# Patient Record
Sex: FEMALE | Race: WHITE | Employment: OTHER | URBAN - NONMETROPOLITAN AREA
[De-identification: names, ages, dates, MRNs, and addresses within clinical notes are randomized per-mention and may not be internally consistent; named-entity substitution may affect disease eponyms.]

---

## 2017-06-21 RX ORDER — NORTRIPTYLINE HYDROCHLORIDE 10 MG/1
CAPSULE ORAL
Qty: 180 CAPSULE | Refills: 1 | Status: SHIPPED | OUTPATIENT
Start: 2017-06-21 | End: 2017-12-06 | Stop reason: SDUPTHER

## 2017-06-21 RX ORDER — NORTRIPTYLINE HYDROCHLORIDE 10 MG/1
10 CAPSULE ORAL 2 TIMES DAILY
COMMUNITY
Start: 2017-03-23 | End: 2017-12-19

## 2017-08-04 RX ORDER — LISINOPRIL 10 MG/1
TABLET ORAL
Qty: 90 TABLET | Refills: 3 | Status: SHIPPED | OUTPATIENT
Start: 2017-08-04 | End: 2018-07-26 | Stop reason: SDUPTHER

## 2017-08-14 RX ORDER — ZOLMITRIPTAN 5 MG/1
TABLET, FILM COATED ORAL
Qty: 27 TABLET | Refills: 1 | Status: SHIPPED | OUTPATIENT
Start: 2017-08-14 | End: 2018-03-18 | Stop reason: SDUPTHER

## 2017-08-26 PROBLEM — G43.009 MIGRAINE WITHOUT AURA: Chronic | Status: ACTIVE | Noted: 2017-08-26

## 2017-08-26 PROBLEM — M50.90 CERVICAL DISC DISEASE: Chronic | Status: ACTIVE | Noted: 2017-08-26

## 2017-08-26 PROBLEM — F32.5 MAJOR DEPRESSIVE DISORDER IN REMISSION (HCC): Chronic | Status: ACTIVE | Noted: 2017-08-26

## 2017-08-26 PROBLEM — K21.9 GASTROESOPHAGEAL REFLUX DISEASE WITHOUT ESOPHAGITIS: Chronic | Status: ACTIVE | Noted: 2017-08-26

## 2017-08-26 PROBLEM — E78.5 HYPERLIPIDEMIA: Chronic | Status: ACTIVE | Noted: 2017-08-26

## 2017-08-26 PROBLEM — I73.00 RAYNAUD'S PHENOMENON: Chronic | Status: ACTIVE | Noted: 2017-08-26

## 2017-08-26 PROBLEM — D51.0 PERNICIOUS ANEMIA: Chronic | Status: ACTIVE | Noted: 2017-08-26

## 2017-08-28 ENCOUNTER — OFFICE VISIT (OUTPATIENT)
Dept: INTERNAL MEDICINE | Age: 57
End: 2017-08-28
Payer: COMMERCIAL

## 2017-08-28 VITALS
SYSTOLIC BLOOD PRESSURE: 122 MMHG | OXYGEN SATURATION: 99 % | DIASTOLIC BLOOD PRESSURE: 78 MMHG | WEIGHT: 151 LBS | HEART RATE: 67 BPM | BODY MASS INDEX: 24.27 KG/M2 | HEIGHT: 66 IN

## 2017-08-28 DIAGNOSIS — E78.2 MIXED HYPERLIPIDEMIA: Primary | Chronic | ICD-10-CM

## 2017-08-28 DIAGNOSIS — F32.5 MAJOR DEPRESSIVE DISORDER WITH SINGLE EPISODE, IN REMISSION (HCC): Chronic | ICD-10-CM

## 2017-08-28 DIAGNOSIS — G43.009 MIGRAINE WITHOUT AURA AND WITHOUT STATUS MIGRAINOSUS, NOT INTRACTABLE: Chronic | ICD-10-CM

## 2017-08-28 DIAGNOSIS — D51.0 PERNICIOUS ANEMIA: Chronic | ICD-10-CM

## 2017-08-28 PROCEDURE — 99396 PREV VISIT EST AGE 40-64: CPT | Performed by: INTERNAL MEDICINE

## 2017-08-28 RX ORDER — VITAMIN B COMPLEX
1 CAPSULE ORAL DAILY
COMMUNITY
End: 2021-03-19

## 2017-08-28 RX ORDER — TIZANIDINE 4 MG/1
4 TABLET ORAL 2 TIMES DAILY PRN
Qty: 90 TABLET | Refills: 1 | Status: SHIPPED | OUTPATIENT
Start: 2017-08-28 | End: 2017-11-29 | Stop reason: SDUPTHER

## 2017-08-28 RX ORDER — CHLORAL HYDRATE 500 MG
1000 CAPSULE ORAL DAILY
COMMUNITY
End: 2018-08-10

## 2017-08-28 RX ORDER — VERAPAMIL HYDROCHLORIDE 240 MG/1
240 CAPSULE, EXTENDED RELEASE ORAL DAILY
COMMUNITY
End: 2017-12-21 | Stop reason: SDUPTHER

## 2017-08-28 RX ORDER — FLUTICASONE PROPIONATE 50 MCG
2 SPRAY, SUSPENSION (ML) NASAL DAILY
COMMUNITY
End: 2018-08-10

## 2017-08-28 RX ORDER — CYANOCOBALAMIN 1000 UG/ML
1000 INJECTION INTRAMUSCULAR; SUBCUTANEOUS
COMMUNITY
End: 2017-08-28 | Stop reason: SDUPTHER

## 2017-08-28 RX ORDER — IVERMECTIN 10 MG/G
CREAM TOPICAL DAILY
COMMUNITY
End: 2019-11-19 | Stop reason: CLARIF

## 2017-08-28 RX ORDER — CYANOCOBALAMIN 1000 UG/ML
1000 INJECTION INTRAMUSCULAR; SUBCUTANEOUS
Qty: 30 ML | Refills: 1 | Status: SHIPPED | OUTPATIENT
Start: 2017-08-28 | End: 2017-09-24 | Stop reason: SDUPTHER

## 2017-08-28 RX ORDER — TIZANIDINE 4 MG/1
4 TABLET ORAL 2 TIMES DAILY PRN
COMMUNITY
End: 2017-08-28 | Stop reason: SDUPTHER

## 2017-08-28 ASSESSMENT — PATIENT HEALTH QUESTIONNAIRE - PHQ9
SUM OF ALL RESPONSES TO PHQ9 QUESTIONS 1 & 2: 0
SUM OF ALL RESPONSES TO PHQ QUESTIONS 1-9: 0
SUM OF ALL RESPONSES TO PHQ QUESTIONS 1-9: 0
2. FEELING DOWN, DEPRESSED OR HOPELESS: 0
2. FEELING DOWN, DEPRESSED OR HOPELESS: 0
1. LITTLE INTEREST OR PLEASURE IN DOING THINGS: 0
SUM OF ALL RESPONSES TO PHQ9 QUESTIONS 1 & 2: 0
1. LITTLE INTEREST OR PLEASURE IN DOING THINGS: 0

## 2017-08-28 ASSESSMENT — ENCOUNTER SYMPTOMS
SHORTNESS OF BREATH: 0
VOICE CHANGE: 0
VOMITING: 0
ANAL BLEEDING: 0
TROUBLE SWALLOWING: 0
DIARRHEA: 0
CONSTIPATION: 0
ABDOMINAL PAIN: 0
SORE THROAT: 0
COUGH: 0
APNEA: 0
NAUSEA: 0
BACK PAIN: 0
BLOOD IN STOOL: 0
RHINORRHEA: 0

## 2017-09-25 RX ORDER — CYANOCOBALAMIN 1000 UG/ML
INJECTION INTRAMUSCULAR; SUBCUTANEOUS
Qty: 3 ML | Refills: 2 | Status: SHIPPED | OUTPATIENT
Start: 2017-09-25 | End: 2018-05-30 | Stop reason: SDUPTHER

## 2017-10-19 RX ORDER — DESVENLAFAXINE 50 MG/1
TABLET, EXTENDED RELEASE ORAL
Qty: 90 TABLET | Refills: 1 | Status: SHIPPED | OUTPATIENT
Start: 2017-10-19 | End: 2018-04-20 | Stop reason: SDUPTHER

## 2017-10-19 NOTE — TELEPHONE ENCOUNTER
Requested Prescriptions     Pending Prescriptions Disp Refills    desvenlafaxine succinate (PRISTIQ) 50 MG TB24 extended release tablet [Pharmacy Med Name: DESVENLAFAXINE SUC ER 50 MG TB] 90 tablet 1     Sig: TAKE 1 TABLET BY MOUTH EVERY DAY

## 2017-11-27 RX ORDER — ESTRADIOL 1 MG/1
TABLET ORAL
Qty: 90 TABLET | Refills: 3 | Status: SHIPPED | OUTPATIENT
Start: 2017-11-27 | End: 2018-08-10

## 2017-11-29 ENCOUNTER — OFFICE VISIT (OUTPATIENT)
Dept: INTERNAL MEDICINE | Age: 57
End: 2017-11-29
Payer: COMMERCIAL

## 2017-11-29 VITALS
TEMPERATURE: 96.5 F | SYSTOLIC BLOOD PRESSURE: 132 MMHG | HEIGHT: 67 IN | WEIGHT: 154.5 LBS | HEART RATE: 76 BPM | OXYGEN SATURATION: 98 % | BODY MASS INDEX: 24.25 KG/M2 | DIASTOLIC BLOOD PRESSURE: 70 MMHG

## 2017-11-29 DIAGNOSIS — R20.0 NUMBNESS IN BOTH HANDS: Primary | ICD-10-CM

## 2017-11-29 DIAGNOSIS — M50.90 CERVICAL DISC DISEASE: Chronic | ICD-10-CM

## 2017-11-29 PROCEDURE — 99213 OFFICE O/P EST LOW 20 MIN: CPT | Performed by: PHYSICIAN ASSISTANT

## 2017-11-29 RX ORDER — TIZANIDINE 4 MG/1
4 TABLET ORAL 2 TIMES DAILY PRN
Qty: 180 TABLET | Refills: 1 | Status: SHIPPED | OUTPATIENT
Start: 2017-11-29 | End: 2018-09-24 | Stop reason: SDUPTHER

## 2017-11-29 ASSESSMENT — ENCOUNTER SYMPTOMS
CHEST TIGHTNESS: 0
DIARRHEA: 0
COLOR CHANGE: 0
EYE PAIN: 0
SHORTNESS OF BREATH: 0
SORE THROAT: 0
EYE REDNESS: 0
WHEEZING: 0
PHOTOPHOBIA: 0
ABDOMINAL PAIN: 0
COUGH: 0
VOMITING: 0
NAUSEA: 0
RHINORRHEA: 0
CONSTIPATION: 0
SINUS PRESSURE: 0

## 2017-11-29 NOTE — PROGRESS NOTES
 Raynaud's phenomenon 8/26/2017       Past Medical History:   Diagnosis Date    Cervical disc disease 8/26/2017    Gastroesophageal reflux disease without esophagitis 8/26/2017    Hyperlipidemia     Major depressive disorder in remission 8/26/2017    Migraine without aura 8/26/2017    Pernicious anemia 8/26/2017    Raynaud's phenomenon 8/26/2017       Prior to Visit Medications    Medication Sig Taking?  Authorizing Provider   estradiol (ESTRACE) 1 MG tablet TAKE 1 TABLET BY MOUTH EVERY DAY Yes Matt Hawk MD   desvenlafaxine succinate (PRISTIQ) 50 MG TB24 extended release tablet TAKE 1 TABLET BY MOUTH EVERY DAY Yes Matt Hawk MD   cyanocobalamin 1000 MCG/ML injection INJECT 1ML MONTHLY Yes Matt Hawk MD   Omega-3 Fatty Acids (FISH OIL) 1000 MG CAPS Take 1,000 mg by mouth daily Yes Historical Provider, MD   Flaxseed Linseed, (FLAX SEEDS PO) Take by mouth Yes Historical Provider, MD   fluticasone (FLONASE) 50 MCG/ACT nasal spray 2 sprays by Nasal route daily Yes Historical Provider, MD   Ivermectin (SOOLANTRA) 1 % CREA Apply topically daily Yes Historical Provider, MD   Eflornithine HCl (VANIQA) 13.9 % CREA Apply topically 2 times daily Yes Historical Provider, MD   verapamil (VERELAN) 240 MG extended release capsule Take 240 mg by mouth daily Yes Historical Provider, MD   b complex vitamins capsule Take 1 capsule by mouth daily Yes Historical Provider, MD   ZOLMitriptan (ZOMIG) 5 MG tablet TAKE 1 TABLET BY MOUTH AT ONSET OF MIGRAINE **MAY REPEAT ONCE AFTER 2 HOURS, MAX 10MG/DAY** Yes Matt Hawk MD   lisinopril (PRINIVIL;ZESTRIL) 10 MG tablet TAKE 1 TABLET BY MOUTH EVERY DAY Yes Matt Hawk MD   nortriptyline (PAMELOR) 10 MG capsule TAKE 2 CAPSULES BY MOUTH AT BEDTIME Yes Matt Hawk MD   nortriptyline (PAMELOR) 10 MG capsule Take 10 mg by mouth 2 times daily Yes Historical Provider, MD   tiZANidine (ZANAFLEX) 4 MG tablet Take 1 tablet by mouth 2 times daily as needed (muscle spasms)  Bora Trevino bruise/bleed easily. Psychiatric/Behavioral: Negative for confusion. The patient is not nervous/anxious. Current Outpatient Prescriptions   Medication Sig Dispense Refill    estradiol (ESTRACE) 1 MG tablet TAKE 1 TABLET BY MOUTH EVERY DAY 90 tablet 3    desvenlafaxine succinate (PRISTIQ) 50 MG TB24 extended release tablet TAKE 1 TABLET BY MOUTH EVERY DAY 90 tablet 1    cyanocobalamin 1000 MCG/ML injection INJECT 1ML MONTHLY 3 mL 2    Omega-3 Fatty Acids (FISH OIL) 1000 MG CAPS Take 1,000 mg by mouth daily      Flaxseed, Linseed, (FLAX SEEDS PO) Take by mouth      fluticasone (FLONASE) 50 MCG/ACT nasal spray 2 sprays by Nasal route daily      Ivermectin (SOOLANTRA) 1 % CREA Apply topically daily      Eflornithine HCl (VANIQA) 13.9 % CREA Apply topically 2 times daily      verapamil (VERELAN) 240 MG extended release capsule Take 240 mg by mouth daily      b complex vitamins capsule Take 1 capsule by mouth daily      ZOLMitriptan (ZOMIG) 5 MG tablet TAKE 1 TABLET BY MOUTH AT ONSET OF MIGRAINE **MAY REPEAT ONCE AFTER 2 HOURS, MAX 10MG/DAY** 27 tablet 1    lisinopril (PRINIVIL;ZESTRIL) 10 MG tablet TAKE 1 TABLET BY MOUTH EVERY DAY 90 tablet 3    nortriptyline (PAMELOR) 10 MG capsule TAKE 2 CAPSULES BY MOUTH AT BEDTIME 180 capsule 1    nortriptyline (PAMELOR) 10 MG capsule Take 10 mg by mouth 2 times daily      tiZANidine (ZANAFLEX) 4 MG tablet Take 1 tablet by mouth 2 times daily as needed (muscle spasms) 180 tablet 1     No current facility-administered medications for this visit. /70   Pulse 76   Temp 96.5 °F (35.8 °C)   Ht 5' 7\" (1.702 m)   Wt 154 lb 8 oz (70.1 kg)   SpO2 98%   BMI 24.20 kg/m²     PHYSICAL EXAM  Physical Exam   Constitutional: Vital signs are normal. She appears well-developed and well-nourished. HENT:   Head: Normocephalic and atraumatic.    Right Ear: External ear normal.   Left Ear: External ear normal.   Nose: Nose normal.   Eyes: Pupils are equal,

## 2017-12-06 RX ORDER — NORTRIPTYLINE HYDROCHLORIDE 10 MG/1
CAPSULE ORAL
Qty: 180 CAPSULE | Refills: 1 | Status: SHIPPED | OUTPATIENT
Start: 2017-12-06 | End: 2018-06-07 | Stop reason: SDUPTHER

## 2017-12-19 ENCOUNTER — OFFICE VISIT (OUTPATIENT)
Dept: INTERNAL MEDICINE | Age: 57
End: 2017-12-19
Payer: COMMERCIAL

## 2017-12-19 VITALS
OXYGEN SATURATION: 99 % | SYSTOLIC BLOOD PRESSURE: 122 MMHG | HEIGHT: 67 IN | WEIGHT: 157.5 LBS | BODY MASS INDEX: 24.72 KG/M2 | TEMPERATURE: 96.4 F | DIASTOLIC BLOOD PRESSURE: 70 MMHG | HEART RATE: 73 BPM

## 2017-12-19 DIAGNOSIS — R20.0 NUMBNESS AND TINGLING IN BOTH HANDS: ICD-10-CM

## 2017-12-19 DIAGNOSIS — R20.2 NUMBNESS AND TINGLING IN BOTH HANDS: ICD-10-CM

## 2017-12-19 DIAGNOSIS — M54.2 NECK PAIN: Primary | ICD-10-CM

## 2017-12-19 DIAGNOSIS — R20.2 PARESTHESIA OF SKIN: ICD-10-CM

## 2017-12-19 PROCEDURE — 99213 OFFICE O/P EST LOW 20 MIN: CPT | Performed by: PHYSICIAN ASSISTANT

## 2017-12-19 RX ORDER — CYCLOSPORINE 0.5 MG/ML
EMULSION OPHTHALMIC
Refills: 3 | COMMUNITY
Start: 2017-12-01 | End: 2019-05-14 | Stop reason: CLARIF

## 2017-12-19 ASSESSMENT — ENCOUNTER SYMPTOMS
DIARRHEA: 0
COLOR CHANGE: 0
ABDOMINAL PAIN: 0
RHINORRHEA: 0
WHEEZING: 0
CHEST TIGHTNESS: 0
SINUS PRESSURE: 0
SHORTNESS OF BREATH: 0
CONSTIPATION: 0
PHOTOPHOBIA: 0
EYE PAIN: 0
COUGH: 0
SORE THROAT: 0
VOMITING: 0
EYE REDNESS: 0
NAUSEA: 0

## 2017-12-19 NOTE — PROGRESS NOTES
tablet TAKE 1 TABLET BY MOUTH AT ONSET OF MIGRAINE **MAY REPEAT ONCE AFTER 2 HOURS, MAX 10MG/DAY** Yes Nicole Danielle MD   lisinopril (PRINIVIL;ZESTRIL) 10 MG tablet TAKE 1 TABLET BY MOUTH EVERY DAY Yes Nicole Danielle MD   Flaxseed, Linseed, (FLAX SEEDS PO) Take by mouth  Historical Provider, MD       Past Surgical History:   Procedure Laterality Date    RIGOBERTO AND BSO         Family History   Problem Relation Age of Onset    High Blood Pressure Mother     Coronary Art Dis Mother     Thyroid Disease Mother      Hypothyroidism    Colon Polyps Father     Prostate Cancer Father     Anemia Father      Pernicious    Colon Polyps Sister     Anemia Brother      Pernicious    High Blood Pressure Paternal Grandmother        Allergies   Allergen Reactions    Pcn [Penicillins] Rash       Social History     Social History    Marital status:      Spouse name: N/A    Number of children: N/A    Years of education: N/A     Occupational History    Not on file. Social History Main Topics    Smoking status: Never Smoker    Smokeless tobacco: Never Used    Alcohol use Yes    Drug use: No    Sexual activity: Not on file     Other Topics Concern    Not on file     Social History Narrative    No narrative on file       Review of Systems  Review of Systems   Constitutional: Negative for activity change, appetite change, chills, fatigue and fever. HENT: Negative for congestion, ear pain, postnasal drip, rhinorrhea, sinus pressure, sneezing and sore throat. Eyes: Negative for photophobia, pain and redness. Respiratory: Negative for cough, chest tightness, shortness of breath and wheezing. Cardiovascular: Negative for chest pain, palpitations and leg swelling. Gastrointestinal: Negative for abdominal pain, constipation, diarrhea, nausea and vomiting. Genitourinary: Negative for dysuria, frequency, hematuria and urgency. Musculoskeletal: Positive for neck pain.  Negative for arthralgias, gait problem, hopefully with the sensation on the side of her neck. Patient states that she is not interested in seeing neurosurgery at this time. So we'll not rush to get MRI. Patient follow-up as needed for increased paresthesia, weakness and numbness and tingling in hands increases or as needed. Orders Placed This Encounter   Procedures   [facility]        Return if symptoms worsen or fail to improve. All questions answered. Patient voices understanding and agrees to plans along with risks and benefits of plan. Patient is instructed to continue prior medications, diet, and exercise plans as instructed. Patient agrees to follow up as instructions, sooner if needed, or to go to ER if condition becomes emergent. Additional Instructions: As always, patient is advised to bring in medication bottles in order to correctly reconcile with our current list.      Myesha Murguia PA-C    EMR Dragon/transcription disclaimer: Much of this encounter note is electronic transcription/translation of spoken language to printed text. The electronic translation of spoken language may be erroneous, or at times, nonsensical words or phrases may be inadvertently transcribed.  Although I have reviewed the note for such errors, some may still exist.

## 2017-12-21 RX ORDER — VERAPAMIL HYDROCHLORIDE 240 MG/1
CAPSULE, EXTENDED RELEASE ORAL
Qty: 90 CAPSULE | Refills: 0 | Status: SHIPPED | OUTPATIENT
Start: 2017-12-21 | End: 2018-03-18 | Stop reason: SDUPTHER

## 2018-01-02 ENCOUNTER — HOSPITAL ENCOUNTER (OUTPATIENT)
Dept: PHYSICAL THERAPY | Age: 58
Setting detail: THERAPIES SERIES
Discharge: HOME | End: 2018-01-02
Payer: COMMERCIAL

## 2018-01-02 PROCEDURE — G8982 BODY POS GOAL STATUS: HCPCS

## 2018-01-02 PROCEDURE — G8981 BODY POS CURRENT STATUS: HCPCS

## 2018-01-02 PROCEDURE — 97162 PT EVAL MOD COMPLEX 30 MIN: CPT

## 2018-01-02 ASSESSMENT — PAIN DESCRIPTION - DESCRIPTORS: DESCRIPTORS: ACHING;HEADACHE;SHOOTING

## 2018-01-02 ASSESSMENT — PAIN DESCRIPTION - FREQUENCY: FREQUENCY: CONTINUOUS

## 2018-01-02 ASSESSMENT — PAIN DESCRIPTION - ORIENTATION: ORIENTATION: LEFT

## 2018-01-02 ASSESSMENT — PAIN DESCRIPTION - LOCATION: LOCATION: NECK

## 2018-01-02 ASSESSMENT — PAIN DESCRIPTION - PAIN TYPE: TYPE: ACUTE PAIN

## 2018-01-02 NOTE — PROGRESS NOTES
Aching;Headache;Shooting  Pain Frequency: Continuous  Vital Signs  Patient Currently in Pain: Yes    Vision/Hearing  Vision  Vision: Within Functional Limits (Hx of detached retina. 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    Observation: At rest in sitting, holds head tilted in at least 10 degrees of R SB. Rounded shoulders with slumped posture.     AROM LLE (degrees)  LLE AROM : WNL  AROM RUE (degrees)  RUE AROM : WNL  Spine  Cervical: 50 degrees flexion, 60 degrees extension (pain at end range), 35 degrees SB R, 55 degrees SB L, 100% rotation bilaterally    Strength RUE  R Shoulder Flexion: 5/5  R Shoulder ABduction: 5/5  R Shoulder Internal Rotation: 5/5  R Shoulder External Rotation: 5/5  R Elbow Flexion: 5/5  R Elbow Extension: 5/5  R Forearm Pron: 5/5  R Forearm Sup: 5/5  R Wrist Flexion: 5/5  R Wrist Extension: 5/5  R Wrist Radial Deviation: 5/5  R Wrist Ulnar Deviation: 5/5  Strength LUE  L Shoulder Flexion: 5/5  L Shoulder ABduction: 5/5  L Shoulder Internal Rotation: 5/5  L Shoulder External Rotation: 5/5  L Elbow Flexion: 5/5  L Elbow Extension: 5/5  L Forearm Pron: 5/5  L Forearm Sup: 5/5  L Wrist Flexion: 4+/5  L Wrist Extension: 5/5  L Wrist Radial Deviation: 5/5  L Wrist Ulnar Deviation: 5/5  Motor Control  Gross Motor?: WNL  Additional Measures  Special Tests: Phalen and reverse Phalen do cause mild increase in L fingertip numbness  Other: Treatment Recommendations: Strengthening, ROM, Home Exercise Program, Safety Education & Training, Patient/Caregiver Education & Training, Equipment Evaluation, Education, & procurement, Modalities    G-Code  PT G-Codes  Functional Assessment Tool Used: Neck Pain Disability Index  Score: 32% impairment  Functional Limitation: Changing and maintaining body position  Changing and Maintaining Body Position Current Status (): At least 20 percent but less than 40 percent impaired, limited or restricted  Changing and Maintaining Body Position Goal Status (): At least 1 percent but less than 20 percent impaired, limited or restricted    OutComes Score                                           Goals  Short term goals  Time Frame for Short term goals: 2-3 weeks  Short term goal 1: Independent with HEP  Short term goal 2: Improve cervical flexion to at least 60 degrees, R SB to at least 50 degrees. Short term goal 3: Report pain on average as 3/10 or less. Long term goals  Time Frame for Long term goals : 4-6 weeks  Long term goal 1: Report reduced frequency and intensity of headaches. Long term goal 2: Report LUE parasthesias less than 50% of the day. Long term goal 3: Normalize posture (neutral head position, as opposed to R or L SB)  Long term goal 4: Improve NPDI score to less than 20% impairment.   Patient Goals   Patient goals : reduce L hand numbness       Therapy Time   Individual Concurrent Group Co-treatment   Time In 1450         Time Out 1535         Minutes 39                 Celeste Figueroa PT   Electronically signed by Celeste Figueroa PT on 1/2/2018 at 4:30 PM

## 2018-01-04 ENCOUNTER — HOSPITAL ENCOUNTER (OUTPATIENT)
Dept: PHYSICAL THERAPY | Age: 58
Setting detail: THERAPIES SERIES
Discharge: HOME | End: 2018-01-04
Payer: COMMERCIAL

## 2018-01-04 PROCEDURE — 97035 APP MDLTY 1+ULTRASOUND EA 15: CPT

## 2018-01-04 PROCEDURE — 97012 MECHANICAL TRACTION THERAPY: CPT

## 2018-01-04 PROCEDURE — 97110 THERAPEUTIC EXERCISES: CPT

## 2018-01-04 NOTE — PROGRESS NOTES
US x 8 min seated at 1.5 W/cm2  Cervical traction: ICT x 15 min @ 15#           Assessment:   Conditions Requiring Skilled Therapeutic Intervention  Body structures, Functions, Activity limitations: Decreased ROM; Decreased strength;Decreased sensation;Decreased high-level IADLs  Assessment: Pt without reports of pain today at rest.  Only reports pain with certain movements and main complaint is stiffness. Pt able to perform routine with min-mod cues. Started out with low reps and intensity. Pt ban routine with only occ reports of tightnes/soreness with stretching and cautioned not to overstretch or go to point of pain. Pt also reports discomfort ant shldr jt with Tband scapular retraction but reports hx of shldr trouble which she also attended therapy for. Pt set up on ICT at 15# tension x 15 min. Pt ban well and indicates that she would like to try inc poundage next visit. Treatment Diagnosis: Neck pain  Patient Education: Plan of care, clinical findings  REQUIRES PT FOLLOW UP: Yes  Treatment Initiated : 1/4/18 ICT @ 15# for 15' and pt to report back on her tolerance next visit; immediately after denied any pain  Discharge Recommendations: Continue to assess pending progress      G-Code:     OutComes Score                                           Goals:  Short term goals  Time Frame for Short term goals: 2-3 weeks  Short term goal 1: Independent with HEP  Short term goal 2: Improve cervical flexion to at least 60 degrees, R SB to at least 50 degrees. Short term goal 3: Report pain on average as 3/10 or less. Long term goals  Time Frame for Long term goals : 4-6 weeks  Long term goal 1: Report reduced frequency and intensity of headaches. Long term goal 2: Report LUE parasthesias less than 50% of the day. Long term goal 3: Normalize posture (neutral head position, as opposed to R or L SB)  Long term goal 4: Improve NPDI score to less than 20% impairment.   Patient Goals   Patient goals : reduce L hand numbness    Plan:    Plan  Times per week: 2x  Plan weeks: 4-6 weeks  Current Treatment Recommendations: Strengthening, ROM, Home Exercise Program, Safety Education & Training, Patient/Caregiver Education & Training, Equipment Evaluation, Education, & procurement, Modalities  Timed Code Treatment Minutes: 45 Minutes (15 min ICT)     Therapy Time   Individual Concurrent Group Co-treatment   Time In 1500         Time Out 1600         Minutes 60         Timed Code Treatment Minutes: 45 Minutes (15 min ICT)       Candie Salmon PTA       Electronically signed by Candie Salmon PTA on 1/4/2018 at 4:03 PM

## 2018-01-08 ENCOUNTER — HOSPITAL ENCOUNTER (OUTPATIENT)
Dept: PHYSICAL THERAPY | Age: 58
Setting detail: THERAPIES SERIES
Discharge: HOME | End: 2018-01-08
Payer: COMMERCIAL

## 2018-01-08 PROCEDURE — 97012 MECHANICAL TRACTION THERAPY: CPT

## 2018-01-08 PROCEDURE — 97110 THERAPEUTIC EXERCISES: CPT

## 2018-01-09 ENCOUNTER — HOSPITAL ENCOUNTER (OUTPATIENT)
Dept: NEUROLOGY | Age: 58
Discharge: HOME | End: 2018-01-09
Payer: COMMERCIAL

## 2018-01-09 DIAGNOSIS — R20.0 NUMBNESS IN BOTH HANDS: ICD-10-CM

## 2018-01-09 PROCEDURE — 95911 NRV CNDJ TEST 9-10 STUDIES: CPT

## 2018-01-09 PROCEDURE — 95886 MUSC TEST DONE W/N TEST COMP: CPT | Performed by: PSYCHIATRY & NEUROLOGY

## 2018-01-09 PROCEDURE — 95886 MUSC TEST DONE W/N TEST COMP: CPT

## 2018-01-09 PROCEDURE — 95911 NRV CNDJ TEST 9-10 STUDIES: CPT | Performed by: PSYCHIATRY & NEUROLOGY

## 2018-01-11 ENCOUNTER — HOSPITAL ENCOUNTER (OUTPATIENT)
Dept: PHYSICAL THERAPY | Age: 58
Setting detail: THERAPIES SERIES
Discharge: HOME | End: 2018-01-11
Payer: COMMERCIAL

## 2018-01-11 PROCEDURE — 97012 MECHANICAL TRACTION THERAPY: CPT

## 2018-01-11 PROCEDURE — 97110 THERAPEUTIC EXERCISES: CPT

## 2018-01-11 NOTE — PROGRESS NOTES
Physical Therapy  Daily Treatment Note  Date: 2018  Patient Name: Alexandra Beth  MRN: [id number]     :   1960    Subjective:   General  Chart Reviewed: Yes  Additional Pertinent Hx: 60 y/o F who presents with neck pain and L hand numbness, as well as R sided neck parasthesias. PMH includes detached retina, hx migraines, pernicious anemia  Response To Previous Treatment: Patient with no complaints from previous session. Referring Practitioner: RAH Obando  PT Visit Information  PT Insurance Information: BCBS (No precert req'd. Massage and HP/CP not covered)  Total # of Visits Approved:  (Anticipate 8-12)  Total # of Visits to Date: 4  Plan of Care/Certification Expiration Date: 18  Progress Note Due Date: 18  Subjective  Subjective: Pt denies pain but reports she is always stiff. Pt admits she hasn't done any ex since HEP issued. Pain Screening  Patient Currently in Pain: No  Vital Signs  Patient Currently in Pain: No       Treatment Activities:              Exercises  Exercise 1: U/S to upper traps and posterior neck in sitting (more focus on L side) 2.0 watt/cm2 x 8 minutes  Exercise 2: CROM x 10 (rotation, flex/ext, sidebending)  Exercise 3: Chin tucks x 10  Exercise 4: Upper trap stretch x 3 for 10\" ea  Exercise 5: Levator stretch x 3 for 10\" ea  Exercise 6: Scalene stretch x 3 for 10\" ea  Exercise 7: Exaggerated slouch and overcorrection x 10  Exercise 8: Shoulder shrugs x 10  Exercise 9: Backwards shoulder rolls x 10  Exercise 11: Overhead reach---NOT today  Exercise 12: Supine on short thoracic pad (as tolerated) x 3 min arms outstretched (pt enjoys this position)  Exercise 13: Windmills x 5 (diff keeping head in line with arm)  Exercise 14: Rhomboid stretch on pole x 3 for 10\" ea  Exercise 15: Scapular retraction with red t-band x 10  Exercise 16: Standing Is, Ts, Ys-- NOT today  Exercise 17:  Fwd bend rows x 10 ea  Exercise 18: ICT x 15 min @ 20#  Exercise 20: **HEP ISSUED 1/8/18**     Modalities  Ultrasound: US x 8 min seated at 1.5 W/cm2  Cervical traction: ICT x 15 min @ 15#              Assessment:   Conditions Requiring Skilled Therapeutic Intervention  Body structures, Functions, Activity limitations: Decreased ROM; Decreased strength;Decreased sensation;Decreased high-level IADLs  Assessment: Pt cont to report only stiffness. Pt denies performing HEP at home to date. Pt with good effort today. Mild discomfort with stretching ex. Some diff with keeping head and arm lined up with windmills (this added today). Pt with no reports of pain only stiffness, but feels looser upon visit end. Treatment Diagnosis: Neck pain      G-Code:     OutComes Score                                           Goals:  Short term goals  Time Frame for Short term goals: 2-3 weeks  Short term goal 1: Independent with HEP (HEP issued 1/8/18)  Short term goal 2: Improve cervical flexion to at least 60 degrees, R SB to at least 50 degrees. Short term goal 3: Report pain on average as 3/10 or less. Long term goals  Time Frame for Long term goals : 4-6 weeks  Long term goal 1: Report reduced frequency and intensity of headaches. Long term goal 2: Report LUE parasthesias less than 50% of the day. Long term goal 3: Normalize posture (neutral head position, as opposed to R or L SB)  Long term goal 4: Improve NPDI score to less than 20% impairment.   Patient Goals   Patient goals : reduce L hand numbness    Plan:    Plan  Times per week: 2x  Plan weeks: 4-6 weeks  Current Treatment Recommendations: Strengthening, ROM, Home Exercise Program, Safety Education & Training, Patient/Caregiver Education & Training, Equipment Evaluation, Education, & procurement, Modalities  Timed Code Treatment Minutes: 41 Minutes (15 min ICT)     Therapy Time   Individual Concurrent Group Co-treatment   Time In 1455         Time Out 1551         Minutes 56         Timed Code Treatment Minutes: 41 Minutes (15 min ICT)       Dario Elizabeth

## 2018-01-15 ENCOUNTER — HOSPITAL ENCOUNTER (OUTPATIENT)
Dept: PHYSICAL THERAPY | Age: 58
Setting detail: THERAPIES SERIES
End: 2018-01-15
Payer: COMMERCIAL

## 2018-01-18 ENCOUNTER — HOSPITAL ENCOUNTER (OUTPATIENT)
Dept: PHYSICAL THERAPY | Age: 58
Setting detail: THERAPIES SERIES
Discharge: HOME | End: 2018-01-18
Payer: COMMERCIAL

## 2018-01-18 PROCEDURE — 97012 MECHANICAL TRACTION THERAPY: CPT

## 2018-01-18 PROCEDURE — 97110 THERAPEUTIC EXERCISES: CPT

## 2018-01-22 ENCOUNTER — HOSPITAL ENCOUNTER (OUTPATIENT)
Dept: PHYSICAL THERAPY | Age: 58
Setting detail: THERAPIES SERIES
Discharge: HOME | End: 2018-01-22
Payer: COMMERCIAL

## 2018-01-22 PROCEDURE — 97110 THERAPEUTIC EXERCISES: CPT

## 2018-01-22 PROCEDURE — 97012 MECHANICAL TRACTION THERAPY: CPT

## 2018-01-22 PROCEDURE — 97035 APP MDLTY 1+ULTRASOUND EA 15: CPT

## 2018-01-22 ASSESSMENT — PAIN DESCRIPTION - DESCRIPTORS: DESCRIPTORS: TINGLING;NUMBNESS

## 2018-01-22 ASSESSMENT — PAIN DESCRIPTION - ORIENTATION: ORIENTATION: LEFT;RIGHT

## 2018-01-22 ASSESSMENT — PAIN SCALES - GENERAL: PAINLEVEL_OUTOF10: 1

## 2018-01-22 ASSESSMENT — PAIN DESCRIPTION - PAIN TYPE: TYPE: ACUTE PAIN

## 2018-01-22 ASSESSMENT — PAIN DESCRIPTION - LOCATION: LOCATION: NECK

## 2018-01-22 ASSESSMENT — PAIN DESCRIPTION - FREQUENCY: FREQUENCY: INTERMITTENT

## 2018-01-22 NOTE — PROGRESS NOTES
head in line with arm, required tactile cues and v.c.)  Exercise 14: Rhomboid stretch on pole x 3 for 10\" ea  Exercise 15: Scapular retraction with red t-band x 10  Exercise 16: Standing Is, Ts, Ys-- 1 x 10 ea  Exercise 17: Fwd bend rows x 10 ea  Exercise 18: ICT x 15 min @ 23#  Exercise 20: **HEP ISSUED 1/8/18**     Modalities  Ultrasound: US x 8 min seated at 1.5 W/cm2--bilateral UT's  Cervical traction: ICT x 20 min @ 23#         Assessment:   Conditions Requiring Skilled Therapeutic Intervention  Body structures, Functions, Activity limitations: Decreased ROM; Decreased strength;Decreased sensation;Decreased high-level IADLs  Assessment: Patient continues to do well the program.  She was able to recall approx 75% of her program.  Requiring min to occ mod verbal cues to perform ex with good tech. She reports overall less neck pain since initiation of therapy. However, the intensity of the symptoms have unchanged. Treatment Diagnosis: Neck pain                     Goals:  Short term goals  Time Frame for Short term goals: 2-3 weeks  Short term goal 1: Independent with HEP (HEP issued 1/8/18)  Short term goal 2: Improve cervical flexion to at least 60 degrees, R SB to at least 50 degrees. Short term goal 3: Report pain on average as 3/10 or less. Long term goals  Time Frame for Long term goals : 4-6 weeks  Long term goal 1: Report reduced frequency and intensity of headaches. (01/18/18: Patient says she is still having headaches and could not determine if frequency or intensity had changed.)  Long term goal 2: Report LUE parasthesias less than 50% of the day. (01/18/2018: patient reports numbness has not changed any)  Long term goal 3: Normalize posture (neutral head position, as opposed to R or L SB)  Long term goal 4: Improve NPDI score to less than 20% impairment.   Patient Goals   Patient goals : reduce L hand numbness    Plan:    Plan  Times per week: 2x  Plan weeks: 4-6 weeks  Current Treatment

## 2018-01-25 ENCOUNTER — HOSPITAL ENCOUNTER (OUTPATIENT)
Dept: PHYSICAL THERAPY | Age: 58
Setting detail: THERAPIES SERIES
End: 2018-01-25
Payer: COMMERCIAL

## 2018-01-29 ENCOUNTER — HOSPITAL ENCOUNTER (OUTPATIENT)
Dept: PHYSICAL THERAPY | Age: 58
Setting detail: THERAPIES SERIES
Discharge: HOME | End: 2018-01-29
Payer: COMMERCIAL

## 2018-01-29 PROCEDURE — 97035 APP MDLTY 1+ULTRASOUND EA 15: CPT

## 2018-01-29 PROCEDURE — 97012 MECHANICAL TRACTION THERAPY: CPT

## 2018-01-29 PROCEDURE — 97110 THERAPEUTIC EXERCISES: CPT

## 2018-01-29 ASSESSMENT — PAIN DESCRIPTION - FREQUENCY: FREQUENCY: INTERMITTENT

## 2018-01-29 ASSESSMENT — PAIN SCALES - GENERAL: PAINLEVEL_OUTOF10: 3

## 2018-01-29 ASSESSMENT — PAIN DESCRIPTION - ORIENTATION: ORIENTATION: RIGHT;LEFT

## 2018-01-29 ASSESSMENT — PAIN DESCRIPTION - DESCRIPTORS: DESCRIPTORS: TINGLING;NUMBNESS

## 2018-01-29 ASSESSMENT — PAIN DESCRIPTION - LOCATION: LOCATION: NECK

## 2018-01-29 ASSESSMENT — PAIN DESCRIPTION - PAIN TYPE: TYPE: ACUTE PAIN

## 2018-02-05 ENCOUNTER — HOSPITAL ENCOUNTER (OUTPATIENT)
Dept: PHYSICAL THERAPY | Age: 58
Setting detail: THERAPIES SERIES
Discharge: HOME | End: 2018-02-05
Payer: COMMERCIAL

## 2018-02-05 PROCEDURE — G8983 BODY POS D/C STATUS: HCPCS

## 2018-02-05 PROCEDURE — 97110 THERAPEUTIC EXERCISES: CPT

## 2018-02-05 PROCEDURE — G8982 BODY POS GOAL STATUS: HCPCS

## 2018-02-05 PROCEDURE — 97012 MECHANICAL TRACTION THERAPY: CPT

## 2018-02-05 PROCEDURE — G8981 BODY POS CURRENT STATUS: HCPCS

## 2018-02-05 PROCEDURE — 97035 APP MDLTY 1+ULTRASOUND EA 15: CPT

## 2018-02-05 ASSESSMENT — PAIN DESCRIPTION - LOCATION: LOCATION: NECK;SHOULDER

## 2018-02-05 ASSESSMENT — PAIN SCALES - GENERAL: PAINLEVEL_OUTOF10: 1

## 2018-02-27 ENCOUNTER — TELEPHONE (OUTPATIENT)
Dept: INTERNAL MEDICINE | Age: 58
End: 2018-02-27

## 2018-02-27 NOTE — TELEPHONE ENCOUNTER
Spoke with patient,  She said she called yesterday to cancel her appointment but doesn't remember who she spoke to. It wasn't me. Please call to reschedule.   2/27 1688

## 2018-03-19 RX ORDER — ZOLMITRIPTAN 5 MG/1
TABLET, FILM COATED ORAL
Qty: 27 TABLET | Refills: 1 | Status: SHIPPED | OUTPATIENT
Start: 2018-03-19 | End: 2018-05-21 | Stop reason: SDUPTHER

## 2018-03-19 RX ORDER — VERAPAMIL HYDROCHLORIDE 240 MG/1
TABLET, FILM COATED, EXTENDED RELEASE ORAL
Qty: 90 TABLET | Refills: 3 | Status: SHIPPED | OUTPATIENT
Start: 2018-03-19 | End: 2019-02-04 | Stop reason: SDUPTHER

## 2018-03-23 RX ORDER — VERAPAMIL HYDROCHLORIDE 240 MG/1
TABLET, FILM COATED, EXTENDED RELEASE ORAL
Qty: 90 TABLET | Refills: 0 | Status: SHIPPED | OUTPATIENT
Start: 2018-03-23 | End: 2018-08-10

## 2018-03-23 NOTE — TELEPHONE ENCOUNTER
Tien Query called requesting a refill of the below medication which has been pended for you:     Requested Prescriptions     Pending Prescriptions Disp Refills    verapamil (CALAN SR) 240 MG extended release tablet [Pharmacy Med Name: VERAPAMIL  MG TABLET] 90 tablet 0     Sig: TAKE 1 CAPSULE DAILY       Last Appointment Date: Visit date not found  Next Appointment Date: Visit date not found    Allergies   Allergen Reactions    Pcn [Penicillins] Rash

## 2018-04-20 RX ORDER — DESVENLAFAXINE 50 MG/1
TABLET, EXTENDED RELEASE ORAL
Qty: 90 TABLET | Refills: 3 | Status: SHIPPED | OUTPATIENT
Start: 2018-04-20 | End: 2019-04-17 | Stop reason: SDUPTHER

## 2018-05-21 RX ORDER — ZOLMITRIPTAN 5 MG/1
TABLET, FILM COATED ORAL
Qty: 9 TABLET | Refills: 2 | Status: SHIPPED | OUTPATIENT
Start: 2018-05-21 | End: 2019-05-14 | Stop reason: CLARIF

## 2018-05-29 ENCOUNTER — TELEPHONE (OUTPATIENT)
Dept: INTERNAL MEDICINE | Age: 58
End: 2018-05-29

## 2018-05-29 DIAGNOSIS — G43.009 MIGRAINE WITHOUT AURA AND RESPONSIVE TO TREATMENT: Primary | ICD-10-CM

## 2018-05-30 RX ORDER — CYANOCOBALAMIN 1000 UG/ML
INJECTION INTRAMUSCULAR; SUBCUTANEOUS
Qty: 3 ML | Refills: 2 | Status: SHIPPED | OUTPATIENT
Start: 2018-05-30 | End: 2019-05-08 | Stop reason: SDUPTHER

## 2018-06-05 RX ORDER — RIZATRIPTAN BENZOATE 10 MG/1
10 TABLET ORAL
Qty: 30 TABLET | Refills: 3 | Status: SHIPPED | OUTPATIENT
Start: 2018-06-05 | End: 2019-11-19 | Stop reason: CLARIF

## 2018-06-08 RX ORDER — NORTRIPTYLINE HYDROCHLORIDE 10 MG/1
CAPSULE ORAL
Qty: 180 CAPSULE | Refills: 1 | Status: SHIPPED | OUTPATIENT
Start: 2018-06-08 | End: 2018-12-03 | Stop reason: SDUPTHER

## 2018-07-26 NOTE — TELEPHONE ENCOUNTER
Marcellus Severe called requesting a refill of the below medication which has been pended for you:     Requested Prescriptions     Pending Prescriptions Disp Refills    lisinopril (PRINIVIL;ZESTRIL) 10 MG tablet [Pharmacy Med Name: LISINOPRIL 10 MG TABLET] 90 tablet 3     Sig: TAKE 1 TABLET BY MOUTH EVERY DAY       Last Appointment Date: Visit date not found  Next Appointment Date: Visit date not found    Allergies   Allergen Reactions    Pcn [Penicillins] Rash

## 2018-07-27 RX ORDER — LISINOPRIL 10 MG/1
TABLET ORAL
Qty: 90 TABLET | Refills: 3 | Status: SHIPPED | OUTPATIENT
Start: 2018-07-27 | End: 2019-07-15 | Stop reason: SDUPTHER

## 2018-08-10 ENCOUNTER — OFFICE VISIT (OUTPATIENT)
Dept: INTERNAL MEDICINE | Age: 58
End: 2018-08-10
Payer: COMMERCIAL

## 2018-08-10 VITALS
HEART RATE: 71 BPM | SYSTOLIC BLOOD PRESSURE: 120 MMHG | HEIGHT: 67 IN | BODY MASS INDEX: 24.04 KG/M2 | TEMPERATURE: 97.3 F | WEIGHT: 153.2 LBS | DIASTOLIC BLOOD PRESSURE: 80 MMHG | OXYGEN SATURATION: 96 %

## 2018-08-10 DIAGNOSIS — Z00.00 ROUTINE GENERAL MEDICAL EXAMINATION AT A HEALTH CARE FACILITY: Primary | ICD-10-CM

## 2018-08-10 DIAGNOSIS — I10 ESSENTIAL HYPERTENSION: ICD-10-CM

## 2018-08-10 DIAGNOSIS — Z23 NEED FOR PROPHYLACTIC VACCINATION AND INOCULATION AGAINST VARICELLA: ICD-10-CM

## 2018-08-10 DIAGNOSIS — Z12.11 SCREEN FOR COLON CANCER: ICD-10-CM

## 2018-08-10 DIAGNOSIS — Z12.39 SCREENING FOR BREAST CANCER: ICD-10-CM

## 2018-08-10 DIAGNOSIS — D51.0 PERNICIOUS ANEMIA: ICD-10-CM

## 2018-08-10 DIAGNOSIS — Z23 NEED FOR PROPHYLACTIC VACCINATION AGAINST DIPHTHERIA-TETANUS-PERTUSSIS (DTP): ICD-10-CM

## 2018-08-10 DIAGNOSIS — N95.1 HOT FLASHES DUE TO MENOPAUSE: ICD-10-CM

## 2018-08-10 DIAGNOSIS — F32.5 MAJOR DEPRESSIVE DISORDER WITH SINGLE EPISODE, IN REMISSION (HCC): Chronic | ICD-10-CM

## 2018-08-10 DIAGNOSIS — E78.2 MIXED HYPERLIPIDEMIA: Chronic | ICD-10-CM

## 2018-08-10 DIAGNOSIS — M50.90 CERVICAL DISC DISEASE: Chronic | ICD-10-CM

## 2018-08-10 PROCEDURE — 90715 TDAP VACCINE 7 YRS/> IM: CPT | Performed by: PHYSICIAN ASSISTANT

## 2018-08-10 PROCEDURE — 99214 OFFICE O/P EST MOD 30 MIN: CPT | Performed by: PHYSICIAN ASSISTANT

## 2018-08-10 PROCEDURE — 90471 IMMUNIZATION ADMIN: CPT | Performed by: PHYSICIAN ASSISTANT

## 2018-08-10 RX ORDER — ESTRADIOL 0.5 MG/1
0.5 TABLET ORAL DAILY
Qty: 90 TABLET | Refills: 3 | Status: SHIPPED | OUTPATIENT
Start: 2018-08-10 | End: 2019-01-29 | Stop reason: SDUPTHER

## 2018-08-10 ASSESSMENT — ENCOUNTER SYMPTOMS
WHEEZING: 0
ABDOMINAL PAIN: 0
EYE REDNESS: 0
COLOR CHANGE: 0
CONSTIPATION: 0
RHINORRHEA: 0
DIARRHEA: 0
SINUS PRESSURE: 0
COUGH: 0
CHEST TIGHTNESS: 0
VOMITING: 0
PHOTOPHOBIA: 0
SORE THROAT: 0
EYE PAIN: 0
SHORTNESS OF BREATH: 0
NAUSEA: 0

## 2018-08-10 NOTE — PROGRESS NOTES
[facility]  [address] [facility] [address]  Dept: [phone]  Dept Fax: [phone]  Loc: [phone]      [facility] INTERNAL MEDICINE  OFFICE NOTE      Chief Complaint   Patient presents with    Other     f/u , pt states shes been having alot of hot flashes stopped medications back in may        Moses Magaña is a 60y.o. year old female who is seen for Yearly physical for chronic conditions hypertension, hyperlipidemia, hot flashes, depression. Patient's blood pressure seems to be stable at this time. Patient's current medication regiment seems to be adequate. Patient denies any chest pain, shortness of breath, palpitations. Patient states compliant with taking medication. Patient's hyperlipidemia seems to be fairly well controlled with diet and exercise continue as directed. Patient states trying to properly eat and exercise as directed. Patient did not get labs but will get at her convenience and we were review it and make sure we do not need to adjust medications. Patient's depression seems to be stable on Pristiq 50 mg. Patient states was on Estrace 1 mg daily for hot flashes. Patient states about 2-3 months ago she decided that she no longer wanted to take Estrace so she stopped it abruptly. Patient states when she stopped medication she had very bad hot flashes. Patient states would like to restart the Estrace but try to go down to the 0.5 mg. Patient is due colonoscopy her last colonoscopy was done by Dr. Jai Schultz in 2013. Patient is due her mammogram her last one was December 2016.           Active Ambulatory Problems     Diagnosis Date Noted    Breast density 12/09/2015    Hyperlipidemia 08/26/2017    Migraine without aura 08/26/2017    Pernicious anemia 08/26/2017    Raynaud's phenomenon 08/26/2017    Cervical disc disease 08/26/2017    Major depressive disorder in remission ([facility] 08/26/2017    Gastroesophageal reflux disease without esophagitis 08/26/2017    Numbness in both hands      Resolved Ambulatory Problems     Diagnosis Date Noted    No Resolved Ambulatory Problems     Past Medical History:   Diagnosis Date    Cervical disc disease 8/26/2017    Gastroesophageal reflux disease without esophagitis 8/26/2017    Hyperlipidemia     Major depressive disorder in remission ([facility] 8/26/2017    Migraine without aura 8/26/2017    Pernicious anemia 8/26/2017    Raynaud's phenomenon 8/26/2017       Past Medical History:   Diagnosis Date    Cervical disc disease 8/26/2017    Gastroesophageal reflux disease without esophagitis 8/26/2017    Hyperlipidemia     Major depressive disorder in remission ([facility] 8/26/2017    Migraine without aura 8/26/2017    Pernicious anemia 8/26/2017    Raynaud's phenomenon 8/26/2017       Prior to Visit Medications    Medication Sig Taking? Authorizing Provider   zoster recombinant adjuvanted vaccine (SHINGRIX) 50 MCG SUSR injection Inject 0.5 mLs into the muscle once for 1 dose 50 MCG IM then repeat 2-6 months.  Yes RAH Barker   estradiol (ESTRACE) 0.5 MG tablet Take 1 tablet by mouth daily Yes RAH Barker   lisinopril (PRINIVIL;ZESTRIL) 10 MG tablet TAKE 1 TABLET BY MOUTH EVERY DAY Yes Suzette Bauer MD   nortriptyline (PAMELOR) 10 MG capsule TAKE 2 CAPSULES BY MOUTH AT BEDTIME Yes Suzette Bauer MD   rizatriptan (MAXALT) 10 MG tablet Take 1 tablet by mouth once as needed for Migraine May repeat in 2 hours if needed G43.009 Yes Suzette Bauer MD   cyanocobalamin 1000 MCG/ML injection INJECT 1ML MONTHLY Yes Suzette Bauer MD   ZOLMitriptan (ZOMIG) 5 MG tablet TAKE 1 TABLET AT ONSET OF  MIGRANE MAY REPEAT ONCE    AFTER 2 HOURS MAX OF 10MG  PER DAY Yes Suzette Bauer MD   desvenlafaxine succinate (PRISTIQ) 50 MG TB24 extended release tablet TAKE 1 TABLET BY MOUTH EVERY DAY Yes Suzette Bauer MD   verapamil (CALAN SR) 240 MG extended release tablet TAKE 1 Endocrine:        Hot flashes   Genitourinary: Negative for dysuria, frequency, hematuria and urgency. Musculoskeletal: Negative for arthralgias, gait problem, joint swelling and myalgias. Skin: Negative for color change, pallor and wound. Patient has some rosacea on her cheeks   Neurological: Negative for dizziness, facial asymmetry, speech difficulty, weakness and light-headedness. Hematological: Negative for adenopathy. Does not bruise/bleed easily. Psychiatric/Behavioral: Negative for confusion. The patient is not nervous/anxious. Current Outpatient Prescriptions   Medication Sig Dispense Refill    zoster recombinant adjuvanted vaccine (SHINGRIX) 50 MCG SUSR injection Inject 0.5 mLs into the muscle once for 1 dose 50 MCG IM then repeat 2-6 months.  1 each 1    estradiol (ESTRACE) 0.5 MG tablet Take 1 tablet by mouth daily 90 tablet 3    lisinopril (PRINIVIL;ZESTRIL) 10 MG tablet TAKE 1 TABLET BY MOUTH EVERY DAY 90 tablet 3    nortriptyline (PAMELOR) 10 MG capsule TAKE 2 CAPSULES BY MOUTH AT BEDTIME 180 capsule 1    rizatriptan (MAXALT) 10 MG tablet Take 1 tablet by mouth once as needed for Migraine May repeat in 2 hours if needed G43.009 30 tablet 3    cyanocobalamin 1000 MCG/ML injection INJECT 1ML MONTHLY 3 mL 2    ZOLMitriptan (ZOMIG) 5 MG tablet TAKE 1 TABLET AT ONSET OF  MIGRANE MAY REPEAT ONCE    AFTER 2 HOURS MAX OF 10MG  PER DAY 9 tablet 2    desvenlafaxine succinate (PRISTIQ) 50 MG TB24 extended release tablet TAKE 1 TABLET BY MOUTH EVERY DAY 90 tablet 3    verapamil (CALAN SR) 240 MG extended release tablet TAKE 1 CAPSULE DAILY 90 tablet 3    RESTASIS 0.05 % ophthalmic emulsion 1 DROP BOTH EYES TWICE A DAY AS DIRECTED  3    tiZANidine (ZANAFLEX) 4 MG tablet Take 1 tablet by mouth 2 times daily as needed (muscle spasms) 180 tablet 1    b complex vitamins capsule Take 1 capsule by mouth daily      Ivermectin (SOOLANTRA) 1 % CREA Apply topically daily       No current facility-administered medications for this visit. /80   Pulse 71   Temp 97.3 °F (36.3 °C)   Ht 5' 7\" (1.702 m)   Wt 153 lb 3.2 oz (69.5 kg)   SpO2 96%   BMI 23.99 kg/m²     PHYSICAL EXAM  Physical Exam   Constitutional: Vital signs are normal. She appears well-developed and well-nourished. HENT:   Head: Normocephalic and atraumatic. Right Ear: External ear normal.   Left Ear: External ear normal.   Nose: Nose normal.   Eyes: Pupils are equal, round, and reactive to light. Right eye exhibits no discharge. Left eye exhibits no discharge. Neck: Normal range of motion. No tracheal deviation present. Cardiovascular: Normal rate, regular rhythm and normal heart sounds. Exam reveals no gallop and no friction rub. No murmur heard. Pulmonary/Chest: Effort normal and breath sounds normal. No respiratory distress. She has no wheezes. She has no rales. She exhibits no tenderness. Abdominal: Soft. There is no tenderness. There is no rebound and no guarding. Musculoskeletal: Normal range of motion. She exhibits no tenderness or deformity. Lymphadenopathy:     She has no cervical adenopathy. Neurological: She is alert. Skin: Skin is warm, dry and intact. No lesion and no rash noted. No erythema. Psychiatric: She has a normal mood and affect. Her mood appears not anxious. She does not exhibit a depressed mood. Nursing note and vitals reviewed. ASSESSMENT      ICD-10-CM ICD-9-CM    1. Routine general medical examination at a health care facility Z00.00 V70.0    2. Hot flashes due to menopause N95.1 627.2 estradiol (ESTRACE) 0.5 MG tablet      Vitamin D 1,25 Dihydroxy   3. Mixed hyperlipidemia E78.2 272.2 Comprehensive Metabolic Panel      Lipid Panel      Comprehensive Metabolic Panel      Lipid Panel   4. Screen for colon cancer Z12.11 V76.51 External Referral To Gastroenterology   5.  Screening for breast cancer Z12.31 V76.10 JOANA DIGITAL SCREEN W OR WO CAD BILATERAL has had all questions answered. Patient voices understanding and agrees to plans along with risks and benefits of plan. Patient is instructed to continue prior medications, diet, and exercise plans as instructed. Patient agrees to follow up as instructions, sooner if needed, or to go to ER if condition becomes emergent. Additional Instructions: As always, patient is advised to bring in medication bottles in order to correctly reconcile with our current list.      Koki Panda PA-C    EMR Dragon/transcription disclaimer: Much of this encounter note is electronic transcription/translation of spoken language to printed text. The electronic translation of spoken language may be erroneous, or at times, nonsensical words or phrases may be inadvertently transcribed.  Although I have reviewed the note for such errors, some may still exist.

## 2018-08-13 ENCOUNTER — TELEPHONE (OUTPATIENT)
Dept: INTERNAL MEDICINE | Age: 58
End: 2018-08-13

## 2018-08-16 ENCOUNTER — HOSPITAL ENCOUNTER (OUTPATIENT)
Dept: WOMENS IMAGING | Age: 58
Discharge: HOME | End: 2018-08-16
Payer: COMMERCIAL

## 2018-08-16 DIAGNOSIS — Z12.39 SCREENING FOR BREAST CANCER: ICD-10-CM

## 2018-08-16 PROCEDURE — 77067 SCR MAMMO BI INCL CAD: CPT

## 2018-08-27 RX ORDER — VERAPAMIL HYDROCHLORIDE 240 MG/1
TABLET, FILM COATED, EXTENDED RELEASE ORAL
Qty: 90 TABLET | Refills: 0 | Status: SHIPPED | OUTPATIENT
Start: 2018-08-27 | End: 2018-11-12 | Stop reason: SDUPTHER

## 2018-08-27 NOTE — TELEPHONE ENCOUNTER
Requested Prescriptions     Pending Prescriptions Disp Refills    verapamil (CALAN SR) 240 MG extended release tablet [Pharmacy Med Name: VERAPAMIL  MG TABLET] 90 tablet 0     Sig: TAKE 1 TABLET BY MOUTH DAILY

## 2018-09-17 ENCOUNTER — OFFICE VISIT (OUTPATIENT)
Dept: GASTROENTEROLOGY | Facility: CLINIC | Age: 58
End: 2018-09-17

## 2018-09-17 VITALS
WEIGHT: 151 LBS | DIASTOLIC BLOOD PRESSURE: 62 MMHG | BODY MASS INDEX: 23.7 KG/M2 | TEMPERATURE: 97.4 F | SYSTOLIC BLOOD PRESSURE: 102 MMHG | OXYGEN SATURATION: 98 % | HEIGHT: 67 IN | HEART RATE: 69 BPM

## 2018-09-17 DIAGNOSIS — Z83.71 FAMILY HISTORY OF POLYPS IN THE COLON: ICD-10-CM

## 2018-09-17 DIAGNOSIS — Z86.010 HX OF COLONIC POLYPS: Primary | ICD-10-CM

## 2018-09-17 PROCEDURE — S0285 CNSLT BEFORE SCREEN COLONOSC: HCPCS | Performed by: NURSE PRACTITIONER

## 2018-09-17 RX ORDER — LISINOPRIL 10 MG/1
10 TABLET ORAL DAILY
COMMUNITY
Start: 2018-07-27

## 2018-09-17 RX ORDER — TIZANIDINE 4 MG/1
4 TABLET ORAL EVERY 6 HOURS PRN
COMMUNITY
Start: 2018-06-26

## 2018-09-17 RX ORDER — DESVENLAFAXINE SUCCINATE 50 MG/1
50 TABLET, EXTENDED RELEASE ORAL DAILY
COMMUNITY
Start: 2018-07-21

## 2018-09-17 RX ORDER — ESTRADIOL 0.5 MG/1
TABLET ORAL
COMMUNITY
Start: 2018-08-10

## 2018-09-17 RX ORDER — RIZATRIPTAN BENZOATE 10 MG/1
10 TABLET ORAL ONCE AS NEEDED
COMMUNITY
Start: 2018-09-02

## 2018-09-17 RX ORDER — NORTRIPTYLINE HYDROCHLORIDE 10 MG/1
10 CAPSULE ORAL NIGHTLY
COMMUNITY
Start: 2018-09-05

## 2018-09-17 RX ORDER — VERAPAMIL HYDROCHLORIDE 240 MG/1
240 TABLET, FILM COATED, EXTENDED RELEASE ORAL DAILY
COMMUNITY
Start: 2018-08-27 | End: 2021-10-19 | Stop reason: ALTCHOICE

## 2018-09-17 RX ORDER — SODIUM, POTASSIUM,MAG SULFATES 17.5-3.13G
2 SOLUTION, RECONSTITUTED, ORAL ORAL ONCE
Qty: 2 BOTTLE | Refills: 0 | Status: SHIPPED | OUTPATIENT
Start: 2018-09-17 | End: 2018-09-17

## 2018-09-17 NOTE — PROGRESS NOTES
Chief Complaint   Patient presents with   • Colon Cancer Screening     Patient is here today for colon screening.     Subjective   HPI  Kendal Valladares is a 58 y.o. female who presents as a referral for preventative maintenance. She has no complaints of nausea or vomiting. No change in bowels. No wt loss. No BRBPR. No melena. There is no family hx for colon cancer. No abdominal pain. There was no Cologaurd screening this year.  The patient's last colonoscopy was performed on 12/16/13 with findings of diverticulosis only.  The patient does carry a history of colon polyps.      Past Medical History:   Diagnosis Date   • Headache    • Migraine    • Mitral valve prolapse    • Pernicious anemia    • Raynauds syndrome      Past Surgical History:   Procedure Laterality Date   • COLONOSCOPY  12/16/2013   • DILATATION AND CURETTAGE     • RETINAL DETACHMENT REPAIR  10/2011       Current Outpatient Prescriptions:   •  desvenlafaxine (PRISTIQ) 50 MG 24 hr tablet, , Disp: , Rfl:   •  estradiol (ESTRACE) 0.5 MG tablet, , Disp: , Rfl:   •  Flaxseed, Linseed, (FLAX SEED OIL PO), Take  by mouth., Disp: , Rfl:   •  lisinopril (PRINIVIL,ZESTRIL) 10 MG tablet, , Disp: , Rfl:   •  Multiple Vitamins-Minerals (MULTIVITAL-M PO), Take  by mouth., Disp: , Rfl:   •  nortriptyline (PAMELOR) 10 MG capsule, , Disp: , Rfl:   •  Omega-3 Fatty Acids (FISH OIL PO), Take  by mouth., Disp: , Rfl:   •  rizatriptan (MAXALT) 10 MG tablet, , Disp: , Rfl:   •  tiZANidine (ZANAFLEX) 4 MG tablet, , Disp: , Rfl:   •  verapamil SR (CALAN-SR) 240 MG CR tablet, , Disp: , Rfl:   •  Vit B12-Methionine-Inos-Chol solution, Inject  into the appropriate muscle as directed by prescriber Every 30 (Thirty) Days., Disp: , Rfl:   •  sodium-potassium-magnesium sulfates (SUPREP BOWEL PREP KIT) 17.5-3.13-1.6 GM/180ML solution oral solution, Take 2 bottles by mouth 1 (One) Time for 1 dose. Split dose prep as directed by office instructions provided.  2 bottles = one kit., Disp: 2  "bottle, Rfl: 0  Allergies   Allergen Reactions   • Demerol [Meperidine] GI Intolerance   • Penicillins Rash     Social History     Social History   • Marital status:      Spouse name: N/A   • Number of children: N/A   • Years of education: N/A     Occupational History   • Not on file.     Social History Main Topics   • Smoking status: Former Smoker   • Smokeless tobacco: Never Used   • Alcohol use Yes      Comment: occasional   • Drug use: No   • Sexual activity: Not on file     Other Topics Concern   • Not on file     Social History Narrative   • No narrative on file     Family History   Problem Relation Age of Onset   • Colon polyps Father    • Colon polyps Sister    • Colon cancer Neg Hx        REVIEW OF SYSTEMS  General: well appearing, no fever chills or sweats, no unexplained wt loss  HEENT: no acute visual or hearing disturbances  Cardiovascular: No chest pain or palpitations  Pulmonary: No shortness of breath, coughing, wheezing or hemoptysis  : No burning, urgency, hematuria, or dysuria  Musculoskeletal: No joint pain or stiffness  Peripheral: no edema  Skin: No lesions or rashes  Neuro: No dizziness, headaches, stroke, syncope  Endocrine: No hot or cold intolerances  Hematological: No blood dyscrasias    Objective   Vitals:    09/17/18 1417   BP: 102/62   Pulse: 69   Temp: 97.4 °F (36.3 °C)   SpO2: 98%   Weight: 68.5 kg (151 lb)   Height: 170.2 cm (67\")     Body mass index is 23.65 kg/m².    PHYSICAL EXAM  General: age appropriate well nourished well appearing, no acute distress  Head: normocephalic and atraumatic  Global assessment-supple  Neck-No JVD noted, no lymphadenopathy  Pulmonary-clear to auscultation bilaterally, normal respiratory effort  Cardiovascular-normal rate and rhythm, normal heart sounds, S1 and S2 noted  Abdomen-soft, non tender, non distended, normal bowel sounds all 4 quadrants, no hepatosplenomegaly noted  Extremities-No clubbing cyanosis or edema  Neuro-Non focal, " converses appropriately, awake, alert, oriented    Imaging Results (most recent)     None        Assessment/Plan   Kendal was seen today for colon cancer screening.    Diagnoses and all orders for this visit:    Hx of colonic polyps  -     Case Request; Standing  -     Case Request    Family history of polyps in the colon  Comments:  father and sister  Orders:  -     Case Request; Standing  -     Case Request    Other orders  -     Follow Anesthesia Guidelines / Standing Orders; Future  -     Implement Anesthesia Orders Day of Procedure; Standing  -     Obtain Informed Consent; Standing  -     Verify bowel prep was successful; Standing  -     sodium-potassium-magnesium sulfates (SUPREP BOWEL PREP KIT) 17.5-3.13-1.6 GM/180ML solution oral solution; Take 2 bottles by mouth 1 (One) Time for 1 dose. Split dose prep as directed by office instructions provided.  2 bottles = one kit.      COLONOSCOPY WITH ANESTHESIA (N/A)       Body mass index is 23.65 kg/m². Patient's Body mass index is 23.65 kg/m². BMI is below normal parameters. Recommendations include: no follow-up required.      All risks, benefits, alternatives, and indications of colonoscopy procedure have been discussed with the patient. Risks to include perforation of the colon requiring possible surgery or colostomy, risk of bleeding from biopsies or removal of colon tissue, possibility of missing a colon polyp or cancer, or adverse drug reaction.  Benefits to include the diagnosis and management of disease of the colon and rectum. Alternatives to include barium enema, radiographic evaluation, lab testing or no intervention. Pt verbalizes understanding and agrees.

## 2018-09-18 PROBLEM — Z86.0100 HX OF COLONIC POLYPS: Status: ACTIVE | Noted: 2018-09-18

## 2018-09-18 PROBLEM — Z86.010 HX OF COLONIC POLYPS: Status: ACTIVE | Noted: 2018-09-18

## 2018-09-18 PROBLEM — Z83.719 FAMILY HISTORY OF POLYPS IN THE COLON: Status: ACTIVE | Noted: 2018-09-18

## 2018-09-18 PROBLEM — Z83.71 FAMILY HISTORY OF POLYPS IN THE COLON: Status: ACTIVE | Noted: 2018-09-18

## 2018-09-24 RX ORDER — TIZANIDINE 4 MG/1
4 TABLET ORAL 2 TIMES DAILY PRN
Qty: 180 TABLET | Refills: 1 | Status: SHIPPED | OUTPATIENT
Start: 2018-09-24 | End: 2019-03-24 | Stop reason: SDUPTHER

## 2018-11-12 ENCOUNTER — TELEPHONE (OUTPATIENT)
Dept: GASTROENTEROLOGY | Facility: CLINIC | Age: 58
End: 2018-11-12

## 2018-11-12 ENCOUNTER — ANESTHESIA (OUTPATIENT)
Dept: GASTROENTEROLOGY | Facility: HOSPITAL | Age: 58
End: 2018-11-12

## 2018-11-12 ENCOUNTER — HOSPITAL ENCOUNTER (OUTPATIENT)
Facility: HOSPITAL | Age: 58
Setting detail: HOSPITAL OUTPATIENT SURGERY
Discharge: HOME | End: 2018-11-12
Attending: INTERNAL MEDICINE | Admitting: INTERNAL MEDICINE

## 2018-11-12 ENCOUNTER — ANESTHESIA EVENT (OUTPATIENT)
Dept: GASTROENTEROLOGY | Facility: HOSPITAL | Age: 58
End: 2018-11-12

## 2018-11-12 VITALS
DIASTOLIC BLOOD PRESSURE: 83 MMHG | OXYGEN SATURATION: 100 % | HEART RATE: 76 BPM | SYSTOLIC BLOOD PRESSURE: 115 MMHG | RESPIRATION RATE: 12 BRPM

## 2018-11-12 VITALS
HEIGHT: 67 IN | WEIGHT: 150 LBS | RESPIRATION RATE: 13 BRPM | SYSTOLIC BLOOD PRESSURE: 137 MMHG | OXYGEN SATURATION: 99 % | TEMPERATURE: 98 F | BODY MASS INDEX: 23.54 KG/M2 | HEART RATE: 61 BPM | DIASTOLIC BLOOD PRESSURE: 93 MMHG

## 2018-11-12 PROCEDURE — G0105 COLORECTAL SCRN; HI RISK IND: HCPCS | Performed by: INTERNAL MEDICINE

## 2018-11-12 PROCEDURE — 25010000002 PROPOFOL 10 MG/ML EMULSION: Performed by: NURSE ANESTHETIST, CERTIFIED REGISTERED

## 2018-11-12 RX ORDER — SODIUM CHLORIDE 9 MG/ML
500 INJECTION, SOLUTION INTRAVENOUS CONTINUOUS PRN
Status: DISCONTINUED | OUTPATIENT
Start: 2018-11-12 | End: 2018-11-12 | Stop reason: HOSPADM

## 2018-11-12 RX ORDER — VERAPAMIL HYDROCHLORIDE 240 MG/1
TABLET, FILM COATED, EXTENDED RELEASE ORAL
Qty: 90 TABLET | Refills: 1 | Status: SHIPPED | OUTPATIENT
Start: 2018-11-12 | End: 2019-02-04 | Stop reason: SDUPTHER

## 2018-11-12 RX ORDER — PROPOFOL 10 MG/ML
VIAL (ML) INTRAVENOUS AS NEEDED
Status: DISCONTINUED | OUTPATIENT
Start: 2018-11-12 | End: 2018-11-12 | Stop reason: SURG

## 2018-11-12 RX ORDER — LIDOCAINE HYDROCHLORIDE 20 MG/ML
INJECTION, SOLUTION INFILTRATION; PERINEURAL AS NEEDED
Status: DISCONTINUED | OUTPATIENT
Start: 2018-11-12 | End: 2018-11-12 | Stop reason: SURG

## 2018-11-12 RX ORDER — SODIUM CHLORIDE 0.9 % (FLUSH) 0.9 %
3 SYRINGE (ML) INJECTION AS NEEDED
Status: DISCONTINUED | OUTPATIENT
Start: 2018-11-12 | End: 2018-11-12 | Stop reason: HOSPADM

## 2018-11-12 RX ADMIN — SODIUM CHLORIDE: 0.9 INJECTION, SOLUTION INTRAVENOUS at 07:40

## 2018-11-12 RX ADMIN — LIDOCAINE HYDROCHLORIDE 100 MG: 20 INJECTION, SOLUTION INFILTRATION; PERINEURAL at 07:52

## 2018-11-12 RX ADMIN — PROPOFOL 300 MG: 10 INJECTION, EMULSION INTRAVENOUS at 07:52

## 2018-11-12 NOTE — ANESTHESIA PREPROCEDURE EVALUATION
Anesthesia Evaluation     Patient summary reviewed and Nursing notes reviewed   no history of anesthetic complications:  NPO Solid Status: > 8 hours  NPO Liquid Status: > 8 hours           Airway   Mallampati: I  TM distance: >3 FB  Neck ROM: full  No difficulty expected  Dental      Pulmonary    Cardiovascular   Exercise tolerance: good (4-7 METS)    (+) hypertension, valvular problems/murmurs (asymptomatic) MVP, PVD,       Neuro/Psych  (+) headaches,     GI/Hepatic/Renal/Endo    (-) liver disease, no renal disease, diabetes    Musculoskeletal     Abdominal    Substance History      OB/GYN          Other                        Anesthesia Plan    ASA 2     general     intravenous induction   Anesthetic plan, all risks, benefits, and alternatives have been provided, discussed and informed consent has been obtained with: patient.

## 2018-11-12 NOTE — H&P
Chief Complaint:   Screening    Subjective     HPI:   She presents for screening colonoscopy.  She has 2 first-degree relatives with history of colon polyps.  Last colonoscopy was 5 years ago.  She is not known to have any adenomatous.    Past Medical History:   Past Medical History:   Diagnosis Date   • Headache    • Hypertension    • Migraine    • Mitral valve prolapse    • Pernicious anemia    • Raynauds syndrome        Past Surgical History:  Past Surgical History:   Procedure Laterality Date   • COLONOSCOPY  12/16/2013   • DILATATION AND CURETTAGE     • RETINAL DETACHMENT REPAIR  10/2011        Family History:  Family History   Problem Relation Age of Onset   • Colon polyps Father    • Colon polyps Sister    • Colon cancer Neg Hx        Social History:   reports that she has quit smoking. she has never used smokeless tobacco. She reports that she drinks alcohol. She reports that she does not use drugs.    Medications:   Medications Prior to Admission   Medication Sig Dispense Refill Last Dose   • desvenlafaxine (PRISTIQ) 50 MG 24 hr tablet    11/11/2018 at Unknown time   • estradiol (ESTRACE) 0.5 MG tablet    11/11/2018 at Unknown time   • lisinopril (PRINIVIL,ZESTRIL) 10 MG tablet    11/11/2018 at Unknown time   • Multiple Vitamins-Minerals (MULTIVITAL-M PO) Take  by mouth.   Past Month at Unknown time   • Omega-3 Fatty Acids (FISH OIL PO) Take  by mouth.   Past Month at Unknown time   • rizatriptan (MAXALT) 10 MG tablet    Past Month at Unknown time   • tiZANidine (ZANAFLEX) 4 MG tablet    Past Week at Unknown time   • verapamil SR (CALAN-SR) 240 MG CR tablet    11/11/2018 at Unknown time   • Vit B12-Methionine-Inos-Chol solution Inject  into the appropriate muscle as directed by prescriber Every 30 (Thirty) Days.   Past Week at Unknown time   • nortriptyline (PAMELOR) 10 MG capsule    11/10/2018       Allergies:  Demerol [meperidine] and Penicillins    ROS:    General: Weight stable  Resp: No  "SOA  Cardiovascular: No CP      Objective     /79 (BP Location: Right arm, Patient Position: Sitting)   Pulse 78   Temp 98 °F (36.7 °C) (Temporal)   Resp 18   Ht 170.2 cm (67\")   Wt 68 kg (150 lb)   SpO2 95%   BMI 23.49 kg/m²     Physical Exam   Constitutional: Pt is oriented to person, place, and in no distress.  Eyes: No icterus  ENMT: Unremarkable   Cardiovascular: Normal rate, regular rhythm.    Pulmonary/Chest: No distress.  No audible wheezes  Abdominal: Soft.   Skin: Skin is warm and dry.   Psychiatric: Mood, memory, affect and judgment appear normal.     Assessment/Plan     Diagnosis:  Screening  Family history colon polyps in 2 first-degree relatives.    Anticipated Surgical Procedure:  Colonoscopy    The risks, benefits, and alternatives of colonoscopy were reviewed with the patient today.  Risks including perforation of the colon possibly requiring surgery or colostomy.  Additional risks include risk of bleeding from biopsies or removal of colon tissue.  There is also the risk of a drug reaction or problems with anesthesia.  This will be discussed with the further by the anesthesia team on the day of the procedure.  Lastly there is a possibility of missing a colon polyp or cancer.  The benefits include the diagnosis and management of disease of the colon and rectum.  Alternatives to colonoscopy include barium enema, laboratory testing, radiographic evaluation, or no intervention.  The patient verbalizes understanding and agrees.    Much of this encounter note is an electronic transcription/translation of spoken language to printed text. The electronic translation of spoken language may permit erroneous, or at times, nonsensical words or phrases to be inadvertently transcribed; although I have reviewed the note for such errors, some may still exist.        "

## 2018-12-03 RX ORDER — NORTRIPTYLINE HYDROCHLORIDE 10 MG/1
CAPSULE ORAL
Qty: 180 CAPSULE | Refills: 1 | Status: SHIPPED | OUTPATIENT
Start: 2018-12-03 | End: 2019-06-04 | Stop reason: SDUPTHER

## 2019-01-29 DIAGNOSIS — N95.1 HOT FLASHES DUE TO MENOPAUSE: ICD-10-CM

## 2019-01-29 RX ORDER — ESTRADIOL 0.5 MG/1
0.5 TABLET ORAL DAILY
Qty: 90 TABLET | Refills: 3 | Status: SHIPPED | OUTPATIENT
Start: 2019-01-29 | End: 2020-09-14 | Stop reason: ALTCHOICE

## 2019-02-04 RX ORDER — VERAPAMIL HYDROCHLORIDE 240 MG/1
240 TABLET, FILM COATED, EXTENDED RELEASE ORAL DAILY
Qty: 90 TABLET | Refills: 0 | Status: SHIPPED | OUTPATIENT
Start: 2019-02-04 | End: 2019-04-30 | Stop reason: SDUPTHER

## 2019-02-19 ENCOUNTER — TELEPHONE (OUTPATIENT)
Dept: INTERNAL MEDICINE | Age: 59
End: 2019-02-19

## 2019-02-26 ENCOUNTER — TELEPHONE (OUTPATIENT)
Dept: INTERNAL MEDICINE | Age: 59
End: 2019-02-26

## 2019-02-27 RX ORDER — CIPROFLOXACIN 500 MG/1
500 TABLET, FILM COATED ORAL 2 TIMES DAILY
Qty: 14 TABLET | Refills: 0 | Status: SHIPPED | OUTPATIENT
Start: 2019-02-27 | End: 2019-03-06

## 2019-03-26 RX ORDER — TIZANIDINE 4 MG/1
4 TABLET ORAL 2 TIMES DAILY PRN
Qty: 180 TABLET | Refills: 1 | Status: SHIPPED | OUTPATIENT
Start: 2019-03-26 | End: 2019-09-26 | Stop reason: SDUPTHER

## 2019-04-17 RX ORDER — DESVENLAFAXINE 50 MG/1
TABLET, EXTENDED RELEASE ORAL
Qty: 90 TABLET | Refills: 3 | Status: SHIPPED | OUTPATIENT
Start: 2019-04-17 | End: 2020-04-13

## 2019-05-01 RX ORDER — VERAPAMIL HYDROCHLORIDE 240 MG/1
TABLET, FILM COATED, EXTENDED RELEASE ORAL
Qty: 90 TABLET | Refills: 0 | Status: SHIPPED | OUTPATIENT
Start: 2019-05-01 | End: 2019-07-22 | Stop reason: SDUPTHER

## 2019-05-08 RX ORDER — CYANOCOBALAMIN 1000 UG/ML
INJECTION INTRAMUSCULAR; SUBCUTANEOUS
Qty: 3 ML | Refills: 2 | Status: SHIPPED | OUTPATIENT
Start: 2019-05-08 | End: 2020-01-20

## 2019-05-14 ENCOUNTER — OFFICE VISIT (OUTPATIENT)
Dept: INTERNAL MEDICINE | Age: 59
End: 2019-05-14
Payer: COMMERCIAL

## 2019-05-14 VITALS
DIASTOLIC BLOOD PRESSURE: 66 MMHG | BODY MASS INDEX: 24.01 KG/M2 | HEIGHT: 67 IN | OXYGEN SATURATION: 99 % | SYSTOLIC BLOOD PRESSURE: 110 MMHG | WEIGHT: 153 LBS | HEART RATE: 79 BPM

## 2019-05-14 DIAGNOSIS — M50.90 CERVICAL DISC DISEASE: Chronic | ICD-10-CM

## 2019-05-14 DIAGNOSIS — K21.9 GASTROESOPHAGEAL REFLUX DISEASE WITHOUT ESOPHAGITIS: Chronic | ICD-10-CM

## 2019-05-14 DIAGNOSIS — G43.009 MIGRAINE WITHOUT AURA AND WITHOUT STATUS MIGRAINOSUS, NOT INTRACTABLE: Chronic | ICD-10-CM

## 2019-05-14 DIAGNOSIS — E78.2 MIXED HYPERLIPIDEMIA: Chronic | ICD-10-CM

## 2019-05-14 DIAGNOSIS — D51.0 PERNICIOUS ANEMIA: Primary | Chronic | ICD-10-CM

## 2019-05-14 PROCEDURE — 99214 OFFICE O/P EST MOD 30 MIN: CPT | Performed by: INTERNAL MEDICINE

## 2019-05-14 NOTE — PROGRESS NOTES
Chief Complaint   Patient presents with   Shelley Slater Patient     Dr. Carol Schwab transfer    Hypertension    Hyperlipidemia       HPI: Patient is a new patient to me she previously saw Dr. Amie Simon she comes in to follow-up hypertension hyperlipidemia cervical DJD depression GERD other medical problems she actually is doing very well generally feels well has occasional headache but overall everything is stable she denies chest pressure chest pain dyspnea abdominal pain we reviewed her past medical history her record and her care. Past Medical History:   Diagnosis Date    Cervical disc disease 8/26/2017    Gastroesophageal reflux disease without esophagitis 8/26/2017    Hyperlipidemia     Major depressive disorder in remission ([facility] [address] 8/26/2017    Migraine without aura 8/26/2017    Pernicious anemia 8/26/2017    Raynaud's phenomenon 8/26/2017       Past Surgical History:   Procedure Laterality Date    RIGOBERTO AND BSO         Family History   Problem Relation Age of Onset    High Blood Pressure Mother     Coronary Art Dis Mother     Thyroid Disease Mother         Hypothyroidism    Breast Cancer Mother     Colon Polyps Father     Prostate Cancer Father     Anemia Father         Pernicious    Colon Polyps Sister     Anemia Brother         Pernicious    High Blood Pressure Paternal Grandmother        Social History     Socioeconomic History    Marital status:      Spouse name: Not on file    Number of children: Not on file    Years of education: Not on file    Highest education level: Not on file   Occupational History    Not on file   Social Needs    Financial resource strain: Not on file    Food insecurity:     Worry: Not on file     Inability: Not on file    Transportation needs:     Medical: Not on file     Non-medical: Not on file   Tobacco Use    Smoking status: Never Smoker    Smokeless tobacco: Never Used   Substance and Sexual Activity    Alcohol use:  Yes    Drug use: No    Sexual activity: for chills, fatigue and fever. HENT: Negative for congestion and sinus pressure. Eyes: Negative for discharge and redness. Respiratory: Negative for cough and shortness of breath. Cardiovascular: Negative for chest pain, palpitations and leg swelling. Gastrointestinal: Negative for abdominal distention and abdominal pain. Genitourinary: Negative for dysuria, frequency and urgency. Musculoskeletal: Negative for arthralgias and back pain. Skin: Negative for rash and wound. Neurological: Positive for headaches. Negative for dizziness and light-headedness. Psychiatric/Behavioral: Negative for dysphoric mood and sleep disturbance. The patient is not nervous/anxious. /66 (Site: Left Upper Arm)   Pulse 79   Ht 5' 7\" (1.702 m)   Wt 153 lb (69.4 kg)   SpO2 99%   BMI 23.96 kg/m²   BP Readings from Last 7 Encounters:   05/14/19 110/66   08/10/18 120/80   12/19/17 122/70   11/29/17 132/70   08/28/17 122/78   10/24/16 120/68     Wt Readings from Last 7 Encounters:   05/14/19 153 lb (69.4 kg)   08/10/18 153 lb 3.2 oz (69.5 kg)   12/19/17 157 lb 8 oz (71.4 kg)   11/29/17 154 lb 8 oz (70.1 kg)   08/28/17 151 lb (68.5 kg)   10/24/16 153 lb 9.6 oz (69.7 kg)     BMI Readings from Last 7 Encounters:   05/14/19 23.96 kg/m²   08/10/18 23.99 kg/m²   12/19/17 24.67 kg/m²   11/29/17 24.20 kg/m²   08/28/17 24.37 kg/m²   10/24/16 24.06 kg/m²     Resp Readings from Last 7 Encounters:   10/24/16 16       Physical Exam   Constitutional: She is oriented to person, place, and time. She appears well-developed. No distress. HENT:   Right Ear: External ear normal. Tympanic membrane is not injected. Left Ear: External ear normal. Tympanic membrane is not injected. Mouth/Throat: Oropharynx is clear and moist. No oropharyngeal exudate. Eyes: Conjunctivae are normal. No scleral icterus. Neck: Neck supple. Carotid bruit is not present. No thyroid mass and no thyromegaly present.    Cardiovascular: Normal rate, regular rhythm, S1 normal and S2 normal. Exam reveals no S3 and no S4. No murmur heard. Pulmonary/Chest: Effort normal and breath sounds normal. No respiratory distress. She has no wheezes. She has no rales. Abdominal: Soft. Normal appearance and bowel sounds are normal. She exhibits no distension and no mass. There is no hepatosplenomegaly. There is no tenderness. Musculoskeletal: She exhibits no edema. Lymphadenopathy:     She has no cervical adenopathy. Right: No supraclavicular adenopathy present. Left: No supraclavicular adenopathy present. Neurological: She is alert and oriented to person, place, and time. She has normal strength. No cranial nerve deficit. Skin: Skin is intact. No rash noted.        Results for orders placed or performed during the hospital encounter of 04/08/13   CBC panel item   Result Value Ref Range    WBC 4.76 (L) 4.8 - 10.8 TH/uL    RBC 5.13 4.2 - 5.4 MIL/uL    Hemoglobin 14.0 12.0 - 16.0 G/DL    Hematocrit 42.2 37 - 47 %    MCV 82.3 81 - 99 FL    MCH 27.3 27 - 31 PG    MCHC 33.2 33 - 37 G/DL    RDW 13.9 11.0 - 14.0 %    Platelets 715 015 - 642    MPV 11.3 10.2 - 13.2 FL    Neutrophils # 2.54 1.5 - 7.5 TH/MM3    Lymphocytes 1.74 1.1 - 4.5 TH/MM3    Monocytes 0.28 0.0 - 0.9 TH/MM3    Eosinophils % 0.17 0.0 - 0.60 TH/MM3    Basophils 0.03 0.0 - 0.20 TH/MM3    Neutrophils % 53.3 50 - 70 %    Lymphocytes 36.6 20 - 40 %    Monocytes % 5.9 0 - 10 %    Eosinophils % 3.6 1 - 5 %    Basophils % 0.6 0 - 1 %   TSH   Result Value Ref Range    TSH 2.354 0.35 - 5.5 uIU/mL   T4   Result Value Ref Range    T4, Total 4.9 4.5 - 10.9 UG/DL   COMPREHENSIVE PANEL   Result Value Ref Range    Calcium 8.9 8.5 - 10.3 MG/DL    Glucose 84 MG/DL    CREATININE 0.9 0.3 - 1.3 MG/DL    Gfr Calculated > 60 >59 mL/min    BUN 21 (H) 6 - 20 MG/DL    Total Protein 6.1 (L) 6.4 - 8.3 G/DL    Alb 4.3 3.4 - 4.8 G/DL    Total Bilirubin 0.4 0.3 - 1.2 MG/DL    Alkaline Phosphatase 71 18 - 125 IU/L AST 17 8 - 41 IU/L    Sodium 142 136 - 145 MEQ/L    Potassium 4.4 3.5 - 5.0 mEq/L    Chloride 109 98 - 111 MEQ/L    CO2 27 23 - 29 MEQ/L    ALT 19 7 - 35 IU/L    Anion Gap 6 (L) 7 - 19 MEQ/L   Lipid panel   Result Value Ref Range    Cholesterol, Total 160 MG/DL    Triglycerides 161 (H) 35 - 160 MG/DL    HDL 50 MG/DL    LDL Direct 71 MG/DL   Uric acid   Result Value Ref Range    Uric Acid 4.7 2.6 - 6.0 MG/DL   Miscellaneous lab test #1   Result Value Ref Range    Miscellaneous Lab Test Result         ASSESSMENT/ PLAN:  1. Pernicious anemia  Chart and labs reviewed continue current care and follow-up and follow-up labs reviewed continue current plan of care. - CBC; Future  - Vitamin B12; Future    2. Mixed hyperlipidemia  As above stable follow continue current plan of care  - Comprehensive Metabolic Panel; Future  - TSH without Reflex; Future  - Lipid Panel; Future  - Hemoglobin A1C; Future    3. Cervical disc disease  Stable with the present management and follow    4. Gastroesophageal reflux disease without esophagitis  Stable    5.  Migraine without aura and without status migrainosus, not intractable  Stable    We reviewed her routine care and screening and will follow up

## 2019-06-02 ASSESSMENT — ENCOUNTER SYMPTOMS
ABDOMINAL PAIN: 0
SINUS PRESSURE: 0
EYE DISCHARGE: 0
ABDOMINAL DISTENTION: 0
SHORTNESS OF BREATH: 0
BACK PAIN: 0
COUGH: 0
EYE REDNESS: 0

## 2019-06-03 DIAGNOSIS — E78.2 MIXED HYPERLIPIDEMIA: Chronic | ICD-10-CM

## 2019-06-03 DIAGNOSIS — D51.0 PERNICIOUS ANEMIA: Chronic | ICD-10-CM

## 2019-06-03 LAB
ALBUMIN SERPL-MCNC: 4.5 G/DL (ref 3.5–5.2)
ALP BLD-CCNC: 63 U/L (ref 35–104)
ALT SERPL-CCNC: 16 U/L (ref 5–33)
ANION GAP SERPL CALCULATED.3IONS-SCNC: 15 MMOL/L (ref 7–19)
AST SERPL-CCNC: 15 U/L (ref 5–32)
BILIRUB SERPL-MCNC: 0.3 MG/DL (ref 0.2–1.2)
BUN BLDV-MCNC: 22 MG/DL (ref 8–23)
CALCIUM SERPL-MCNC: 9.6 MG/DL (ref 8.8–10.2)
CHLORIDE BLD-SCNC: 104 MMOL/L (ref 98–111)
CHOLESTEROL, TOTAL: 178 MG/DL (ref 160–199)
CO2: 26 MMOL/L (ref 22–29)
CREAT SERPL-MCNC: 1 MG/DL (ref 0.5–0.9)
GFR NON-AFRICAN AMERICAN: 56
GLUCOSE BLD-MCNC: 98 MG/DL (ref 74–109)
HBA1C MFR BLD: 5.3 % (ref 4–6)
HCT VFR BLD CALC: 44 % (ref 37–47)
HDLC SERPL-MCNC: 60 MG/DL (ref 65–121)
HEMOGLOBIN: 14 G/DL (ref 12–16)
LDL CHOLESTEROL CALCULATED: 100 MG/DL
MCH RBC QN AUTO: 26.6 PG (ref 27–31)
MCHC RBC AUTO-ENTMCNC: 31.8 G/DL (ref 33–37)
MCV RBC AUTO: 83.5 FL (ref 81–99)
PDW BLD-RTO: 14.1 % (ref 11.5–14.5)
PLATELET # BLD: 221 K/UL (ref 130–400)
PMV BLD AUTO: 11.8 FL (ref 9.4–12.3)
POTASSIUM SERPL-SCNC: 4.9 MMOL/L (ref 3.5–5)
RBC # BLD: 5.27 M/UL (ref 4.2–5.4)
SODIUM BLD-SCNC: 145 MMOL/L (ref 136–145)
TOTAL PROTEIN: 6.5 G/DL (ref 6.6–8.7)
TRIGL SERPL-MCNC: 90 MG/DL (ref 0–149)
TSH SERPL DL<=0.05 MIU/L-ACNC: 3.07 UIU/ML (ref 0.27–4.2)
VITAMIN B-12: >2000 PG/ML (ref 211–946)
WBC # BLD: 5.5 K/UL (ref 4.8–10.8)

## 2019-06-04 RX ORDER — NORTRIPTYLINE HYDROCHLORIDE 10 MG/1
CAPSULE ORAL
Qty: 180 CAPSULE | Refills: 1 | Status: SHIPPED | OUTPATIENT
Start: 2019-06-04 | End: 2019-12-02 | Stop reason: SDUPTHER

## 2019-06-07 RX ORDER — ZOLMITRIPTAN 5 MG/1
TABLET, FILM COATED ORAL
Qty: 9 TABLET | Refills: 2 | Status: SHIPPED | OUTPATIENT
Start: 2019-06-07 | End: 2019-07-03 | Stop reason: SDUPTHER

## 2019-07-03 RX ORDER — RIZATRIPTAN BENZOATE 10 MG/1
10 TABLET ORAL
Qty: 9 TABLET | Refills: 5 | Status: SHIPPED | OUTPATIENT
Start: 2019-07-03 | End: 2020-08-31 | Stop reason: SDUPTHER

## 2019-07-03 RX ORDER — ZOLMITRIPTAN 5 MG/1
TABLET, FILM COATED ORAL
Qty: 9 TABLET | Refills: 2 | Status: SHIPPED | OUTPATIENT
Start: 2019-07-03 | End: 2019-07-03 | Stop reason: ALTCHOICE

## 2019-07-15 RX ORDER — LISINOPRIL 10 MG/1
TABLET ORAL
Qty: 90 TABLET | Refills: 3 | Status: SHIPPED | OUTPATIENT
Start: 2019-07-15 | End: 2020-07-13

## 2019-07-22 RX ORDER — VERAPAMIL HYDROCHLORIDE 240 MG/1
TABLET, FILM COATED, EXTENDED RELEASE ORAL
Qty: 90 TABLET | Refills: 0 | Status: SHIPPED | OUTPATIENT
Start: 2019-07-22 | End: 2019-10-20 | Stop reason: SDUPTHER

## 2019-08-19 ENCOUNTER — HOSPITAL ENCOUNTER (OUTPATIENT)
Dept: WOMENS IMAGING | Age: 59
Discharge: HOME | End: 2019-08-19
Payer: COMMERCIAL

## 2019-08-19 DIAGNOSIS — Z12.39 BREAST CANCER SCREENING: ICD-10-CM

## 2019-08-19 PROCEDURE — 77067 SCR MAMMO BI INCL CAD: CPT

## 2019-09-27 RX ORDER — TIZANIDINE 4 MG/1
TABLET ORAL
Qty: 180 TABLET | Refills: 1 | Status: SHIPPED | OUTPATIENT
Start: 2019-09-27 | End: 2020-03-25

## 2019-10-21 RX ORDER — VERAPAMIL HYDROCHLORIDE 240 MG/1
TABLET, FILM COATED, EXTENDED RELEASE ORAL
Qty: 90 TABLET | Refills: 3 | Status: SHIPPED | OUTPATIENT
Start: 2019-10-21 | End: 2020-11-02

## 2019-11-05 PROBLEM — H52.10 MYOPIA: Status: ACTIVE | Noted: 2018-09-26

## 2019-11-05 PROBLEM — Z96.1 PSEUDOPHAKIA: Status: ACTIVE | Noted: 2018-09-26

## 2019-11-05 PROBLEM — H04.129 TEAR FILM INSUFFICIENCY: Status: ACTIVE | Noted: 2018-09-26

## 2019-11-05 PROBLEM — H25.10 NUCLEAR SENILE CATARACT: Status: ACTIVE | Noted: 2018-09-26

## 2019-11-19 ENCOUNTER — OFFICE VISIT (OUTPATIENT)
Dept: INTERNAL MEDICINE | Age: 59
End: 2019-11-19
Payer: COMMERCIAL

## 2019-11-19 VITALS
OXYGEN SATURATION: 97 % | HEART RATE: 77 BPM | DIASTOLIC BLOOD PRESSURE: 72 MMHG | WEIGHT: 153 LBS | BODY MASS INDEX: 24.01 KG/M2 | HEIGHT: 67 IN | SYSTOLIC BLOOD PRESSURE: 120 MMHG

## 2019-11-19 DIAGNOSIS — Z00.00 ANNUAL PHYSICAL EXAM: Primary | ICD-10-CM

## 2019-11-19 DIAGNOSIS — F32.5 MAJOR DEPRESSIVE DISORDER WITH SINGLE EPISODE, IN REMISSION (HCC): Chronic | ICD-10-CM

## 2019-11-19 DIAGNOSIS — D51.0 PERNICIOUS ANEMIA: Chronic | ICD-10-CM

## 2019-11-19 DIAGNOSIS — I10 ESSENTIAL HYPERTENSION: ICD-10-CM

## 2019-11-19 DIAGNOSIS — E78.2 MIXED HYPERLIPIDEMIA: Chronic | ICD-10-CM

## 2019-11-19 LAB
ALBUMIN SERPL-MCNC: 4.5 G/DL (ref 3.5–5.2)
ALP BLD-CCNC: 72 U/L (ref 35–104)
ALT SERPL-CCNC: 22 U/L (ref 5–33)
ANION GAP SERPL CALCULATED.3IONS-SCNC: 13 MMOL/L (ref 7–19)
AST SERPL-CCNC: 20 U/L (ref 5–32)
BILIRUB SERPL-MCNC: <0.2 MG/DL (ref 0.2–1.2)
BUN BLDV-MCNC: 17 MG/DL (ref 8–23)
CALCIUM SERPL-MCNC: 9.3 MG/DL (ref 8.8–10.2)
CHLORIDE BLD-SCNC: 104 MMOL/L (ref 98–111)
CO2: 26 MMOL/L (ref 22–29)
CREAT SERPL-MCNC: 0.7 MG/DL (ref 0.5–0.9)
GFR NON-AFRICAN AMERICAN: >60
GLUCOSE BLD-MCNC: 89 MG/DL (ref 74–109)
POTASSIUM SERPL-SCNC: 3.7 MMOL/L (ref 3.5–5)
SODIUM BLD-SCNC: 143 MMOL/L (ref 136–145)
TOTAL PROTEIN: 6.6 G/DL (ref 6.6–8.7)

## 2019-11-19 PROCEDURE — 99396 PREV VISIT EST AGE 40-64: CPT | Performed by: INTERNAL MEDICINE

## 2019-12-01 PROBLEM — I10 ESSENTIAL HYPERTENSION: Status: ACTIVE | Noted: 2019-12-01

## 2019-12-01 ASSESSMENT — ENCOUNTER SYMPTOMS
BACK PAIN: 0
SHORTNESS OF BREATH: 0
ABDOMINAL PAIN: 0
COUGH: 0
EYE DISCHARGE: 0
EYE REDNESS: 0
ABDOMINAL DISTENTION: 0
SINUS PRESSURE: 0

## 2019-12-03 RX ORDER — NORTRIPTYLINE HYDROCHLORIDE 10 MG/1
CAPSULE ORAL
Qty: 180 CAPSULE | Refills: 3 | Status: SHIPPED | OUTPATIENT
Start: 2019-12-03 | End: 2020-11-23

## 2020-01-20 RX ORDER — CYANOCOBALAMIN 1000 UG/ML
INJECTION INTRAMUSCULAR; SUBCUTANEOUS
Qty: 3 ML | Refills: 2 | Status: SHIPPED | OUTPATIENT
Start: 2020-01-20 | End: 2020-08-31 | Stop reason: SDUPTHER

## 2020-03-25 RX ORDER — TIZANIDINE 4 MG/1
TABLET ORAL
Qty: 180 TABLET | Refills: 1 | Status: SHIPPED | OUTPATIENT
Start: 2020-03-25 | End: 2020-08-31 | Stop reason: SDUPTHER

## 2020-04-13 RX ORDER — DESVENLAFAXINE 50 MG/1
TABLET, EXTENDED RELEASE ORAL
Qty: 90 TABLET | Refills: 3 | Status: SHIPPED | OUTPATIENT
Start: 2020-04-13 | End: 2020-09-18

## 2020-06-09 ENCOUNTER — TELEPHONE (OUTPATIENT)
Dept: INTERNAL MEDICINE | Age: 60
End: 2020-06-09

## 2020-06-25 ENCOUNTER — OFFICE VISIT (OUTPATIENT)
Dept: INTERNAL MEDICINE | Age: 60
End: 2020-06-25
Payer: COMMERCIAL

## 2020-06-25 ENCOUNTER — HOSPITAL ENCOUNTER (OUTPATIENT)
Dept: GENERAL RADIOLOGY | Age: 60
Discharge: HOME | End: 2020-06-25
Payer: COMMERCIAL

## 2020-06-25 VITALS
BODY MASS INDEX: 23.54 KG/M2 | OXYGEN SATURATION: 95 % | WEIGHT: 150 LBS | HEIGHT: 67 IN | DIASTOLIC BLOOD PRESSURE: 70 MMHG | SYSTOLIC BLOOD PRESSURE: 118 MMHG | HEART RATE: 70 BPM

## 2020-06-25 DIAGNOSIS — R06.09 DYSPNEA ON EXERTION: ICD-10-CM

## 2020-06-25 DIAGNOSIS — I10 ESSENTIAL HYPERTENSION: ICD-10-CM

## 2020-06-25 LAB
ALBUMIN SERPL-MCNC: 4.7 G/DL (ref 3.5–5.2)
ALP BLD-CCNC: 81 U/L (ref 35–104)
ALT SERPL-CCNC: 24 U/L (ref 5–33)
ANION GAP SERPL CALCULATED.3IONS-SCNC: 12 MMOL/L (ref 7–19)
AST SERPL-CCNC: 22 U/L (ref 5–32)
BILIRUB SERPL-MCNC: 0.3 MG/DL (ref 0.2–1.2)
BUN BLDV-MCNC: 19 MG/DL (ref 8–23)
CALCIUM SERPL-MCNC: 9.9 MG/DL (ref 8.8–10.2)
CHLORIDE BLD-SCNC: 105 MMOL/L (ref 98–111)
CHOLESTEROL, TOTAL: 180 MG/DL (ref 160–199)
CO2: 26 MMOL/L (ref 22–29)
CREAT SERPL-MCNC: 0.9 MG/DL (ref 0.5–0.9)
GFR NON-AFRICAN AMERICAN: >60
GLUCOSE BLD-MCNC: 81 MG/DL (ref 74–109)
HCT VFR BLD CALC: 45.6 % (ref 37–47)
HDLC SERPL-MCNC: 57 MG/DL (ref 65–121)
HEMOGLOBIN: 14.8 G/DL (ref 12–16)
LDL CHOLESTEROL CALCULATED: 109 MG/DL
MCH RBC QN AUTO: 26.7 PG (ref 27–31)
MCHC RBC AUTO-ENTMCNC: 32.5 G/DL (ref 33–37)
MCV RBC AUTO: 82.2 FL (ref 81–99)
PDW BLD-RTO: 15.3 % (ref 11.5–14.5)
PLATELET # BLD: 242 K/UL (ref 130–400)
PMV BLD AUTO: 11.9 FL (ref 9.4–12.3)
POTASSIUM SERPL-SCNC: 5.4 MMOL/L (ref 3.5–5)
RBC # BLD: 5.55 M/UL (ref 4.2–5.4)
SODIUM BLD-SCNC: 143 MMOL/L (ref 136–145)
T4 FREE: 1.07 NG/DL (ref 0.93–1.7)
TOTAL PROTEIN: 6.7 G/DL (ref 6.6–8.7)
TRIGL SERPL-MCNC: 70 MG/DL (ref 0–149)
TSH SERPL DL<=0.05 MIU/L-ACNC: 0.79 UIU/ML (ref 0.27–4.2)
WBC # BLD: 7 K/UL (ref 4.8–10.8)

## 2020-06-25 PROCEDURE — 93000 ELECTROCARDIOGRAM COMPLETE: CPT | Performed by: INTERNAL MEDICINE

## 2020-06-25 PROCEDURE — 71046 X-RAY EXAM CHEST 2 VIEWS: CPT

## 2020-06-25 PROCEDURE — 99213 OFFICE O/P EST LOW 20 MIN: CPT | Performed by: INTERNAL MEDICINE

## 2020-06-25 ASSESSMENT — ENCOUNTER SYMPTOMS
FACIAL SWELLING: 0
EYE REDNESS: 0
SHORTNESS OF BREATH: 1
EYE DISCHARGE: 0
COUGH: 0
CHEST TIGHTNESS: 1
BACK PAIN: 0
ABDOMINAL DISTENTION: 0
ABDOMINAL PAIN: 0

## 2020-06-25 NOTE — PROGRESS NOTES
Negative for discharge and redness. Respiratory: Positive for chest tightness and shortness of breath. Negative for cough. Cardiovascular: Negative for palpitations and leg swelling. Gastrointestinal: Negative for abdominal distention and abdominal pain. Musculoskeletal: Negative for arthralgias and back pain. Skin: Negative for rash and wound. Neurological: Negative for facial asymmetry and light-headedness. Psychiatric/Behavioral: Negative for dysphoric mood and sleep disturbance. The patient is not nervous/anxious. /70 (Site: Left Upper Arm)   Pulse 70   Ht 5' 7\" (1.702 m)   Wt 150 lb (68 kg)   SpO2 95%   BMI 23.49 kg/m²   BP Readings from Last 7 Encounters:   06/25/20 118/70   11/19/19 120/72   05/14/19 110/66   08/10/18 120/80   12/19/17 122/70   11/29/17 132/70   08/28/17 122/78     Wt Readings from Last 7 Encounters:   06/25/20 150 lb (68 kg)   11/19/19 153 lb (69.4 kg)   05/14/19 153 lb (69.4 kg)   08/10/18 153 lb 3.2 oz (69.5 kg)   12/19/17 157 lb 8 oz (71.4 kg)   11/29/17 154 lb 8 oz (70.1 kg)   08/28/17 151 lb (68.5 kg)     BMI Readings from Last 7 Encounters:   06/25/20 23.49 kg/m²   11/19/19 23.96 kg/m²   05/14/19 23.96 kg/m²   08/10/18 23.99 kg/m²   12/19/17 24.67 kg/m²   11/29/17 24.20 kg/m²   08/28/17 24.37 kg/m²     Resp Readings from Last 7 Encounters:   10/24/16 16       Physical Exam  Constitutional:       General: She is not in acute distress. Appearance: Normal appearance. She is well-developed. HENT:      Right Ear: External ear normal. Tympanic membrane is not injected. Left Ear: External ear normal. Tympanic membrane is not injected. Mouth/Throat:      Pharynx: No oropharyngeal exudate. Eyes:      General: No scleral icterus. Conjunctiva/sclera: Conjunctivae normal.   Neck:      Musculoskeletal: Neck supple. Thyroid: No thyroid mass or thyromegaly. Vascular: No carotid bruit.    Cardiovascular:      Rate and Rhythm: Normal rate and regular rhythm. Heart sounds: S1 normal and S2 normal. No murmur. No S3 or S4 sounds. Pulmonary:      Effort: Pulmonary effort is normal. No respiratory distress. Breath sounds: Normal breath sounds. No wheezing or rales. Abdominal:      General: Bowel sounds are normal. There is no distension. Palpations: Abdomen is soft. There is no mass. Tenderness: There is no abdominal tenderness. Lymphadenopathy:      Cervical: No cervical adenopathy. Upper Body:      Right upper body: No supraclavicular adenopathy. Left upper body: No supraclavicular adenopathy. Skin:     Findings: No rash. Neurological:      Mental Status: She is alert and oriented to person, place, and time. Cranial Nerves: No cranial nerve deficit. Results for orders placed or performed in visit on 11/19/19   Comprehensive Metabolic Panel   Result Value Ref Range    Sodium 143 136 - 145 mmol/L    Potassium 3.7 3.5 - 5.0 mmol/L    Chloride 104 98 - 111 mmol/L    CO2 26 22 - 29 mmol/L    Anion Gap 13 7 - 19 mmol/L    Glucose 89 74 - 109 mg/dL    BUN 17 8 - 23 mg/dL    CREATININE 0.7 0.5 - 0.9 mg/dL    GFR Non-African American >60 >60    Calcium 9.3 8.8 - 10.2 mg/dL    Total Protein 6.6 6.6 - 8.7 g/dL    Alb 4.5 3.5 - 5.2 g/dL    Total Bilirubin <0.2 0.2 - 1.2 mg/dL    Alkaline Phosphatase 72 35 - 104 U/L    ALT 22 5 - 33 U/L    AST 20 5 - 32 U/L       ASSESSMENT/ PLAN:  1. Dyspnea on exertion    - TSH without Reflex; Future  - T4, Free; Future  - EKG 12 Lead; Future  - EKG 12 Lead  - XR CHEST STANDARD (2 VW); Future  - ECHO Complete 2D W Doppler W Color; Future  - ECHO Stress Test; Future    2. Essential hypertension    - CBC; Future  - Comprehensive Metabolic Panel; Future  - Lipid Panel; Future  - ECHO Stress Test; Future    3. Pernicious anemia  Continue current care and f/u     4.  Mixed hyperlipidemia  folow

## 2020-06-26 ENCOUNTER — TELEPHONE (OUTPATIENT)
Dept: INTERNAL MEDICINE | Age: 60
End: 2020-06-26

## 2020-06-26 NOTE — TELEPHONE ENCOUNTER
Mary Mclaughlin called requesting status of their referral.    Please return call to update her on status. The best time to call her to accommodate their needs is Anytime    Thank you. *Patient called to get an update on her referral to do a stress test. Please call patient. Thanks.

## 2020-06-29 ENCOUNTER — TELEPHONE (OUTPATIENT)
Dept: INTERNAL MEDICINE | Age: 60
End: 2020-06-29

## 2020-07-02 ENCOUNTER — TELEPHONE (OUTPATIENT)
Dept: INTERNAL MEDICINE | Age: 60
End: 2020-07-02

## 2020-07-08 ENCOUNTER — OFFICE VISIT (OUTPATIENT)
Age: 60
End: 2020-07-08

## 2020-07-10 LAB
REPORT: NORMAL
SARS-COV-2: NOT DETECTED
THIS TEST SENT TO: NORMAL

## 2020-07-13 RX ORDER — LISINOPRIL 10 MG/1
TABLET ORAL
Qty: 90 TABLET | Refills: 3 | Status: SHIPPED | OUTPATIENT
Start: 2020-07-13 | End: 2021-09-07

## 2020-07-21 ENCOUNTER — HOSPITAL ENCOUNTER (OUTPATIENT)
Dept: NON INVASIVE DIAGNOSTICS | Age: 60
Discharge: HOME | End: 2020-07-21
Payer: COMMERCIAL

## 2020-07-21 LAB
LV EF: 50 %
LV EF: 58 %
LVEF MODALITY: NORMAL
LVEF MODALITY: NORMAL

## 2020-07-21 PROCEDURE — 93350 STRESS TTE ONLY: CPT

## 2020-07-21 PROCEDURE — 93306 TTE W/DOPPLER COMPLETE: CPT

## 2020-08-17 ENCOUNTER — OFFICE VISIT (OUTPATIENT)
Dept: INTERNAL MEDICINE | Age: 60
End: 2020-08-17
Payer: COMMERCIAL

## 2020-08-17 VITALS
HEIGHT: 67 IN | DIASTOLIC BLOOD PRESSURE: 80 MMHG | SYSTOLIC BLOOD PRESSURE: 120 MMHG | OXYGEN SATURATION: 96 % | WEIGHT: 151 LBS | HEART RATE: 78 BPM | BODY MASS INDEX: 23.7 KG/M2

## 2020-08-17 PROCEDURE — 99213 OFFICE O/P EST LOW 20 MIN: CPT | Performed by: INTERNAL MEDICINE

## 2020-08-17 NOTE — PROGRESS NOTES
Chief Complaint   Patient presents with    6 Month Follow-Up    Hypertension       HPI: Patient is here today to follow-up hypertension and some recent issues with shortness of breath. She has been short of breath and not feeling quite as well recently she would notice it with exertion though symptoms are resolved she feels back to her usual self feeling well she denies headache chest pain dyspnea abdominal pain. She denies fevers chills rashes cough or congestion    Past Medical History:   Diagnosis Date    Cervical disc disease 8/26/2017    Essential hypertension 12/1/2019    Gastroesophageal reflux disease without esophagitis 8/26/2017    Hyperlipidemia     Major depressive disorder in remission ([facility] [address] 8/26/2017    Migraine without aura 8/26/2017    Pernicious anemia 8/26/2017    Raynaud's phenomenon 8/26/2017       Past Surgical History:   Procedure Laterality Date    RIGOBERTO AND BSO         Family History   Problem Relation Age of Onset    High Blood Pressure Mother     Coronary Art Dis Mother     Thyroid Disease Mother         Hypothyroidism    Breast Cancer Mother     Colon Polyps Father     Prostate Cancer Father     Anemia Father         Pernicious    Colon Polyps Sister     Anemia Brother         Pernicious    High Blood Pressure Paternal Grandmother        Social History     Socioeconomic History    Marital status:      Spouse name: Not on file    Number of children: Not on file    Years of education: Not on file    Highest education level: Not on file   Occupational History    Not on file   Social Needs    Financial resource strain: Not on file    Food insecurity     Worry: Not on file     Inability: Not on file    Transportation needs     Medical: Not on file     Non-medical: Not on file   Tobacco Use    Smoking status: Never Smoker    Smokeless tobacco: Never Used   Substance and Sexual Activity    Alcohol use:  Yes    Drug use: No    Sexual activity: Not on file Lifestyle    Physical activity     Days per week: Not on file     Minutes per session: Not on file    Stress: Not on file   Relationships    Social connections     Talks on phone: Not on file     Gets together: Not on file     Attends Yazidi service: Not on file     Active member of club or organization: Not on file     Attends meetings of clubs or organizations: Not on file     Relationship status: Not on file    Intimate partner violence     Fear of current or ex partner: Not on file     Emotionally abused: Not on file     Physically abused: Not on file     Forced sexual activity: Not on file   Other Topics Concern    Not on file   Social History Narrative    Not on file       Allergies   Allergen Reactions    Demerol  [Meperidine Hcl]      Other reaction(s): Unknown    Pcn [Penicillins] Rash       Current Outpatient Medications   Medication Sig Dispense Refill    lisinopril (PRINIVIL;ZESTRIL) 10 MG tablet TAKE 1 TABLET BY MOUTH EVERY DAY 90 tablet 3    desvenlafaxine succinate (PRISTIQ) 50 MG TB24 extended release tablet TAKE 1 TABLET BY MOUTH EVERY DAY 90 tablet 3    tiZANidine (ZANAFLEX) 4 MG tablet TAKE 1 TABLET BY MOUTH 2 TIMES DAILY AS NEEDED FOR MUSCLE SPASM 180 tablet 1    cyanocobalamin 1000 MCG/ML injection INJECT 1ML MONTHLY 3 mL 2    nortriptyline (PAMELOR) 10 MG capsule TAKE 2 CAPSULES BY MOUTH EVERY DAY AT BEDTIME 180 capsule 3    verapamil (CALAN SR) 240 MG extended release tablet TAKE 1 TABLET BY MOUTH EVERY DAY 90 tablet 3    rizatriptan (MAXALT) 10 MG tablet Take 1 tablet by mouth once as needed for Migraine May repeat in 2 hours if needed 9 tablet 5    estradiol (ESTRACE) 0.5 MG tablet Take 1 tablet by mouth daily (Patient taking differently: Take 0.25 mg by mouth daily ) 90 tablet 3    b complex vitamins capsule Take 1 capsule by mouth daily       No current facility-administered medications for this visit.         Review of Systems   Constitutional: Negative for chills, fatigue and fever. HENT: Negative for congestion and sinus pressure. Eyes: Negative for discharge and redness. Respiratory: Negative for cough and shortness of breath. Cardiovascular: Negative for chest pain, palpitations and leg swelling. Gastrointestinal: Negative for abdominal distention and abdominal pain. Genitourinary: Negative for dysuria, frequency and urgency. Musculoskeletal: Negative for arthralgias and back pain. Skin: Negative for rash and wound. Neurological: Negative for dizziness, light-headedness and headaches. Psychiatric/Behavioral: Negative for dysphoric mood and sleep disturbance. The patient is not nervous/anxious. /80 (Site: Left Upper Arm)   Pulse 78   Ht 5' 7\" (1.702 m)   Wt 151 lb (68.5 kg)   SpO2 96%   BMI 23.65 kg/m²   BP Readings from Last 7 Encounters:   08/17/20 120/80   06/25/20 118/70   11/19/19 120/72   05/14/19 110/66   08/10/18 120/80   12/19/17 122/70   11/29/17 132/70     Wt Readings from Last 7 Encounters:   08/17/20 151 lb (68.5 kg)   06/25/20 150 lb (68 kg)   11/19/19 153 lb (69.4 kg)   05/14/19 153 lb (69.4 kg)   08/10/18 153 lb 3.2 oz (69.5 kg)   12/19/17 157 lb 8 oz (71.4 kg)   11/29/17 154 lb 8 oz (70.1 kg)     BMI Readings from Last 7 Encounters:   08/17/20 23.65 kg/m²   06/25/20 23.49 kg/m²   11/19/19 23.96 kg/m²   05/14/19 23.96 kg/m²   08/10/18 23.99 kg/m²   12/19/17 24.67 kg/m²   11/29/17 24.20 kg/m²     Resp Readings from Last 7 Encounters:   10/24/16 16       Physical Exam  Constitutional:       General: She is not in acute distress. Appearance: Normal appearance. She is well-developed. HENT:      Right Ear: External ear normal. Tympanic membrane is not injected. Left Ear: External ear normal. Tympanic membrane is not injected. Mouth/Throat:      Pharynx: No oropharyngeal exudate. Eyes:      General: No scleral icterus. Conjunctiva/sclera: Conjunctivae normal.   Neck:      Musculoskeletal: Neck supple.

## 2020-08-21 ENCOUNTER — HOSPITAL ENCOUNTER (OUTPATIENT)
Dept: WOMENS IMAGING | Age: 60
Discharge: HOME | End: 2020-08-21
Payer: COMMERCIAL

## 2020-08-21 PROCEDURE — 77063 BREAST TOMOSYNTHESIS BI: CPT

## 2020-08-22 ASSESSMENT — ENCOUNTER SYMPTOMS
BACK PAIN: 0
ABDOMINAL DISTENTION: 0
COUGH: 0
EYE REDNESS: 0
SHORTNESS OF BREATH: 0
EYE DISCHARGE: 0
SINUS PRESSURE: 0
ABDOMINAL PAIN: 0

## 2020-08-31 ENCOUNTER — TELEPHONE (OUTPATIENT)
Dept: INTERNAL MEDICINE | Age: 60
End: 2020-08-31

## 2020-08-31 RX ORDER — RIZATRIPTAN BENZOATE 10 MG/1
10 TABLET ORAL
Qty: 9 TABLET | Refills: 5 | Status: SHIPPED | OUTPATIENT
Start: 2020-08-31 | End: 2020-12-24 | Stop reason: SDUPTHER

## 2020-08-31 RX ORDER — CYANOCOBALAMIN 1000 UG/ML
1000 INJECTION INTRAMUSCULAR; SUBCUTANEOUS
Qty: 3 ML | Refills: 2 | Status: SHIPPED | OUTPATIENT
Start: 2020-08-31 | End: 2020-09-01 | Stop reason: SDUPTHER

## 2020-08-31 RX ORDER — TIZANIDINE 4 MG/1
4 TABLET ORAL EVERY 8 HOURS PRN
Qty: 180 TABLET | Refills: 1 | Status: SHIPPED | OUTPATIENT
Start: 2020-08-31 | End: 2020-09-14

## 2020-08-31 NOTE — TELEPHONE ENCOUNTER
Recently saw Dr. Eugena Barthel for shortness of breath and had testing done for this. It was getting better, but now seems to be getting worse. Just walking from car inside causes dyspnea. Please advise.

## 2020-08-31 NOTE — TELEPHONE ENCOUNTER
I spoke with her -- Out of breath walking car to [facility] --- lightheadedness shortness of breath chest tightness negative cardiac work-up patient worried about PE and symptoms could be consistent we will get a CTA of the chest need to rule out life-threatening conditions and then we will reassess if these tests are negative she has enough symptoms that we would go along with a CTA first  If increased worrisome symptoms go to the ER in the meantime

## 2020-09-01 RX ORDER — CYANOCOBALAMIN 1000 UG/ML
1000 INJECTION INTRAMUSCULAR; SUBCUTANEOUS
Qty: 3 ML | Refills: 2 | Status: SHIPPED | OUTPATIENT
Start: 2020-09-01 | End: 2020-12-24 | Stop reason: SDUPTHER

## 2020-09-02 ENCOUNTER — HOSPITAL ENCOUNTER (INPATIENT)
Age: 60
LOS: 3 days | Discharge: HOME | DRG: 175 | End: 2020-09-05
Attending: EMERGENCY MEDICINE
Payer: COMMERCIAL

## 2020-09-02 ENCOUNTER — APPOINTMENT (OUTPATIENT)
Dept: INTERVENTIONAL RADIOLOGY/VASCULAR | Age: 60
DRG: 175 | End: 2020-09-02
Payer: COMMERCIAL

## 2020-09-02 ENCOUNTER — HOSPITAL ENCOUNTER (OUTPATIENT)
Dept: CT IMAGING | Age: 60
Discharge: HOME | DRG: 175 | End: 2020-09-02
Payer: COMMERCIAL

## 2020-09-02 ENCOUNTER — ANESTHESIA (OUTPATIENT)
Dept: OPERATING ROOM | Age: 60
DRG: 175 | End: 2020-09-02
Payer: COMMERCIAL

## 2020-09-02 ENCOUNTER — ANESTHESIA EVENT (OUTPATIENT)
Dept: OPERATING ROOM | Age: 60
DRG: 175 | End: 2020-09-02
Payer: COMMERCIAL

## 2020-09-02 VITALS
OXYGEN SATURATION: 85 % | RESPIRATION RATE: 3 BRPM | SYSTOLIC BLOOD PRESSURE: 106 MMHG | DIASTOLIC BLOOD PRESSURE: 61 MMHG

## 2020-09-02 PROBLEM — I82.90 VTE (VENOUS THROMBOEMBOLISM): Status: ACTIVE | Noted: 2020-09-02

## 2020-09-02 PROBLEM — I27.21 PULMONARY ARTERIAL HYPERTENSION (HCC): Status: ACTIVE | Noted: 2020-09-02

## 2020-09-02 PROBLEM — I26.99 BILATERAL PULMONARY EMBOLISM (HCC): Status: ACTIVE | Noted: 2020-09-02

## 2020-09-02 LAB
ABO/RH: NORMAL
ALBUMIN SERPL-MCNC: 4.2 G/DL (ref 3.5–5.2)
ALP BLD-CCNC: 105 U/L (ref 35–104)
ALT SERPL-CCNC: 81 U/L (ref 5–33)
ANION GAP SERPL CALCULATED.3IONS-SCNC: 13 MMOL/L (ref 7–19)
ANTIBODY SCREEN: NORMAL
APTT: 23.4 SEC (ref 26–36.2)
APTT: 82.7 SEC (ref 26–36.2)
APTT: >200 SEC (ref 26–36.2)
AST SERPL-CCNC: 77 U/L (ref 5–32)
BASOPHILS ABSOLUTE: 0 K/UL (ref 0–0.2)
BASOPHILS RELATIVE PERCENT: 0.4 % (ref 0–1)
BILIRUB SERPL-MCNC: <0.2 MG/DL (ref 0.2–1.2)
BILIRUBIN URINE: NEGATIVE
BLOOD, URINE: NEGATIVE
BUN BLDV-MCNC: 18 MG/DL (ref 8–23)
CALCIUM SERPL-MCNC: 9 MG/DL (ref 8.8–10.2)
CHLORIDE BLD-SCNC: 104 MMOL/L (ref 98–111)
CLARITY: CLEAR
CO2: 23 MMOL/L (ref 22–29)
COLOR: YELLOW
CREAT SERPL-MCNC: 0.9 MG/DL (ref 0.5–0.9)
D DIMER: 1.3 UG/ML FEU (ref 0–0.48)
EOSINOPHILS ABSOLUTE: 1.4 K/UL (ref 0–0.6)
EOSINOPHILS RELATIVE PERCENT: 15.5 % (ref 0–5)
FIBRINOGEN: 315 MG/DL (ref 238–505)
GFR AFRICAN AMERICAN: >59
GFR AFRICAN AMERICAN: >60
GFR NON-AFRICAN AMERICAN: 50
GFR NON-AFRICAN AMERICAN: >60
GLUCOSE BLD-MCNC: 110 MG/DL (ref 74–109)
GLUCOSE URINE: NEGATIVE MG/DL
HCT VFR BLD CALC: 40.3 % (ref 37–47)
HCT VFR BLD CALC: 44.4 % (ref 37–47)
HEMOGLOBIN: 13.1 G/DL (ref 12–16)
HEMOGLOBIN: 14.3 G/DL (ref 12–16)
IMMATURE GRANULOCYTES #: 0 K/UL
INR BLD: 1.05 (ref 0.88–1.18)
KETONES, URINE: NEGATIVE MG/DL
LEUKOCYTE ESTERASE, URINE: NEGATIVE
LV EF: 75 %
LVEF MODALITY: NORMAL
LYMPHOCYTES ABSOLUTE: 2.4 K/UL (ref 1.1–4.5)
LYMPHOCYTES RELATIVE PERCENT: 26.1 % (ref 20–40)
MCH RBC QN AUTO: 26.4 PG (ref 27–31)
MCH RBC QN AUTO: 26.7 PG (ref 27–31)
MCHC RBC AUTO-ENTMCNC: 32.2 G/DL (ref 33–37)
MCHC RBC AUTO-ENTMCNC: 32.5 G/DL (ref 33–37)
MCV RBC AUTO: 81.1 FL (ref 81–99)
MCV RBC AUTO: 82.8 FL (ref 81–99)
MONOCYTES ABSOLUTE: 0.5 K/UL (ref 0–0.9)
MONOCYTES RELATIVE PERCENT: 5.6 % (ref 0–10)
NEUTROPHILS ABSOLUTE: 4.9 K/UL (ref 1.5–7.5)
NEUTROPHILS RELATIVE PERCENT: 52.2 % (ref 50–65)
NITRITE, URINE: NEGATIVE
PDW BLD-RTO: 15 % (ref 11.5–14.5)
PDW BLD-RTO: 15.1 % (ref 11.5–14.5)
PERFORMED ON: ABNORMAL
PH UA: 6 (ref 5–8)
PLATELET # BLD: 142 K/UL (ref 130–400)
PLATELET # BLD: 191 K/UL (ref 130–400)
PMV BLD AUTO: 11.2 FL (ref 9.4–12.3)
PMV BLD AUTO: 11.9 FL (ref 9.4–12.3)
POC CREATININE: 1.1 MG/DL (ref 0.3–1.3)
POC SAMPLE TYPE: ABNORMAL
POTASSIUM SERPL-SCNC: 4.1 MMOL/L (ref 3.5–5)
PRO-BNP: 3380 PG/ML (ref 0–900)
PROTEIN UA: NEGATIVE MG/DL
PROTHROMBIN TIME: 13.7 SEC (ref 12–14.6)
RBC # BLD: 4.97 M/UL (ref 4.2–5.4)
RBC # BLD: 5.36 M/UL (ref 4.2–5.4)
REASON FOR REJECTION: NORMAL
REJECTED TEST: NORMAL
SARS-COV-2, NAAT: NOT DETECTED
SODIUM BLD-SCNC: 140 MMOL/L (ref 136–145)
SPECIFIC GRAVITY UA: >1.045 (ref 1–1.03)
TOTAL PROTEIN: 6.2 G/DL (ref 6.6–8.7)
TROPONIN: 0.04 NG/ML (ref 0–0.03)
TROPONIN: <0.01 NG/ML (ref 0–0.03)
TROPONIN: <0.01 NG/ML (ref 0–0.03)
UROBILINOGEN, URINE: 1 E.U./DL
WBC # BLD: 13.2 K/UL (ref 4.8–10.8)
WBC # BLD: 9.3 K/UL (ref 4.8–10.8)

## 2020-09-02 PROCEDURE — 99284 EMERGENCY DEPT VISIT MOD MDM: CPT

## 2020-09-02 PROCEDURE — C1751 CATH, INF, PER/CENT/MIDLINE: HCPCS | Performed by: SURGERY

## 2020-09-02 PROCEDURE — 6360000002 HC RX W HCPCS: Performed by: SURGERY

## 2020-09-02 PROCEDURE — 71275 CT ANGIOGRAPHY CHEST: CPT

## 2020-09-02 PROCEDURE — 93970 EXTREMITY STUDY: CPT

## 2020-09-02 PROCEDURE — 85610 PROTHROMBIN TIME: CPT

## 2020-09-02 PROCEDURE — 85379 FIBRIN DEGRADATION QUANT: CPT

## 2020-09-02 PROCEDURE — 96374 THER/PROPH/DIAG INJ IV PUSH: CPT

## 2020-09-02 PROCEDURE — 3700000000 HC ANESTHESIA ATTENDED CARE: Performed by: SURGERY

## 2020-09-02 PROCEDURE — 96366 THER/PROPH/DIAG IV INF ADDON: CPT

## 2020-09-02 PROCEDURE — 2000000000 HC ICU R&B

## 2020-09-02 PROCEDURE — 6370000000 HC RX 637 (ALT 250 FOR IP): Performed by: SURGERY

## 2020-09-02 PROCEDURE — 2500000003 HC RX 250 WO HCPCS: Performed by: SURGERY

## 2020-09-02 PROCEDURE — C1894 INTRO/SHEATH, NON-LASER: HCPCS | Performed by: SURGERY

## 2020-09-02 PROCEDURE — 37211 THROMBOLYTIC ART THERAPY: CPT | Performed by: SURGERY

## 2020-09-02 PROCEDURE — 82565 ASSAY OF CREATININE: CPT

## 2020-09-02 PROCEDURE — C1887 CATHETER, GUIDING: HCPCS | Performed by: SURGERY

## 2020-09-02 PROCEDURE — 81003 URINALYSIS AUTO W/O SCOPE: CPT

## 2020-09-02 PROCEDURE — 6360000004 HC RX CONTRAST MEDICATION: Performed by: INTERNAL MEDICINE

## 2020-09-02 PROCEDURE — U0002 COVID-19 LAB TEST NON-CDC: HCPCS

## 2020-09-02 PROCEDURE — 99222 1ST HOSP IP/OBS MODERATE 55: CPT | Performed by: NURSE PRACTITIONER

## 2020-09-02 PROCEDURE — 3700000001 HC ADD 15 MINUTES (ANESTHESIA): Performed by: SURGERY

## 2020-09-02 PROCEDURE — 93005 ELECTROCARDIOGRAM TRACING: CPT | Performed by: EMERGENCY MEDICINE

## 2020-09-02 PROCEDURE — 86900 BLOOD TYPING SEROLOGIC ABO: CPT

## 2020-09-02 PROCEDURE — 6360000002 HC RX W HCPCS

## 2020-09-02 PROCEDURE — 3600000005 HC SURGERY LEVEL 5 BASE: Performed by: SURGERY

## 2020-09-02 PROCEDURE — 2700000000 HC OXYGEN THERAPY PER DAY

## 2020-09-02 PROCEDURE — 2580000003 HC RX 258: Performed by: SURGERY

## 2020-09-02 PROCEDURE — 84484 ASSAY OF TROPONIN QUANT: CPT

## 2020-09-02 PROCEDURE — 36415 COLL VENOUS BLD VENIPUNCTURE: CPT

## 2020-09-02 PROCEDURE — 85730 THROMBOPLASTIN TIME PARTIAL: CPT

## 2020-09-02 PROCEDURE — 85025 COMPLETE CBC W/AUTO DIFF WBC: CPT

## 2020-09-02 PROCEDURE — 85384 FIBRINOGEN ACTIVITY: CPT

## 2020-09-02 PROCEDURE — 6360000002 HC RX W HCPCS: Performed by: NURSE ANESTHETIST, CERTIFIED REGISTERED

## 2020-09-02 PROCEDURE — 85027 COMPLETE CBC AUTOMATED: CPT

## 2020-09-02 PROCEDURE — 99285 EMERGENCY DEPT VISIT HI MDM: CPT

## 2020-09-02 PROCEDURE — 80053 COMPREHEN METABOLIC PANEL: CPT

## 2020-09-02 PROCEDURE — 86901 BLOOD TYPING SEROLOGIC RH(D): CPT

## 2020-09-02 PROCEDURE — 86850 RBC ANTIBODY SCREEN: CPT

## 2020-09-02 PROCEDURE — 3E06317 INTRODUCTION OF OTHER THROMBOLYTIC INTO CENTRAL ARTERY, PERCUTANEOUS APPROACH: ICD-10-PCS | Performed by: SURGERY

## 2020-09-02 PROCEDURE — 2580000003 HC RX 258: Performed by: ANESTHESIOLOGY

## 2020-09-02 PROCEDURE — 96365 THER/PROPH/DIAG IV INF INIT: CPT

## 2020-09-02 PROCEDURE — 99254 IP/OBS CNSLTJ NEW/EST MOD 60: CPT | Performed by: SURGERY

## 2020-09-02 PROCEDURE — 2709999900 HC NON-CHARGEABLE SUPPLY: Performed by: SURGERY

## 2020-09-02 PROCEDURE — C1769 GUIDE WIRE: HCPCS | Performed by: SURGERY

## 2020-09-02 PROCEDURE — 3600000015 HC SURGERY LEVEL 5 ADDTL 15MIN: Performed by: SURGERY

## 2020-09-02 PROCEDURE — 36015 PLACE CATHETER IN ARTERY: CPT | Performed by: SURGERY

## 2020-09-02 PROCEDURE — 2500000003 HC RX 250 WO HCPCS

## 2020-09-02 PROCEDURE — 6360000002 HC RX W HCPCS: Performed by: ANESTHESIOLOGY

## 2020-09-02 PROCEDURE — 83880 ASSAY OF NATRIURETIC PEPTIDE: CPT

## 2020-09-02 PROCEDURE — 2580000003 HC RX 258: Performed by: NURSE ANESTHETIST, CERTIFIED REGISTERED

## 2020-09-02 PROCEDURE — 93306 TTE W/DOPPLER COMPLETE: CPT

## 2020-09-02 RX ORDER — FENTANYL CITRATE 50 UG/ML
50 INJECTION, SOLUTION INTRAMUSCULAR; INTRAVENOUS EVERY 5 MIN PRN
Status: DISCONTINUED | OUTPATIENT
Start: 2020-09-02 | End: 2020-09-02 | Stop reason: HOSPADM

## 2020-09-02 RX ORDER — CALCIUM CHLORIDE 100 MG/ML
INJECTION INTRAVENOUS; INTRAVENTRICULAR PRN
Status: DISCONTINUED | OUTPATIENT
Start: 2020-09-02 | End: 2020-09-02 | Stop reason: SDUPTHER

## 2020-09-02 RX ORDER — DEXAMETHASONE SODIUM PHOSPHATE 4 MG/ML
4 INJECTION, SOLUTION INTRA-ARTICULAR; INTRALESIONAL; INTRAMUSCULAR; INTRAVENOUS; SOFT TISSUE ONCE
Status: DISCONTINUED | OUTPATIENT
Start: 2020-09-02 | End: 2020-09-02 | Stop reason: HOSPADM

## 2020-09-02 RX ORDER — HEPARIN SODIUM 1000 [USP'U]/ML
INJECTION, SOLUTION INTRAVENOUS; SUBCUTANEOUS
Status: COMPLETED
Start: 2020-09-02 | End: 2020-09-02

## 2020-09-02 RX ORDER — PROMETHAZINE HYDROCHLORIDE 25 MG/ML
6.25 INJECTION, SOLUTION INTRAMUSCULAR; INTRAVENOUS
Status: DISCONTINUED | OUTPATIENT
Start: 2020-09-02 | End: 2020-09-02 | Stop reason: HOSPADM

## 2020-09-02 RX ORDER — HEPARIN SODIUM 10000 [USP'U]/100ML
INJECTION, SOLUTION INTRAVENOUS
Status: COMPLETED
Start: 2020-09-02 | End: 2020-09-02

## 2020-09-02 RX ORDER — HEPARIN SODIUM 1000 [USP'U]/ML
80 INJECTION, SOLUTION INTRAVENOUS; SUBCUTANEOUS ONCE
Status: COMPLETED | OUTPATIENT
Start: 2020-09-02 | End: 2020-09-02

## 2020-09-02 RX ORDER — HEPARIN SODIUM AND DEXTROSE 10000; 5 [USP'U]/100ML; G/100ML
200 INJECTION INTRAVENOUS CONTINUOUS
Status: DISCONTINUED | OUTPATIENT
Start: 2020-09-02 | End: 2020-09-03

## 2020-09-02 RX ORDER — PROPOFOL 10 MG/ML
INJECTION, EMULSION INTRAVENOUS PRN
Status: DISCONTINUED | OUTPATIENT
Start: 2020-09-02 | End: 2020-09-02 | Stop reason: SDUPTHER

## 2020-09-02 RX ORDER — PROMETHAZINE HYDROCHLORIDE 25 MG/1
12.5 TABLET ORAL EVERY 6 HOURS PRN
Status: DISCONTINUED | OUTPATIENT
Start: 2020-09-02 | End: 2020-09-05 | Stop reason: HOSPADM

## 2020-09-02 RX ORDER — ONDANSETRON 2 MG/ML
4 INJECTION INTRAMUSCULAR; INTRAVENOUS EVERY 8 HOURS PRN
Status: DISCONTINUED | OUTPATIENT
Start: 2020-09-02 | End: 2020-09-05 | Stop reason: HOSPADM

## 2020-09-02 RX ORDER — POLYETHYLENE GLYCOL 3350 17 G/17G
17 POWDER, FOR SOLUTION ORAL DAILY PRN
Status: DISCONTINUED | OUTPATIENT
Start: 2020-09-02 | End: 2020-09-02

## 2020-09-02 RX ORDER — ONDANSETRON 2 MG/ML
4 INJECTION INTRAMUSCULAR; INTRAVENOUS EVERY 6 HOURS PRN
Status: DISCONTINUED | OUTPATIENT
Start: 2020-09-02 | End: 2020-09-02 | Stop reason: SDUPTHER

## 2020-09-02 RX ORDER — SODIUM CHLORIDE 0.9 % (FLUSH) 0.9 %
10 SYRINGE (ML) INJECTION EVERY 12 HOURS SCHEDULED
Status: DISCONTINUED | OUTPATIENT
Start: 2020-09-02 | End: 2020-09-05 | Stop reason: HOSPADM

## 2020-09-02 RX ORDER — SODIUM CHLORIDE, SODIUM LACTATE, POTASSIUM CHLORIDE, CALCIUM CHLORIDE 600; 310; 30; 20 MG/100ML; MG/100ML; MG/100ML; MG/100ML
INJECTION, SOLUTION INTRAVENOUS CONTINUOUS PRN
Status: DISCONTINUED | OUTPATIENT
Start: 2020-09-02 | End: 2020-09-02 | Stop reason: SDUPTHER

## 2020-09-02 RX ORDER — MIDAZOLAM HYDROCHLORIDE 1 MG/ML
2 INJECTION INTRAMUSCULAR; INTRAVENOUS
Status: COMPLETED | OUTPATIENT
Start: 2020-09-02 | End: 2020-09-02

## 2020-09-02 RX ORDER — SODIUM CHLORIDE 0.9 % (FLUSH) 0.9 %
10 SYRINGE (ML) INJECTION PRN
Status: DISCONTINUED | OUTPATIENT
Start: 2020-09-02 | End: 2020-09-05 | Stop reason: HOSPADM

## 2020-09-02 RX ORDER — PROPOFOL 10 MG/ML
INJECTION, EMULSION INTRAVENOUS CONTINUOUS PRN
Status: DISCONTINUED | OUTPATIENT
Start: 2020-09-02 | End: 2020-09-02 | Stop reason: SDUPTHER

## 2020-09-02 RX ORDER — VERAPAMIL HYDROCHLORIDE 240 MG/1
240 TABLET, FILM COATED, EXTENDED RELEASE ORAL DAILY
Status: DISCONTINUED | OUTPATIENT
Start: 2020-09-02 | End: 2020-09-05 | Stop reason: HOSPADM

## 2020-09-02 RX ORDER — FENTANYL CITRATE 50 UG/ML
INJECTION, SOLUTION INTRAMUSCULAR; INTRAVENOUS PRN
Status: DISCONTINUED | OUTPATIENT
Start: 2020-09-02 | End: 2020-09-02 | Stop reason: SDUPTHER

## 2020-09-02 RX ORDER — NORTRIPTYLINE HYDROCHLORIDE 10 MG/1
10 CAPSULE ORAL NIGHTLY
Status: DISCONTINUED | OUTPATIENT
Start: 2020-09-02 | End: 2020-09-05 | Stop reason: HOSPADM

## 2020-09-02 RX ORDER — HEPARIN SODIUM 10000 [USP'U]/100ML
18 INJECTION, SOLUTION INTRAVENOUS CONTINUOUS
Status: DISCONTINUED | OUTPATIENT
Start: 2020-09-02 | End: 2020-09-02

## 2020-09-02 RX ORDER — EPHEDRINE SULFATE 50 MG/ML
INJECTION, SOLUTION INTRAVENOUS PRN
Status: DISCONTINUED | OUTPATIENT
Start: 2020-09-02 | End: 2020-09-02 | Stop reason: SDUPTHER

## 2020-09-02 RX ORDER — MORPHINE SULFATE 4 MG/ML
2 INJECTION, SOLUTION INTRAMUSCULAR; INTRAVENOUS
Status: DISCONTINUED | OUTPATIENT
Start: 2020-09-02 | End: 2020-09-03

## 2020-09-02 RX ORDER — ACETAMINOPHEN 650 MG/1
650 SUPPOSITORY RECTAL EVERY 6 HOURS PRN
Status: DISCONTINUED | OUTPATIENT
Start: 2020-09-02 | End: 2020-09-05 | Stop reason: HOSPADM

## 2020-09-02 RX ORDER — HYDROMORPHONE HYDROCHLORIDE 1 MG/ML
0.5 INJECTION, SOLUTION INTRAMUSCULAR; INTRAVENOUS; SUBCUTANEOUS EVERY 5 MIN PRN
Status: DISCONTINUED | OUTPATIENT
Start: 2020-09-02 | End: 2020-09-02 | Stop reason: HOSPADM

## 2020-09-02 RX ORDER — ACETAMINOPHEN 325 MG/1
650 TABLET ORAL EVERY 4 HOURS PRN
Status: DISCONTINUED | OUTPATIENT
Start: 2020-09-02 | End: 2020-09-05 | Stop reason: HOSPADM

## 2020-09-02 RX ORDER — MIDAZOLAM HYDROCHLORIDE 1 MG/ML
INJECTION INTRAMUSCULAR; INTRAVENOUS
Status: COMPLETED
Start: 2020-09-02 | End: 2020-09-02

## 2020-09-02 RX ORDER — LIDOCAINE HYDROCHLORIDE 20 MG/ML
INJECTION, SOLUTION EPIDURAL; INFILTRATION; INTRACAUDAL; PERINEURAL PRN
Status: DISCONTINUED | OUTPATIENT
Start: 2020-09-02 | End: 2020-09-02 | Stop reason: HOSPADM

## 2020-09-02 RX ORDER — LISINOPRIL 10 MG/1
10 TABLET ORAL DAILY
Status: DISCONTINUED | OUTPATIENT
Start: 2020-09-02 | End: 2020-09-05 | Stop reason: HOSPADM

## 2020-09-02 RX ORDER — ACETAMINOPHEN 325 MG/1
650 TABLET ORAL EVERY 6 HOURS PRN
Status: DISCONTINUED | OUTPATIENT
Start: 2020-09-02 | End: 2020-09-05 | Stop reason: HOSPADM

## 2020-09-02 RX ORDER — SODIUM CHLORIDE 0.9 % (FLUSH) 0.9 %
10 SYRINGE (ML) INJECTION EVERY 12 HOURS SCHEDULED
Status: DISCONTINUED | OUTPATIENT
Start: 2020-09-02 | End: 2020-09-02 | Stop reason: HOSPADM

## 2020-09-02 RX ORDER — SODIUM CHLORIDE, SODIUM LACTATE, POTASSIUM CHLORIDE, CALCIUM CHLORIDE 600; 310; 30; 20 MG/100ML; MG/100ML; MG/100ML; MG/100ML
INJECTION, SOLUTION INTRAVENOUS CONTINUOUS
Status: DISCONTINUED | OUTPATIENT
Start: 2020-09-02 | End: 2020-09-02

## 2020-09-02 RX ORDER — DESVENLAFAXINE 50 MG/1
1 TABLET, EXTENDED RELEASE ORAL DAILY
Status: DISCONTINUED | OUTPATIENT
Start: 2020-09-02 | End: 2020-09-02

## 2020-09-02 RX ORDER — MIDAZOLAM HYDROCHLORIDE 1 MG/ML
INJECTION INTRAMUSCULAR; INTRAVENOUS PRN
Status: DISCONTINUED | OUTPATIENT
Start: 2020-09-02 | End: 2020-09-02 | Stop reason: SDUPTHER

## 2020-09-02 RX ORDER — HEPARIN SODIUM 1000 [USP'U]/ML
80 INJECTION, SOLUTION INTRAVENOUS; SUBCUTANEOUS PRN
Status: DISCONTINUED | OUTPATIENT
Start: 2020-09-02 | End: 2020-09-02

## 2020-09-02 RX ORDER — ONDANSETRON 2 MG/ML
4 INJECTION INTRAMUSCULAR; INTRAVENOUS
Status: DISCONTINUED | OUTPATIENT
Start: 2020-09-02 | End: 2020-09-02 | Stop reason: HOSPADM

## 2020-09-02 RX ORDER — SODIUM CHLORIDE 0.9 % (FLUSH) 0.9 %
10 SYRINGE (ML) INJECTION PRN
Status: DISCONTINUED | OUTPATIENT
Start: 2020-09-02 | End: 2020-09-02 | Stop reason: HOSPADM

## 2020-09-02 RX ORDER — HEPARIN SODIUM 1000 [USP'U]/ML
40 INJECTION, SOLUTION INTRAVENOUS; SUBCUTANEOUS PRN
Status: DISCONTINUED | OUTPATIENT
Start: 2020-09-02 | End: 2020-09-02

## 2020-09-02 RX ORDER — VASOPRESSIN 20 [USP'U]/ML
INJECTION, SOLUTION INTRAMUSCULAR; SUBCUTANEOUS PRN
Status: DISCONTINUED | OUTPATIENT
Start: 2020-09-02 | End: 2020-09-02 | Stop reason: SDUPTHER

## 2020-09-02 RX ADMIN — MIDAZOLAM 1 MG: 1 INJECTION INTRAMUSCULAR; INTRAVENOUS at 15:53

## 2020-09-02 RX ADMIN — EPHEDRINE SULFATE 10 MG: 50 INJECTION INTRAMUSCULAR; INTRAVENOUS; SUBCUTANEOUS at 16:07

## 2020-09-02 RX ADMIN — ALTEPLASE 1 MG/HR: 2.2 INJECTION, POWDER, LYOPHILIZED, FOR SOLUTION INTRAVENOUS at 18:05

## 2020-09-02 RX ADMIN — HEPARIN SODIUM 200 UNITS/HR: 10000 INJECTION, SOLUTION INTRAVENOUS at 18:05

## 2020-09-02 RX ADMIN — VASOPRESSIN 2 UNITS: 20 INJECTION INTRAVENOUS at 17:15

## 2020-09-02 RX ADMIN — MORPHINE SULFATE 2 MG: 4 INJECTION, SOLUTION INTRAMUSCULAR; INTRAVENOUS at 19:46

## 2020-09-02 RX ADMIN — HEPARIN SODIUM 18 UNITS/KG/HR: 10000 INJECTION, SOLUTION INTRAVENOUS at 09:56

## 2020-09-02 RX ADMIN — PROPOFOL 50 MCG/KG/MIN: 10 INJECTION, EMULSION INTRAVENOUS at 16:00

## 2020-09-02 RX ADMIN — ALTEPLASE 1 MG/HR: 2.2 INJECTION, POWDER, LYOPHILIZED, FOR SOLUTION INTRAVENOUS at 17:45

## 2020-09-02 RX ADMIN — SODIUM CHLORIDE, SODIUM LACTATE, POTASSIUM CHLORIDE, AND CALCIUM CHLORIDE: 600; 310; 30; 20 INJECTION, SOLUTION INTRAVENOUS at 14:51

## 2020-09-02 RX ADMIN — PHENYLEPHRINE HYDROCHLORIDE 200 MCG: 10 INJECTION INTRAVENOUS at 16:47

## 2020-09-02 RX ADMIN — MIDAZOLAM 2 MG: 1 INJECTION INTRAMUSCULAR; INTRAVENOUS at 15:02

## 2020-09-02 RX ADMIN — VASOPRESSIN 2 UNITS: 20 INJECTION INTRAVENOUS at 16:38

## 2020-09-02 RX ADMIN — FENTANYL CITRATE 50 MCG: 50 INJECTION INTRAMUSCULAR; INTRAVENOUS at 15:55

## 2020-09-02 RX ADMIN — FENTANYL CITRATE 50 MCG: 50 INJECTION INTRAMUSCULAR; INTRAVENOUS at 16:05

## 2020-09-02 RX ADMIN — IOPAMIDOL 90 ML: 755 INJECTION, SOLUTION INTRAVENOUS at 09:24

## 2020-09-02 RX ADMIN — MORPHINE SULFATE 2 MG: 4 INJECTION, SOLUTION INTRAMUSCULAR; INTRAVENOUS at 22:00

## 2020-09-02 RX ADMIN — HEPARIN SODIUM 5440 UNITS: 1000 INJECTION, SOLUTION INTRAVENOUS; SUBCUTANEOUS at 09:55

## 2020-09-02 RX ADMIN — EPHEDRINE SULFATE 20 MG: 50 INJECTION INTRAMUSCULAR; INTRAVENOUS; SUBCUTANEOUS at 16:18

## 2020-09-02 RX ADMIN — MIDAZOLAM 1 MG: 1 INJECTION INTRAMUSCULAR; INTRAVENOUS at 16:00

## 2020-09-02 RX ADMIN — HEPARIN SODIUM 5440 UNITS: 1000 INJECTION INTRAVENOUS; SUBCUTANEOUS at 09:55

## 2020-09-02 RX ADMIN — PROMETHAZINE HYDROCHLORIDE 12.5 MG: 25 TABLET ORAL at 21:18

## 2020-09-02 RX ADMIN — HEPARIN SODIUM 200 UNITS/HR: 10000 INJECTION, SOLUTION INTRAVENOUS at 17:45

## 2020-09-02 RX ADMIN — PHENYLEPHRINE HYDROCHLORIDE 200 MCG: 10 INJECTION INTRAVENOUS at 16:41

## 2020-09-02 RX ADMIN — Medication 10 ML: at 21:00

## 2020-09-02 RX ADMIN — PROPOFOL 50 MG: 10 INJECTION, EMULSION INTRAVENOUS at 15:54

## 2020-09-02 RX ADMIN — CALCIUM CHLORIDE 0.5 G: 100 INJECTION, SOLUTION INTRAVENOUS; INTRAVENTRICULAR at 16:39

## 2020-09-02 RX ADMIN — PHENYLEPHRINE HYDROCHLORIDE 100 MCG: 10 INJECTION INTRAVENOUS at 16:23

## 2020-09-02 RX ADMIN — VASOPRESSIN 2 UNITS: 20 INJECTION INTRAVENOUS at 16:53

## 2020-09-02 RX ADMIN — CALCIUM CHLORIDE 0.5 G: 100 INJECTION, SOLUTION INTRAVENOUS; INTRAVENTRICULAR at 16:30

## 2020-09-02 RX ADMIN — PROPOFOL 20 MG: 10 INJECTION, EMULSION INTRAVENOUS at 15:56

## 2020-09-02 RX ADMIN — SODIUM CHLORIDE, SODIUM LACTATE, POTASSIUM CHLORIDE, AND CALCIUM CHLORIDE: 600; 310; 30; 20 INJECTION, SOLUTION INTRAVENOUS at 15:48

## 2020-09-02 RX ADMIN — PHENYLEPHRINE HYDROCHLORIDE 100 MCG: 10 INJECTION INTRAVENOUS at 16:33

## 2020-09-02 RX ADMIN — EPHEDRINE SULFATE 15 MG: 50 INJECTION INTRAMUSCULAR; INTRAVENOUS; SUBCUTANEOUS at 16:13

## 2020-09-02 RX ADMIN — ROPINIROLE HYDROCHLORIDE 10 MG: 1 TABLET, FILM COATED ORAL at 21:18

## 2020-09-02 ASSESSMENT — LIFESTYLE VARIABLES: SMOKING_STATUS: 0

## 2020-09-02 ASSESSMENT — ENCOUNTER SYMPTOMS
SHORTNESS OF BREATH: 1
ABDOMINAL PAIN: 0

## 2020-09-02 ASSESSMENT — PAIN SCALES - GENERAL
PAINLEVEL_OUTOF10: 5
PAINLEVEL_OUTOF10: 6

## 2020-09-02 NOTE — ANESTHESIA PRE PROCEDURE
Department of Anesthesiology  Preprocedure Note       Name:  Michael Quan   Age:  63 y.o.  :  1960                                          MRN:  [id number]         Date:  2020      Surgeon: Kerrie Neves):  Lesvia Estrada MD    Procedure: Procedure(s):  ECOS-PULMONARY    Medications prior to admission:   Prior to Admission medications    Medication Sig Start Date End Date Taking?  Authorizing Provider   cyanocobalamin 1000 MCG/ML injection Inject 1 mL into the muscle every 30 days 20   Milagro Call MD   rizatriptan (MAXALT) 10 MG tablet Take 1 tablet by mouth once as needed for Migraine May repeat in 2 hours if needed 20  Milagro Call MD   tiZANidine (ZANAFLEX) 4 MG tablet Take 1 tablet by mouth every 8 hours as needed (muscle spasms) 20   Milagro Call MD   lisinopril (PRINIVIL;ZESTRIL) 10 MG tablet TAKE 1 TABLET BY MOUTH EVERY DAY 20   Milagro Call MD   desvenlafaxine succinate (PRISTIQ) 50 MG TB24 extended release tablet TAKE 1 TABLET BY MOUTH EVERY DAY 20   Milagro Call MD   nortriptyline (PAMELOR) 10 MG capsule TAKE 2 CAPSULES BY MOUTH EVERY DAY AT BEDTIME 12/3/19   Milagro Call MD   verapamil (CALAN SR) 240 MG extended release tablet TAKE 1 TABLET BY MOUTH EVERY DAY 10/21/19   Milagro Call MD   estradiol (ESTRACE) 0.5 MG tablet Take 1 tablet by mouth daily  Patient taking differently: Take 0.25 mg by mouth daily  19   RAH Vora   b complex vitamins capsule Take 1 capsule by mouth daily    Historical Provider, MD       Current medications:    Current Facility-Administered Medications   Medication Dose Route Frequency Provider Last Rate Last Dose    heparin (porcine) injection 5,440 Units  80 Units/kg Intravenous PRN Maura Lucero MD        heparin (porcine) injection 2,720 Units  40 Units/kg Intravenous PRN Maura Lucero MD        heparin 25,000 units in dextrose 5% 250 mL infusion  18 Units/kg/hr Intravenous Continuous Maura Lucero MD 12.2 mL/hr at 09/02/20 0956 18 Units/kg/hr at 09/02/20 0956    vancomycin (VANCOCIN) 1,250 mg in dextrose 5 % 250 mL IVPB  1,250 mg Intravenous On Call to [id number] [facility], EM[place]IAN[place]        lisinopril (PRINIVIL;ZESTRIL) tablet 10 mg  10 mg Oral Daily Jody Jimenez MD        verapamil (CALAN SR) extended release tablet 240 mg  1 tablet Oral Daily Jody Jimenez MD        nortriptyline (PAMELOR) capsule 10 mg  10 mg Oral Nightly Jody Jimenez MD        desvenlafaxine succinate (PRISTIQ) extended release tablet 50 mg  1 tablet Oral Daily Jody Jimenez MD        sodium chloride flush 0.9 % injection 10 mL  10 mL Intravenous 2 times per day Jody Jimenez MD        sodium chloride flush 0.9 % injection 10 mL  10 mL Intravenous PRN Jody Jimenez MD        acetaminophen (TYLENOL) tablet 650 mg  650 mg Oral Q6H PRN Jody Jimenez MD        Or    acetaminophen (TYLENOL) suppository 650 mg  650 mg Rectal Q6H PRN Jody Jimenez MD        polyethylene glycol [facility]) packet 17 g  17 g Oral Daily PRN Jody Jimenez MD        promethazine (PHENERGAN) tablet 12.5 mg  12.5 mg Oral Q6H PRN Jody Jimenez MD        Or    ondansetron [facility]) injection 4 mg  4 mg Intravenous Q6H PRN Jody Jimenez MD         Current Outpatient Medications   Medication Sig Dispense Refill    cyanocobalamin 1000 MCG/ML injection Inject 1 mL into the muscle every 30 days 3 mL 2    rizatriptan (MAXALT) 10 MG tablet Take 1 tablet by mouth once as needed for Migraine May repeat in 2 hours if needed 9 tablet 5    tiZANidine (ZANAFLEX) 4 MG tablet Take 1 tablet by mouth every 8 hours as needed (muscle spasms) 180 tablet 1    lisinopril (PRINIVIL;ZESTRIL) 10 MG tablet TAKE 1 TABLET BY MOUTH EVERY DAY 90 tablet 3    desvenlafaxine succinate (PRISTIQ) 50 MG TB24 extended release tablet TAKE 1 TABLET BY MOUTH EVERY DAY 90 tablet 3    nortriptyline (PAMELOR) 10 MG capsule TAKE 2 CAPSULES BY MOUTH EVERY DAY AT BEDTIME 180 capsule 3    verapamil (CALAN SR) 240 MG extended release tablet TAKE 1 TABLET BY MOUTH EVERY DAY 90 tablet 3    estradiol (ESTRACE) 0.5 MG tablet Take 1 tablet by mouth daily (Patient taking differently: Take 0.25 mg by mouth daily ) 90 tablet 3    b complex vitamins capsule Take 1 capsule by mouth daily         Allergies: Allergies   Allergen Reactions    Demerol  [Meperidine Hcl]      Other reaction(s): Unknown    Pcn [Penicillins] Rash       Problem List:    Patient Active Problem List   Diagnosis Code    Breast density R92.2    Hyperlipidemia E78.5    Migraine without aura G43.009    Pernicious anemia D51.0    Raynaud's phenomenon I73.00    Cervical disc disease M50.90    Major depressive disorder in remission ([facility] [address] F32.5    Gastroesophageal reflux disease without esophagitis K21.9    Numbness in both hands R20.0    Myopia H52.10    Nuclear senile cataract H25.10    Pseudophakia Z96.1    Tear film insufficiency H04.129    Essential hypertension I10    Pulmonary embolism (HCC) I26.99    VTE (venous thromboembolism) I82.90       Past Medical History:        Diagnosis Date    Cervical disc disease 8/26/2017    Essential hypertension 12/1/2019    Gastroesophageal reflux disease without esophagitis 8/26/2017    Hyperlipidemia     Major depressive disorder in remission ([facility] [address] 8/26/2017    Migraine without aura 8/26/2017    Pernicious anemia 8/26/2017    Raynaud's phenomenon 8/26/2017       Past Surgical History:        Procedure Laterality Date    RIGOBERTO AND BSO         Social History:    Social History     Tobacco Use    Smoking status: Never Smoker    Smokeless tobacco: Never Used   Substance Use Topics    Alcohol use:  Yes                                Counseling given: Not Answered      Vital Signs (Current):   Vitals:    09/02/20 1140 09/02/20 1322 09/02/20 1333 09/02/20 1400   BP: 113/79 111/76 120/74 120/76   Pulse: 87 94 87 90   Resp: 20 20 20 20   Temp:    98.7 °F (37.1 °C)   TempSrc: Temporal   SpO2: 99% 98% 98% 98%   Weight:                                                  BP Readings from Last 3 Encounters:   09/02/20 120/76   08/17/20 120/80   06/25/20 118/70       NPO Status:                                                                                 BMI:   Wt Readings from Last 3 Encounters:   09/02/20 150 lb (68 kg)   08/17/20 151 lb (68.5 kg)   06/25/20 150 lb (68 kg)     Body mass index is 23.49 kg/m². CBC:   Lab Results   Component Value Date    WBC 9.3 09/02/2020    RBC 5.36 09/02/2020    HGB 14.3 09/02/2020    HCT 44.4 09/02/2020    HCT 43.5 05/19/2011    MCV 82.8 09/02/2020    RDW 15.0 09/02/2020     09/02/2020     05/19/2011       CMP:   Lab Results   Component Value Date     09/02/2020     05/19/2011    K 4.1 09/02/2020    K 4.9 05/19/2011     09/02/2020     05/19/2011    CO2 23 09/02/2020    BUN 18 09/02/2020    CREATININE 0.9 09/02/2020    CREATININE 0.7 05/19/2011    GFRAA >59 09/02/2020    LABGLOM >60 09/02/2020    GLUCOSE 110 09/02/2020    PROT 6.2 09/02/2020    PROT 6.6 05/19/2011    CALCIUM 9.0 09/02/2020    BILITOT <0.2 09/02/2020    ALKPHOS 105 09/02/2020    ALKPHOS 66 05/19/2011    AST 77 09/02/2020    ALT 81 09/02/2020       POC Tests: No results for input(s): POCGLU, POCNA, POCK, POCCL, POCBUN, POCHEMO, POCHCT in the last 72 hours.     Coags:   Lab Results   Component Value Date    PROTIME 13.7 09/02/2020    INR 1.05 09/02/2020    APTT 23.4 09/02/2020       HCG (If Applicable): No results found for: PREGTESTUR, PREGSERUM, HCG, HCGQUANT     ABGs: No results found for: PHART, PO2ART, EWR3GBE, UBZ3BBA, BEART, Y6QGAJBD     Type & Screen (If Applicable):  No results found for: LABABO, LABRH    Drug/Infectious Status (If Applicable):  No results found for: HIV, HEPCAB    COVID-19 Screening (If Applicable):   Lab Results   Component Value Date    COVID19 Not Detected 09/02/2020    COVID19 Not Detected 07/08/2020         Anesthesia with CRNA.     Attending anesthesiologist reviewed and agrees with Pre Eval content          Marion Lakhani MD   9/2/2020

## 2020-09-02 NOTE — ED NOTES
ASSESSMENT:  Pt to ED from outpt CT with diagnosis of bilateral PEs. Pt co increasing SOB since April, along w intermittent CP. No lower edema noted. SKIN:  Warm, dry, pink. Cap refill < 2 sec  CARDIAC:  S1 S2 noted. Intermittent CP. Denies CP presently. LUNGS: clear upper and lower lobes. Respirations even and unlabored. Pt co increasing SOB with exertion, such as walking to the mailbox. No resp distress noted at this time. ABDOMEN: bowel sounds noted upper and lower quadrants. Soft and tender. EXTREMITIES: bilateral DP and PT. No edema noted. Pt alert and oriented x4. Pupils equal and reactive. No distress noted. Side rails up and call light within reach.        Lior Taylor RN  09/02/20 0203

## 2020-09-02 NOTE — CONSULTS
[facility] Vascular Surgery  Consultation Note          Patient:  Tana Mcclain  YOB: 1960  Date of Service: 9/2/2020  MRN: [id number]   Acct: [de-identified]   Primary Care Physician: Lai Daigle MD    Chief Complaint: Shortness of Breath    History Obtained From:  Patient    HISTORY OF PRESENT ILLNESS:  Ms. Rocky Knapp is a 63 y.o. female who presented to the ER with worsening shortness of breath, especially when walking up hills. She has had shortness of breath since April of this year. She had a 2D echocardiogram demonstrated normal LV systolic function. Stress echocardiogram was negative for coronary ischemia. Oupatient CTA chest demonstrated large clot burden with reported right heart strain. Work-up in the ER included  Initial troponin was negative. proBNP was slightly elevated, 2' to PE. She was hemodynamically stable, oxygenating well on RA. Vascular surgery called to ER to evaluate and offer recommendations. Past Medical History:        Diagnosis Date    Cervical disc disease 8/26/2017    Essential hypertension 12/1/2019    Gastroesophageal reflux disease without esophagitis 8/26/2017    Hyperlipidemia     Major depressive disorder in remission ([facility] [address] 8/26/2017    Migraine without aura 8/26/2017    Pernicious anemia 8/26/2017    Raynaud's phenomenon 8/26/2017       Past Surgical History:        Procedure Laterality Date    RIGOBERTO AND BSO         Allergies:  Demerol  [meperidine hcl] and Pcn [penicillins]    Medications Prior to Admission:    Not in a hospital admission. Social History:   Reports that she has never smoked. She has never used smokeless tobacco. She reports current alcohol use. She reports that she does not use drugs.     Family History:       Problem Relation Age of Onset    High Blood Pressure Mother     Coronary Art Dis Mother     Thyroid Disease Mother         Hypothyroidism    Breast Cancer Mother     Colon Polyps Father     Prostate Cancer Father     Anemia Father         Pernicious    Colon Polyps Sister     Anemia Brother         Pernicious    High Blood Pressure Paternal Grandmother        REVIEW OF SYSTEMS:  Constitutional: Denies fever or chills. Denies change in energy level or malaise. HEENT: Denies headaches or visual disturbances. Denies difficulty swallowing or sore throat. Respiratory: Denies cough or hoarseness. + shortness of breath, especially when walking up hill. Shortness of breath has been getting worse over last two nonths. Cardiovascular: Pleuritic type chest pain. Denies palpitations. Denies presyncope/syncope. Denies orthopnea/PND. Denies lower extremity edema. Gastrointestinal: Denies abdominal pain. Denies nausea/vomiting. Denies change in bowel habits or history of recent GI tract blood loss. Genitourinary: Denies urinary urgency or frequency. Denies dysuria, hematuria. Musculoskeletal: Denies pain or swelling in joints. Neurological: Denies paresthesias. Denies headache. Denies seizure or stroke symptoms. Behavioral/Psych: Denies problems with anxiety or depression. All other systems are negative except where stated above.     PHYSICAL EXAM:  /76   Pulse 75   Temp 98.7 °F (37.1 °C) (Temporal)   Resp 20   Wt 150 lb (68 kg)   SpO2 98%   BMI 23.49 kg/m²     General Appearance: Alert and oriented to person, place and time, well developed and well- nourished, in no acute distress  Skin: Warm and dry, no rash or erythema  Head: Normocephalic and atraumatic  Eyes: Pupils equal, round, and reactive to light, extraocular eye movements intact, conjunctivae normal  ENT: Tympanic membrane, external ear and ear canal normal bilaterally, nose without deformity, nasal mucosa and turbinates normal without polyps  Neck: Supple and non-tender without mass, no thyromegaly or thyroid nodules, no cervical lymphadenopathy  Pulmonary/Chest: Clear to auscultation bilaterally- no wheezes, rales or rhonchi, normal air movement, no respiratory distress-Unless walks then shortness ofair at 20 feet. Cardiovascular: Normal rate, regular rhythm, normal S1 and S2, no murmurs, rubs, clicks, or gallops, distal pulses intact, no carotid bruits No leg swelling. Femoral pulses +2 bilaterally. Distal pulses palpable. Abdomen: Soft, non-tender, non-distended, normal bowel sounds, no masses or organomegaly  Extremities: No cyanosis, clubbing or edema  Musculoskeletal: Normal range of motion, no joint swelling, deformity or tenderness  Neurologic: Reflexes normal and symmetric, no cranial nerve deficit, gait, coordination and speech normal  Psychologic: Mood and affect normal.       DATA:  EKG:  I have reviewed EKG with the following interpretation: SR with lateral ST changes. CTA Chest With and Without Contrast:  1.   Significant ulnar embolus burden involving both lungs with RV to LV ratio 1.8, consistent with right heart strain. Labs:    CBC with Differential:    Lab Results   Component Value Date    WBC 9.3 09/02/2020    RBC 5.36 09/02/2020    HGB 14.3 09/02/2020    HCT 44.4 09/02/2020     09/02/2020    MCV 82.8 09/02/2020    LYMPHOPCT 26.1 09/02/2020     BMP:    Lab Results   Component Value Date     09/02/2020    K 4.1 09/02/2020     09/02/2020    CO2 23 09/02/2020    BUN 18 09/02/2020    CREATININE 0.9 09/02/2020    CALCIUM 9.0 09/02/2020    GFRAA >59 09/02/2020    LABGLOM >60 09/02/2020    GLUCOSE 110 09/02/2020     PT/INR:  13.7 / 1.05  D-dimer (1.30 )  Troponin <0.01  BNP 3380    Indications:Pulmonary embolus.      Conclusions      Summary   Right ventricle global systolic function is severely reduced . Severly dilated right ventricle. Mild tricuspid regurgitation . There is severe pulmonary hypertension. Normal left ventricular chamber size and systolic function. Flattening of the septum along with paradoxical motion, consistent with   right ventricular pressure overload.    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 I covered risks of bleeding, allergy, stroke, bleeding into the lung, death and other less severe type complications. They asked appropriate questions and wish to proceed with lysis.  Maury Bedoya md

## 2020-09-02 NOTE — ED NOTES
ICU unable to take pt at this time. RN to call back when ready.      Libia Villeda RN  09/02/20 [id number]

## 2020-09-02 NOTE — ED NOTES
Jose Francisco Montague returned call @ [phone].   Electronically signed by Joe Mai on 9/2/2020 at 1:30 PM       Joe Mai  09/02/20 8075

## 2020-09-02 NOTE — ANESTHESIA PROCEDURE NOTES
Central Venous Line:    A central venous line was placed using ultrasound guidance, in the OR for the following indication(s): Pulmonary pressure monitoring. 9/2/2020 4:10 PM9/2/2020 4:17 PM    Sterility preparation included the following: hand hygiene performed prior to procedure, maximum sterile barriers used and sterile technique used to drape from head to toe. The patient was placed in Trendelenburg position. The right internal jugular vein was prepped. The site was prepped with Chloraprep. A 7 Fr (size), introducer single lumen was placed. During the procedure, the following specific steps were taken: target vein identified, needle advanced into vein and blood aspirated and guidewire advanced into vein. Intravenous verification was obtained by ultrasound. Post insertion care included: all ports aspirated, all ports flushed easily, guidewire removed intact, Biopatch applied, line sutured in place and dressing applied. During the procedure the patient experienced: patient tolerated procedure well with no complications, EBL 0mL and EBL < 5mL. Insertion site scrubbed per usage guidelines?: Yes  Skin prep agent dried for 3 minutes prior to procedure?:yes  Anesthesia type: local (MAC)right internal jugular vein. The PAC placement was confirmed by pressure tracing changes and x-ray and secured at 45 cm (depth). The patient experienced the following events during the procedure: patient tolerated procedure well with no complications. PA Cath placed?: Yes  Staffing  Anesthesiologist: Eun Lui MD  Performed: Anesthesiologist   Preanesthetic Checklist  Completed: patient identified, site marked, surgical consent, pre-op evaluation, timeout performed, IV checked, risks and benefits discussed, monitors and equipment checked, anesthesia consent given, oxygen available and patient being monitored

## 2020-09-02 NOTE — ANESTHESIA POSTPROCEDURE EVALUATION
Department of Anesthesiology  Postprocedure Note    Patient: Román Olivares  MRN: [id number]  YOB: 1960  Date of evaluation: 9/2/2020  Time:  5:53 PM     Procedure Summary     Date:  09/02/20 Room / Location:  [facility] OR [address]    Anesthesia Start:  1156 Anesthesia Stop:  7673    Procedure:  Mariann Quiver (Bilateral ) Diagnosis:  (PULMONARY)    Surgeon:  Rogelio Allen MD Responsible Provider:  EMILIANO Nunez CRNA    Anesthesia Type:  MAC ASA Status:  3 - Emergent          Anesthesia Type: MAC    Vira Phase I: Vira Score: 10    Vira Phase II:      Last vitals: Reviewed and per EMR flowsheets.        Anesthesia Post Evaluation    Patient location during evaluation: ICU  Patient participation: complete - patient participated  Level of consciousness: awake  Pain score: 0  Nausea & Vomiting: no nausea and no vomiting  Complications: no  Cardiovascular status: hemodynamically stable  Respiratory status: acceptable, face mask and spontaneous ventilation  Hydration status: euvolemic

## 2020-09-02 NOTE — ED NOTES
Bed: 01-A  Expected date:   Expected time:   Means of arrival:   Comments:  Pt in room     Liat Villanueva RN  09/02/20 1563

## 2020-09-02 NOTE — H&P
[address]    Patient: Fredis Wheeler   : 1960   MRN: [id number]  Code Status: No Order  PCP: Lashay Krause MD  Date of Service: 2020    Chief Complaint:   Dyspnea/intermittent cp for several months    History of Present Illness:   69-year-old female with past medical history as listed below presented to the ER after outpatient imaging revealed significant pulmonary embolus burden involving both lungs with right heart strain. Vascular surgery plans for catheter directed therapy today. Patient denies sedentary lifestyle, known history of thrombophilia, or known malignancy. Patient reports a recent flight to [place] of 2 hours duration. Patient also mentioned that her mother had a history of miscarriages. Overall, patient states her shortness of breath and chest pain has been going on for several months. Denies tobacco use.     Review of Systems:   Respiratory: positive for dyspnea on exertion  Cardiovascular: positive for chest pain    Past Medical History:     Past Medical History:   Diagnosis Date    Cervical disc disease 2017    Essential hypertension 2019    Gastroesophageal reflux disease without esophagitis 2017    Hyperlipidemia     Major depressive disorder in remission ([facility] [address] 2017    Migraine without aura 2017    Pernicious anemia 2017    Raynaud's phenomenon 2017         Past Surgical History:     Past Surgical History:   Procedure Laterality Date    RIGOBERTO AND BSO          Family History:     Family History   Problem Relation Age of Onset    High Blood Pressure Mother     Coronary Art Dis Mother     Thyroid Disease Mother         Hypothyroidism    Breast Cancer Mother     Colon Polyps Father     Prostate Cancer Father     Anemia Father         Pernicious    Colon Polyps Sister     Anemia Brother         Pernicious    High Blood Pressure Paternal Grandmother         Social History:     Social History     Socioeconomic History    Marital status:      Spouse name: Not on file    Number of children: Not on file    Years of education: Not on file    Highest education level: Not on file   Occupational History    Not on file   Social Needs    Financial resource strain: Not on file    Food insecurity     Worry: Not on file     Inability: Not on file    Transportation needs     Medical: Not on file     Non-medical: Not on file   Tobacco Use    Smoking status: Never Smoker    Smokeless tobacco: Never Used   Substance and Sexual Activity    Alcohol use: Yes    Drug use: No    Sexual activity: Not on file   Lifestyle    Physical activity     Days per week: Not on file     Minutes per session: Not on file    Stress: Not on file   Relationships    Social connections     Talks on phone: Not on file     Gets together: Not on file     Attends [employer] service: Not on file     Active member of club or organization: Not on file     Attends meetings of clubs or organizations: Not on file     Relationship status: Not on file    Intimate partner violence     Fear of current or ex partner: Not on file     Emotionally abused: Not on file     Physically abused: Not on file     Forced sexual activity: Not on file   Other Topics Concern    Not on file   Social History Narrative    Not on file       Prior to Admission Medications:   Not in a hospital admission. Allergies:      Allergies   Allergen Reactions    Demerol  [Meperidine Hcl]      Other reaction(s): Unknown    Pcn [Penicillins] Rash         Physical Exam:   /79   Pulse 87   Temp 98.7 °F (37.1 °C) (Temporal)   Resp 20   Wt 150 lb (68 kg)   SpO2 99%   BMI 23.49 kg/m²     General: no acute distress  HEENT: normocephalic, atraumatic  Neck: supple, symmetrical, trachea midline   Lungs: clear to auscultation bilaterally   Cardiovascular: s1 and s2 normal  Abdomen: soft, positive bowel sounds, nondistended, nontender  Extremities: no >60    GFR African American >59 >59    Calcium 9.0 8.8 - 10.2 mg/dL    Total Protein 6.2 (L) 6.6 - 8.7 g/dL    Alb 4.2 3.5 - 5.2 g/dL    Total Bilirubin <0.2 0.2 - 1.2 mg/dL    Alkaline Phosphatase 105 (H) 35 - 104 U/L    ALT 81 (H) 5 - 33 U/L    AST 77 (H) 5 - 32 U/L   Troponin    Collection Time: 09/02/20 10:25 AM   Result Value Ref Range    Troponin <0.01 0.00 - 0.03 ng/mL   Brain Natriuretic Peptide    Collection Time: 09/02/20 10:25 AM   Result Value Ref Range    Pro-BNP 3,380 (H) 0 - 900 pg/mL   Urinalysis Reflex to Culture    Collection Time: 09/02/20 12:07 PM   Result Value Ref Range    Color, UA YELLOW Straw/Yellow    Clarity, UA Clear Clear    Glucose, Ur Negative Negative mg/dL    Bilirubin Urine Negative Negative    Ketones, Urine Negative Negative mg/dL    Specific Gravity, UA >1.045 1.005 - 1.030    Blood, Urine Negative Negative    pH, UA 6.0 5.0 - 8.0    Protein, UA Negative Negative mg/dL    Urobilinogen, Urine 1.0 <2.0 E.U./dL    Nitrite, Urine Negative Negative    Leukocyte Esterase, Urine Negative Negative       No intake/output data recorded. Cta Chest W Wo Contrast    Result Date: 9/2/2020  1. Significant ulnar embolus burden involving both lungs with RV to LV ratio 1.8, consistent with right heart strain. 2.  The patient has been sent to the emergency room. Dr. Delgado Chappell is aware. Signed by Dr Chelsie Kelly on 9/2/2020 9:35 AM      Assessment and Plan:   1) vte  -ctc 9/2/2020: Significant pe burden involving both lungs with RV to LV ratio 1.8, consistent with right heart strain  -bl le venous duplex us 9/2/2020: There is evidence of subacute deep vein thrombosis in the left lower extremity involving the peroneal veins  -tte 9/2/2020: Right ventricle global systolic function is severely reduced. Severly dilated right ventricle. Mild tricuspid regurgitation. There is severe pulmonary hypertension. Normal left ventricular chamber size and systolic function.  Flattening of the septum along with paradoxical motion, consistent with right ventricular pressure overload.  Left ventricular ejection fraction is visually estimated at 75%  -heparin gtt for now   -dr Matias Nazario plans for catheter-directed therapy (ekos)  -pt denies any known personal h/o thrombophilia/malignancy although she states her mother suffered from multiple miscarriages  -recent 2-hour flight to [facility]  -heme/onc consulted to evaluate for potential thrombophilia/malignancy  -ivc filter as warranted  -monitor cardiopulmonary status  -follow serial troponin/bnp for risk stratification     2) dvt ppx  -as above     Total critical care time: 70 minutes  Total time spent managing the care of this patient: 70 minutes    Stephen Quach MD  9/2/2020 1:16 PM

## 2020-09-02 NOTE — PROGRESS NOTES
Vascular lab preliminary results. Bilateral lower extremity venous duplex scan performed. (+) DVT noted in a small segment of the [address] Veins at mid calf   No evidence of SVT or reflux noted at this time. Informed patient's nurse, Latrice Teran, of preliminary results  Final report pending.

## 2020-09-02 NOTE — ED NOTES
Patient placed in a gown  Patient placed on cardiac monitor, continuous pulse oximeter, and NIBP monitor. Monitor alarms on. Mask and gloves worn by staff while in pt room. Pt masked during all contact with staff.        Alla Schirmer, RN  09/02/20 6148

## 2020-09-02 NOTE — ED PROVIDER NOTES
[facility] EMERGENCY DEPT  eMERGENCY dEPARTMENT eNCOUnter      Pt Name: Lucy Barr  MRN: [id number]  [place] 1960  Date of evaluation: 9/2/2020  Provider: Pina Cutler MD    CHIEF COMPLAINT       Chief Complaint   Patient presents with    Pulmonary Embolism     bilateral PE's diagnosed by outpt ct today    Shortness of Breath         HISTORY OF PRESENT ILLNESS   (Location/Symptom, Timing/Onset,Context/Setting, Quality, Duration, Modifying Factors, Severity)  Note limiting factors. Lucy Barr is a 63 y.o. female who presents to the emergency department with shortness of breath first noted in April when she could not go on a hike. She is had progressive worsening of her shortness of breath. Today she had to sit down when she came in for outpatient CT scan. She made it from the front door to the radiology department when she was short of breath. Patient while at rest denies any shortness of breath acutely. She is in no distress. She states that she has been worse she has no reason to have PEs but she was found to have massive PEs in her bilateral pulmonary arteries. No GI bleeding no history of trauma no history of recent surgeries. No recent trips prior to the PE. Patient denies any cardiac issues of note she had an echo and a stress test. No known cardiac issues. Had ECHO and Stress echo 7/21   Patient had SOB but passed ekg stress      Patient denies any fever. She has no known covert exposure. Patient has no swelling in her legs. She is otherwise healthy. She does take some medications including some for pernicious anemia. The history is provided by the patient. NursingNotes were reviewed. REVIEW OF SYSTEMS    (2-9 systems for level 4, 10 or more for level 5)     Review of Systems   Constitutional: Negative for fever. Respiratory: Positive for shortness of breath. Cardiovascular: Negative for chest pain and leg swelling.    Gastrointestinal: Negative for abdominal pain. Neurological: Negative for syncope. Psychiatric/Behavioral: Negative for confusion. A complete review of systems was performed and is negative except as noted above in the HPI.        PAST MEDICAL HISTORY     Past Medical History:   Diagnosis Date    Cervical disc disease 8/26/2017    Essential hypertension 12/1/2019    Gastroesophageal reflux disease without esophagitis 8/26/2017    Hyperlipidemia     Major depressive disorder in remission ([facility] [address] 8/26/2017    Migraine without aura 8/26/2017    Pernicious anemia 8/26/2017    Raynaud's phenomenon 8/26/2017         SURGICAL HISTORY       Past Surgical History:   Procedure Laterality Date    RIGOBERTO AND BSO           CURRENT MEDICATIONS       Previous Medications    B COMPLEX VITAMINS CAPSULE    Take 1 capsule by mouth daily    CYANOCOBALAMIN 1000 MCG/ML INJECTION    Inject 1 mL into the muscle every 30 days    DESVENLAFAXINE SUCCINATE (PRISTIQ) 50 MG TB24 EXTENDED RELEASE TABLET    TAKE 1 TABLET BY MOUTH EVERY DAY    ESTRADIOL (ESTRACE) 0.5 MG TABLET    Take 1 tablet by mouth daily    LISINOPRIL (PRINIVIL;ZESTRIL) 10 MG TABLET    TAKE 1 TABLET BY MOUTH EVERY DAY    NORTRIPTYLINE (PAMELOR) 10 MG CAPSULE    TAKE 2 CAPSULES BY MOUTH EVERY DAY AT BEDTIME    RIZATRIPTAN (MAXALT) 10 MG TABLET    Take 1 tablet by mouth once as needed for Migraine May repeat in 2 hours if needed    TIZANIDINE (ZANAFLEX) 4 MG TABLET    Take 1 tablet by mouth every 8 hours as needed (muscle spasms)    VERAPAMIL (CALAN SR) 240 MG EXTENDED RELEASE TABLET    TAKE 1 TABLET BY MOUTH EVERY DAY       ALLERGIES     Demerol  [meperidine hcl] and Pcn [penicillins]    FAMILY HISTORY       Family History   Problem Relation Age of Onset    High Blood Pressure Mother     Coronary Art Dis Mother     Thyroid Disease Mother         Hypothyroidism    Breast Cancer Mother     Colon Polyps Father     Prostate Cancer Father     Anemia Father         Pernicious    Colon Polyps Normocephalic and atraumatic. Nose: Nose normal.      Mouth/Throat:      Mouth: Mucous membranes are moist.   Eyes:      Extraocular Movements: Extraocular movements intact. Pupils: Pupils are equal, round, and reactive to light. Neck:      Musculoskeletal: Normal range of motion and neck supple. Cardiovascular:      Rate and Rhythm: Normal rate and regular rhythm. Pulses: Normal pulses. Heart sounds: Normal heart sounds. Pulmonary:      Effort: Pulmonary effort is normal. No respiratory distress. Breath sounds: Normal breath sounds. Abdominal:      General: Abdomen is flat. Tenderness: There is no abdominal tenderness. Musculoskeletal: Normal range of motion. General: No swelling, tenderness or deformity. Skin:     General: Skin is warm and dry. Neurological:      General: No focal deficit present. Mental Status: She is alert and oriented to person, place, and time. Cranial Nerves: No cranial nerve deficit. Sensory: No sensory deficit. Motor: No weakness. Psychiatric:         Mood and Affect: Mood normal.         Behavior: Behavior normal.         DIAGNOSTIC RESULTS     EKG: All EKG's are interpreted by the Emergency Department Physician who either signs or Co-signs this chart in the absence of a cardiologist.    EKG sinus 98 T wave inversion V2 V3 low voltage    RADIOLOGY:   Non-plain film images such as CT, Ultrasound and MRI are read by the radiologist. Plainradiographic images are visualized and preliminarily interpreted by the emergency physician with the below findings:      CTA with/without done outpt Radiology today; pt sent to ER. IMPRESSION: 1. Significant pulm embolus burden involving both lungs with RV to LV ratio 1.8, consistent with right heart strain. 2. The patient has been sent to the emergency room. Dr. Stefanie Canas is aware.  Signed by Dr Felicitas Nicholas on 9/2/2020 9:35 AM      Interpretation per the Radiologist below, if the following components:    D-Dimer, Quant 1.30 (*)     All other components within normal limits   PROTIME-INR   URINE RT REFLEX TO CULTURE   SPECIMEN REJECTION   TROPONIN   FIBRINOGEN   APTT   APTT   COVID-19   TYPE AND SCREEN       All other labs were within normal range or not returned as of this dictation. EMERGENCY DEPARTMENT COURSE and DIFFERENTIALDIAGNOSIS/MDM:   Vitals:    Vitals:    09/02/20 1140 09/02/20 1322 09/02/20 1333 09/02/20 1400   BP: 113/79 111/76 120/74 120/76   Pulse: 87 94 87 90   Resp: 20 20 20 20   Temp:    98.7 °F (37.1 °C)   TempSrc:    Temporal   SpO2: 99% 98% 98% 98%   Weight:           MDM  Number of Diagnoses or Management Options  Acute deep vein thrombosis (DVT) of left peroneal vein: new and requires workup  Acute pulmonary embolism with acute cor pulmonale, unspecified pulmonary embolism type [facility]): new and requires workup  Diagnosis management comments: Patient with large burden pulmonary emboli. Unclear acute versus more chronic subacute over the last couple months. Patient hemodynamically stable. Started on IV heparin with bolus. Consult placed to vascular surgery appreciate Dr Cris Santos seeing pt immediately. Ordered stat echo to compare to prior to try and help elucidate whether or not patient should go for ekos. Pt with much worse echo and RV strain. Vascular surgery discussed with the patient. Will take her for intervention as her echo is much worse than the July 21 one. Patient otherwise with no risks of bleeding by hx. Discussed multiple times a vascular surgery, ICU attending who will admit patient Dr. Shahid Gonzalez, and the family. Risks and benefits options and started heparin drip with bolus ordered stat echo and ultrasounds of the legs finding a small subacute DVT.        Amount and/or Complexity of Data Reviewed  Clinical lab tests: ordered and reviewed  Tests in the radiology section of CPT®: reviewed and ordered  Decide to obtain previous medical records or to obtain history from someone other than the patient: yes  Obtain history from someone other than the patient: yes  Discuss the patient with other providers: yes  Independent visualization of images, tracings, or specimens: yes    Risk of Complications, Morbidity, and/or Mortality  Presenting problems: high  Management options: high    Critical Care  Total time providing critical care: 30-74 minutes    Patient Progress  Patient progress: stable    CRITICAL CARE TIME   Total Critical Care time was 35 minutes, excluding separately reportable procedures. There was a high probability of clinically significant/life threatening deterioration in the patient's condition which required my urgent intervention. CONSULTS:  IP CONSULT TO VASCULAR SURGERY   Discussed with and seen by Dr. Christine Coto in ER    PROCEDURES:  Unless otherwise notedbelow, none     Procedures    FINAL IMPRESSION     1. Acute pulmonary embolism with acute cor pulmonale, unspecified pulmonary embolism type ([address]    2.  Acute deep vein thrombosis (DVT) of left peroneal vein          DISPOSITION/PLAN   DISPOSITION        PATIENT REFERRED TO:  Darek Patterson MD  / [facility]  [phone]            DISCHARGE MEDICATIONS:  New Prescriptions    No medications on file          (Please note that portions of this note were completed with a voice recognition program.  Efforts were made to edit the dictations butoccasionally words are mis-transcribed.)    Werner Lundberg MD (electronically signed)  AttendingEmergency Physician         Werner Lundberg MD  09/02/20 7981

## 2020-09-02 NOTE — ED NOTES
Spoke with tech from vascular. Pt has small DVT in L peroneal vein.  md aware     Andrei Tate, RN  09/02/20 [id number]

## 2020-09-02 NOTE — ANESTHESIA PROCEDURE NOTES
Arterial Line:    An arterial line was placed using surface landmarks, in the holding area for the following indication(s): continuous blood pressure monitoring and blood sampling needed. A 20 gauge (size), (length), Arrow (type) catheter was placed, Seldinger technique used, into the right radial artery, secured by Tegaderm and tape. Anesthesia type: Local  Local infiltration: Injection    Events:  patient tolerated procedure well with no complications.   9/2/2020 3:06 1/0/4682 3:08 PM  Anesthesiologist: David Vinson MD  Performed: Anesthesiologist   Preanesthetic Checklist  Completed: patient identified, site marked, surgical consent, pre-op evaluation, timeout performed, IV checked, risks and benefits discussed, monitors and equipment checked, anesthesia consent given, oxygen available and patient being monitored

## 2020-09-02 NOTE — ED NOTES
Pt has 4 oz caffeinated coffee and 1 piece of sprague at [address] [facility], RN  09/02/20 0572

## 2020-09-03 ENCOUNTER — TELEPHONE (OUTPATIENT)
Dept: HEMATOLOGY | Age: 60
End: 2020-09-03

## 2020-09-03 LAB
ANION GAP SERPL CALCULATED.3IONS-SCNC: 10 MMOL/L (ref 7–19)
APTT: 159.5 SEC (ref 26–36.2)
APTT: 174.9 SEC (ref 26–36.2)
APTT: 39 SEC (ref 26–36.2)
APTT: 42.3 SEC (ref 26–36.2)
BASOPHILS ABSOLUTE: 0 K/UL (ref 0–0.2)
BASOPHILS RELATIVE PERCENT: 0.3 % (ref 0–1)
BUN BLDV-MCNC: 11 MG/DL (ref 8–23)
CALCIUM SERPL-MCNC: 8.5 MG/DL (ref 8.8–10.2)
CHLORIDE BLD-SCNC: 109 MMOL/L (ref 98–111)
CO2: 23 MMOL/L (ref 22–29)
CREAT SERPL-MCNC: 0.7 MG/DL (ref 0.5–0.9)
EKG P AXIS: 68 DEGREES
EKG P AXIS: 72 DEGREES
EKG P-R INTERVAL: 164 MS
EKG P-R INTERVAL: 170 MS
EKG Q-T INTERVAL: 390 MS
EKG Q-T INTERVAL: 396 MS
EKG QRS DURATION: 74 MS
EKG QRS DURATION: 82 MS
EKG QTC CALCULATION (BAZETT): 432 MS
EKG QTC CALCULATION (BAZETT): 442 MS
EKG T AXIS: -130 DEGREES
EKG T AXIS: 17 DEGREES
EOSINOPHILS ABSOLUTE: 0.2 K/UL (ref 0–0.6)
EOSINOPHILS RELATIVE PERCENT: 1.7 % (ref 0–5)
FIBRINOGEN: 188 MG/DL (ref 238–505)
FIBRINOGEN: 234 MG/DL (ref 238–505)
GFR AFRICAN AMERICAN: >59
GFR NON-AFRICAN AMERICAN: >60
GLUCOSE BLD-MCNC: 93 MG/DL (ref 74–109)
HCT VFR BLD CALC: 36.2 % (ref 37–47)
HCT VFR BLD CALC: 38.5 % (ref 37–47)
HEMOGLOBIN: 11.7 G/DL (ref 12–16)
HEMOGLOBIN: 12.6 G/DL (ref 12–16)
IMMATURE GRANULOCYTES #: 0.1 K/UL
INR BLD: 1.24 (ref 0.88–1.18)
LYMPHOCYTES ABSOLUTE: 1.7 K/UL (ref 1.1–4.5)
LYMPHOCYTES RELATIVE PERCENT: 14.4 % (ref 20–40)
MCH RBC QN AUTO: 26.4 PG (ref 27–31)
MCH RBC QN AUTO: 26.5 PG (ref 27–31)
MCHC RBC AUTO-ENTMCNC: 32.3 G/DL (ref 33–37)
MCHC RBC AUTO-ENTMCNC: 32.7 G/DL (ref 33–37)
MCV RBC AUTO: 81.1 FL (ref 81–99)
MCV RBC AUTO: 81.7 FL (ref 81–99)
MONOCYTES ABSOLUTE: 0.6 K/UL (ref 0–0.9)
MONOCYTES RELATIVE PERCENT: 5.3 % (ref 0–10)
NEUTROPHILS ABSOLUTE: 9.1 K/UL (ref 1.5–7.5)
NEUTROPHILS RELATIVE PERCENT: 77.8 % (ref 50–65)
PDW BLD-RTO: 15.2 % (ref 11.5–14.5)
PDW BLD-RTO: 15.2 % (ref 11.5–14.5)
PLATELET # BLD: 127 K/UL (ref 130–400)
PLATELET # BLD: 143 K/UL (ref 130–400)
PMV BLD AUTO: 11.2 FL (ref 9.4–12.3)
PMV BLD AUTO: 11.5 FL (ref 9.4–12.3)
POTASSIUM REFLEX MAGNESIUM: 4.4 MMOL/L (ref 3.5–5)
PROTHROMBIN TIME: 15.6 SEC (ref 12–14.6)
RBC # BLD: 4.43 M/UL (ref 4.2–5.4)
RBC # BLD: 4.75 M/UL (ref 4.2–5.4)
SODIUM BLD-SCNC: 142 MMOL/L (ref 136–145)
WBC # BLD: 11.7 K/UL (ref 4.8–10.8)
WBC # BLD: 13.2 K/UL (ref 4.8–10.8)

## 2020-09-03 PROCEDURE — 1210000000 HC MED SURG R&B

## 2020-09-03 PROCEDURE — 37214 CESSJ THERAPY CATH REMOVAL: CPT | Performed by: SURGERY

## 2020-09-03 PROCEDURE — 85027 COMPLETE CBC AUTOMATED: CPT

## 2020-09-03 PROCEDURE — 80048 BASIC METABOLIC PNL TOTAL CA: CPT

## 2020-09-03 PROCEDURE — 6370000000 HC RX 637 (ALT 250 FOR IP): Performed by: SURGERY

## 2020-09-03 PROCEDURE — 85384 FIBRINOGEN ACTIVITY: CPT

## 2020-09-03 PROCEDURE — 2580000003 HC RX 258: Performed by: SURGERY

## 2020-09-03 PROCEDURE — 2700000000 HC OXYGEN THERAPY PER DAY

## 2020-09-03 PROCEDURE — 6360000002 HC RX W HCPCS

## 2020-09-03 PROCEDURE — 85730 THROMBOPLASTIN TIME PARTIAL: CPT

## 2020-09-03 PROCEDURE — 85610 PROTHROMBIN TIME: CPT

## 2020-09-03 PROCEDURE — 93005 ELECTROCARDIOGRAM TRACING: CPT | Performed by: SURGERY

## 2020-09-03 PROCEDURE — 93010 ELECTROCARDIOGRAM REPORT: CPT | Performed by: INTERNAL MEDICINE

## 2020-09-03 PROCEDURE — 6360000002 HC RX W HCPCS: Performed by: NURSE PRACTITIONER

## 2020-09-03 PROCEDURE — 85025 COMPLETE CBC W/AUTO DIFF WBC: CPT

## 2020-09-03 PROCEDURE — 6360000002 HC RX W HCPCS: Performed by: SURGERY

## 2020-09-03 PROCEDURE — 99223 1ST HOSP IP/OBS HIGH 75: CPT | Performed by: INTERNAL MEDICINE

## 2020-09-03 RX ORDER — HYDROMORPHONE HYDROCHLORIDE 1 MG/ML
1 INJECTION, SOLUTION INTRAMUSCULAR; INTRAVENOUS; SUBCUTANEOUS
Status: DISCONTINUED | OUTPATIENT
Start: 2020-09-03 | End: 2020-09-05 | Stop reason: HOSPADM

## 2020-09-03 RX ORDER — HEPARIN SODIUM 1000 [USP'U]/ML
80 INJECTION, SOLUTION INTRAVENOUS; SUBCUTANEOUS PRN
Status: ACTIVE | OUTPATIENT
Start: 2020-09-03 | End: 2020-09-04

## 2020-09-03 RX ORDER — HEPARIN SODIUM 1000 [USP'U]/ML
40 INJECTION, SOLUTION INTRAVENOUS; SUBCUTANEOUS PRN
Status: ACTIVE | OUTPATIENT
Start: 2020-09-03 | End: 2020-09-04

## 2020-09-03 RX ORDER — KETOROLAC TROMETHAMINE 30 MG/ML
30 INJECTION, SOLUTION INTRAMUSCULAR; INTRAVENOUS EVERY 8 HOURS
Status: DISCONTINUED | OUTPATIENT
Start: 2020-09-03 | End: 2020-09-03

## 2020-09-03 RX ORDER — FUROSEMIDE 10 MG/ML
INJECTION INTRAMUSCULAR; INTRAVENOUS
Status: DISCONTINUED
Start: 2020-09-03 | End: 2020-09-03 | Stop reason: WASHOUT

## 2020-09-03 RX ORDER — FUROSEMIDE 10 MG/ML
10 INJECTION INTRAMUSCULAR; INTRAVENOUS ONCE
Status: COMPLETED | OUTPATIENT
Start: 2020-09-03 | End: 2020-09-03

## 2020-09-03 RX ORDER — FUROSEMIDE 10 MG/ML
INJECTION INTRAMUSCULAR; INTRAVENOUS
Status: COMPLETED
Start: 2020-09-03 | End: 2020-09-03

## 2020-09-03 RX ORDER — HEPARIN SODIUM 10000 [USP'U]/100ML
18 INJECTION, SOLUTION INTRAVENOUS CONTINUOUS
Status: DISPENSED | OUTPATIENT
Start: 2020-09-03 | End: 2020-09-04

## 2020-09-03 RX ADMIN — LISINOPRIL 10 MG: 20 TABLET ORAL at 09:39

## 2020-09-03 RX ADMIN — ALTEPLASE 1 MG/HR: 2.2 INJECTION, POWDER, LYOPHILIZED, FOR SOLUTION INTRAVENOUS at 03:30

## 2020-09-03 RX ADMIN — KETOROLAC TROMETHAMINE 30 MG: 30 INJECTION, SOLUTION INTRAMUSCULAR at 07:55

## 2020-09-03 RX ADMIN — HYDROMORPHONE HYDROCHLORIDE 1 MG: 1 INJECTION, SOLUTION INTRAMUSCULAR; INTRAVENOUS; SUBCUTANEOUS at 19:37

## 2020-09-03 RX ADMIN — MORPHINE SULFATE 2 MG: 4 INJECTION, SOLUTION INTRAMUSCULAR; INTRAVENOUS at 04:00

## 2020-09-03 RX ADMIN — Medication 10 ML: at 19:49

## 2020-09-03 RX ADMIN — FUROSEMIDE 10 MG: 10 INJECTION, SOLUTION INTRAMUSCULAR; INTRAVENOUS at 08:34

## 2020-09-03 RX ADMIN — ONDANSETRON 4 MG: 2 INJECTION INTRAMUSCULAR; INTRAVENOUS at 16:51

## 2020-09-03 RX ADMIN — HEPARIN SODIUM 18 UNITS/KG/HR: 10000 INJECTION, SOLUTION INTRAVENOUS at 08:40

## 2020-09-03 RX ADMIN — FUROSEMIDE 10 MG: 10 INJECTION INTRAMUSCULAR; INTRAVENOUS at 08:34

## 2020-09-03 RX ADMIN — VERAPAMIL HYDROCHLORIDE 240 MG: 240 TABLET, FILM COATED, EXTENDED RELEASE ORAL at 09:39

## 2020-09-03 RX ADMIN — MORPHINE SULFATE 2 MG: 4 INJECTION, SOLUTION INTRAMUSCULAR; INTRAVENOUS at 00:00

## 2020-09-03 RX ADMIN — MORPHINE SULFATE 2 MG: 4 INJECTION, SOLUTION INTRAMUSCULAR; INTRAVENOUS at 02:10

## 2020-09-03 RX ADMIN — HYDROMORPHONE HYDROCHLORIDE 1 MG: 1 INJECTION, SOLUTION INTRAMUSCULAR; INTRAVENOUS; SUBCUTANEOUS at 12:01

## 2020-09-03 RX ADMIN — MORPHINE SULFATE 2 MG: 4 INJECTION, SOLUTION INTRAMUSCULAR; INTRAVENOUS at 06:13

## 2020-09-03 RX ADMIN — ROPINIROLE HYDROCHLORIDE 10 MG: 1 TABLET, FILM COATED ORAL at 19:49

## 2020-09-03 RX ADMIN — HYDROMORPHONE HYDROCHLORIDE 1 MG: 1 INJECTION, SOLUTION INTRAMUSCULAR; INTRAVENOUS; SUBCUTANEOUS at 07:55

## 2020-09-03 ASSESSMENT — PAIN SCALES - GENERAL
PAINLEVEL_OUTOF10: 5
PAINLEVEL_OUTOF10: 5
PAINLEVEL_OUTOF10: 6
PAINLEVEL_OUTOF10: 10
PAINLEVEL_OUTOF10: 6
PAINLEVEL_OUTOF10: 2

## 2020-09-03 NOTE — PROGRESS NOTES
Hospitalist Progress Note  [facility]     Patient: Valentin Dinh  : 1960  MRN: [id number]  Code Status: Full Code    Hospital Day: 1   Date of Service: 9/3/2020    Subjective:   Pt seen and examined. Resting comfortably in bed. Complains of chronic back pain. No further complaints. Past Medical History:   Diagnosis Date    Cervical disc disease 2017    Essential hypertension 2019    Gastroesophageal reflux disease without esophagitis 2017    Hyperlipidemia     Major depressive disorder in remission ([facility] [address] 2017    Migraine without aura 2017    Pernicious anemia 2017    Raynaud's phenomenon 2017       Past Surgical History:   Procedure Laterality Date    ARTERIOGRAM Bilateral 2020    ECOS-PULMONARY performed by Savannah Leblanc [facility] at [facility]    [facility]         Family History   Problem Relation Age of Onset    High Blood Pressure Mother     Coronary Art Dis Mother     Thyroid Disease Mother         Hypothyroidism    Breast Cancer Mother     Colon Polyps Father     Prostate Cancer Father     Anemia Father         Pernicious    Colon Polyps Sister     Anemia Brother         Pernicious    High Blood Pressure Paternal Grandmother        Social History     Socioeconomic History    Marital status:      Spouse name: Not on file    Number of children: Not on file    Years of education: Not on file    Highest education level: Not on file   Occupational History    Not on file   Social Needs    Financial resource strain: Not on file    Food insecurity     Worry: Not on file     Inability: Not on file    Transportation needs     Medical: Not on file     Non-medical: Not on file   Tobacco Use    Smoking status: Never Smoker    Smokeless tobacco: Never Used   Substance and Sexual Activity    Alcohol use:  Yes    Drug use: No    Sexual activity: Not on file   Lifestyle    Physical activity     Days per week: Not on file Minutes per session: Not on file    Stress: Not on file   Relationships    Social connections     Talks on phone: Not on file     Gets together: Not on file     Attends Holiness service: Not on file     Active member of club or organization: Not on file     Attends meetings of clubs or organizations: Not on file     Relationship status: Not on file    Intimate partner violence     Fear of current or ex partner: Not on file     Emotionally abused: Not on file     Physically abused: Not on file     Forced sexual activity: Not on file   Other Topics Concern    Not on file   Social History Narrative    Not on file       Current Facility-Administered Medications   Medication Dose Route Frequency Provider Last Rate Last Dose    HYDROmorphone HCl PF (DILAUDID) injection 1 mg  1 mg Intravenous Q2H PRN Maureen Roads, APRN        ketorolac (TORADOL) injection 30 mg  30 mg Intravenous Q8H Maureen Roads, APRN        lisinopril (PRINIVIL;ZESTRIL) tablet 10 mg  10 mg Oral Daily Eric Martini MD        verapamil (CALAN SR) extended release tablet 240 mg  240 mg Oral Daily Eric Martini MD        nortriptyline (PAMELOR) capsule 10 mg  10 mg Oral Nightly Eric Martini MD   10 mg at 09/02/20 2118    sodium chloride flush 0.9 % injection 10 mL  10 mL Intravenous 2 times per day Eric Martini MD   10 mL at 09/02/20 2100    sodium chloride flush 0.9 % injection 10 mL  10 mL Intravenous PRN Eric Martini MD        acetaminophen (TYLENOL) tablet 650 mg  650 mg Oral Q6H PRN Eric Martini MD        Or    acetaminophen (TYLENOL) suppository 650 mg  650 mg Rectal Q6H PRN Eric Martini MD        promethazine (PHENERGAN) tablet 12.5 mg  12.5 mg Oral Q6H PRN Eric Martini MD   12.5 mg at 09/02/20 2118    alteplase (CATHFLO) 10 mg in sodium chloride 0.9 % 250 mL infusion  0.5 mg/hr Intracatheter Continuous Eric Martini MD 12.5 mL/hr at 09/03/20 0627 0.5 mg/hr at 09/03/20 2166    alteplase (CATHFLO) 10 mg in sodium chloride 0.9 % 250 mL infusion  0.5 mg/hr Intracatheter Continuous Terrence Villasenor MD 12.5 mL/hr at 09/03/20 0626 0.5 mg/hr at 09/03/20 0626    heparin 25,000 units in dextrose 5 % 250 mL infusion (rate based)  200 Units/hr Intravenous Continuous Terrence Villasenor MD 2 mL/hr at 09/02/20 1745 200 Units/hr at 09/02/20 1745    heparin 25,000 units in dextrose 5 % 250 mL infusion (rate based)  200 Units/hr Intravenous Continuous Terrence Villasenor MD 2 mL/hr at 09/02/20 1805 200 Units/hr at 09/02/20 1805    acetaminophen (TYLENOL) tablet 650 mg  650 mg Oral Q4H PRN Terrence Villasenor MD        ondansetron (ZOFRAN) injection 4 mg  4 mg Intravenous Q8H PRN Terrence Villasenor MD             alteplase (ACTIVASE) 10mg in 0.9% sodium chloride infusion (EKOS catheter) 0.5 mg/hr (09/03/20 0627)    alteplase (ACTIVASE) 10mg in 0.9% sodium chloride infusion (EKOS catheter) 0.5 mg/hr (09/03/20 0626)    heparin (porcine) 200 Units/hr (09/02/20 1745)    heparin (porcine) 200 Units/hr (09/02/20 1805)        Objective:   /72   Pulse 83   Temp 98.3 °F (36.8 °C) (Temporal)   Resp 15   Ht 5' 7\" (1.702 m)   Wt 148 lb (67.1 kg)   SpO2 98%   BMI 23.18 kg/m²     General: no acute distress  HEENT: normocephalic, atraumatic  Neck: supple, symmetrical, trachea midline   Lungs: clear to auscultation bilaterally   Cardiovascular: s1 and s2 normal  Abdomen: soft, positive bowel sounds, nondistended, nontender  Extremities: no edema or cyanosis   Neuro: aaox3, no focal deficits   Skin: normal color and texture     Recent Labs     09/02/20  1815 09/02/20  2340 09/03/20  0539   WBC 13.2* 13.2* 11.7*   RBC 4.97 4.75 4.43   HGB 13.1 12.6 11.7*   HCT 40.3 38.5 36.2*   MCV 81.1 81.1 81.7   MCH 26.4* 26.5* 26.4*   MCHC 32.5* 32.7* 32.3*    143 127*     Recent Labs     09/02/20  0910 09/02/20  1025 09/03/20  0539   NA  --  140 142   K  --  4.1 4.4   ANIONGAP  --  13 10   CL  --  104 109   CO2 --  23 23   BUN  --  18 11   CREATININE 1.1 0.9 0.7   GLUCOSE  --  110* 93   CALCIUM  --  9.0 8.5*     No results for input(s): MG, PHOS in the last 72 hours. Recent Labs     09/02/20  1025   AST 77*   ALT 81*   BILITOT <0.2   ALKPHOS 105*     No results for input(s): PH, PO2, PCO2, HCO3, BE, O2SAT in the last 72 hours. Recent Labs     09/02/20  1025 09/02/20  1527 09/02/20  2126   TROPONINI <0.01 <0.01 0.04*     Recent Labs     09/02/20  0940 09/03/20  0539   INR 1.05 1.24*     No results for input(s): LACTA in the last 72 hours. Intake/Output Summary (Last 24 hours) at 9/3/2020 0746  Last data filed at 9/3/2020 0700  Gross per 24 hour   Intake 1298 ml   Output 1285 ml   Net 13 ml       Cta Chest W Wo Contrast    Result Date: 9/2/2020  CTA CHEST W WO CONTRAST 9/2/2020 8:06 AM HISTORY: R06.09 COMPARISON: None. DLP: 194.1 mGy cm TECHNIQUE: Helical tomographic images of the chest were obtained after the administration of intravenous contrast following angiogram protocol. Additionally, 3D reformatted images were provided. FINDINGS:  Pulmonary arteries: There is adequate enhancement of the pulmonary arteries to evaluate for central and segmental pulmonary emboli. There are numerous filling defects involving the arterial supply of both lungs. The distal right main pulmonary artery shows a large filling defect with near occlusive disease extending into the arterial supply to the right middle lobe and right lower lobe. Thrombotic disease in the distal left main pulmonary artery extending to the left lower lobe and left upper lobe as well. RV to LV ratio is elevated at 1.8. Aorta and great vessels: The aorta is well opacified and demonstrates no dissection or aneurysm. The great vessels are normal in appearance. Visualized neck base: The imaged portion of the base of the neck appears unremarkable. Lungs: Biapical scarring. . The trachea and bronchial tree are patent. Heart:  There is increase RV to LV ratio, !Location                            ! Visualized! Compressibility! Thrombosis! +------------------------------------+----------+---------------+----------+ ! Sapheno Femoral Junction            ! Yes       ! Yes            ! None      ! +------------------------------------+----------+---------------+----------+ ! Common Femoral                      !Yes       ! Yes            ! None      ! +------------------------------------+----------+---------------+----------+ ! Prox Femoral                        !Yes       ! Yes            ! None      ! +------------------------------------+----------+---------------+----------+ ! Mid Femoral                         !Yes       ! Yes            ! None      ! +------------------------------------+----------+---------------+----------+ ! Dist Femoral                        !Yes       ! Yes            ! None      ! +------------------------------------+----------+---------------+----------+ ! Deep Femoral                        !Yes       ! Yes            ! None      ! +------------------------------------+----------+---------------+----------+ ! Popliteal                           !Yes       ! Yes            ! None      ! +------------------------------------+----------+---------------+----------+ ! SSV                                 ! Yes       ! Yes            ! None      ! +------------------------------------+----------+---------------+----------+ ! Gastroc                             ! Yes       ! Yes            ! None      ! +------------------------------------+----------+---------------+----------+ ! PTV                                 ! Yes       ! Yes            ! None      ! +------------------------------------+----------+---------------+----------+ ! GSV                                 ! Yes       ! Yes            ! None      ! +------------------------------------+----------+---------------+----------+ ! ATV                                 ! Yes       ! Yes            ! None      ! +------------------------------------+----------+---------------+----------+ ! Peroneal                            !Yes       ! Yes            ! None      ! +------------------------------------+----------+---------------+----------+ Left Lower Extremities DVT Study Measurements Left 2D Measurements +------------------------------------+----------+---------------+----------+ ! Location                            ! Visualized! Compressibility! Thrombosis! +------------------------------------+----------+---------------+----------+ ! Sapheno Femoral Junction            ! Yes       ! Yes            ! None      ! +------------------------------------+----------+---------------+----------+ ! Common Femoral                      !Yes       ! Yes            ! None      ! +------------------------------------+----------+---------------+----------+ ! Prox Femoral                        !Yes       ! Yes            ! None      ! +------------------------------------+----------+---------------+----------+ ! Mid Femoral                         !Yes       ! Yes            ! None      ! +------------------------------------+----------+---------------+----------+ ! Dist Femoral                        !Yes       ! Yes            ! None      ! +------------------------------------+----------+---------------+----------+ ! Deep Femoral                        !Yes       ! Yes            ! None      ! +------------------------------------+----------+---------------+----------+ ! Popliteal                           !Yes       ! Yes            ! None      ! +------------------------------------+----------+---------------+----------+ ! SSV                                 ! Yes       ! Yes            ! None      ! +------------------------------------+----------+---------------+----------+ ! Gastroc                             ! Yes       ! Yes            ! None      ! +------------------------------------+----------+---------------+----------+ ! PTV

## 2020-09-03 NOTE — PROGRESS NOTES
Vascular Surgery Rounding Note    9/3/2020  8:14 AM      Post Hosp day - 2    Subjective- only complaint is low back pain (chronic)    Objective-   Invalid input(s): 24H    Intake/Output Summary (Last 24 hours) at 9/3/2020 0814  Last data filed at 9/3/2020 0700  Gross per 24 hour   Intake 1298 ml   Output 1285 ml   Net 13 ml     No intake/output data recorded. CBC:   Recent Labs     09/02/20  1815 09/02/20  2340 09/03/20  0539   WBC 13.2* 13.2* 11.7*   RBC 4.97 4.75 4.43   HGB 13.1 12.6 11.7*   HCT 40.3 38.5 36.2*   MCV 81.1 81.1 81.7   MCH 26.4* 26.5* 26.4*   MCHC 32.5* 32.7* 32.3*   RDW 15.1* 15.2* 15.2*    143 127*   MPV 11.9 11.5 11.2      Last 3 CMP:   Recent Labs     09/02/20  0910 09/02/20  1025 09/03/20  0539   NA  --  140 142   K  --  4.1 4.4   CL  --  104 109   CO2  --  23 23   BUN  --  18 11   CREATININE 1.1 0.9 0.7   GLUCOSE  --  110* 93   CALCIUM  --  9.0 8.5*   PROT  --  6.2*  --    LABALBU  --  4.2  --    BILITOT  --  <0.2  --    ALKPHOS  --  105*  --    AST  --  77*  --    ALT  --  81*  --       Troponin:   Recent Labs     09/02/20  1025 09/02/20  1527 09/02/20  2126   TROPONINI <0.01 <0.01 0.04*     Calcium:   Lab Results   Component Value Date    CALCIUM 8.5 09/03/2020    CALCIUM 9.0 09/02/2020    CALCIUM 9.9 06/25/2020      Ionized Calcium: No results found for: IONCA   Lipids: No results for input(s): CHOL, HDL in the last 72 hours. Invalid input(s): LDLCALCU  INR:   Recent Labs     09/02/20  0940 09/03/20  0539   INR 1.05 1.24*     Lactic Acid: No results found for: LACTA           Physical Exam:  Awake, alert, appropriate Yes  /72   Pulse 83   Temp 98.3 °F (36.8 °C) (Temporal)   Resp 15   Ht 5' 7\" (1.702 m)   Wt 148 lb (67.1 kg)   SpO2 98%   BMI 23.18 kg/m²   Heart-Normal S1 and S2.  Regular rhythm. No murmurs, gallops, or rubs.    Lung-negative findings: some wheezing  Neck-suppleno adenopathy, no JVD, supple, symmetrical, trachea midline and thyroid not enlarged, symmetric, no tenderness/mass/nodules  Abdomen-Abdomen soft, non-tender. BS normal. No masses,  No organomegaly  Extremities-trace edema  Neurologic- alert, oriented, normal speech, no focal findings or movement disorder noted    Fibrinogen down to 188 last check. Other labs stable. I checked PAP as I pulled catheters today 24/17, If this is accurate it is down from 65/43 in or yesterday before lysis. Assessment/Plan  1. Completed course of EKOS tpa  2. Seems better  3. Will convert to heparin then oral anticoagulation. 4. Will give a 10 mg dose of Lasix, wheezing and she received quite a bit of fluid over last 15 hours.   5. To floor, or home later today  Antibiotics continued after surgery due to:na                        DVT prophylaxis: on anticoagulation             Beta Blocker:  not indicated    [place]ster Catheter:  remove

## 2020-09-03 NOTE — PROGRESS NOTES
4 Eyes Skin Assessment    Keila Carrillo is being assessed upon: Transfer to New Unit    I agree that Dontae Santiago, along with Nayeli Ramos RN (either 2 RN's or 1 LPN and 1 RN) have performed a thorough Head to Toe Skin Assessment on the patient. ALL assessment sites listed below have been assessed. Areas assessed by both nurses:     [x]   Head, Face, and Ears   [x]   Shoulders, Back, and Chest  [x]   Arms, Elbows, and Hands   [x]   Coccyx, Sacrum, and Ischium  [x]   Legs, Feet, and Heels    Does the Patient have Skin Breakdown?  No    Erick Prevention initiated: NA  Wound Care Orders initiated: NA    WOC nurse consulted for Pressure Injury (Stage 3,4, Unstageable, DTI, NWPT, and Complex wounds) and New or Established Ostomies: NA        Primary Nurse eSignature: Jorge Alvarado RN on 9/3/2020 at 4:57 PM      Co-Signer eSignature: Electronically signed by Nayeli Ramos RN on 9/3/20 at 5:55 PM CDT

## 2020-09-03 NOTE — PROGRESS NOTES
Called Dr. Jennifer Kenney to report 09/02/20 2345 lab results per his request. Electronically signed by Janice Foy RN on 9/3/2020 at 1:52 AM

## 2020-09-03 NOTE — PROGRESS NOTES
Keila Carrillo received from ICU to room # 324 . Mental Status: Patient is oriented, alert, coherent, logical, thought processes intact and able to concentrate and follow conversation. Vitals:    09/03/20 1649   BP: 138/85   Pulse: 83   Resp: 18   Temp: 96.5 °F (35.8 °C)   SpO2: 96%     Placed on cardiac monitor: Yes. Box # [id number]. Belongings: cell phone with patient at bedside . Family at bedside Yes. Oriented Patient and Family to room. Call light within reach. Yes. Transfer was: Well tolerated by patient. .    Electronically signed by Jorge Alvarado RN on 9/3/2020 at 4:56 PM

## 2020-09-03 NOTE — PROGRESS NOTES
Vascular Surgery Rounding Note                                                     Dr.Timothy Adame    9/3/2020  7:35 AM      Seen with  at bedside, pulmonary pressures checked    Post op day -#1 Procedure(s):  ECOS-PULMONARY  Delivery catheter placement x 2.   12 cm infusion legnth catheters one into the left lower lobe, the second into the right lower lobe. (Bilateral Femoral vein 7 Fr. Sheaths. Subjective- my back is hurting from being flat all night, I need to sit up, also c/o wheezing this am    Objective-   Invalid input(s): 24H    Intake/Output Summary (Last 24 hours) at 9/3/2020 0735  Last data filed at 9/3/2020 0700  Gross per 24 hour   Intake 650 ml   Output 1285 ml   Net -635 ml     No intake/output data recorded. Physical Exam:  Awake, alert, appropriate Yes  /72   Pulse 83   Temp 98.3 °F (36.8 °C) (Temporal)   Resp 15   Ht 5' 7\" (1.702 m)   Wt 148 lb (67.1 kg)   SpO2 98%   BMI 23.18 kg/m²   Heart-Regular rate and rhythm  Lung- Slight expiratory wheezes bilaterally anteriorly  supple, symmetrical, trachea midline  Abdomen-Abdomen soft, non-tender. BS normal.   Extremities-feet warm to touch/pink color with +palp DP/PT pulses  Neurologic- alert, oriented, normal speech, no focal findings or movement disorder noted  Wound surgical incisions-Bilateral groin sheath sites with dressings CDI, no bleeding/hematoma noted, and areas soft to touch        Assessment/Plan:  1. Stable day #1 EKOS  2.  DC  Activase/Heparin/coolant and EKOS sheaths/catheters  3. Initiate Heparin drip  4.  4 hours bedrest after sheaths pulled  5. Okay to feed  6. Hematology consulted, will defer anticoagulation to Hematology  7. Will transfer to [facility] later today if stable  8.   Will dc hubbard when bedrest completed    DVT prophylaxis: EKOS infusing with Activase, heparin via bilateral sheaths        Hubbard Catheter:    Leave catheter in place due to:  []  low pelvic dissection    []  strict I&Os   [x]  total bedrest  []  functioning as drain

## 2020-09-03 NOTE — CONSULTS
MEDICAL ONCOLOGY CONSULTATION    Pt Name: Sonya Atkins  MRN: [id number]  YOB: 1960  Date of evaluation: 9/3/2020    REASON FOR CONSULTATION: Pulmonary embolism  REQUESTING PHYSICIAN: Hospitalist    History Obtained From:    patient, electronic medical record    HISTORY OF PRESENT ILLNESS:      Diagnosis  · Unprovoked pulmonary embolism  · Severe    Treatment summary  · 9/2/2020-catheter directed thrombolysis-EKOS    Pranay Dawkins was first seen by me on 9/3/2020 as an inpatient consultation at [facility].  The patient presented to the hospital after outpatient imaging showed pulmonary embolus and right-sided heart strain. She had complains of dyspnea since April 2020. Patient denies any personal history of VTE. She reports that mother had a history of miscarriages. The patient has also endured a 2-hour flight from [place] in July. She was admitted to ICU. Duplex lower extremity showed no evidence of DVT. 2D echo showed global systolic function severely reduced and severely dilated right ventricle. · 9/2/2020- CTA showed significant burden of pulmonary emboli in both lungs with RV strain. · 9/2/2020-2D echo showed severely reduced right ventricular global systolic function. Severely dilated right ventricle. Severe pulmonary hypertension. Flattening of the septum along with paradoxical motion. LVEF 75%.   · 9/2/2020- catheter directed thrombolysis-EKOS  · 9/3/2020-Heparin drip x24 hours    Past Medical History:    Past Medical History:   Diagnosis Date    Cervical disc disease 8/26/2017    Essential hypertension 12/1/2019    Gastroesophageal reflux disease without esophagitis 8/26/2017    Hyperlipidemia     Major depressive disorder in remission ([facility] [address] 8/26/2017    Migraine without aura 8/26/2017    Pernicious anemia 8/26/2017    Raynaud's phenomenon 8/26/2017       Past Surgical History:    Past Surgical History:   Procedure Laterality Date    ARTERIOGRAM Bilateral 9/2/2020 ECOS-PULMONARY performed by Randee Kendall [facility] at [facility] OR    [facility]         Social History:    Social History     Socioeconomic History    Marital status:      Spouse name: Not on file    Number of children: Not on file    Years of education: Not on file    Highest education level: Not on file   Occupational History    Not on file   Social Needs    Financial resource strain: Not on file    Food insecurity     Worry: Not on file     Inability: Not on file    Transportation needs     Medical: Not on file     Non-medical: Not on file   Tobacco Use    Smoking status: Never Smoker    Smokeless tobacco: Never Used   Substance and Sexual Activity    Alcohol use:  Yes    Drug use: No    Sexual activity: Not on file   Lifestyle    Physical activity     Days per week: Not on file     Minutes per session: Not on file    Stress: Not on file   Relationships    Social connections     Talks on phone: Not on file     Gets together: Not on file     Attends [employer] service: Not on file     Active member of club or organization: Not on file     Attends meetings of clubs or organizations: Not on file     Relationship status: Not on file    Intimate partner violence     Fear of current or ex partner: Not on file     Emotionally abused: Not on file     Physically abused: Not on file     Forced sexual activity: Not on file   Other Topics Concern    Not on file   Social History Narrative    Not on file       Family History:   Family History   Problem Relation Age of Onset    High Blood Pressure Mother     Coronary Art Dis Mother     Thyroid Disease Mother         Hypothyroidism    Breast Cancer Mother     Colon Polyps Father     Prostate Cancer Father     Anemia Father         Pernicious    Colon Polyps Sister     Anemia Brother         Pernicious    High Blood Pressure Paternal Grandmother        Current Hospital Medications:    Current Facility-Administered Medications: HYDROmorphone HCl PF (DILAUDID) injection 1 mg, 1 mg, Intravenous, Q2H PRN  heparin (porcine) injection 5,370 Units, 80 Units/kg, Intravenous, PRN  heparin 25,000 units in dextrose 5% 250 mL infusion, 18 Units/kg/hr, Intravenous, Continuous  heparin (porcine) injection 2,680 Units, 40 Units/kg, Intravenous, PRN  lisinopril (PRINIVIL;ZESTRIL) tablet 10 mg, 10 mg, Oral, Daily  verapamil (CALAN SR) extended release tablet 240 mg, 240 mg, Oral, Daily  nortriptyline (PAMELOR) capsule 10 mg, 10 mg, Oral, Nightly  sodium chloride flush 0.9 % injection 10 mL, 10 mL, Intravenous, 2 times per day  sodium chloride flush 0.9 % injection 10 mL, 10 mL, Intravenous, PRN  acetaminophen (TYLENOL) tablet 650 mg, 650 mg, Oral, Q6H PRN **OR** acetaminophen (TYLENOL) suppository 650 mg, 650 mg, Rectal, Q6H PRN  promethazine (PHENERGAN) tablet 12.5 mg, 12.5 mg, Oral, Q6H PRN **OR** [DISCONTINUED] ondansetron (ZOFRAN) injection 4 mg, 4 mg, Intravenous, Q6H PRN  acetaminophen (TYLENOL) tablet 650 mg, 650 mg, Oral, Q4H PRN  ondansetron (ZOFRAN) injection 4 mg, 4 mg, Intravenous, Q8H PRN    Allergies:    Allergies   Allergen Reactions    Demerol  [Meperidine Hcl]      Other reaction(s): Unknown    Pcn [Penicillins] Rash         Subjective   REVIEW OF SYSTEMS:   CONSTITUTIONAL: no fever, no night sweats,  fatigue;  HEENT: no blurring of vision, no double vision, no hearing difficulty, no tinnitus, no ulceration, no dysplasia, no epistaxis;  LUNGS: no cough, no hemoptysis, no wheeze,   shortness of breath;  CARDIOVASCULAR: no palpitation, no chest pain,  shortness of breath;  GI: no abdominal pain, no nausea, no vomiting, no diarrhea, no constipation;  REN: no dysuria, no hematuria, no frequency or urgency, no nephrolithiasis;  MUSCULOSKELETAL: no joint pain, no swelling, no stiffness;  ENDOCRINE: no polyuria, no polydipsia, no cold or heat intolerance;  HEMATOLOGY: no easy bruising or bleeding, no history of clotting disorder;  DERMATOLOGY: no skin rash, no eczema, no pruritus;  PSYCHIATRY: no depression, no anxiety, no panic attacks, no suicidal ideation, no homicidal ideation;  NEUROLOGY: no syncope, no seizures, no numbness or tingling of hands, no numbness or tingling of feet, no paresis;    Objective   /72   Pulse 83   Temp 98.3 °F (36.8 °C) (Temporal)   Resp 15   Ht 5' 7\" (1.702 m)   Wt 148 lb (67.1 kg)   SpO2 98%   BMI 23.18 kg/m²     PHYSICAL EXAM:  CONSTITUTIONAL: Alert, appropriate, no acute distress  EYES: Non icteric, EOM intact, pupils equal round   ENT: Mucus membranes moist, no oral pharyngeal lesions, external inspection of ears and nose are normal  NECK: Supple, no masses. No palpable thyroid mass  CHEST/LUNGS: CTA bilaterally, normal respiratory effort   CARDIOVASCULAR: RRR, no murmurs. No lower extremity edema  ABDOMEN: soft non-tender, active bowel sounds, no HSM. No palpable masses  EXTREMITIES: warm, full ROM in all 4 extremities, no focal weakness. SKIN: warm, dry with no rashes or lesions  LYMPH: No cervical, clavicular, axillary, or inguinal lymphadenopathy  NEUROLOGIC: follows commands, non focal   PSYCH: mood and affect appropriate.   Alert and oriented to time, place, person        LABORATORY RESULTS REVIEWED BY ME:  Recent Labs     09/03/20  0539 09/02/20  2340 09/02/20  1815   WBC 11.7* 13.2* 13.2*   HGB 11.7* 12.6 13.1   HCT 36.2* 38.5 40.3   MCV 81.7 81.1 81.1   * 143 142       Lab Results   Component Value Date     09/03/2020    K 4.4 09/03/2020     09/03/2020    CO2 23 09/03/2020    BUN 11 09/03/2020    CREATININE 0.7 09/03/2020    GLUCOSE 93 09/03/2020    CALCIUM 8.5 (L) 09/03/2020    PROT 6.2 (L) 09/02/2020    LABALBU 4.2 09/02/2020    BILITOT <0.2 09/02/2020    ALKPHOS 105 (H) 09/02/2020    AST 77 (H) 09/02/2020    ALT 81 (H) 09/02/2020    LABGLOM >60 09/03/2020    GFRAA >59 09/03/2020       Lab Results   Component Value Date    INR 1.24 (H) 09/03/2020    INR 1.05 09/02/2020    PROTIME 15.6 (H) 9/2/2020 9:35 AM    Vl Dup Lower Extremity Venous Bilateral    Result Date: 9/2/2020  Impression   Normal venous duplex study of the right lower extremity(ies). There is no  evidence of deep or superficial venous thrombosis. There is evidence of subacute deep vein thrombosis in the left lower  extremity involving the peroneal, veins. My interpretation: Saddle embolus      ASSESSMENT:  #Pulmonary embolism, unprovoked event  High clot burden with pulmonary hypertension and RV dilation with high BNP. Status post catheter directed thrombolysis  Heparin IV drip x24 hours  With transition to low molecular weight heparin tomorrow  She may be a good candidate for long-term anticoagulation with DOACS if antiphospholipid ruled out.     #Mild normocytic anemia    #Hypertension    #Raynaud phenomena    PLAN:  Continue heparin drip x24 hours  Discharge on Eliquis  Thrombophilia workup in clinic  Antiphospholipid panel      Loenardo Varela MD    09/03/20  9:38 AM

## 2020-09-04 ENCOUNTER — APPOINTMENT (OUTPATIENT)
Dept: CT IMAGING | Age: 60
DRG: 175 | End: 2020-09-04
Payer: COMMERCIAL

## 2020-09-04 PROBLEM — I51.9 DYSFUNCTION OF RIGHT CARDIAC VENTRICLE: Status: ACTIVE | Noted: 2020-09-04

## 2020-09-04 LAB
ANION GAP SERPL CALCULATED.3IONS-SCNC: 9 MMOL/L (ref 7–19)
APTT: 76.4 SEC (ref 26–36.2)
BASOPHILS ABSOLUTE: 0 K/UL (ref 0–0.2)
BASOPHILS RELATIVE PERCENT: 0.4 % (ref 0–1)
BUN BLDV-MCNC: 11 MG/DL (ref 8–23)
CALCIUM SERPL-MCNC: 8.5 MG/DL (ref 8.8–10.2)
CHLORIDE BLD-SCNC: 106 MMOL/L (ref 98–111)
CO2: 25 MMOL/L (ref 22–29)
CREAT SERPL-MCNC: 0.8 MG/DL (ref 0.5–0.9)
EOSINOPHILS ABSOLUTE: 0.8 K/UL (ref 0–0.6)
EOSINOPHILS RELATIVE PERCENT: 9.2 % (ref 0–5)
GFR AFRICAN AMERICAN: >59
GFR NON-AFRICAN AMERICAN: >60
GLUCOSE BLD-MCNC: 96 MG/DL (ref 74–109)
HCT VFR BLD CALC: 34.1 % (ref 37–47)
HEMOGLOBIN: 10.9 G/DL (ref 12–16)
IMMATURE GRANULOCYTES #: 0 K/UL
LYMPHOCYTES ABSOLUTE: 2 K/UL (ref 1.1–4.5)
LYMPHOCYTES RELATIVE PERCENT: 23.9 % (ref 20–40)
MAGNESIUM: 1.8 MG/DL (ref 1.6–2.4)
MCH RBC QN AUTO: 26.4 PG (ref 27–31)
MCHC RBC AUTO-ENTMCNC: 32 G/DL (ref 33–37)
MCV RBC AUTO: 82.6 FL (ref 81–99)
MONOCYTES ABSOLUTE: 0.5 K/UL (ref 0–0.9)
MONOCYTES RELATIVE PERCENT: 5.6 % (ref 0–10)
NEUTROPHILS ABSOLUTE: 5 K/UL (ref 1.5–7.5)
NEUTROPHILS RELATIVE PERCENT: 60.7 % (ref 50–65)
PDW BLD-RTO: 15.3 % (ref 11.5–14.5)
PLATELET # BLD: 116 K/UL (ref 130–400)
PMV BLD AUTO: 11.8 FL (ref 9.4–12.3)
POTASSIUM SERPL-SCNC: 3.8 MMOL/L (ref 3.5–5)
RBC # BLD: 4.13 M/UL (ref 4.2–5.4)
SODIUM BLD-SCNC: 140 MMOL/L (ref 136–145)
WBC # BLD: 8.2 K/UL (ref 4.8–10.8)

## 2020-09-04 PROCEDURE — 83735 ASSAY OF MAGNESIUM: CPT

## 2020-09-04 PROCEDURE — 86146 BETA-2 GLYCOPROTEIN ANTIBODY: CPT

## 2020-09-04 PROCEDURE — 85025 COMPLETE CBC W/AUTO DIFF WBC: CPT

## 2020-09-04 PROCEDURE — 6360000004 HC RX CONTRAST MEDICATION: Performed by: SURGERY

## 2020-09-04 PROCEDURE — 71275 CT ANGIOGRAPHY CHEST: CPT

## 2020-09-04 PROCEDURE — 6370000000 HC RX 637 (ALT 250 FOR IP): Performed by: SURGERY

## 2020-09-04 PROCEDURE — 85635 REPTILASE TEST: CPT

## 2020-09-04 PROCEDURE — 2700000000 HC OXYGEN THERAPY PER DAY

## 2020-09-04 PROCEDURE — 6370000000 HC RX 637 (ALT 250 FOR IP): Performed by: HOSPITALIST

## 2020-09-04 PROCEDURE — 85613 RUSSELL VIPER VENOM DILUTED: CPT

## 2020-09-04 PROCEDURE — 80048 BASIC METABOLIC PNL TOTAL CA: CPT

## 2020-09-04 PROCEDURE — 85610 PROTHROMBIN TIME: CPT

## 2020-09-04 PROCEDURE — 85732 THROMBOPLASTIN TIME PARTIAL: CPT

## 2020-09-04 PROCEDURE — 99232 SBSQ HOSP IP/OBS MODERATE 35: CPT | Performed by: INTERNAL MEDICINE

## 2020-09-04 PROCEDURE — 85730 THROMBOPLASTIN TIME PARTIAL: CPT

## 2020-09-04 PROCEDURE — 86147 CARDIOLIPIN ANTIBODY EA IG: CPT

## 2020-09-04 PROCEDURE — 6360000002 HC RX W HCPCS: Performed by: INTERNAL MEDICINE

## 2020-09-04 PROCEDURE — 94761 N-INVAS EAR/PLS OXIMETRY MLT: CPT

## 2020-09-04 PROCEDURE — 97161 PT EVAL LOW COMPLEX 20 MIN: CPT

## 2020-09-04 PROCEDURE — 85670 THROMBIN TIME PLASMA: CPT

## 2020-09-04 PROCEDURE — 1210000000 HC MED SURG R&B

## 2020-09-04 PROCEDURE — 2580000003 HC RX 258: Performed by: SURGERY

## 2020-09-04 RX ORDER — LANOLIN ALCOHOL/MO/W.PET/CERES
3 CREAM (GRAM) TOPICAL NIGHTLY PRN
Status: DISCONTINUED | OUTPATIENT
Start: 2020-09-04 | End: 2020-09-04

## 2020-09-04 RX ORDER — ZOLPIDEM TARTRATE 5 MG/1
5 TABLET ORAL ONCE
Status: COMPLETED | OUTPATIENT
Start: 2020-09-04 | End: 2020-09-04

## 2020-09-04 RX ORDER — ZOLPIDEM TARTRATE 5 MG/1
5 TABLET ORAL NIGHTLY PRN
Status: DISCONTINUED | OUTPATIENT
Start: 2020-09-04 | End: 2020-09-04

## 2020-09-04 RX ORDER — ZOLPIDEM TARTRATE 5 MG/1
5 TABLET ORAL NIGHTLY PRN
Status: DISCONTINUED | OUTPATIENT
Start: 2020-09-04 | End: 2020-09-05 | Stop reason: HOSPADM

## 2020-09-04 RX ADMIN — ROPINIROLE HYDROCHLORIDE 10 MG: 1 TABLET, FILM COATED ORAL at 20:49

## 2020-09-04 RX ADMIN — ZOLPIDEM TARTRATE 5 MG: 5 TABLET ORAL at 02:01

## 2020-09-04 RX ADMIN — ZOLPIDEM TARTRATE 5 MG: 5 TABLET ORAL at 20:48

## 2020-09-04 RX ADMIN — ENOXAPARIN SODIUM 70 MG: 80 INJECTION SUBCUTANEOUS at 08:01

## 2020-09-04 RX ADMIN — LISINOPRIL 10 MG: 20 TABLET ORAL at 08:36

## 2020-09-04 RX ADMIN — Medication 10 ML: at 20:48

## 2020-09-04 RX ADMIN — ENOXAPARIN SODIUM 70 MG: 80 INJECTION SUBCUTANEOUS at 20:48

## 2020-09-04 RX ADMIN — IOPAMIDOL 90 ML: 755 INJECTION, SOLUTION INTRAVENOUS at 09:02

## 2020-09-04 RX ADMIN — VERAPAMIL HYDROCHLORIDE 240 MG: 240 TABLET, FILM COATED, EXTENDED RELEASE ORAL at 08:35

## 2020-09-04 ASSESSMENT — PAIN SCALES - GENERAL: PAINLEVEL_OUTOF10: 0

## 2020-09-04 NOTE — PROGRESS NOTES
PROGRESS NOTE    Pt Name: Darlene Solano  MRN: [id number]  YOB: 1960  Date of evaluation: 9/4/2020    Subjective \"I did not sleep well\". Still feeling tightness in her chest.  Felt nauseated yesterday after dinner. I discussed with nurse staff. Heparin drip has been turned off. Switching to low molecular weight heparin.  has been consulted for outpatient Lovenox. CT chest ordered for today for assessment of clot burden. Diagnosis  · Unprovoked pulmonary embolism  · Severe     Treatment summary  · 9/2/2020-catheter directed thrombolysis-EKOS     Meir Macias was first seen by me on 9/3/2020 as an inpatient consultation at [facility].  The patient presented to the hospital after outpatient imaging showed pulmonary embolus and right-sided heart strain. She had complains of dyspnea since April 2020. Patient denies any personal history of VTE. She reports that mother had a history of miscarriages. The patient has also endured a 2-hour flight from [place] in July. She was admitted to ICU. Duplex lower extremity showed no evidence of DVT. 2D echo showed global systolic function severely reduced and severely dilated right ventricle. · 9/2/2020- CTA showed significant burden of pulmonary emboli in both lungs with RV strain. · 9/2/2020-2D echo showed severely reduced right ventricular global systolic function. Severely dilated right ventricle. Severe pulmonary hypertension. Flattening of the septum along with paradoxical motion. LVEF 75%.   · 9/2/2020- catheter directed thrombolysis-EKOS  · 9/3/2020-Heparin drip x24 hours      REVIEW OF SYSTEMS:   CONSTITUTIONAL: no fever, no night sweats,  fatigue;  HEENT: no blurring of vision, no double vision, no hearing difficulty, no tinnitus, no ulceration, no dysplasia, no epistaxis;  LUNGS: no cough, no hemoptysis, no wheeze,  shortness of breath;  CARDIOVASCULAR: no palpitation, chest tightness, shortness of breath;  GI: no abdominal pain, GLUCOSE 96 09/04/2020    CALCIUM 8.5 (L) 09/04/2020    PROT 6.2 (L) 09/02/2020    LABALBU 4.2 09/02/2020    BILITOT <0.2 09/02/2020    ALKPHOS 105 (H) 09/02/2020    AST 77 (H) 09/02/2020    ALT 81 (H) 09/02/2020    LABGLOM >60 09/04/2020    GFRAA >59 09/04/2020       Lab Results   Component Value Date    INR 1.24 (H) 09/03/2020    INR 1.05 09/02/2020    PROTIME 15.6 (H) 09/03/2020    PROTIME 13.7 09/02/2020       RADIOLOGY STUDIES REVIEWED BY ME:  Cta Chest W Wo Contrast     Result Date: 9/2/2020  CTA CHEST W WO CONTRAST 9/2/2020 8:06 AM HISTORY: R06.09 COMPARISON: None. DLP: 194.1 mGy cm TECHNIQUE: Helical tomographic images of the chest were obtained after the administration of intravenous contrast following angiogram protocol. Additionally, 3D reformatted images were provided. FINDINGS:  Pulmonary arteries: There is adequate enhancement of the pulmonary arteries to evaluate for central and segmental pulmonary emboli. There are numerous filling defects involving the arterial supply of both lungs. The distal right main pulmonary artery shows a large filling defect with near occlusive disease extending into the arterial supply to the right middle lobe and right lower lobe. Thrombotic disease in the distal left main pulmonary artery extending to the left lower lobe and left upper lobe as well. RV to LV ratio is elevated at 1.8. Aorta and great vessels: The aorta is well opacified and demonstrates no dissection or aneurysm. The great vessels are normal in appearance. Visualized neck base: The imaged portion of the base of the neck appears unremarkable. Lungs: Biapical scarring. . The trachea and bronchial tree are patent. Heart: There is increase RV to LV ratio, consistent with right heart strain. . There is no pericardial effusion. Mediastinum and lymph nodes: No enlarged mediastinal, hilar, or axillary lymph nodes are present. Skeletal and soft tissues:  The osseous structures of the thorax and surrounding soft tissues demonstrate no acute process. Upper abdomen: The imaged portion of the upper abdomen demonstrates no acute process.      1. Significant ulnar embolus burden involving both lungs with RV to LV ratio 1.8, consistent with right heart strain. 2.  The patient has been sent to the emergency room. Dr. Sheryl Jarrett is aware. Signed by Dr Valeria Kendrick on 9/2/2020 9:35 AM     Vl Dup Lower Extremity Venous Bilateral     Result Date: 9/2/2020  Impression   Normal venous duplex study of the right lower extremity(ies). There is no  evidence of deep or superficial venous thrombosis. There is evidence of subacute deep vein thrombosis in the left lower  extremity involving the peroneal, veins.         My interpretation: Saddle embolus     ASSESSMENT:  #Pulmonary embolism, unprovoked event  High clot burden with pulmonary hypertension and RV dilation with high BNP. Status post catheter directed thrombolysis  Heparin IV drip x24 hours  With transition to low molecular weight heparin tomorrow  She may be a good candidate for long-term anticoagulation with DOACS if antiphospholipid ruled out. CT chest today     #Mild normocytic anemia hemoglobin 10.9     #Hypertension     #Raynaud phenomena-unknown if the patient has any underlying rheumatologic condition     PLAN:  Transition to low molecular weight heparin 1 mg/kg every 12 hours this morning  Discharge on low molecular weight heparin x 2 weeks  We will likely transition to Eliquis in clinic if antiphospholipid panel negative. Thrombophilia workup in clinic  Antiphospholipid panel ongoing (to include Cameron 2 mutation, discussed age-appropriate cancer screening, [facility] flow cytometry)    I have seen, examined and reviewed this patient medication list, appropriate labs and imaging studies. I reviewed relevant medical records and others physicians notes. I discussed the plans of care with the patient. I answered all the questions to the patients satisfaction.  I have also reviewed the chief complaint (CC) and part of the history (History of Present Illness (HPI), Past Family Social History [facility]), or Review of Systems (ROS) and made changes when appropriated.        (Please note that portions of this note were completed with a voice recognition program. Efforts were made to edit the dictations but occasionally words are mis-transcribed.)         Sena Estrada MD    09/04/20  6:36 AM

## 2020-09-04 NOTE — PROGRESS NOTES
PHYSICAL THERAPY    Unable to see pt at this time due to an active bedrest order. Please discontinue to begin mobility assessment.     Electronically signed by Wendi Rowland PT on 9/4/2020 at 2:22 PM

## 2020-09-04 NOTE — PLAN OF CARE
Problem: Pain:  Goal: Pain level will decrease  Description: Pain level will decrease  9/4/2020 0930 by Renee Plummer RN  Outcome: Ongoing  9/4/2020 0018 by Reynaldo Singh RN  Outcome: Ongoing  Goal: Control of acute pain  Description: Control of acute pain  9/4/2020 0930 by Renee Plummer RN  Outcome: Ongoing  9/4/2020 0018 by Reynaldo Singh RN  Outcome: Ongoing  Goal: Control of chronic pain  Description: Control of chronic pain  9/4/2020 0930 by Renee Plummer RN  Outcome: Ongoing  9/4/2020 0018 by Reynaldo Singh RN  Outcome: Ongoing     Problem: Falls - Risk of:  Goal: Will remain free from falls  Description: Will remain free from falls  9/4/2020 0930 by Renee Plummer RN  Outcome: Ongoing  9/4/2020 0018 by Reynaldo Singh RN  Outcome: Ongoing  Goal: Absence of physical injury  Description: Absence of physical injury  9/4/2020 0930 by Reene Plummer RN  Outcome: Ongoing  9/4/2020 0018 by Reynaldo Singh RN  Outcome: Ongoing

## 2020-09-04 NOTE — CARE COORDINATION
ELMA faxed demographics, H&P, respiratory note, and home oxygen order to [address] [facility]  [phone] P  [phone] F  Electronically signed by NESTOR Eng on 9/4/2020 at 9:54 AM

## 2020-09-04 NOTE — PROGRESS NOTES
CLINICAL PHARMACY NOTE: MEDS TO [address] Select Patient?: No  Total # of Prescriptions Filled: 1   The following medications were delivered to the patient:  · Lovenox 80mg/0.8ml  Total # of Interventions Completed: 0  Time Spent (min): 30    Additional Documentation:    Gave script to patients

## 2020-09-04 NOTE — OP NOTE
[facility] [employer] [facility] [address] [facility]                                OPERATIVE REPORT    PATIENT NAME: Tiffanie Naik                      :        1960  MED REC NO:   [id number]                              ROOM:       [facility]  ACCOUNT NO:   [de-identified]                           ADMIT DATE: 2020  PROVIDER:     Jenny Lorenzana MD    DATE OF PROCEDURE:  2020    PREOPERATIVE DIAGNOSIS:  Bilateral pulmonary emboli with right heart  strain. POSTOPERATIVE DIAGNOSIS:  Bilateral pulmonary emboli with right heart  strain with confirmed pulmonary hypertension. PROCEDURES PERFORMED:  1. Placement of EKOS pulmonary artery tPA delivery catheters x2 12 cm  in length, first catheter into the left lower lobe, second catheter into  the right lower lobe, bilateral femoral venous approach 7-Urdu sheath. 2.  Initiation of thrombolysis. SURGEON:  Jenny Lorenzana MD    ASSISTANT:  Gabby Wiley. ANESTHESIA:  Monitored anesthesia care. ESTIMATED BLOOD LOSS:  Less than 50 mL. HISTORY:  The patient is a 43-year-old female who presented to the  emergency room today with shortness of breath. CTA performed confirmed  bilateral pulmonary emboli. Her EKG as well as the CT scan were  consistent with right heart strain. An echocardiogram echocardiogram  was performed, which suggested severe right heart strain with a  pulmonary systolic pressure of 80. She was counseled as to her options. She was brought to the operating room for placement of EKOS tPA delivery  catheters and initiation of pulmonary thrombolysis. PROCEDURE:  The patient was brought into the hybrid operating room. Appropriate monitoring lines were placed and sedation was administered. Anesthesia placed a central line and initially floated a [place]-Chava  catheter and registered pulmonary artery pressures of 65/40. Both groin areas were then prepped.   Under ultrasound guidance using  micropuncture needle, bilaterally the femoral veins were accessed and  bilaterally 7-Kinyarwanda sheaths were placed. Initially, I worked through  the right sheath and using a _____ catheter entered the left pulmonary  vasculature and was able to manipulate a wire into the left lower lobe  branches. This was across the thrombus in the left main pulmonary  artery, the intermedius branches. I then advanced over a wire an 12 cm  EKOS tPA delivery catheter with 12 cm of delivery holes. The most  distal portion was in the distal branches of the left lower lobe. The  most proximal delivery ports were in the left main pulmonary artery. Then, working from the left femoral sheath, I again manipulated _____  [place] catheter into the right pulmonary arterial system and  manipulated a wire into the right lower lobe distal vasculature. Of  note, contrast was injected through these catheters so as to delineate  both on the left and right side which veins we were in. I then once  more advanced a second EKOS tPA delivery catheter over the wire into the  right lower lobe arteries. This went through the intermedius branches. The delivery holes were from the distal branches in the left lower lobe  pulmonary artery through the intermediate branches and with the most  proximal holes in the proximal right main pulmonary artery. I had Anesthesia remove the [place]-Chava catheter. Through the EKOS  catheters, 5 mg of tPA was delivered in a pulse technique through each  catheter for a total of 10 mg of tPA and we initially started our  infusions at 1 mg per hour in each catheter. The catheters were then  anchored and sterile dressings applied. The patient's sedation at this  point had worn off. She was able to be taken to the surgical intensive  care unit with the tPA infusions running and in stable condition with no  immediate complications. The total contrast used was approximately 50  mL.   Blood loss was less than 50 mL.         Perri Euceda MD    D: 09/03/2020 12:15:54      T: 09/03/2020 12:26:26     [email or web address]  Job#: [id number]     Doc#: [id number]    CC:

## 2020-09-04 NOTE — PROGRESS NOTES
Physical Therapy    Facility/Department: [facility] 3 YOSEPH/VAS/MED  Initial Assessment    NAME: Haroldo Mosqueda  : 1960  MRN: [id number]    Date of Service: 2020    Discharge Recommendations:  Continue to assess pending progress(anticipate home with spouse)        Assessment   Assessment: pt able to tolerate amb well. No need for PT follow up. Can continue to amb with nursing staff. gt belt left in room. Decision Making: Low Complexity  PT Education: General Safety;Gait Training  REQUIRES PT FOLLOW UP: No  Activity Tolerance  Activity Tolerance: Patient Tolerated treatment well       Patient Diagnosis(es): The primary encounter diagnosis was Acute pulmonary embolism with acute cor pulmonale, unspecified pulmonary embolism type ([address] A diagnosis of Acute deep vein thrombosis (DVT) of left peroneal vein was also pertinent to this visit. has a past medical history of Cervical disc disease, DVT (deep venous thrombosis) ([address] Essential hypertension, Gastroesophageal reflux disease without esophagitis, Hyperlipidemia, Major depressive disorder in remission ([address] Migraine without aura, Pernicious anemia, and Raynaud's phenomenon. has a past surgical history that includes felipe and bso (cervix removed) and Arteriogram (Bilateral, 2020). Restrictions     Vision/Hearing        Subjective  General  Diagnosis: vte  Follows Commands: Within Functional Limits  Subjective  Subjective: pt states she is doing well with bed mobility and walking with some fatigue. declines sitting up in recliner.           Orientation     Social/Functional History  Social/Functional History  Lives With: Spouse  ADL Assistance: Independent  Ambulation Assistance: Independent  Transfer Assistance: Independent  Cognition        Objective          AROM RLE (degrees)  RLE AROM: WFL  AROM LLE (degrees)  LLE AROM : WFL  Strength RLE  Strength RLE: WFL  Strength LLE  Strength LLE: WFL        Bed mobility  Supine to Sit: Independent  Sit to Supine: Independent  Transfers  Sit to Stand: Supervision(CGA for eval)  Stand to sit: Supervision(CGA for eval)  Ambulation  Ambulation?: Yes  Ambulation 1  Surface: level tile  Device: No Device  Other Apparatus: O2  Assistance: Stand by assistance;Contact guard assistance(CGA for eval, feel pt can amb with supervision)  Quality of Gait: Pt experienced mild lost balance x1 but corrected themselves ind.   Distance: 125 feet              Plan   Plan  Plan Comment: Eval only  Safety Devices  Type of devices: Call light within reach, Left in bed    G-Code       OutComes Score                                                  AM-PAC Score             Goals  Short term goals  Time Frame for Short term goals: eval only  Short term goal 1: goal met to eval for need for skilled PT in this setting  Patient Goals   Patient goals : D/C home       Therapy Time   Individual Concurrent Group Co-treatment   Time In           Time Out           Minutes                   uJanis Alba PT    Electronically signed by Juansi Alba PT on 9/4/2020 at 3:30 PM

## 2020-09-04 NOTE — PROGRESS NOTES
Vascular Surgery Rounding Note                                                     Dr.Scott Bains Speaks    9/4/2020  6:37 AM      Post op day -#2 Procedure(s):  ECOS-PULMONARY  Delivery catheter placement x 2.   12 cm infusion legnth catheters one into the left lower lobe, the second into the right lower lobe. (Bilateral Femoral vein 7 Fr. Sheaths. Post op day #1  Procedure:    Removal of EKOS pulmonary wires and sheaths at bedside      Subjective- stating she does feel more short of breath this am, didn't sleep well last night      Objective-   Invalid input(s): 24H    Intake/Output Summary (Last 24 hours) at 9/4/2020 0637  Last data filed at 9/4/2020 0022  Gross per 24 hour   Intake 0 ml   Output 1845 ml   Net -1845 ml     In: -   Out: 275 [Urine:275]    Physical Exam:  Awake, alert, appropriate Yes  /67   Pulse 75   Temp 97.6 °F (36.4 °C) (Temporal)   Resp 16   Ht 5' 7\" (1.702 m)   Wt 154 lb 9.6 oz (70.1 kg)   SpO2 94%   BMI 24.21 kg/m²   Heart-Regular rate and rhythm  Lung- Clear bilaterally anteriorly, slightly diminished lower lobe sounds  supple, symmetrical, trachea midline  Abdomen-Abdomen soft, non-tender. BS normal.   Extremities-feet warm to touch/pink color with +palp DP/PT pulses  Neurologic- alert, oriented, normal speech, no focal findings or movement disorder noted  Wound surgical incisions-Bilateral groin sheath sites with dressings CDI, no bleeding/hematoma noted, and areas soft to touch      Assessment/Plan:    1. Stable day #2  EKOS  2. Heparin drip, per Oncology will transition to Lovenox mg/kg q12h X 2 weeks, then possibly Eliquis when antiphospholipid panel back. Will do meds-to-bed for lovenox   3. Respiratory Therapy to eval for home 02 needs  4. CTA pulmonary repeat today  5. Will have a 6wk follow up with [id number] [facility] with a LE venous duplex prior to visit  6. Stating poor appetite, had N/V last night after eating dinner  7.   Will increase activity today, up with 02 and assist, possibly dc home tomorrow if stable  8.   DC right IJ cortis      DVT prophylaxis: Heparin drip

## 2020-09-04 NOTE — PROGRESS NOTES
Daily Progress Note    Date:2020  Patient: Kyle Koo  : 1960  FYP:[id number]  CODE:Full Code No additional code details  Octavio Carbajal MD    Admit Date: 2020  9:32 AM   LOS: 2 days       Subjective: Patient resting comfortably. Dyspnea improved but still with some chest tightness. .  Had some difficulty sleeping overnight. Complains of some back pain which is chronic. No other issues. Switched from heparin drip to Lovenox. Summary:  patient presented to the hospital after outpatient imaging showed pulmonary embolus and right-sided heart strain. She had complains of dyspnea since 2020. Patient denies any personal history of VTE. She reports that mother had a history of miscarriages. Recently had a 2-hour flight from [place] in . She was admitted to ICU. Duplex lower extremity showed no evidence of DVT. 2D echo showed global systolic function severely reduced and severely dilated right ventricle. Review of Systems    Comprehensive ROS completed and is negative except as otherwise noted      Objective:      Vital signs in last 24 hours:  Patient Vitals for the past 24 hrs:   BP Temp Temp src Pulse Resp SpO2 Weight   20 0740 -- -- -- -- 18 95 % --   20 0653 114/73 97.1 °F (36.2 °C) Temporal 81 18 96 % --   20 0026 102/67 97.6 °F (36.4 °C) Temporal 75 16 94 % 154 lb 9.6 oz (70.1 kg)   20 1929 129/77 98.3 °F (36.8 °C) Temporal 80 14 93 % --   20 1649 138/85 96.5 °F (35.8 °C) -- 83 18 96 % --   20 1620 -- -- -- -- 14 -- --   20 1600 131/64 98 °F (36.7 °C) Temporal 74 12 99 % --   20 1500 111/71 -- -- 70 13 97 % --   20 1400 -- -- -- 77 13 94 % --   20 1300 -- -- -- 70 12 93 % --   20 1200 -- 98.3 °F (36.8 °C) Temporal 70 14 99 % --   20 1100 -- -- -- 72 9 94 % --   20 1050 -- -- -- -- 9 (!) 88 % --   20 1000 -- -- -- 78 11 95 % --     Physical Exam  Vitals signs and nursing note reviewed. Constitutional:       General: She is not in acute distress. HENT:      Head: Normocephalic and atraumatic. Eyes:      Extraocular Movements: Extraocular movements intact. Conjunctiva/sclera: Conjunctivae normal.   Cardiovascular:      Rate and Rhythm: Normal rate and regular rhythm. Pulses: Normal pulses. Heart sounds: Normal heart sounds. Pulmonary:      Effort: Pulmonary effort is normal. No respiratory distress. Breath sounds: Normal breath sounds. Abdominal:      General: Bowel sounds are normal.      Palpations: Abdomen is soft. Tenderness: There is no abdominal tenderness. Musculoskeletal:         General: No tenderness or deformity. Skin:     General: Skin is warm and dry. Neurological:      General: No focal deficit present. Mental Status: She is alert.    Psychiatric:         Mood and Affect: Mood normal.       Lab Review   Recent Results (from the past 24 hour(s))   APTT    Collection Time: 09/03/20 11:50 AM   Result Value Ref Range    aPTT 159.5 (HH) 26.0 - 36.2 sec   APTT    Collection Time: 09/03/20  8:30 PM   Result Value Ref Range    aPTT 174.9 (HH) 26.0 - 36.2 sec   APTT    Collection Time: 09/04/20  2:15 AM   Result Value Ref Range    aPTT 76.4 (HH) 26.0 - 36.2 sec   CBC Auto Differential    Collection Time: 09/04/20  2:15 AM   Result Value Ref Range    WBC 8.2 4.8 - 10.8 K/uL    RBC 4.13 (L) 4.20 - 5.40 M/uL    Hemoglobin 10.9 (L) 12.0 - 16.0 g/dL    Hematocrit 34.1 (L) 37.0 - 47.0 %    MCV 82.6 81.0 - 99.0 fL    MCH 26.4 (L) 27.0 - 31.0 pg    MCHC 32.0 (L) 33.0 - 37.0 g/dL    RDW 15.3 (H) 11.5 - 14.5 %    Platelets 695 (L) 838 - 400 K/uL    MPV 11.8 9.4 - 12.3 fL    Neutrophils % 60.7 50.0 - 65.0 %    Lymphocytes % 23.9 20.0 - 40.0 %    Monocytes % 5.6 0.0 - 10.0 %    Eosinophils % 9.2 (H) 0.0 - 5.0 %    Basophils % 0.4 0.0 - 1.0 %    Neutrophils Absolute 5.0 1.5 - 7.5 K/uL    Immature Granulocytes # 0.0 K/uL    Lymphocytes Absolute 2.0 1.1 - 4.5 K/uL Monocytes Absolute 0.50 0.00 - 0.90 K/uL    Eosinophils Absolute 0.80 (H) 0.00 - 0.60 K/uL    Basophils Absolute 0.00 0.00 - 0.20 K/uL   Basic Metabolic Panel    Collection Time: 09/04/20  2:15 AM   Result Value Ref Range    Sodium 140 136 - 145 mmol/L    Potassium 3.8 3.5 - 5.0 mmol/L    Chloride 106 98 - 111 mmol/L    CO2 25 22 - 29 mmol/L    Anion Gap 9 7 - 19 mmol/L    Glucose 96 74 - 109 mg/dL    BUN 11 8 - 23 mg/dL    CREATININE 0.8 0.5 - 0.9 mg/dL    GFR Non-African American >60 >60    GFR African American >59 >59    Calcium 8.5 (L) 8.8 - 10.2 mg/dL   Magnesium    Collection Time: 09/04/20  2:15 AM   Result Value Ref Range    Magnesium 1.8 1.6 - 2.4 mg/dL       I/O last 3 completed shifts:  In: -   Out: 0078 [Urine:1745]  No intake/output data recorded.       Current Facility-Administered Medications:     melatonin tablet 3 mg, 3 mg, Oral, Nightly PRN, Karolina Louise MD    enoxaparin (LOVENOX) injection 70 mg, 1 mg/kg, Subcutaneous, BID, Oli Johnson MD, 70 mg at 09/04/20 0801    HYDROmorphone HCl PF (DILAUDID) injection 1 mg, 1 mg, Intravenous, Q2H PRN, René Leblanc APRN, 1 mg at 09/03/20 1937    lisinopril (PRINIVIL;ZESTRIL) tablet 10 mg, 10 mg, Oral, Daily, Randee Kendall MD, 10 mg at 09/04/20 0836    verapamil (CALAN SR) extended release tablet 240 mg, 240 mg, Oral, Daily, Randee Kendall MD, 240 mg at 09/04/20 0835    nortriptyline (PAMELOR) capsule 10 mg, 10 mg, Oral, Nightly, Randee Kendall MD, 10 mg at 09/03/20 1949    sodium chloride flush 0.9 % injection 10 mL, 10 mL, Intravenous, 2 times per day, Randee Kendall MD, 10 mL at 09/03/20 1949    sodium chloride flush 0.9 % injection 10 mL, 10 mL, Intravenous, PRN, Randee Kendall MD    acetaminophen (TYLENOL) tablet 650 mg, 650 mg, Oral, Q6H PRN **OR** acetaminophen (TYLENOL) suppository 650 mg, 650 mg, Rectal, Q6H PRN, Randee Kendall MD    promethazine (PHENERGAN) tablet 12.5 mg, 12.5 mg, Oral, Q6H PRN, 12.5 mg at 09/02/20 2118 **OR** [DISCONTINUED] ondansetron (ZOFRAN) injection 4 mg, 4 mg, Intravenous, Q6H PRN, Tatiana Martínez MD    acetaminophen (TYLENOL) tablet 650 mg, 650 mg, Oral, Q4H PRN, Tatiana Martínez MD    ondansetron (ZOFRAN) injection 4 mg, 4 mg, Intravenous, Q8H PRN, Tatiana Martínez MD, 4 mg at 09/03/20 1651      PROCEDURES PERFORMED:  1. Placement of EKOS pulmonary artery tPA delivery catheters x2 12 cm  in length, first catheter into the left lower lobe, second catheter into  the right lower lobe, bilateral femoral venous approach 7-Norwegian sheath. 2.  Initiation of thrombolysis.     SURGEON:  Jase Torres MD     ASSISTANT:  Karyn Melton.     ANESTHESIA:  Monitored anesthesia care.     ESTIMATED BLOOD LOSS:  Less than 50 mL.     HISTORY:  The patient is a 56-year-old female who presented to the  emergency room today with shortness of breath. CTA performed confirmed  bilateral pulmonary emboli. Her EKG as well as the CT scan were  consistent with right heart strain. An echocardiogram echocardiogram  was performed, which suggested severe right heart strain with a  pulmonary systolic pressure of 80. She was counseled as to her options. She was brought to the operating room for placement of EKOS tPA delivery  catheters and initiation of pulmonary thrombolysis.     PROCEDURE:  The patient was brought into the hybrid operating room. Appropriate monitoring lines were placed and sedation was administered. Anesthesia placed a central line and initially floated a [place]  catheter and registered pulmonary artery pressures of 65/40.     Both groin areas were then prepped. Under ultrasound guidance using  micropuncture needle, bilaterally the femoral veins were accessed and  bilaterally 7-Norwegian sheaths were placed.   Initially, I worked through  the right sheath and using a _____ catheter entered the left pulmonary  vasculature and was able to manipulate a wire into the left lower lobe  branches. This was across the thrombus in the left main pulmonary  artery, the intermedius branches. I then advanced over a wire an 12 cm  EKOS tPA delivery catheter with 12 cm of delivery holes. The most  distal portion was in the distal branches of the left lower lobe. The  most proximal delivery ports were in the left main pulmonary artery.     Then, working from the left femoral sheath, I again manipulated _____  Дмитрий Contes catheter into the right pulmonary arterial system and  manipulated a wire into the right lower lobe distal vasculature. Of  note, contrast was injected through these catheters so as to delineate  both on the left and right side which veins we were in. I then once  more advanced a second EKOS tPA delivery catheter over the wire into the  right lower lobe arteries. This went through the intermedius branches. The delivery holes were from the distal branches in the left lower lobe  pulmonary artery through the intermediate branches and with the most  proximal holes in the proximal right main pulmonary artery.     I had Anesthesia remove the [place] catheter. Through the EKOS  catheters, 5 mg of tPA was delivered in a pulse technique through each  catheter for a total of 10 mg of tPA and we initially started our  infusions at 1 mg per hour in each catheter. The catheters were then  anchored and sterile dressings applied. The patient's sedation at this  point had worn off. She was able to be taken to the surgical intensive  care unit with the tPA infusions running and in stable condition with no  immediate complications. The total contrast used was approximately 50  mL.   Blood loss was less than 50 mL.     Angelina De Jesus MD     D: 09/03/2020 12:15:54         Labs and diagnostic studies reviewed      Assessment/plan  Principal Problem:    Bilateral pulmonary embolism (HCC)  Active Problems:    Dysfunction of right cardiac ventricle    DVT (deep venous thrombosis) ([facility] [address] Hyperlipidemia    Gastroesophageal reflux disease without esophagitis    Essential hypertension    Pulmonary embolism (HCC)    VTE (venous thromboembolism)    Pulmonary arterial hypertension ([facility]  Resolved Problems:    * No resolved hospital problems.  *      Unprovoked bilateral submassive pulmonary embolism, with right heart strain,   - Completed catheter directed EKOS tpa by vascular surgery 9/3/2020  - Transitioned to Lovenox  - Hematology/oncology following  - Follow-up CT chest to reassess clot burden    Subacute DVT of lower extremity, peroneal veins    Acute hypoxic respiratory failure, secondary to submassive PE with right heart strain  -Supplemental O2 as needed to keep SPO2 over 90%  -Wean O2 as tolerated  -Home O2 assessment    Normocytic anemia  - Relatively stable H&H, monitor CBC    Chronic hypertension  - Continue home antihypertensive regimen cautiously  -Hold antihypertensives for SBP less than 100    Continue appropriate home meds/management for other chronic conditions    Plan to continue on Lovenox x2 weeks, then  Eventually transition to Eliquis  when patient follows up in hematology/oncology clinic if further hypercoagulable work-up including antiphospholipid panel is negative    Dispo: Discharge pending improvement, home O2 assessment, rehab evaluation      Chelsie To MD 9/4/2020 9:12 AM

## 2020-09-04 NOTE — PROGRESS NOTES
9:00a - Home oxygen evaluation performed and results are as follows: SaO2 = 88% on R/A at rest, SaO2 = 91% on 2 lpm O2(NC) at rest, SaO2 = 87% on R/A while ambulating, SaO2 = 92% on 2 lpm O2(NC) while ambulating. (Recovery SaO2) TS RRT/RCP

## 2020-09-04 NOTE — PROGRESS NOTES
Right IJ cortis removed, pressure held X 10 minutes, covered with 2X2 and small Tegaderm. Scant amount of bloody drainage noted after cortis pulled and pressure held.

## 2020-09-04 NOTE — CARE COORDINATION
09/04/20 0645    Inpatient consult to Case Management  ONE TIME     Complete    Discontinue     Comments: Will discharge on Lovenox 1 mg/kg every 12 hours x2 weeks. Please provide coverage for 2 weeks following discharge. We will arrange follow-up in clinic to switch the patient to Eliquis if laboratory work-up does not contraindicate. Provider: (Not yet assigned)    Question: Reason for Consult? Answer: Assistance with Discharge Planning                 **SW awaiting notification from [id number] re:  Lovenox cost  Electronically signed by NESTOR Guajardo on 9/4/2020 at 9:36 AM    **Per Merna in the pharmacy it is 10.00  Electronically signed by NESTOR Guajardo on 9/4/2020 at 9:48 AM

## 2020-09-05 VITALS
SYSTOLIC BLOOD PRESSURE: 131 MMHG | DIASTOLIC BLOOD PRESSURE: 89 MMHG | BODY MASS INDEX: 24.58 KG/M2 | WEIGHT: 156.6 LBS | OXYGEN SATURATION: 91 % | HEIGHT: 67 IN | HEART RATE: 85 BPM | TEMPERATURE: 96.9 F | RESPIRATION RATE: 18 BRPM

## 2020-09-05 LAB
ANTICARDIOLIPIN IGG ANTIBODY: 0 GPL (ref 0–14)
BETA-2 GLYCOPROTEIN 1 IGG ANTIBODY: 0 SGU (ref 0–20)
BETA-2 GLYCOPROTEIN 1 IGM ANTIBODY: 0 SMU (ref 0–20)
CARDIOLIPIN AB IGM: 0 MPL (ref 0–12)
DRVVT CONFIRMATION TEST: ABNORMAL RATIO
DRVVT SCREEN: 32 SEC (ref 33–44)
DRVVT,DIL: ABNORMAL SEC (ref 33–44)
HEXAGONAL PHOSPHOLIPID NEUTRALIZAT TEST: ABNORMAL
LUPUS ANTICOAG INTERP: ABNORMAL
PLT NEUTA: ABNORMAL
PT D: 14.3 SEC (ref 12–15.5)
PTT D: 97 SEC (ref 32–48)
PTT-D CORR REFLEX: 45 SEC (ref 32–48)
PTT-HEPARIN NEUTRALIZED: 54 SEC (ref 32–48)
REPTILASE TIME: 17 SEC
THROMBIN TIME: 53.4 SEC (ref 14.7–19.5)

## 2020-09-05 PROCEDURE — 6360000002 HC RX W HCPCS: Performed by: INTERNAL MEDICINE

## 2020-09-05 PROCEDURE — 99232 SBSQ HOSP IP/OBS MODERATE 35: CPT | Performed by: INTERNAL MEDICINE

## 2020-09-05 PROCEDURE — 2580000003 HC RX 258: Performed by: SURGERY

## 2020-09-05 PROCEDURE — 99232 SBSQ HOSP IP/OBS MODERATE 35: CPT | Performed by: SURGERY

## 2020-09-05 PROCEDURE — 2700000000 HC OXYGEN THERAPY PER DAY

## 2020-09-05 PROCEDURE — 6370000000 HC RX 637 (ALT 250 FOR IP): Performed by: SURGERY

## 2020-09-05 RX ADMIN — Medication 10 ML: at 08:11

## 2020-09-05 RX ADMIN — LISINOPRIL 10 MG: 20 TABLET ORAL at 08:11

## 2020-09-05 RX ADMIN — ENOXAPARIN SODIUM 70 MG: 80 INJECTION SUBCUTANEOUS at 08:11

## 2020-09-05 RX ADMIN — VERAPAMIL HYDROCHLORIDE 240 MG: 240 TABLET, FILM COATED, EXTENDED RELEASE ORAL at 08:11

## 2020-09-05 NOTE — DISCHARGE SUMMARY
Discharge Summary    NAME: Alen Monge  :  1960  MRN:  [id number]    Admit date:  2020  Discharge date:  2020    Admitting Physician: Dr. Sharon Schumacher Directive: Full Code    Consults: Vascular surgery  Hematology/oncology    Primary Care Physician:  Poli Royal MD    Discharge Diagnoses:     Bilateral pulmonary embolism [facility]), with right heart strain    Dysfunction of right cardiac ventricle    DVT (deep venous thrombosis) ([facility] [address]    Hyperlipidemia    Gastroesophageal reflux disease without esophagitis    Essential hypertension    Pulmonary embolism (HCC)    VTE (venous thromboembolism)    Pulmonary arterial hypertension ([facility] [address]      Significant Diagnostic Studies:   Cta Chest W Wo Contrast    Result Date: 2020  CTA CHEST W WO CONTRAST 2020 8:06 AM HISTORY: R06.09 COMPARISON: None. DLP: 194.1 mGy cm TECHNIQUE: Helical tomographic images of the chest were obtained after the administration of intravenous contrast following angiogram protocol. Additionally, 3D reformatted images were provided. FINDINGS:  Pulmonary arteries: There is adequate enhancement of the pulmonary arteries to evaluate for central and segmental pulmonary emboli. There are numerous filling defects involving the arterial supply of both lungs. The distal right main pulmonary artery shows a large filling defect with near occlusive disease extending into the arterial supply to the right middle lobe and right lower lobe. Thrombotic disease in the distal left main pulmonary artery extending to the left lower lobe and left upper lobe as well. RV to LV ratio is elevated at 1.8. Aorta and great vessels: The aorta is well opacified and demonstrates no dissection or aneurysm. The great vessels are normal in appearance. Visualized neck base: The imaged portion of the base of the neck appears unremarkable. Lungs: Biapical scarring. . The trachea and bronchial tree are patent. Heart:  There is increase RV to LV ratio, consistent with right heart strain. . There is no pericardial effusion. Mediastinum and lymph nodes: No enlarged mediastinal, hilar, or axillary lymph nodes are present. Skeletal and soft tissues: The osseous structures of the thorax and surrounding soft tissues demonstrate no acute process. Upper abdomen: The imaged portion of the upper abdomen demonstrates no acute process. 1.   Significant ulnar embolus burden involving both lungs with RV to LV ratio 1.8, consistent with right heart strain. 2.  The patient has been sent to the emergency room. Dr. Salomón Rehman is aware. Signed by Dr Annamae Halsted on 9/2/2020 9:35 AM     Dup Lower Extremity Venous Bilateral    Result Date: 9/2/2020  Vascular Lower Extremities DVT Study Procedure  Demographics   Patient Name     Fozia Reasoner Age                    63   Patient Number   [id number]        Gender                 Female   Visit Number     [id number]     Interpreting Physician Gunner Kolb MD   Date of Birth    1960    Referring Physician    Jorge L Coughiln   Accession Number [id number]    [id number] [facility]  Procedure Type of Study:   Veins:Lower Extremities DVT Study, VL LOWER EXTREMITY BILATERAL VENOUS  DUPLEX . Indications for Study:Pulmonary embolism. Impression   Normal venous duplex study of the right lower extremity(ies). There is no  evidence of deep or superficial venous thrombosis. There is evidence of subacute deep vein thrombosis in the left lower  extremity involving the peroneal, veins. Signature   ----------------------------------------------------------------  Electronically signed by Gunner Kolb MD(Interpreting  physician) on 09/02/2020 12:35 PM  ----------------------------------------------------------------  Velocities are measured in cm/s ; Diameters are measured in mm Right Lower Extremities DVT Study Measurements Right 2D Measurements +------------------------------------+----------+---------------+----------+ ! Location !Visualized! Compressibility! Thrombosis! +------------------------------------+----------+---------------+----------+ ! Sapheno Femoral Junction            ! Yes       ! Yes            ! None      ! +------------------------------------+----------+---------------+----------+ ! Common Femoral                      !Yes       ! Yes            ! None      ! +------------------------------------+----------+---------------+----------+ ! Prox Femoral                        !Yes       ! Yes            ! None      ! +------------------------------------+----------+---------------+----------+ ! Mid Femoral                         !Yes       ! Yes            ! None      ! +------------------------------------+----------+---------------+----------+ ! Dist Femoral                        !Yes       ! Yes            ! None      ! +------------------------------------+----------+---------------+----------+ ! Deep Femoral                        !Yes       ! Yes            ! None      ! +------------------------------------+----------+---------------+----------+ ! Popliteal                           !Yes       ! Yes            ! None      ! +------------------------------------+----------+---------------+----------+ ! SSV                                 ! Yes       ! Yes            ! None      ! +------------------------------------+----------+---------------+----------+ ! Gastroc                             ! Yes       ! Yes            ! None      ! +------------------------------------+----------+---------------+----------+ ! PTV                                 ! Yes       ! Yes            ! None      ! +------------------------------------+----------+---------------+----------+ ! GSV                                 ! Yes       ! Yes            ! None      ! +------------------------------------+----------+---------------+----------+ ! ATV                                 ! Yes       ! Yes            ! None      ! +------------------------------------+----------+---------------+----------+ ! Peroneal                            !Yes       ! Yes            ! None      ! +------------------------------------+----------+---------------+----------+ Left Lower Extremities DVT Study Measurements Left 2D Measurements +------------------------------------+----------+---------------+----------+ ! Location                            ! Visualized! Compressibility! Thrombosis! +------------------------------------+----------+---------------+----------+ ! Sapheno Femoral Junction            ! Yes       ! Yes            ! None      ! +------------------------------------+----------+---------------+----------+ ! Common Femoral                      !Yes       ! Yes            ! None      ! +------------------------------------+----------+---------------+----------+ ! Prox Femoral                        !Yes       ! Yes            ! None      ! +------------------------------------+----------+---------------+----------+ ! Mid Femoral                         !Yes       ! Yes            ! None      ! +------------------------------------+----------+---------------+----------+ ! Dist Femoral                        !Yes       ! Yes            ! None      ! +------------------------------------+----------+---------------+----------+ ! Deep Femoral                        !Yes       ! Yes            ! None      ! +------------------------------------+----------+---------------+----------+ ! Popliteal                           !Yes       ! Yes            ! None      ! +------------------------------------+----------+---------------+----------+ ! SSV                                 ! Yes       ! Yes            ! None      ! +------------------------------------+----------+---------------+----------+ ! Gastroc                             ! Yes       ! Yes            ! None      ! +------------------------------------+----------+---------------+----------+ ! PTV delivery catheters x2 12 cm  in length, first catheter into the left lower lobe, second catheter into  the right lower lobe, bilateral femoral venous approach 7-Cymro sheath. 2.  Initiation of thrombolysis.     SURGEON:  Wong Sanders MD     ASSISTANT:  Karen Talavera.     ANESTHESIA:  Monitored anesthesia care.     ESTIMATED BLOOD LOSS:  Less than 50 mL.     HISTORY:  The patient is a 58-year-old female who presented to the  emergency room today with shortness of breath. CTA performed confirmed  bilateral pulmonary emboli. Her EKG as well as the CT scan were  consistent with right heart strain. An echocardiogram echocardiogram  was performed, which suggested severe right heart strain with a  pulmonary systolic pressure of 80. She was counseled as to her options. She was brought to the operating room for placement of EKOS tPA delivery  catheters and initiation of pulmonary thrombolysis.     PROCEDURE:  The patient was brought into the hybrid operating room. Appropriate monitoring lines were placed and sedation was administered. Anesthesia placed a central line and initially floated a [place]-Chava  catheter and registered pulmonary artery pressures of 65/40.     Both groin areas were then prepped. Under ultrasound guidance using  micropuncture needle, bilaterally the femoral veins were accessed and  bilaterally 7-Cymro sheaths were placed. Initially, I worked through  the right sheath and using a _____ catheter entered the left pulmonary  vasculature and was able to manipulate a wire into the left lower lobe  branches. This was across the thrombus in the left main pulmonary  artery, the intermedius branches. I then advanced over a wire an 12 cm  EKOS tPA delivery catheter with 12 cm of delivery holes. The most  distal portion was in the distal branches of the left lower lobe.   The  most proximal delivery ports were in the left main pulmonary artery.     Then, working from the left femoral sheath, I again manipulated _____  True Dom catheter into the right pulmonary arterial system and  manipulated a wire into the right lower lobe distal vasculature. Of  note, contrast was injected through these catheters so as to delineate  both on the left and right side which veins we were in. I then once  more advanced a second EKOS tPA delivery catheter over the wire into the  right lower lobe arteries. This went through the intermedius branches. The delivery holes were from the distal branches in the left lower lobe  pulmonary artery through the intermediate branches and with the most  proximal holes in the proximal right main pulmonary artery.     I had Anesthesia remove the [place]-Chava catheter. Through the EKOS  catheters, 5 mg of tPA was delivered in a pulse technique through each  catheter for a total of 10 mg of tPA and we initially started our  infusions at 1 mg per hour in each catheter. The catheters were then  anchored and sterile dressings applied. The patient's sedation at this  point had worn off. She was able to be taken to the surgical intensive  care unit with the tPA infusions running and in stable condition with no  immediate complications. The total contrast used was approximately 50  mL. Blood loss was less than 50 mL.           Alexandra Banda MD     D: 09/03/2020 12:15:54      T: 09/03/2020 12:26:26     [email or web address]  Job#: [id number]     Doc#: [id number]            Hospital Course: 74-year-old female with PMH of hypertension, hyperlipidemia, pernicious anemia and Raynaud's phenomenon, presented to [facility] emergency department with progressive dyspnea with recent outpatient imaging that revealed a large PE with significant clot burden involving bilateral lungs with concern for right heart strain. Admitted to ICU. Echo obtained confirmed right heart strain with significant RV systolic dysfunction and dilated RV.   Vascular surgery consulted patient underwent catheter directed TPA via EKOS without complication. Lower extremity Doppler obtained did show RLE DVT, however this is thought to be subacute in nature. Subsequently transferred to the floor and transition to heparin drip briefly then therapeutic Lovenox per hematology/oncology recommendations. At this time PE thought to be unprovoked pending further hypercoagulable work-up with follow-up in outpatient setting within 2 weeks. Patient did remain hypoxic on some supplemental O2 and did qualify for home supplemental oxygen. will not require ongoing rehab with PT after assessment. Patient will continue on Lovenox x2 weeks until hematology/oncology outpatient follow-up, then determine if patient is candidate to transition to DO [facility] therapy with Eliquis depending on results of hypercoagulable work-up. Stable for discharge on 9/5/2020 and will have close outpatient follow-up with PCP, hematology/oncology, and vascular surgery. Physical Exam:  Vital Signs: /75   Pulse 79   Temp 96.9 °F (36.1 °C) (Temporal)   Resp 18   Ht 5' 7\" (1.702 m)   Wt 156 lb 9.6 oz (71 kg)   SpO2 94%   BMI 24.53 kg/m²   General appearance:. Alert and Cooperative   HEENT: Normocephalic. Chest: clear to auscultation bilaterally without wheezes or rhonchi. No increased work of breathing. Cardiac: Normal heart tones with regular rate and rhythm, S1, S2 normal.   Abdomen:soft, non-tender; normal bowel sounds  Extremities: No clubbing or cyanosis. No peripheral edema. Peripheral pulses palpable. Neurologic: Grossly intact.     Discharge Medications:       Tabatha Gear   Home Medication Instructions [facility]:[id number]    Printed on:09/05/20 1283   Medication Information                      b complex vitamins capsule  Take 1 capsule by mouth daily             cyanocobalamin 1000 MCG/ML injection  Inject 1 mL into the muscle every 30 days             desvenlafaxine succinate (PRISTIQ) 50 MG TB24 extended release tablet  TAKE 1 TABLET BY MOUTH EVERY DAY enoxaparin (LOVENOX) 80 MG/0.8ML injection  Inject 0.7 mLs into the skin 2 times daily for 14 days             estradiol (ESTRACE) 0.5 MG tablet  Take 1 tablet by mouth daily             lisinopril (PRINIVIL;ZESTRIL) 10 MG tablet  TAKE 1 TABLET BY MOUTH EVERY DAY             nortriptyline (PAMELOR) 10 MG capsule  TAKE 2 CAPSULES BY MOUTH EVERY DAY AT BEDTIME             rizatriptan (MAXALT) 10 MG tablet  Take 1 tablet by mouth once as needed for Migraine May repeat in 2 hours if needed             tiZANidine (ZANAFLEX) 4 MG tablet  Take 1 tablet by mouth every 8 hours as needed (muscle spasms)             verapamil (CALAN SR) 240 MG extended release tablet  TAKE 1 TABLET BY MOUTH EVERY DAY                 Discharge Instructions: Follow up with Eliceo Kumari MD in 7 days. Take medications as directed. Resume activity as tolerated. Follow up appointment with Hematology/Oncology and Vascular Surgery clinics. Diet: DIET GENERAL;     Disposition: Patient is medically stable and will be discharged home. Time spent on discharge over 30 minutes. More than 30 minutes was personally spent on discharge planning on the day of discharge in coordination of care, counseling the patient, and preparation of discharge.         Signed:  Jamie Strong MD  9/5/2020 9:32 AM

## 2020-09-05 NOTE — PROGRESS NOTES
cold or heat intolerance;  HEMATOLOGY: no easy bruising or bleeding, no history of clotting disorder;  DERMATOLOGY: no skin rash, no eczema, no pruritus;  PSYCHIATRY: no depression, no anxiety, no panic attacks, no suicidal ideation, no homicidal ideation;  NEUROLOGY: no syncope, no seizures, no numbness or tingling of hands, no numbness or tingling of feet, no paresis;    Objective   /75   Pulse 79   Temp 96.9 °F (36.1 °C) (Temporal)   Resp 18   Ht 5' 7\" (1.702 m)   Wt 156 lb 9.6 oz (71 kg)   SpO2 94%   BMI 24.53 kg/m²     PHYSICAL EXAM:  CONSTITUTIONAL: Alert, appropriate, no acute distress  EYES: Non icteric, EOM intact, pupils equal round   ENT: Mucus membranes moist, no oral pharyngeal lesions, external inspection of ears and nose are normal  NECK: Supple, no masses. No palpable thyroid mass  CHEST/LUNGS: CTA bilaterally, normal respiratory effort   CARDIOVASCULAR: RRR, no murmurs. No lower extremity edema  ABDOMEN: soft non-tender, active bowel sounds, no HSM. No palpable masses  EXTREMITIES: warm, full ROM in all 4 extremities, no focal weakness. SKIN: warm, dry with no rashes or lesions  LYMPH: No cervical, clavicular, axillary, or inguinal lymphadenopathy  NEUROLOGIC: follows commands, non focal   PSYCH: mood and affect appropriate.   Alert and oriented to time, place, person        LABORATORY RESULTS REVIEWED BY ME:  Recent Labs     09/04/20  0215 09/03/20  0539 09/02/20  2340   WBC 8.2 11.7* 13.2*   HGB 10.9* 11.7* 12.6   HCT 34.1* 36.2* 38.5   MCV 82.6 81.7 81.1   * 127* 143       Lab Results   Component Value Date     09/04/2020    K 3.8 09/04/2020     09/04/2020    CO2 25 09/04/2020    BUN 11 09/04/2020    CREATININE 0.8 09/04/2020    GLUCOSE 96 09/04/2020    CALCIUM 8.5 (L) 09/04/2020    PROT 6.2 (L) 09/02/2020    LABALBU 4.2 09/02/2020    BILITOT <0.2 09/02/2020    ALKPHOS 105 (H) 09/02/2020    AST 77 (H) 09/02/2020    ALT 81 (H) 09/02/2020    LABGLOM >60 09/04/2020 GFRAA >59 09/04/2020       Lab Results   Component Value Date    INR 1.24 (H) 09/03/2020    INR 1.05 09/02/2020    PROTIME 15.6 (H) 09/03/2020    PROTIME 13.7 09/02/2020       RADIOLOGY STUDIES REVIEWED BY ME:  Cta Chest W Wo Contrast     Result Date: 9/2/2020  CTA CHEST W WO CONTRAST 9/2/2020 8:06 AM HISTORY: R06.09 COMPARISON: None. DLP: 194.1 mGy cm TECHNIQUE: Helical tomographic images of the chest were obtained after the administration of intravenous contrast following angiogram protocol. Additionally, 3D reformatted images were provided. FINDINGS:  Pulmonary arteries: There is adequate enhancement of the pulmonary arteries to evaluate for central and segmental pulmonary emboli. There are numerous filling defects involving the arterial supply of both lungs. The distal right main pulmonary artery shows a large filling defect with near occlusive disease extending into the arterial supply to the right middle lobe and right lower lobe. Thrombotic disease in the distal left main pulmonary artery extending to the left lower lobe and left upper lobe as well. RV to LV ratio is elevated at 1.8. Aorta and great vessels: The aorta is well opacified and demonstrates no dissection or aneurysm. The great vessels are normal in appearance. Visualized neck base: The imaged portion of the base of the neck appears unremarkable. Lungs: Biapical scarring. . The trachea and bronchial tree are patent. Heart: There is increase RV to LV ratio, consistent with right heart strain. . There is no pericardial effusion. Mediastinum and lymph nodes: No enlarged mediastinal, hilar, or axillary lymph nodes are present. Skeletal and soft tissues: The osseous structures of the thorax and surrounding soft tissues demonstrate no acute process. Upper abdomen: The imaged portion of the upper abdomen demonstrates no acute process.      1.    Significant ulnar embolus burden involving both lungs with RV to LV ratio 1.8, consistent with right heart strain. 2.  The patient has been sent to the emergency room. Dr. Mendez Lucero is aware. Signed by Dr Vernon Mckoy on 9/2/2020 9:35 AM     Vl Dup Lower Extremity Venous Bilateral     Result Date: 9/2/2020  Impression   Normal venous duplex study of the right lower extremity(ies). There is no  evidence of deep or superficial venous thrombosis. There is evidence of subacute deep vein thrombosis in the left lower  extremity involving the peroneal, veins.         My interpretation: Saddle embolus     ASSESSMENT:  #Pulmonary embolism, unprovoked event  High clot burden with pulmonary hypertension and RV dilation with high BNP. Status post catheter directed thrombolysis  Heparin IV drip x24 hours completed 9/4/2020  Transitioned to Lovenox 70mg every 12 hours on 9/4/2020    She may be a good candidate for long-term anticoagulation with DOACS if antiphospholipid ruled out. Labs 9/4/2020:  Lupus Anticoagulant in process  Beta-2 glycoprotein in process  Cardiolipin antibodies in process      CTA chest on 9/4/2020  · Bilateral pulmonary emboli with moderate decreased embolus burden since the previous study. · Improved RV/ LV ratio and right ventricle strain. · Areas of pulmonary infarction and evolving infarction     #Mild normocytic anemia, hemoglobin 10.9 on 9/4/2020     #Hypertension, controlled /75 this AM     #Raynaud phenomena-unknown if the patient has any underlying rheumatologic condition     PLAN:  Transitioned to Lovenox 70mg every 12 hours, has demonstrated ability to self inject  Discharge on Levaquin x 2 weeks,will have a $10 copay  We will likely transition to Eliquis in clinic if antiphospholipid panel negative. Thrombophilia workup in clinic  Antiphospholipid panel ongoing (to include Cameron 2 mutation, discussed age-appropriate cancer screening, [facility] flow cytometry)  Planning home today with supplemental O2.   Follow up in clinic with EMILIANO Ham on 9/18/2020 at 11:30         Jazlyn SHAH EMIILANO Navarrete    09/05/20  9:11 AM     Physician's attestation and contribution:  I, Dr Arley Vaz, personally and independently performed an evaluation on  [address]        I have reviewed relevant medical information/data to include but not limited to the medication list, relevant appropriate lab work and imaging when applicable. I reviewed other physician's notes, ancillary services and nurses assessments. I have reviewed the above documentation completed by Aisha CUMMINGS   Please see my additional addended and/or modified contributions to the history of present illness, physical examination, and assessment/medical decision-making and plan that reflects my findings and impressions. I discussed essential elements of the care plan with Jazlyn CUMMINGS and the patient. I have encouraged and answered all the questions raised to the patient's understanding and satisfaction. I concur with the above stated. Subjective-awake and alert. She states this problem occurred progressively over the previous couple of months. She had been hiking and getting progressively more short of breath. She states that she tends to ignore things but this got to the point that she could hardly breathe and therefore sought evaluation. Objective-  clinically significantly improved. Able to ambulate, very much better than when she was admitted. Assessment/plan:  Transitioned to Lovenox 70mg every 12 hours, has demonstrated ability to self inject  Discharge on Levaquin x 2 weeks,will have a $10 copay  We will likely transition to Eliquis in clinic if antiphospholipid panel negative. Thrombophilia workup in clinic  Antiphospholipid panel ongoing (to include Cameron 2 mutation, discussed age-appropriate cancer screening, [facility] flow cytometry)  Planning home today with supplemental O2.   Follow up in clinic with EMILIANO Head on 9/18/2020 at 11:30      Electronically signed by Arley Vaz MD on 9/5/20 at 9:34 AM CDT

## 2020-09-05 NOTE — PLAN OF CARE
Problem: Pain:  Goal: Pain level will decrease  Description: Pain level will decrease  9/4/2020 2326 by Jae Jain RN  Outcome: Ongoing  9/4/2020 0930 by Haroldo Marcelo RN  Outcome: Ongoing  Goal: Control of acute pain  Description: Control of acute pain  9/4/2020 2326 by Jae Jain RN  Outcome: Ongoing  9/4/2020 0930 by Haroldo Marcelo RN  Outcome: Ongoing  Goal: Control of chronic pain  Description: Control of chronic pain  9/4/2020 2326 by Jae Jain RN  Outcome: Ongoing  9/4/2020 0930 by Haroldo Marcelo RN  Outcome: Ongoing     Problem: Falls - Risk of:  Goal: Will remain free from falls  Description: Will remain free from falls  9/4/2020 2326 by Jae Jain RN  Outcome: Ongoing  9/4/2020 0930 by Haroldo Marcelo RN  Outcome: Ongoing  Goal: Absence of physical injury  Description: Absence of physical injury  9/4/2020 2326 by Jae Jain RN  Outcome: Ongoing  9/4/2020 0930 by Haroldo Marcelo RN  Outcome: Ongoing

## 2020-09-05 NOTE — PROGRESS NOTES
She is feeling better today than she did yesterday. She is able to walk in the hallway without becoming dizzy nor significantly short of breath. She is actually doing okay clinically without oxygen. On exam, she is alert and oriented x3 she is pleasant her vital signs are noted in the chart. She is not laboring to breathe. Assessment:  Bilateral submassive pulmonary embolism  She is less symptomatic certainly than pre-TPA thrombolysis of bilateral pulmonary emboli. Plan:  She can go home today. We can check her oxygen saturation without nasal cannula oxygen. She will still go home with an oxygen tank just in case she has some shortness of breath. Of course, she will be anticoagulated with Lovenox per hematology recommendations. She should be completely anticoagulated for 1 year, unless she is found to have a coagulopathy.

## 2020-09-08 ENCOUNTER — TELEPHONE (OUTPATIENT)
Dept: INTERNAL MEDICINE | Age: 60
End: 2020-09-08

## 2020-09-08 NOTE — TELEPHONE ENCOUNTER
Brina requests that [facility] return their call. The best time to reach her is Anytime. [facility], 1 week HFU, Dis: 9/5/2020, Biateral Pulmonary Embolism. When I asked the COVID questions pt did have a cough. I did offer for pt to do a vv. Pt refused. Please all pt back to advise. Thank you.

## 2020-09-08 NOTE — TELEPHONE ENCOUNTER
[facility]NCY [facility]O [facility] ONC Nadia HOD   2/18/2021  9:30 AM Milagro Call MD [facility]ARRAH FUNEZ-[place]       Haroldo Sullivan MA

## 2020-09-14 ENCOUNTER — OFFICE VISIT (OUTPATIENT)
Dept: INTERNAL MEDICINE | Age: 60
End: 2020-09-14
Payer: COMMERCIAL

## 2020-09-14 VITALS
BODY MASS INDEX: 23.49 KG/M2 | OXYGEN SATURATION: 95 % | SYSTOLIC BLOOD PRESSURE: 100 MMHG | WEIGHT: 150 LBS | DIASTOLIC BLOOD PRESSURE: 60 MMHG | HEART RATE: 80 BPM

## 2020-09-14 PROCEDURE — 1111F DSCHRG MED/CURRENT MED MERGE: CPT | Performed by: INTERNAL MEDICINE

## 2020-09-14 PROCEDURE — 99495 TRANSJ CARE MGMT MOD F2F 14D: CPT | Performed by: INTERNAL MEDICINE

## 2020-09-14 RX ORDER — TIZANIDINE 4 MG/1
TABLET ORAL
Qty: 180 TABLET | Refills: 1 | Status: SHIPPED | OUTPATIENT
Start: 2020-09-14 | End: 2020-12-24 | Stop reason: SDUPTHER

## 2020-09-14 NOTE — PROGRESS NOTES
Post-Discharge Transitional Care Management Services or Hospital Follow Up      Lucy Barr   YOB: 1960    Date of Office Visit:  9/14/2020  Date of Hospital Admission: 9/2/20  Date of Hospital Discharge: 9/5/20  Readmission Risk Score(high >=14%. Medium >=10%):Readmission Risk Score: 14      Care management risk score Rising risk (score 2-5) and Complex Care (Scores >=6): 2     Non face to face  following discharge, date last encounter closed (first attempt may have been earlier): 9/8/2020  3:15 PM 9/8/2020  3:15 PM    Call initiated 2 business days of discharge: Yes     Patient Active Problem List   Diagnosis    Breast density    Hyperlipidemia    Migraine without aura    Pernicious anemia    Raynaud's phenomenon    Cervical disc disease    Major depressive disorder in remission ([address]    Gastroesophageal reflux disease without esophagitis    Numbness in both hands    Myopia    Nuclear senile cataract    Pseudophakia    Tear film insufficiency    Essential hypertension    Pulmonary embolism ([address]    VTE (venous thromboembolism)    Bilateral pulmonary embolism ([address]    Pulmonary arterial hypertension ([address]    DVT (deep venous thrombosis) (HCC)    Dysfunction of right cardiac ventricle       Allergies   Allergen Reactions    Demerol  [Meperidine Hcl]      Other reaction(s): Unknown    Pcn [Penicillins] Rash       Medications listed as ordered at the time of discharge from hospital   Tawanda Lovett 9 Medication Instructions UBALDO:    Printed on:09/20/20 5533   Medication Information                      apixaban (ELIQUIS DVT/PE STARTER PACK) 5 MG TABS tablet  Take 2 tablets by mouth 2 times daily for 7 days, THEN 1 tablet 2 times daily for 23 days.              b complex vitamins capsule  Take 1 capsule by mouth daily             cyanocobalamin 1000 MCG/ML injection  Inject 1 mL into the muscle every 30 days             desvenlafaxine succinate (PRISTIQ) 25 MG TB24 extended release tablet  Take 1 tablet by mouth daily             lisinopril (PRINIVIL;ZESTRIL) 10 MG tablet  TAKE 1 TABLET BY MOUTH EVERY DAY             nortriptyline (PAMELOR) 10 MG capsule  TAKE 2 CAPSULES BY MOUTH EVERY DAY AT BEDTIME             rizatriptan (MAXALT) 10 MG tablet  Take 1 tablet by mouth once as needed for Migraine May repeat in 2 hours if needed             tiZANidine (ZANAFLEX) 4 MG tablet  TAKE 1 TABLET BY MOUTH 2 TIMES DAILY AS NEEDED FOR MUSCLE SPASM             verapamil (CALAN SR) 240 MG extended release tablet  TAKE 1 TABLET BY MOUTH EVERY DAY                   Medications marked \"taking\" at this time  No outpatient medications have been marked as taking for the 9/14/20 encounter (Office Visit) with Rl Grubbs MD.        Medications patient taking as of now reconciled against medications ordered at time of hospital discharge: yes    Chief Complaint   Patient presents with    Follow-Up from Hospital       HPI patient is here for high level TCM follow-up she had been having some intermittent shortness of breath with exertion walking her dog a lot and active we did a cardiac work-up and her symptoms were better when we saw her in follow-up but it sounds like they really were only briefly better and persisted worsen she called that she felt worse we got a CTA of the chest and radiology sent her right away to the ER she was admitted she underwent EKOS pulmonary artery tPA delivery catheter and thrombolysis. Patient had hospital stay from 9/2/2020 to 9/5/2020. She does feel better since going home shortness of breath is better she is worried anxious having evaluation to see if she has a hereditary hypercoagulable disorder or what the cause was. No fever no chills no cough no congestion. Inpatient course: Discharge summary reviewed- see chart. Interval history/Current status: Improving worried    Review of Systems   Constitutional: Negative for chills, fatigue and fever.    HENT: Negative for congestion and sinus pressure. Eyes: Negative for discharge and redness. Respiratory: Negative for cough and shortness of breath. Cardiovascular: Negative for chest pain, palpitations and leg swelling. Gastrointestinal: Negative for abdominal distention and abdominal pain. Genitourinary: Negative for dysuria, frequency and urgency. Musculoskeletal: Negative for arthralgias and back pain. Skin: Negative for rash and wound. Neurological: Positive for weakness. Negative for dizziness, light-headedness and headaches. Psychiatric/Behavioral: Positive for dysphoric mood. Negative for sleep disturbance. The patient is nervous/anxious. Vitals:    09/14/20 0751   BP: 100/60   Pulse: 80   SpO2: 95%   Weight: 150 lb (68 kg)     Body mass index is 23.49 kg/m². Wt Readings from Last 3 Encounters:   09/18/20 152 lb 6.4 oz (69.1 kg)   09/14/20 150 lb (68 kg)   09/05/20 156 lb 9.6 oz (71 kg)     BP Readings from Last 3 Encounters:   09/18/20 98/66   09/14/20 100/60   09/05/20 131/89       Physical Exam  Constitutional:       General: She is not in acute distress. Appearance: Normal appearance. She is well-developed. HENT:      Right Ear: External ear normal. Tympanic membrane is not injected. Left Ear: External ear normal. Tympanic membrane is not injected. Mouth/Throat:      Pharynx: No oropharyngeal exudate. Eyes:      General: No scleral icterus. Conjunctiva/sclera: Conjunctivae normal.   Neck:      Musculoskeletal: Neck supple. Thyroid: No thyroid mass or thyromegaly. Vascular: No carotid bruit. Cardiovascular:      Rate and Rhythm: Normal rate and regular rhythm. Heart sounds: S1 normal and S2 normal. No murmur. No S3 or S4 sounds. Pulmonary:      Effort: Pulmonary effort is normal. No respiratory distress. Breath sounds: Normal breath sounds. No wheezing or rales. Abdominal:      General: Bowel sounds are normal. There is no distension. Palpations: Abdomen is soft. There is no mass. Tenderness: There is no abdominal tenderness. Lymphadenopathy:      Cervical: No cervical adenopathy. Upper Body:      Right upper body: No supraclavicular adenopathy. Left upper body: No supraclavicular adenopathy. Skin:     Findings: No rash. Neurological:      Mental Status: She is alert and oriented to person, place, and time. Cranial Nerves: No cranial nerve deficit. Assessment/Plan:  1. Bilateral pulmonary embolism (HCC)  Pt s/p bilateral PE- thrombolysis---on lovenox--- w/u for hypercoaguable disorder in process  - CO DISCHARGE MEDS RECONCILED W/ CURRENT OUTPATIENT MED LIST    2. Essential hypertension  follow    3.  Major depressive disorder with single episode, in remission [facility])  follow        Medical Decision Making: high

## 2020-09-15 LAB
CHOLESTEROL, TOTAL: 209 MG/DL (ref 160–199)
GLUCOSE BLD-MCNC: 64 MG/DL (ref 74–109)
HDLC SERPL-MCNC: 53 MG/DL (ref 65–121)
LDL CHOLESTEROL CALCULATED: 142 MG/DL
TRIGL SERPL-MCNC: 68 MG/DL (ref 0–149)

## 2020-09-16 ENCOUNTER — TELEPHONE (OUTPATIENT)
Dept: VASCULAR SURGERY | Age: 60
End: 2020-09-16

## 2020-09-16 ENCOUNTER — TELEPHONE (OUTPATIENT)
Dept: INFUSION THERAPY | Age: 60
End: 2020-09-16

## 2020-09-16 NOTE — TELEPHONE ENCOUNTER
I sw the pt to let her know Narendra Hammer has sw Dr. Laura Daley office in regards to her anticoagulation therapy. Per Dr. Hardki Horner it is okay for the pt to be transitioned to Eliquis. I asked the pt if she had already taken her Lovenox injection this morning? Pt stated yes. I instructed the pt to take her evening dose of Lovenox as well. Starting tomorrow she will d/c Lovenox and start the Eliquis to take as instructed. I asked the pt how she was feeling after her EKOS procedure and if she was having any issues. Pt states she is \" doing okay\". I asked if it would be alright with her if we change her in office visit to a vv? I explained to the pt we are trying to limit in office pts to those who have sutures or staples only to help reduce the chance of exposure to covid-19 for the pt. The pt agreed to the appt change. Her appt will stay scheduled for the same day and time it will just be a video visit. Pt voiced understanding and is aware of the POC.

## 2020-09-17 ENCOUNTER — VIRTUAL VISIT (OUTPATIENT)
Dept: VASCULAR SURGERY | Age: 60
End: 2020-09-17
Payer: COMMERCIAL

## 2020-09-17 PROCEDURE — 99212 OFFICE O/P EST SF 10 MIN: CPT | Performed by: NURSE PRACTITIONER

## 2020-09-18 ENCOUNTER — OFFICE VISIT (OUTPATIENT)
Dept: HEMATOLOGY | Age: 60
End: 2020-09-18
Payer: COMMERCIAL

## 2020-09-18 ENCOUNTER — HOSPITAL ENCOUNTER (OUTPATIENT)
Dept: INFUSION THERAPY | Age: 60
Discharge: HOME | End: 2020-09-18
Payer: COMMERCIAL

## 2020-09-18 VITALS
DIASTOLIC BLOOD PRESSURE: 66 MMHG | HEIGHT: 67 IN | OXYGEN SATURATION: 99 % | HEART RATE: 93 BPM | WEIGHT: 152.4 LBS | BODY MASS INDEX: 23.92 KG/M2 | SYSTOLIC BLOOD PRESSURE: 98 MMHG | TEMPERATURE: 97.7 F

## 2020-09-18 DIAGNOSIS — D51.0 PERNICIOUS ANEMIA: Chronic | ICD-10-CM

## 2020-09-18 LAB
BASOPHILS ABSOLUTE: 0.05 K/UL (ref 0.01–0.08)
BASOPHILS RELATIVE PERCENT: 0.7 % (ref 0.1–1.2)
EOSINOPHILS ABSOLUTE: 1.02 K/UL (ref 0.04–0.54)
EOSINOPHILS RELATIVE PERCENT: 13.9 % (ref 0.7–7)
HCT VFR BLD CALC: 45.8 % (ref 34.1–44.9)
HEMOGLOBIN: 14.6 G/DL (ref 11.2–15.7)
LYMPHOCYTES ABSOLUTE: 1.86 K/UL (ref 1.18–3.74)
LYMPHOCYTES RELATIVE PERCENT: 25.3 % (ref 19.3–53.1)
MCH RBC QN AUTO: 27.2 PG (ref 25.6–32.2)
MCHC RBC AUTO-ENTMCNC: 31.9 G/DL (ref 32.3–35.5)
MCV RBC AUTO: 85.3 FL (ref 79.4–94.8)
MONOCYTES ABSOLUTE: 0.45 K/UL (ref 0.24–0.82)
MONOCYTES RELATIVE PERCENT: 6.1 % (ref 4.7–12.5)
NEUTROPHILS ABSOLUTE: 3.97 K/UL (ref 1.56–6.13)
NEUTROPHILS RELATIVE PERCENT: 54 % (ref 34–71.1)
PDW BLD-RTO: 14.9 % (ref 11.7–14.4)
PLATELET # BLD: 250 K/UL (ref 182–369)
PMV BLD AUTO: 10.9 FL (ref 7.4–10.4)
RBC # BLD: 5.37 M/UL (ref 3.93–5.22)
WBC # BLD: 7.35 K/UL (ref 3.98–10.04)

## 2020-09-18 PROCEDURE — 99211 OFF/OP EST MAY X REQ PHY/QHP: CPT

## 2020-09-18 PROCEDURE — 85025 COMPLETE CBC W/AUTO DIFF WBC: CPT

## 2020-09-18 PROCEDURE — 99214 OFFICE O/P EST MOD 30 MIN: CPT | Performed by: NURSE PRACTITIONER

## 2020-09-18 ASSESSMENT — ENCOUNTER SYMPTOMS
NAUSEA: 0
DIARRHEA: 0
VOMITING: 0
COUGH: 0
CONSTIPATION: 0
TROUBLE SWALLOWING: 0
SORE THROAT: 0
WHEEZING: 0
ABDOMINAL PAIN: 0
EYE ITCHING: 0
PHOTOPHOBIA: 0
SHORTNESS OF BREATH: 1
EYE DISCHARGE: 0
EYE REDNESS: 0

## 2020-09-18 NOTE — PROGRESS NOTES
Progress Note      Pt Name: Keren Foster: 1960  MRN: [id number]    Date of evaluation: 2020  History Obtained From:  Patient, EMR    Chief Complaint   Patient presents with    New Patient     [address] FU for PE       HISTORY OF PRESENT ILLNESS:    Sean Lemons is a 79-year-old  female who comes to the clinic in hospital follow-up, treated for unprovoked PE and subacute LLE DVT. She was released from [address] on Lovenox. She was changed to Eliquis by Dr. Harris Lopez, which she initiated this morning (2020). Symptomatically, exertional dyspnea is improved with minimal residual.  She denies lower extremity swelling or claudication. She further denies chest pain or palpitations. She is mildly hypotensive with a blood pressure 98/66. Adan Aguilera denies bleeding issues. She is concerned regarding possible interaction of Pristiq and Eliquis. Adan Aguilera has contacted her PCPs office for further recommendations on medication change. Diagnosis  · Unprovoked pulmonary embolism, Severe 2020  · subacute DVT in the LLE     Treatment summary  · 2020-catheter directed thrombolysis-FARHAD     Brina Lovett was seen in hematology consultation on 9/3/2020 as an inpatient consultation at [facility].    She presented to the hospital after outpatient imaging showed pulmonary embolus and right-sided heart strain. Alvarez Olmos had complaint of dyspnea since 2020. Adan Aguilera denies a personal history of VTE.    She stopped Estradiol over 1 year ago prior to this evaluation. She is a non-smoker (quit age 26). Adan Aguilera had also endured a 2-hour flight from [place] in .     Adan Lutzt reports her mother had a history of miscarriages. She was admitted to [facility] 2020. Duplex lower extremity showed subacute DVT in the LLE. 2D echo showed global systolic function severely reduced and severely dilated right ventricle.    · - RVP showed significant burden of pulmonary emboli in both lungs with RV strain. · 9/2/2020-BLE venous Doppler with evidence of subacute DVT in the LLE involving the peroneal veins  · 9/2/2020-2D echo showed severely reduced right ventricular global systolic function.  Severely dilated right ventricle.  Severe pulmonary hypertension.  Flattening of the septum along with paradoxical motion.  LVEF 75%. · 9/2/2020- catheter directed thrombolysis-EKOS  · 9/3/2020-Heparin drip x24 hours, followed by Lovenox 70 mg subcut q12 hours  · Eliquis 10 mg po BID x 7 days, followed by 5 mg BID thereafter, initiated 9/17/2020 (prescribed by Dr. Jeny Peng)    Labs 9/4/2020:  · Lupus Anticoagulant: not detected  · Beta-2 glycoprotein IGM & IgG: negative  · Cardiolipin antibodies IgM & IgG: negative    Labs to complete the prothrombotic work-up requested at her office visit 9/18/2020. Possible risk factors discussed. Continue DOAC. Past Medical History:   Diagnosis Date    Cervical disc disease 8/26/2017    DVT (deep venous thrombosis) (HCC)     Essential hypertension 12/1/2019    Gastroesophageal reflux disease without esophagitis 8/26/2017    Hyperlipidemia     Major depressive disorder in remission ([facility] [address] 8/26/2017    Migraine without aura 8/26/2017    Pernicious anemia 8/26/2017    Raynaud's phenomenon 8/26/2017     Past Surgical History:   Procedure Laterality Date    ARTERIOGRAM Bilateral 9/2/2020    ECOS-PULMONARY performed by Steffi Umanzor MD at [address] [place] [address] AND [facility]       Current Outpatient Medications   Medication Sig Dispense Refill    apixaban (ELIQUIS DVT/PE STARTER PACK) 5 MG TABS tablet Take 2 tablets by mouth 2 times daily for 7 days, THEN 1 tablet 2 times daily for 23 days.  74 tablet 0    tiZANidine (ZANAFLEX) 4 MG tablet TAKE 1 TABLET BY MOUTH 2 TIMES DAILY AS NEEDED FOR MUSCLE SPASM 180 tablet 1    cyanocobalamin 1000 MCG/ML injection Inject 1 mL into the muscle every 30 days 3 mL 2    lisinopril (PRINIVIL;ZESTRIL) 10 MG tablet TAKE 1 TABLET BY MOUTH EVERY DAY 90 tablet 3    nortriptyline (PAMELOR) 10 MG capsule TAKE 2 CAPSULES BY MOUTH EVERY DAY AT BEDTIME 180 capsule 3    verapamil (CALAN SR) 240 MG extended release tablet TAKE 1 TABLET BY MOUTH EVERY DAY 90 tablet 3    b complex vitamins capsule Take 1 capsule by mouth daily      desvenlafaxine succinate (PRISTIQ) 25 MG TB24 extended release tablet Take 1 tablet by mouth daily 30 tablet 5    rizatriptan (MAXALT) 10 MG tablet Take 1 tablet by mouth once as needed for Migraine May repeat in 2 hours if needed 9 tablet 5     No current facility-administered medications for this visit. Allergies   Allergen Reactions    Demerol  [Meperidine Hcl]      Other reaction(s): Unknown    Pcn [Penicillins] Rash     Social History     Tobacco Use    Smoking status: Never Smoker    Smokeless tobacco: Never Used   Substance Use Topics    Alcohol use: Yes    Drug use: No     Family History   Problem Relation Age of Onset    High Blood Pressure Mother     Coronary Art Dis Mother     Thyroid Disease Mother         Hypothyroidism    Breast Cancer Mother 80    Colon Polyps Father     Prostate Cancer Father     Anemia Father         Pernicious    Colon Polyps Sister     Anemia Brother         Pernicious    High Blood Pressure Paternal Grandmother        Review of Systems   Constitutional: Negative for fatigue and fever. No night sweats   HENT: Negative for dental problem, hearing loss, mouth sores, nosebleeds, sore throat and trouble swallowing. Eyes: Negative for photophobia, discharge, redness and itching. No blurring of vision, no double vision   Respiratory: Positive for shortness of breath (exertional, minimal). Negative for cough and wheezing. No hemoptysis   Cardiovascular: Negative for chest pain, palpitations and leg swelling. Gastrointestinal: Negative for abdominal pain, constipation, diarrhea, nausea and vomiting.    Endocrine: Negative for cold intolerance, heat intolerance, polydipsia and polyuria. Genitourinary: Negative for dysuria, frequency, hematuria and urgency. Musculoskeletal: Negative for arthralgias, joint swelling and myalgias. Skin: Negative for pallor and rash. Allergic/Immunologic: Negative for environmental allergies and immunocompromised state. Neurological: Negative for seizures, syncope and numbness. Hematological: Negative for adenopathy. Does not bruise/bleed easily. Psychiatric/Behavioral: Negative for agitation, behavioral problems, confusion and suicidal ideas. The patient is not nervous/anxious. Physical Exam  Vitals signs reviewed. Constitutional:       General: She is not in acute distress. Appearance: She is well-developed. She is not toxic-appearing or diaphoretic. HENT:      Head: Normocephalic and atraumatic. Right Ear: External ear normal.      Left Ear: External ear normal.      Nose: Nose normal.      Mouth/Throat:      Mouth: Mucous membranes are moist.   Eyes:      General: No scleral icterus. Right eye: No discharge. Left eye: No discharge. Conjunctiva/sclera: Conjunctivae normal.   Neck:      Musculoskeletal: Neck supple. Trachea: No tracheal deviation. Cardiovascular:      Rate and Rhythm: Normal rate and regular rhythm. Heart sounds: No murmur. Pulmonary:      Effort: Pulmonary effort is normal. No respiratory distress. Breath sounds: Normal breath sounds. No wheezing or rales. Chest:      Chest wall: No tenderness. Abdominal:      General: Bowel sounds are normal. There is no distension. Palpations: Abdomen is soft. There is no mass. Tenderness: There is no abdominal tenderness. There is no guarding. Genitourinary:     Comments: Exam deferred  Musculoskeletal:         General: No tenderness or deformity. Comments: Normal ROM all four extremities   Lymphadenopathy:      Cervical: No cervical adenopathy.       Right cervical: No superficial cervical adenopathy. Left cervical: No superficial cervical adenopathy. Upper Body:      Right upper body: No supraclavicular adenopathy. Left upper body: No supraclavicular adenopathy. Comments: No bulky palpable cervical, clavicular, axillary or inguinal adenopathies on the left or right. Skin:     General: Skin is warm and dry. Findings: No rash. Neurological:      Mental Status: She is alert and oriented to person, place, and time. Comments: follows commands, non-focal   Psychiatric:         Behavior: Behavior normal. Behavior is cooperative. Thought Content: Thought content normal.         Judgment: Judgment normal.      Comments: Alert and oriented to person, place and time. Vitals:    09/18/20 1129   BP: 98/66   Pulse: 93   Temp: 97.7 °F (36.5 °C)   TempSrc: Temporal   SpO2: 99%   Weight: 152 lb 6.4 oz (69.1 kg)   Height: 5' 7\" (1.702 m)      Wt Readings from Last 3 Encounters:   09/18/20 152 lb 6.4 oz (69.1 kg)   09/14/20 150 lb (68 kg)   09/05/20 156 lb 9.6 oz (71 kg)     LABS:  CBC 9/18/2020: WBC 7.35, Hgb 14.6, MCV 85.3 and platelet count 734,559    ASSESSMENT/PLAN:    1. Acute saddle pulmonary embolism with acute cor pulmonale (HCC)    2. Acute deep vein thrombosis (DVT) of left peroneal vein    3. Normocytic anemia    4. Essential hypertension    5. Pernicious anemia       #Pulmonary embolism, unprovoked event  CTA chest on 9/4/2020  · Bilateral pulmonary emboli with moderate decreased embolus burden since the previous study. · Improved RV/ LV ratio and right ventricle strain.   · Areas of pulmonary infarction and evolving infarction     High clot burden with pulmonary hypertension and RV dilation with high BNP.    Status post catheter directed thrombolysis  Heparin IV drip x24 hours completed 9/4/2020, changed to Lovenox 70mg every 12 hours, then Eliquis 10 mg po BID x7 days on 9/17/2020, followed by 5 mg po BID thereafter     Agree with Kathy Martinez is tolerating without bleeding issues. Continue DOAC, prescribed by Dr. Sharan Siddiqui.     Labs 9/4/2020:  · Lupus Anticoagulant: not detected  · Beta-2 glycoprotein IGM & IgG: negative  · Cardiolipin antibodies IgM & IgG: negative     Labs requested to complete the prothrombotic work-up. #Mild normocytic anemia  Hgb improved at 14.6 (was 10.9 on 9/4/2020)     #Hypertension  Stable, B/P 98/66.      #History of pernicious anemia  Brina self administers monthly B12 injections    PLAN:  Complete thrombophilia workup including: JAK2 with reflexes, FLOW for PNH   Recommend age-appropriate cancer screening  Discussed possible risk factors  Cherylene Alt has placed a call to and will follow up with her PCP regarding possible change of Pristiq due to interaction with Eliquis. Health maintenance:  Bilateral mammograms 8/21/2020 at [facility]: BI-RADS Category 2  Colonoscopy 11/2018  Ovarian screening: Cherylene Alt has a history of RIGOBERTO, BSO 12/15/2011 and no longer has GYN exams    Orders Placed This Encounter   Procedures    Antithrombin Antigen    Factor 5 Leiden    Miscellaneous Sendout 1 - JAK2 with reflexes - [facility]    Protein C Functional    Protein S Antigen, Free    Miscellaneous Sendout 1 - FLOW for PNH - [facility]    Miscellaneous Sendout 1 - MTHFR    Homocysteine, Plasma    Miscellaneous Sendout 1 - Factor II     Return in about 2 weeks (around 10/2/2020) for follow up with EMILIANO Wong - virtual visit to discuss lab results. 25 minute encounter with the patient, out of which more than 50% of the time was spent in counseling patient or family and coordination of care. Counseling included but was not limited to time spent reviewing labs, imaging studies/ treatment plan and answering questions. Dictated utilizing Dragon transcription software.          EMILIANO Cabrera  8:13 PM  9/18/2020

## 2020-09-18 NOTE — PROGRESS NOTES
2020    TELEHEALTH EVALUATION -- Audio/Visual (During [id number] public health emergency)    HPI:    Patient is located at home  Provider is located at [facility]   Also present during call is     Abad Viera (:  1960) has requested an audio/video evaluation for the following concern(s):    She presents for follow-up of known DVT and PE. She has no known history of DVT. Her current treatment includes lovenox and starts eliquis tomorrow. She does not have a family history of hypercoagulability. Risk factors for hypercoagulable state include: none known. She does not have an IVC filter. She has symptoms involving   left leg. Symptoms include pain, swelling Onset was abrupt 2 weeks ago. These symptoms have been unchanged. There has been 1 episode. .    Differential diagnosis includes but is not limited to CHF, thyroid disease, venous disease, DVT, SVT, peripheral vascular disease. Prior to Visit Medications    Medication Sig Taking? Authorizing Provider   apixaban (ELIQUIS DVT/PE STARTER PACK) 5 MG TABS tablet Take 2 tablets by mouth 2 times daily for 7 days, THEN 1 tablet 2 times daily for 23 days.   Merissa Santamaria MD   tiZANidine (ZANAFLEX) 4 MG tablet TAKE 1 TABLET BY MOUTH 2 TIMES DAILY AS NEEDED FOR MUSCLE SPASM  Monika Morel MD   cyanocobalamin 1000 MCG/ML injection Inject 1 mL into the muscle every 30 days  Monika Morel MD   rizatriptan (MAXALT) 10 MG tablet Take 1 tablet by mouth once as needed for Migraine May repeat in 2 hours if needed  Monika Morel MD   lisinopril (PRINIVIL;ZESTRIL) 10 MG tablet TAKE 1 TABLET BY MOUTH EVERY DAY  Monika Morel MD   desvenlafaxine succinate (PRISTIQ) 50 MG TB24 extended release tablet TAKE 1 TABLET BY MOUTH EVERY DAY  Monika Morel MD   nortriptyline (PAMELOR) 10 MG capsule TAKE 2 CAPSULES BY MOUTH EVERY DAY AT BEDTIME  Monika Morel MD   verapamil (CALAN SR) 240 MG extended release tablet TAKE 1 TABLET BY MOUTH EVERY DAY  Monika Morel MD b complex vitamins capsule Take 1 capsule by mouth daily  Historical Provider, MD       Social History     Tobacco Use    Smoking status: Never Smoker    Smokeless tobacco: Never Used   Substance Use Topics    Alcohol use: Yes    Drug use: No        Allergies   Allergen Reactions    Demerol  [Meperidine Hcl]      Other reaction(s): Unknown    Pcn [Penicillins] Rash   ,   Past Medical History:   Diagnosis Date    Cervical disc disease 8/26/2017    D[facility] thrombosis) [facility]     Essential hypertension 12/1/2019    Gastroesophageal reflux disease without esophagitis 8/26/2017    Hyperlipidemia     Major depressive disorder in remission ([facility] [address] 8/26/2017    Migraine without aura 8/26/2017    Pernicious anemia 8/26/2017    Raynaud's phenomenon 8/26/2017   ,   Past Surgical History:   Procedure Laterality Date    ARTERIOGRAM Bilateral 9/2/2020    ECOS-PULMONARY performed by Liz Camara MD at [address] [place] [address] AND [facility]     ,   Social History     Tobacco Use    Smoking status: Never Smoker    Smokeless tobacco: Never Used   Substance Use Topics    Alcohol use:  Yes    Drug use: No   ,   Family History   Problem Relation Age of Onset    High Blood Pressure Mother     Coronary Art Dis Mother     Thyroid Disease Mother         Hypothyroidism    Breast Cancer Mother     Colon Polyps Father     Prostate Cancer Father     Anemia Father         Pernicious    Colon Polyps Sister     Anemia Brother         Pernicious    High Blood Pressure Paternal Grandmother    ,   Health Maintenance   Topic Date Due    Hepatitis C screen  1960    Pneumococcal 0-64 years Vaccine (1 of 1 - PPSV23) 01/26/1965    HIV screen  01/26/1974    Flu vaccine (1) 09/01/2020    Breast cancer screen  08/21/2021    Potassium monitoring  09/04/2021    Creatinine monitoring  09/04/2021    Colon cancer screen colonoscopy  11/12/2023    Lipid screen  09/15/2025    DTaP/Tdap/Td vaccine (2 - Td) 08/10/2028   

## 2020-09-19 NOTE — PROGRESS NOTES
Progress Note      Pt Name: Taniya Bermudez: 1960  MRN: [id number]    Date of evaluation: 9/18/2020  History Obtained From:  Patient, EMR    Chief Complaint   Patient presents with    New Patient     [address] FU for PE     HISTORY OF PRESENT ILLNESS:    Kym Marshall is a 26-year-old  female who comes to the clinic in hospital follow-up, diagnosed with unprovoked PE and subacute LLE DVT 9/2/2020. She received catheter directed thrombolysis 9/2/2020. Initially, Rica Valdes was treated with heparin during hospitalization, discharged on Lovenox which was completed for approximately 2 weeks. She was negative for antiphospholipid syndrome. Eliquis 10 mg po BID x7 days, followed by 10 mg po BID thereafter was initiated today, 9/18/2020. Prescribed by Dr. Tess Portillo. Symptomatically, Rica Valdes is feeling well. She has minimal exertional dyspnea, significantly improved. She denies chest pain or palpitations. She has no bleeding issues. HEMATOLOGY HISTORY: Unprovoked PE, subacute LLE DVT, 9/2/2020    Diagnosis  · Unprovoked pulmonary embolism, severe 9/2/2020  · Subacute LLE DVT     Treatment summary  · 9/2/2020-catheter directed thrombolysis-FARHAD     Brina Lovett was seen in hematology consultation on 9/3/2020 as an inpatient consultation at [facility].  She presented to the hospital after outpatient imaging showed pulmonary embolus and right-sided heart strain.    She had complaint of dyspnea since April 2020. Rica Valdes denies a personal history of VTE.    She is a former smoker, having quit at the age of 26. Previously on estradiol, which was completed at least 1 year prior to PE in September, 2020. Rica Valdes retired in July, 2020. She admits to being rather sedentary, working at a desk. She is more active since her residential. Rica Valdes had also endured a 2-hour flight from [place] in July, 2020. She reports her mother had a history of miscarriages.    She was admitted to [address] [facility] ICU 9/2/2020. Duplex lower extremity showed subacute DVT in the LLE. 2D echo showed global systolic function severely reduced and severely dilated right ventricle. · 7/0/8837- OCA showed significant burden of pulmonary emboli in both lungs with RV strain. · 9/2/2020-BLE venous Doppler with evidence of subacute DVT in the LLE involving the peroneal veins  · 9/2/2020-2D echo showed severely reduced right ventricular global systolic function.  Severely dilated right ventricle.  Severe pulmonary hypertension.  Flattening of the septum along with paradoxical motion.  LVEF 75%. · 9/2/2020- catheter directed thrombolysis-EKOS  · 9/3/2020-Heparin drip x24 hours, followed by Lovenox 70 mg subcut q12 hours  · Eliquis 10 mg po BID x 7 days, followed by 5 mg BID thereafter, initiated 9/17/2020 (prescribed by Dr. Rosa Maria Keene)    Labs 9/4/2020:  Lupus Anticoagulant: not detected  Beta-2 glycoprotein IGM & IgG: negative  Cardiolipin antibodies IgM & IgG: negative     Possible risk factors discussed. Completion of prothrombotic work-up was requested at her follow-up on 9/18/2020. Recommend age-appropriate health maintenance screenings. Past Medical History:   Diagnosis Date    Cervical disc disease 8/26/2017    DVT (deep venous thrombosis) (HCC)     Essential hypertension 12/1/2019    Gastroesophageal reflux disease without esophagitis 8/26/2017    Hyperlipidemia     Major depressive disorder in remission ([facility] [address] 8/26/2017    Migraine without aura 8/26/2017    Pernicious anemia 8/26/2017    Raynaud's phenomenon 8/26/2017     Past Surgical History:   Procedure Laterality Date    ARTERIOGRAM Bilateral 9/2/2020    ECOS-PULMONARY performed by Kelby Bo MD at [address] [facility] AND [facility]       Current Outpatient Medications   Medication Sig Dispense Refill    apixaban (ELIQUIS DVT/PE STARTER PACK) 5 MG TABS tablet Take 2 tablets by mouth 2 times daily for 7 days, THEN 1 tablet 2 times daily for 23 days.  74 tablet 0    tiZANidine (ZANAFLEX) 4 MG tablet TAKE 1 TABLET BY MOUTH 2 TIMES DAILY AS NEEDED FOR MUSCLE SPASM 180 tablet 1    cyanocobalamin 1000 MCG/ML injection Inject 1 mL into the muscle every 30 days 3 mL 2    lisinopril (PRINIVIL;ZESTRIL) 10 MG tablet TAKE 1 TABLET BY MOUTH EVERY DAY 90 tablet 3    nortriptyline (PAMELOR) 10 MG capsule TAKE 2 CAPSULES BY MOUTH EVERY DAY AT BEDTIME 180 capsule 3    verapamil (CALAN SR) 240 MG extended release tablet TAKE 1 TABLET BY MOUTH EVERY DAY 90 tablet 3    b complex vitamins capsule Take 1 capsule by mouth daily      desvenlafaxine succinate (PRISTIQ) 25 MG TB24 extended release tablet Take 1 tablet by mouth daily 30 tablet 5    rizatriptan (MAXALT) 10 MG tablet Take 1 tablet by mouth once as needed for Migraine May repeat in 2 hours if needed 9 tablet 5     No current facility-administered medications for this visit. Allergies   Allergen Reactions    Demerol  [Meperidine Hcl]      Other reaction(s): Unknown    Pcn [Penicillins] Rash     Social History     Tobacco Use    Smoking status: Never Smoker    Smokeless tobacco: Never Used   Substance Use Topics    Alcohol use: Yes    Drug use: No     Family History   Problem Relation Age of Onset    High Blood Pressure Mother     Coronary Art Dis Mother     Thyroid Disease Mother         Hypothyroidism    Breast Cancer Mother 80    Colon Polyps Father     Prostate Cancer Father     Anemia Father         Pernicious    Colon Polyps Sister     Anemia Brother         Pernicious    High Blood Pressure Paternal Grandmother        Review of Systems   Constitutional: Negative for fatigue and fever. No night sweats   HENT: Negative for dental problem, hearing loss, mouth sores, nosebleeds, sore throat and trouble swallowing. Eyes: Negative for photophobia, discharge, redness and itching. No blurring of vision, no double vision   Respiratory: Positive for shortness of breath (exertional, minimal). Negative for cough and wheezing. No hemoptysis   Cardiovascular: Negative for chest pain, palpitations and leg swelling. Gastrointestinal: Negative for abdominal pain, constipation, diarrhea, nausea and vomiting. Endocrine: Negative for cold intolerance, heat intolerance, polydipsia and polyuria. Genitourinary: Negative for dysuria, frequency, hematuria and urgency. Musculoskeletal: Negative for arthralgias, joint swelling and myalgias. Skin: Negative for pallor and rash. Allergic/Immunologic: Negative for environmental allergies and immunocompromised state. Neurological: Negative for seizures, syncope and numbness. Hematological: Negative for adenopathy. Does not bruise/bleed easily. Psychiatric/Behavioral: Negative for agitation, behavioral problems, confusion and suicidal ideas. The patient is not nervous/anxious. Physical Exam  Vitals signs reviewed. Constitutional:       General: She is not in acute distress. Appearance: She is well-developed. She is not toxic-appearing or diaphoretic. HENT:      Head: Normocephalic and atraumatic. Right Ear: External ear normal.      Left Ear: External ear normal.      Nose: Nose normal.      Mouth/Throat:      Mouth: Mucous membranes are moist.   Eyes:      General: No scleral icterus. Right eye: No discharge. Left eye: No discharge. Conjunctiva/sclera: Conjunctivae normal.   Neck:      Musculoskeletal: Neck supple. Trachea: No tracheal deviation. Cardiovascular:      Rate and Rhythm: Normal rate and regular rhythm. Heart sounds: No murmur. Pulmonary:      Effort: Pulmonary effort is normal. No respiratory distress. Breath sounds: Normal breath sounds. No wheezing or rales. Chest:      Chest wall: No tenderness. Abdominal:      General: Bowel sounds are normal. There is no distension. Palpations: Abdomen is soft. There is no mass. Tenderness: There is no abdominal tenderness. There is no guarding. Genitourinary:     Comments: Exam deferred  Musculoskeletal:         General: No tenderness or deformity. Comments: Normal ROM all four extremities   Lymphadenopathy:      Cervical: No cervical adenopathy. Right cervical: No superficial cervical adenopathy. Left cervical: No superficial cervical adenopathy. Upper Body:      Right upper body: No supraclavicular adenopathy. Left upper body: No supraclavicular adenopathy. Comments: No bulky palpable cervical, clavicular, axillary or inguinal adenopathies on the left or right. Skin:     General: Skin is warm and dry. Findings: No rash. Neurological:      Mental Status: She is alert and oriented to person, place, and time. Comments: follows commands, non-focal   Psychiatric:         Behavior: Behavior normal. Behavior is cooperative. Thought Content: Thought content normal.         Judgment: Judgment normal.      Comments: Alert and oriented to person, place and time. Vitals:    09/18/20 1129   BP: 98/66   Pulse: 93   Temp: 97.7 °F (36.5 °C)   TempSrc: Temporal   SpO2: 99%   Weight: 152 lb 6.4 oz (69.1 kg)   Height: 5' 7\" (1.702 m)      Wt Readings from Last 3 Encounters:   09/18/20 152 lb 6.4 oz (69.1 kg)   09/14/20 150 lb (68 kg)   09/05/20 156 lb 9.6 oz (71 kg)       LABS:  CBC 9/18/2020 includes a WBC of       ASSESSMENT/PLAN:    1. Acute saddle pulmonary embolism with acute cor pulmonale (HCC)    2. Acute deep vein thrombosis (DVT) of left peroneal vein    3. Normocytic anemia    4. Essential hypertension    5. Pernicious anemia          #Pulmonary embolism, unprovoked event  CTA chest on 9/4/2020  · Bilateral pulmonary emboli with moderate decreased embolus burden since the previous study. · Improved RV/ LV ratio and right ventricle strain. · Areas of pulmonary infarction and evolving infarction     High clot burden with pulmonary hypertension and RV dilation with high BNP.    Status post catheter directed thrombolysis  Heparin IV drip x24 hours completed 9/4/2020, changed to Lovenox 70mg every 12 hours, then Eliquis 10 mg po BID x7 days on 9/17/2020, followed by 5 mg po BID thereafter (no findings to suggest APS)     Continue DOAC, prescribed by Dr. Tennille Zaman 9/4/2020:  · Lupus Anticoagulant: not detected  · Beta-2 glycoprotein IGM & IgG: negative  · Cardiolipin antibodies IgM & IgG: negative      #Mild normocytic anemia  Hgb improved, 14.6/MCV 85.3 (Hgb was 10.9 on 9/4/2020)     #Hypertension  Stable, B/P 98/66    #Pernicious anemia  Continues monthly self injected B12      PLAN:  Complete thrombophilia workup including JAK2 with reflexes and FLOW for PNH   age-appropriate cancer screening  Continue monthly B12 injections, self-administered     Health maintenance:  Bilateral mammograms 8/21/2020 at [address]: BI-RADS Category 2  Colonoscopy 11/2018, per patient  Ovarian screening: Hanane Ramsey has a history of RIGOBERTO, BSO 12/15/2011 and no longer has GYN exams      Orders Placed This Encounter   Procedures    Antithrombin Antigen    Factor 5 Leiden    Miscellaneous Sendout 1 - JAK2 with reflexes    Protein C Functional    Protein S Antigen, Free    Miscellaneous Sendout 1 - FLOW for PNH    Miscellaneous Sendout 1 - Factor II    Homocysteine, Plasma    Miscellaneous Sendout 1 - Factor II       Return in about 2 weeks (around 10/2/2020) for follow up with EMILIANO Lange - virtual visit to discuss lab results. 25 minute encounter with the patient, out of which more than 50% of the time was spent in counseling patient or family and coordination of care. Counseling included but was not limited to time spent reviewing labs, imaging studies/ treatment plan and answering questions. I have seen, examined and reviewed patient medication list, appropriate labs and imaging studies. Relevant medical records and other physician notes have been reviewed.     I answered all questions to the best of my knowledge and to the patient's satisfaction. Dictated utilizing Dragon transcription software.          EMILIANO Moura  9:01 PM  9/18/2020

## 2020-09-20 ASSESSMENT — ENCOUNTER SYMPTOMS
BACK PAIN: 0
SHORTNESS OF BREATH: 0
COUGH: 0
EYE REDNESS: 0
ABDOMINAL DISTENTION: 0
SINUS PRESSURE: 0
EYE DISCHARGE: 0
ABDOMINAL PAIN: 0

## 2020-10-01 ENCOUNTER — OFFICE VISIT (OUTPATIENT)
Dept: HEMATOLOGY | Age: 60
End: 2020-10-01
Payer: COMMERCIAL

## 2020-10-01 ENCOUNTER — HOSPITAL ENCOUNTER (OUTPATIENT)
Dept: INFUSION THERAPY | Age: 60
Discharge: HOME | End: 2020-10-01
Payer: COMMERCIAL

## 2020-10-01 VITALS
HEIGHT: 67 IN | WEIGHT: 154.3 LBS | TEMPERATURE: 97.1 F | SYSTOLIC BLOOD PRESSURE: 120 MMHG | HEART RATE: 92 BPM | DIASTOLIC BLOOD PRESSURE: 70 MMHG | OXYGEN SATURATION: 97 % | BODY MASS INDEX: 24.22 KG/M2

## 2020-10-01 DIAGNOSIS — D51.0 PERNICIOUS ANEMIA: Chronic | ICD-10-CM

## 2020-10-01 LAB
BASOPHILS ABSOLUTE: 0.05 K/UL (ref 0.01–0.08)
BASOPHILS RELATIVE PERCENT: 0.6 % (ref 0.1–1.2)
EOSINOPHILS ABSOLUTE: 0.46 K/UL (ref 0.04–0.54)
EOSINOPHILS RELATIVE PERCENT: 5.9 % (ref 0.7–7)
HCT VFR BLD CALC: 44.3 % (ref 34.1–44.9)
HEMOGLOBIN: 14.2 G/DL (ref 11.2–15.7)
LYMPHOCYTES ABSOLUTE: 1.91 K/UL (ref 1.18–3.74)
LYMPHOCYTES RELATIVE PERCENT: 24.6 % (ref 19.3–53.1)
MCH RBC QN AUTO: 27.3 PG (ref 25.6–32.2)
MCHC RBC AUTO-ENTMCNC: 32.1 G/DL (ref 32.3–35.5)
MCV RBC AUTO: 85 FL (ref 79.4–94.8)
MONOCYTES ABSOLUTE: 0.45 K/UL (ref 0.24–0.82)
MONOCYTES RELATIVE PERCENT: 5.8 % (ref 4.7–12.5)
NEUTROPHILS ABSOLUTE: 4.91 K/UL (ref 1.56–6.13)
NEUTROPHILS RELATIVE PERCENT: 63.1 % (ref 34–71.1)
PDW BLD-RTO: 14.7 % (ref 11.7–14.4)
PLATELET # BLD: 210 K/UL (ref 182–369)
PMV BLD AUTO: 11.7 FL (ref 7.4–10.4)
RBC # BLD: 5.21 M/UL (ref 3.93–5.22)
WBC # BLD: 7.78 K/UL (ref 3.98–10.04)

## 2020-10-01 PROCEDURE — 85025 COMPLETE CBC W/AUTO DIFF WBC: CPT

## 2020-10-01 PROCEDURE — 99214 OFFICE O/P EST MOD 30 MIN: CPT | Performed by: NURSE PRACTITIONER

## 2020-10-01 PROCEDURE — 99211 OFF/OP EST MAY X REQ PHY/QHP: CPT

## 2020-10-01 NOTE — PROGRESS NOTES
Progress Note      Pt Name: Lacho Coronado: 1960  MRN: [id number]    Date of evaluation: 10/1/2020  History Obtained From:  Patient, EMR    Chief Complaint   Patient presents with    Follow-up     Acute saddle pulmonary embolism with acute cor pulmonale     HISTORY OF PRESENT ILLNESS:    Dana Grandchild is a 78-year-old  female diagnosed with unprovoked PE with right heart strain and subacute LLE DVT 9/2/2020. She received catheter directed thrombolysis 9/2/2020. She was negative for antiphospholipid syndrome. Lenard Cortes is currently taking Eliquis 5 mg po BID, prescribed by Dr. Jean-Pierre Del Rosario. She denies missed doses and denies bleeding issues. Symptomatically, Lenard Cortes reports mild chest tightness that began yesterday, 9/30/2020 while walking. Associated symptoms included mild shortness of breath. She has had mild chest tightness again today, lasting approximately 30 minutes. She has no acute distress. Vital signs are stable including normal heart rate and O2 saturation of 97% on room air. Lenard Cortes returns to the clinic today to review prothrombotic work-up completed in lieu of her unprovoked PE/DVT. HEMATOLOGY HISTORY: Unprovoked PE, subacute LLE DVT, 9/2/2020    Diagnosis  · Unprovoked pulmonary embolism, severe 9/2/2020  · Subacute LLE DVT     Treatment summary  · 9/2/2020-catheter directed thrombolysis-FARHAD     Brina Lovett was seen in hematology consultation on 9/3/2020 as an inpatient consultation at [facility].  She presented to the hospital after outpatient imaging showed pulmonary embolus and right-sided heart strain.    She had complaint of dyspnea since April 2020. Lenard Cortes denies a personal history of VTE.    She is a former smoker, having quit at the age of 26. Previously on estradiol, which was completed at least 1 year prior to PE in September, 2020. Lenard Cortes retired in July, 2020. She admits to being rather sedentary, working at a desk.   She is more active since her prison. Radha Harper had also endured a 2-hour flight from [place] in July, 2020. She reports her mother had a history of miscarriages. She was admitted to [address] ICU 9/2/2020. Duplex lower extremity showed subacute DVT in the LLE. 2D echo showed global systolic function severely reduced and severely dilated right ventricle. · 3/4/7388- ITM showed significant burden of pulmonary emboli in both lungs with RV strain. · 9/2/2020-BLE venous Doppler with evidence of subacute DVT in the LLE involving the peroneal veins  · 9/2/2020-2D echo showed severely reduced right ventricular global systolic function.  Severely dilated right ventricle.  Severe pulmonary hypertension.  Flattening of the septum along with paradoxical motion.  LVEF 75%. · 9/2/2020- catheter directed thrombolysis-EKOS  · 9/3/2020-Heparin drip x24 hours, followed by Lovenox 70 mg subcut q12 hours  · Eliquis 10 mg po BID x 7 days, followed by 5 mg BID thereafter, initiated 9/17/2020 (prescribed by Dr. Justin Reyes)    Labs 9/4/2020:  Lupus Anticoagulant: not detected  Beta-2 glycoprotein IGM & IgG: negative  Cardiolipin antibodies IgM & IgG: negative     Labs 9/18/2020:  · JAK2: Negative for V617F, exon 12, MPL, CALR and KIT mutations  · FLOW cytometry: Negative for evidence of PNH population  · Factor V Leiden: Heterozygous for single or 506Q mutation  · Factor II: Heterozygous [id number] mutation  · MTHFR: Heterozygous [id number] variant (typically not associated with increased risk of thrombosis)  · Homocysteine: 13.6, normal  · Antithrombin activity: Elevated at 167% (no known clinical significance)  · Functional protein C: 128%  · Free protein S: 123%    Recommend long-term anticoagulation  Recommend discussing genetic variants with family, which was discussed    Recommend age-appropriate health maintenance screenings.   · Bilateral mammograms 8/21/2020 at [address]: BI-RADS Category 2  · Colonoscopy 11/2018, per patient  · Ovarian screening: Radha Harper has a history of RIGOBERTO, BSO 12/15/2011 and no longer has GYN exams    Past Medical History:   Diagnosis Date    Cervical disc disease 8/26/2017    D[facility]deep [facility] thrombosis) ([facility])     Essential hypertension 12/1/2019    Gastroesophageal reflux disease without esophagitis 8/26/2017    Hyperlipidemia     Major depressive disorder in remission ([facility] [address] 8/26/2017    Migraine without aura 8/26/2017    Pernicious anemia 8/26/2017    Raynaud's phenomenon 8/26/2017     Past Surgical History:   Procedure Laterality Date    ARTERIOGRAM Bilateral 9/2/2020    [facility]UL[facility]    [facility] AND BSO       Current Outpatient Medications   Medication Sig Dispense Refill    desvenlafaxine succinate (PRISTIQ) 25 MG TB24 extended release tablet Take 1 tablet by mouth daily 30 tablet 5    apixaban (ELIQUIS DVT/PE STARTER PACK) 5 MG TABS tablet Take 2 tablets by mouth 2 times daily for 7 days, THEN 1 tablet 2 times daily for 23 days. 74 tablet 0    tiZANidine (ZANAFLEX) 4 MG tablet TAKE 1 TABLET BY MOUTH 2 TIMES DAILY AS NEEDED FOR MUSCLE SPASM 180 tablet 1    cyanocobalamin 1000 MCG/ML injection Inject 1 mL into the muscle every 30 days 3 mL 2    rizatriptan (MAXALT) 10 MG tablet Take 1 tablet by mouth once as needed for Migraine May repeat in 2 hours if needed 9 tablet 5    lisinopril (PRINIVIL;ZESTRIL) 10 MG tablet TAKE 1 TABLET BY MOUTH EVERY DAY 90 tablet 3    nortriptyline (PAMELOR) 10 MG capsule TAKE 2 CAPSULES BY MOUTH EVERY DAY AT BEDTIME 180 capsule 3    verapamil (CALAN SR) 240 MG extended release tablet TAKE 1 TABLET BY MOUTH EVERY DAY 90 tablet 3    b complex vitamins capsule Take 1 capsule by mouth daily       No current facility-administered medications for this visit.        Allergies   Allergen Reactions    Demerol  [Meperidine Hcl]      Other reaction(s): Unknown    Pcn [Penicillins] Rash     Social History     Tobacco Use    Smoking status: Never Smoker    Smokeless tobacco: Never Used   Substance Use Topics    Alcohol use: Yes    Drug use: No     Family History   Problem Relation Age of Onset    High Blood Pressure Mother     Coronary Art Dis Mother     Thyroid Disease Mother         Hypothyroidism    Breast Cancer Mother 80    Colon Polyps Father     Prostate Cancer Father     Anemia Father         Pernicious    Colon Polyps Sister     Anemia Brother         Pernicious    High Blood Pressure Paternal Grandmother        Review of Systems   Constitutional: Negative for fatigue and fever. No night sweats   HENT: Negative for dental problem, hearing loss, mouth sores, nosebleeds, sore throat and trouble swallowing. Eyes: Negative for photophobia, discharge, redness and itching. No blurring of vision, no double vision   Respiratory: Positive for shortness of breath (exertional, minimal). Negative for cough and wheezing. No hemoptysis   Cardiovascular: Negative for chest pain, palpitations and leg swelling. Gastrointestinal: Negative for abdominal pain, constipation, diarrhea, nausea and vomiting. Endocrine: Negative for cold intolerance, heat intolerance, polydipsia and polyuria. Genitourinary: Negative for dysuria, frequency, hematuria and urgency. Musculoskeletal: Negative for arthralgias, joint swelling and myalgias. Skin: Negative for pallor and rash. Allergic/Immunologic: Negative for environmental allergies and immunocompromised state. Neurological: Negative for seizures, syncope and numbness. Hematological: Negative for adenopathy. Does not bruise/bleed easily. Psychiatric/Behavioral: Negative for agitation, behavioral problems, confusion and suicidal ideas. The patient is not nervous/anxious. Physical Exam  Vitals signs reviewed. Constitutional:       General: She is not in acute distress. Appearance: She is well-developed. She is not toxic-appearing or diaphoretic.    HENT:      Head: Normocephalic and atraumatic. Right Ear: External ear normal.      Left Ear: External ear normal.      Nose: Nose normal.      Mouth/Throat:      Mouth: Mucous membranes are moist.   Eyes:      General: No scleral icterus. Right eye: No discharge. Left eye: No discharge. Conjunctiva/sclera: Conjunctivae normal.   Neck:      Musculoskeletal: Neck supple. Trachea: No tracheal deviation. Cardiovascular:      Rate and Rhythm: Normal rate and regular rhythm. Heart sounds: No murmur. Pulmonary:      Effort: Pulmonary effort is normal. No respiratory distress. Breath sounds: Normal breath sounds. No wheezing or rales. Chest:      Chest wall: No tenderness. Abdominal:      General: Bowel sounds are normal. There is no distension. Palpations: Abdomen is soft. There is no mass. Tenderness: There is no abdominal tenderness. There is no guarding. Genitourinary:     Comments: Exam deferred  Musculoskeletal:         General: No tenderness or deformity. Comments: Normal ROM all four extremities   Lymphadenopathy:      Cervical: No cervical adenopathy. Right cervical: No superficial cervical adenopathy. Left cervical: No superficial cervical adenopathy. Upper Body:      Right upper body: No supraclavicular adenopathy. Left upper body: No supraclavicular adenopathy. Comments: No bulky palpable cervical, clavicular, axillary or inguinal adenopathies on the left or right. Skin:     General: Skin is warm and dry. Findings: No rash. Neurological:      Mental Status: She is alert and oriented to person, place, and time. Comments: follows commands, non-focal   Psychiatric:         Behavior: Behavior normal. Behavior is cooperative. Thought Content: Thought content normal.         Judgment: Judgment normal.      Comments: Alert and oriented to person, place and time.      Vitals:    10/01/20 1352   BP: 120/70   Pulse: 92   Temp: 97.1 °F (36.2 °C) SpO2: 97%   Weight: 154 lb 4.8 oz (70 kg)   Height: 5' 7\" (1.702 m)      Wt Readings from Last 3 Encounters:   10/01/20 154 lb 4.8 oz (70 kg)   09/18/20 152 lb 6.4 oz (69.1 kg)   09/14/20 150 lb (68 kg)       LABS:  CBC 10/1/2020 includes a WBC of 7.78, Hgb 14.2/MCV 85.0, platelets 035,409      ASSESSMENT/PLAN:    1. Acute saddle pulmonary embolism with acute cor pulmonale (HCC)    2. Acute deep vein thrombosis (DVT) of left peroneal vein (HCC)    3. Normocytic anemia    4. Pernicious anemia         #Pulmonary embolism, unprovoked event  CTA chest on 9/4/2020  · Bilateral pulmonary emboli with moderate decreased embolus burden since the previous study. · Improved RV/ LV ratio and right ventricle strain.   · Areas of pulmonary infarction and evolving infarction     High clot burden with pulmonary hypertension and RV dilation with high BNP.    Status post catheter directed thrombolysis  Heparin IV drip x24 hours completed 9/4/2020, changed to Lovenox 70mg every 12 hours, then Eliquis 10 mg po BID x7 days on 9/17/2020, followed by 5 mg po BID thereafter (no findings to suggest APS)     Continue DOAC, prescribed by Dr. Weaver December 9/4/2020:  · Lupus Anticoagulant: not detected  · Beta-2 glycoprotein IGM & IgG: negative  · Cardiolipin antibodies IgM & IgG: negative      Labs 9/18/2020:  · JAK2: Negative for V617F, exon 12, MPL, CALR and KIT mutations  · FLOW cytometry: Negative for evidence of PNH population  · Factor V Leiden: Heterozygous for single or 506Q mutation  · Factor II: Heterozygous [id number]-B mutation  · MTHFR: Heterozygous S1058V variant (typically not associated with increased risk of thrombosis)  · Homocysteine: 13.6, normal  · Antithrombin activity: Elevated at 167% (no known clinical significance)  · Functional protein C: 128%  · Free protein S: 123%    Recommend long-term anticoagulation  Recommend discussing genetic variants with family, which was discussed today  Monitor chest tightness, report

## 2020-10-02 ENCOUNTER — HOSPITAL ENCOUNTER (OUTPATIENT)
Dept: VASCULAR LAB | Age: 60
Discharge: HOME | End: 2020-10-02
Payer: COMMERCIAL

## 2020-10-02 ENCOUNTER — HOSPITAL ENCOUNTER (OUTPATIENT)
Dept: CT IMAGING | Age: 60
Discharge: HOME | End: 2020-10-02
Payer: COMMERCIAL

## 2020-10-02 LAB
GFR AFRICAN AMERICAN: >60
GFR NON-AFRICAN AMERICAN: 56
PERFORMED ON: ABNORMAL
POC CREATININE: 1 MG/DL (ref 0.3–1.3)
POC SAMPLE TYPE: ABNORMAL

## 2020-10-02 PROCEDURE — 93970 EXTREMITY STUDY: CPT

## 2020-10-02 PROCEDURE — 71275 CT ANGIOGRAPHY CHEST: CPT

## 2020-10-02 PROCEDURE — 6360000004 HC RX CONTRAST MEDICATION: Performed by: NURSE PRACTITIONER

## 2020-10-02 PROCEDURE — 82565 ASSAY OF CREATININE: CPT

## 2020-10-02 RX ADMIN — IOPAMIDOL 90 ML: 755 INJECTION, SOLUTION INTRAVENOUS at 10:29

## 2020-10-05 ENCOUNTER — TELEPHONE (OUTPATIENT)
Dept: VASCULAR SURGERY | Age: 60
End: 2020-10-05

## 2020-10-05 NOTE — TELEPHONE ENCOUNTER
10/2Patient called. Having increased episodes of SOB and some chest pain with exercise. Ordered repeat ct and venous scan. No new PE was identified. We discussed with Dr. Harman Douglas. We will continue DOAC.

## 2020-10-12 ENCOUNTER — TELEPHONE (OUTPATIENT)
Dept: VASCULAR SURGERY | Age: 60
End: 2020-10-12

## 2020-10-12 NOTE — TELEPHONE ENCOUNTER
----- Message from EMILIANO Burns sent at 10/5/2020 12:54 PM CDT -----  Please call patient. Let her know that we have decided based on test results and consultation with Dr. Silverio Cruz she can stay on the current blood thinner  ----- Message -----  From: EMILIANO Wilcox  Sent: 10/3/2020  10:10 AM CDT  To: EMILIANO Burns    I talked with Dr. Silverio Cruz, he agreed she is a good candidate for DOAC. No sign of APS  BMI < 40  Renal function okay. Just to clarify, what reason was Dr. Lyly Saldivar thinking warfarin? Thanks,  KD  ----- Message -----  From: EMILIANO Burns  Sent: 10/2/2020  11:02 AM CDT  To: EMILIANO Wilcox    I talked to her this morning. I also discussed with Dr. Lyly Saldivar.  He had me rescan her to be safe. He leans toward thinking we should probably switch her to coumadin. You guys are the experts. What do you think. Everyone we have with factor II is on coumadin. Her pain is likely from the pulmonary infarct but cold have been a subtle small pe although unlikely. What are your thoughts  ----- Message -----  From: EMILIANO Wilcox  Sent: 10/1/2020   4:23 PM CDT  To: EMILIANO Burns Gravely,   I saw Ms. Luca Medina in clinic today. We have seen her recently on 9/18/2020. She was complaining of chest tightness both yesterday and today associated with mild shortness of breath. She just had imaging that showed improvement. I told her I would discuss with you as to whether she needs more imaging or to monitor. She had heart strain, so I wanted to be sure to check with you. She was in no distress, O2 saturation 97% on room air and normal heart rate. She did have a single copy of factor V Leiden and factor II.     Thanks,  Marciano Arndt

## 2020-10-15 ENCOUNTER — PATIENT MESSAGE (OUTPATIENT)
Dept: INTERNAL MEDICINE | Age: 60
End: 2020-10-15

## 2020-10-15 RX ORDER — DESVENLAFAXINE 25 MG/1
25 TABLET, EXTENDED RELEASE ORAL DAILY
Qty: 90 TABLET | Refills: 1 | Status: SHIPPED | OUTPATIENT
Start: 2020-10-15 | End: 2020-11-05

## 2020-11-02 RX ORDER — VERAPAMIL HYDROCHLORIDE 240 MG/1
TABLET, FILM COATED, EXTENDED RELEASE ORAL
Qty: 90 TABLET | Refills: 0 | Status: SHIPPED | OUTPATIENT
Start: 2020-11-02 | End: 2020-12-27 | Stop reason: SDUPTHER

## 2020-11-05 ENCOUNTER — TELEPHONE (OUTPATIENT)
Dept: VASCULAR SURGERY | Age: 60
End: 2020-11-05

## 2020-11-05 ENCOUNTER — OFFICE VISIT (OUTPATIENT)
Dept: INTERNAL MEDICINE | Age: 60
End: 2020-11-05
Payer: COMMERCIAL

## 2020-11-05 VITALS
HEART RATE: 72 BPM | DIASTOLIC BLOOD PRESSURE: 80 MMHG | SYSTOLIC BLOOD PRESSURE: 112 MMHG | BODY MASS INDEX: 23.96 KG/M2 | WEIGHT: 153 LBS

## 2020-11-05 PROCEDURE — 90732 PPSV23 VACC 2 YRS+ SUBQ/IM: CPT | Performed by: INTERNAL MEDICINE

## 2020-11-05 PROCEDURE — 90471 IMMUNIZATION ADMIN: CPT | Performed by: INTERNAL MEDICINE

## 2020-11-05 PROCEDURE — 99214 OFFICE O/P EST MOD 30 MIN: CPT | Performed by: INTERNAL MEDICINE

## 2020-11-05 RX ORDER — DESVENLAFAXINE 50 MG/1
50 TABLET, EXTENDED RELEASE ORAL DAILY
Qty: 90 TABLET | Refills: 3 | Status: SHIPPED | OUTPATIENT
Start: 2020-11-05 | End: 2020-12-27 | Stop reason: SDUPTHER

## 2020-11-05 NOTE — TELEPHONE ENCOUNTER
Brina requests that someone  return their call. The best time to reach her is Anytime. The pt wants to know if she needs a follow up visit. Thank you.

## 2020-11-05 NOTE — PROGRESS NOTES
Chief Complaint   Patient presents with    Follow-up       HPI: Patient is here today to follow-up recent PE multiple bilateral bilateral pulmonary emboli. She is feeling better she does get short of breath still if she does extra strenuous exertion or walks up an incline she is walking on a flat surface well. She has questions about developing chronic complications. She has increased depression and anxiety since this happened of course. She denies headache fever chills rashes.     Past Medical History:   Diagnosis Date    Cervical disc disease 8/26/2017    DVT (deep venous thrombosis) (HCC)     Essential hypertension 12/1/2019    Gastroesophageal reflux disease without esophagitis 8/26/2017    Hyperlipidemia     Major depressive disorder in remission ([facility] [address] 8/26/2017    Migraine without aura 8/26/2017    Pernicious anemia 8/26/2017    Raynaud's phenomenon 8/26/2017       Past Surgical History:   Procedure Laterality Date    ARTERIOGRAM Bilateral 9/2/2020    [facility]UL[facility]    [facility] AND BSO         Family History   Problem Relation Age of Onset    High Blood Pressure Mother     Coronary Art Dis Mother     Thyroid Disease Mother         Hypothyroidism    Breast Cancer Mother 80    Colon Polyps Father     Prostate Cancer Father     Anemia Father         Pernicious    Colon Polyps Sister     Anemia Brother         Pernicious    High Blood Pressure Paternal Grandmother        Social History     Socioeconomic History    Marital status:      Spouse name: Not on file    Number of children: Not on file    Years of education: Not on file    Highest education level: Not on file   Occupational History    Not on file   Social Needs    Financial resource strain: Not on file    Food insecurity     Worry: Not on file     Inability: Not on file    Transportation needs     Medical: Not on file     Non-medical: Not on file   Tobacco Use    Smoking status: Never Smoker    Smokeless tobacco: Never Used   Substance and Sexual Activity    Alcohol use:  Yes    Drug use: No    Sexual activity: Not on file   Lifestyle    Physical activity     Days per week: Not on file     Minutes per session: Not on file    Stress: Not on file   Relationships    Social connections     Talks on phone: Not on file     Gets together: Not on file     Attends Taoist service: Not on file     Active member of club or organization: Not on file     Attends meetings of clubs or organizations: Not on file     Relationship status: Not on file    Intimate partner violence     Fear of current or ex partner: Not on file     Emotionally abused: Not on file     Physically abused: Not on file     Forced sexual activity: Not on file   Other Topics Concern    Not on file   Social History Narrative    Not on file       Allergies   Allergen Reactions    Demerol  [Meperidine Hcl]      Other reaction(s): Unknown    Pcn [Penicillins] Rash       Current Outpatient Medications   Medication Sig Dispense Refill    desvenlafaxine succinate (PRISTIQ) 50 MG TB24 extended release tablet Take 1 tablet by mouth daily 90 tablet 3    verapamil (CALAN SR) 240 MG extended release tablet TAKE ONE TABLET ONE TIME DAILY 90 tablet 0    apixaban (ELIQUIS) 5 MG TABS tablet Take 1 tablet by mouth 2 times daily 180 tablet 0    tiZANidine (ZANAFLEX) 4 MG tablet TAKE 1 TABLET BY MOUTH 2 TIMES DAILY AS NEEDED FOR MUSCLE SPASM 180 tablet 1    cyanocobalamin 1000 MCG/ML injection Inject 1 mL into the muscle every 30 days 3 mL 2    rizatriptan (MAXALT) 10 MG tablet Take 1 tablet by mouth once as needed for Migraine May repeat in 2 hours if needed 9 tablet 5    lisinopril (PRINIVIL;ZESTRIL) 10 MG tablet TAKE 1 TABLET BY MOUTH EVERY DAY 90 tablet 3    nortriptyline (PAMELOR) 10 MG capsule TAKE 2 CAPSULES BY MOUTH EVERY DAY AT BEDTIME 180 capsule 3    b complex vitamins capsule Take 1 capsule by mouth daily No current facility-administered medications for this visit. Review of Systems   Constitutional: Negative for chills, fatigue and fever. HENT: Negative for congestion and sinus pressure. Eyes: Negative for discharge and redness. Respiratory: Positive for shortness of breath. Negative for cough. Cardiovascular: Negative for chest pain, palpitations and leg swelling. Gastrointestinal: Negative for abdominal distention and abdominal pain. Genitourinary: Negative for dysuria, frequency and urgency. Musculoskeletal: Negative for arthralgias and back pain. Skin: Negative for rash and wound. Neurological: Positive for weakness. Negative for dizziness, light-headedness and headaches. Psychiatric/Behavioral: Positive for dysphoric mood. Negative for sleep disturbance. The patient is nervous/anxious. /80   Pulse 72   Wt 153 lb (69.4 kg)   BMI 23.96 kg/m²   BP Readings from Last 7 Encounters:   11/05/20 112/80   10/01/20 120/70   09/18/20 98/66   09/14/20 100/60   09/05/20 131/89   09/02/20 106/61   08/17/20 120/80     Wt Readings from Last 7 Encounters:   11/05/20 153 lb (69.4 kg)   10/01/20 154 lb 4.8 oz (70 kg)   09/18/20 152 lb 6.4 oz (69.1 kg)   09/14/20 150 lb (68 kg)   09/05/20 156 lb 9.6 oz (71 kg)   08/17/20 151 lb (68.5 kg)   06/25/20 150 lb (68 kg)     BMI Readings from Last 7 Encounters:   11/05/20 23.96 kg/m²   10/01/20 24.17 kg/m²   09/18/20 23.87 kg/m²   09/14/20 23.49 kg/m²   09/05/20 24.53 kg/m²   08/17/20 23.65 kg/m²   06/25/20 23.49 kg/m²     Resp Readings from Last 7 Encounters:   09/05/20 18   09/02/20 (!) 3   10/24/16 16       Physical Exam  Constitutional:       General: She is not in acute distress. Eyes:      General: No scleral icterus. Neck:      Musculoskeletal: Neck supple. Cardiovascular:      Heart sounds: Normal heart sounds. Pulmonary:      Breath sounds: Normal breath sounds. Lymphadenopathy:      Cervical: No cervical adenopathy.

## 2020-11-06 NOTE — TELEPHONE ENCOUNTER
Spoke with pt. Per Lola Arvizu last office visit note on 9/17/20. We will follow up in 3 month. I let patient know we will be seeing  Her in January for follow up. Pt voiced understanding and agreed.

## 2020-11-13 PROBLEM — F34.1 DYSTHYMIA: Status: ACTIVE | Noted: 2017-08-26

## 2020-11-13 ASSESSMENT — ENCOUNTER SYMPTOMS
EYE DISCHARGE: 0
ABDOMINAL DISTENTION: 0
EYE REDNESS: 0
COUGH: 0
BACK PAIN: 0
SINUS PRESSURE: 0
ABDOMINAL PAIN: 0
SHORTNESS OF BREATH: 1

## 2020-12-11 ENCOUNTER — OFFICE VISIT (OUTPATIENT)
Age: 60
End: 2020-12-11

## 2020-12-11 VITALS — TEMPERATURE: 97.6 F | HEART RATE: 63 BPM | OXYGEN SATURATION: 98 %

## 2020-12-11 LAB — SARS-COV-2, PCR: NOT DETECTED

## 2020-12-16 ENCOUNTER — TELEPHONE (OUTPATIENT)
Dept: INTERNAL MEDICINE | Age: 60
End: 2020-12-16

## 2020-12-16 ENCOUNTER — HOSPITAL ENCOUNTER (OUTPATIENT)
Dept: NON INVASIVE DIAGNOSTICS | Age: 60
Discharge: HOME | End: 2020-12-16
Payer: COMMERCIAL

## 2020-12-16 ENCOUNTER — HOSPITAL ENCOUNTER (OUTPATIENT)
Dept: PULMONOLOGY | Age: 60
Discharge: HOME | End: 2020-12-16
Payer: COMMERCIAL

## 2020-12-16 LAB
LV EF: 63 %
LVEF MODALITY: NORMAL

## 2020-12-16 PROCEDURE — 93306 TTE W/DOPPLER COMPLETE: CPT

## 2020-12-16 RX ORDER — ALBUTEROL SULFATE 90 UG/1
2 AEROSOL, METERED RESPIRATORY (INHALATION)
Status: ACTIVE | OUTPATIENT
Start: 2020-12-16 | End: 2020-12-16

## 2020-12-16 NOTE — PROGRESS NOTES
Called providers office to let them know that the patients diagnosis was contraindicated for a PFT. RN stated she would speak with the provider and get back with me. Provider called and stated that she wanted the order canceled and she would call the patient to let them know.

## 2020-12-16 NOTE — TELEPHONE ENCOUNTER
I actually did not order the PFT - pulmonary did they said to put in my name and so someone at this office put the order in under my name at pulmonary's request.  I called Jazlyn and she is going to ask pulmonary why there making us order this on every patient when it is not necessary and in this case not indicated. I also called Ms. Pickens Less but I could not get her so I left a voicemail. If we could resend the referral and write on there \"PFTs are not needed patient with recent acute pulmonary embolism\". Also we are checking with [facility] there actually might be a new pulmonologist starting in the next few weeks. That might be better for the patient as she is on [facility].

## 2020-12-16 NOTE — LETTER
Pulmonary Fuction Testing Lab  [facility], [address] [place] [address]  [phone]  Fax [phone]   November 16, 2020    Dear Germania Abdalla,      This letter is to remind you of your upcoming appointment on 12/16/2020 at 11 AM. Due to the COVID-19 pandemic and states restrictions all patients are required to have a COVID-19 test done prior to Pulmonary Function Testing. You must do your COVID-19 test on 12/11/2020 between 8 AM and 12 PM. You need to self quarantine once your test is completed until your appointment. Your Covid test must be done 4 days prior to you pulmonary test. We are required to have results back prior to your appointment, otherwise you will need to be rescheduled. If you choice to have your Covid test done at another facility rather than at the [facility], you will need to have the printed test results with you on the day of your appointment. Below is the address of the testing location:    [facility]-   Location: [facility],[address], [address]  Monday thru Friday- 8 AM- 12 PM     On the day of your appointment you will come through the Main Entrance of the [facility] to register at the [address] [place] [address] [facility] [place] registration. Upon entry you will be screened for any temperature, symptoms and history. After being screened you will be directed to the registration area. This helps us identify any potential COVID-19 exposure early on and take appropriate steps for the health and saftey of all our patients and health care professionals. Thank you for entrusting us at [facility]) with your care. We look forward to caring for you.     [facility]) Pulmonary Function Lab    Frederick Aguilar  Registered Respiratory Therapist

## 2020-12-17 ENCOUNTER — OFFICE VISIT (OUTPATIENT)
Dept: INTERNAL MEDICINE | Age: 60
End: 2020-12-17
Payer: COMMERCIAL

## 2020-12-17 VITALS — DIASTOLIC BLOOD PRESSURE: 78 MMHG | HEART RATE: 64 BPM | SYSTOLIC BLOOD PRESSURE: 120 MMHG

## 2020-12-17 PROCEDURE — 99214 OFFICE O/P EST MOD 30 MIN: CPT | Performed by: INTERNAL MEDICINE

## 2020-12-17 ASSESSMENT — ENCOUNTER SYMPTOMS
ABDOMINAL PAIN: 0
SINUS PRESSURE: 0
COUGH: 0
EYE REDNESS: 0
EYE DISCHARGE: 0
BACK PAIN: 0
ABDOMINAL DISTENTION: 0
SHORTNESS OF BREATH: 1

## 2020-12-17 NOTE — PROGRESS NOTES
Chief Complaint   Patient presents with    Follow-up       HPI: Patient is here today to follow-up bilateral pulmonary emboli and pulmonary hypertension. We reviewed her echo from the time of diagnosis her pulmonary artery pressure was 80 is now down to 36. She has been hiking walking more very active. She gets more short of breath than a year ago but she has improved even from a month ago. She is gone on long hikes actually a 7 mile hike recently up and down hills seems to be doing well no edema no syncope. No chest pain. Mood is better with the 50 mg of Pristiq. She has had a flu shot.     Past Medical History:   Diagnosis Date    Cervical disc disease 8/26/2017    [facility])     Essential hypertension 12/1/2019    Gastroesophageal reflux disease without esophagitis 8/26/2017    Hyperlipidemia     Major depressive disorder in remission ([facility] [address] 8/26/2017    Migraine without aura 8/26/2017    Pernicious anemia 8/26/2017    Raynaud's phenomenon 8/26/2017       Past Surgical History:   Procedure Laterality Date    ARTERIOGRAM Bilateral 9/2/2020    ECOS-PULMONARY performed by Nisha Anaya MD at [facility] OR    [facility]         Family History   Problem Relation Age of Onset    High Blood Pressure Mother     Coronary Art Dis Mother     Thyroid Disease Mother         Hypothyroidism    Breast Cancer Mother 80    Colon Polyps Father     Prostate Cancer Father     Anemia Father         Pernicious    Colon Polyps Sister     Anemia Brother         Pernicious    High Blood Pressure Paternal Grandmother        Social History     Socioeconomic History    Marital status:      Spouse name: Not on file    Number of children: Not on file    Years of education: Not on file    Highest education level: Not on file   Occupational History    Not on file   Social Needs    Financial resource strain: Not on file    Food insecurity     Worry: Not on file     Inability: Not on Unable to obtain accurate po2 reading, MD aware. ABG shows adequate oxygenation at this time.    file   Enlightened Lifestyle needs     Medical: Not on file     Non-medical: Not on file   Tobacco Use    Smoking status: Never Smoker    Smokeless tobacco: Never Used   Substance and Sexual Activity    Alcohol use:  Yes    Drug use: No    Sexual activity: Not on file   Lifestyle    Physical activity     Days per week: Not on file     Minutes per session: Not on file    Stress: Not on file   Relationships    Social connections     Talks on phone: Not on file     Gets together: Not on file     Attends [employer] service: Not on file     Active member of club or organization: Not on file     Attends meetings of clubs or organizations: Not on file     Relationship status: Not on file    Intimate partner violence     Fear of current or ex partner: Not on file     Emotionally abused: Not on file     Physically abused: Not on file     Forced sexual activity: Not on file   Other Topics Concern    Not on file   Social History Narrative    Not on file       Allergies   Allergen Reactions    Demerol  [Meperidine Hcl]      Other reaction(s): Unknown    Pcn [Penicillins] Rash       Current Outpatient Medications   Medication Sig Dispense Refill    nortriptyline (PAMELOR) 10 MG capsule TAKE TWO CAPSULES BY MOUTH EVERY NIGHT AT BEDTIME 180 capsule 3    desvenlafaxine succinate (PRISTIQ) 50 MG TB24 extended release tablet Take 1 tablet by mouth daily 90 tablet 3    verapamil (CALAN SR) 240 MG extended release tablet TAKE ONE TABLET ONE TIME DAILY 90 tablet 0    apixaban (ELIQUIS) 5 MG TABS tablet Take 1 tablet by mouth 2 times daily 180 tablet 0    tiZANidine (ZANAFLEX) 4 MG tablet TAKE 1 TABLET BY MOUTH 2 TIMES DAILY AS NEEDED FOR MUSCLE SPASM 180 tablet 1    cyanocobalamin 1000 MCG/ML injection Inject 1 mL into the muscle every 30 days 3 mL 2    rizatriptan (MAXALT) 10 MG tablet Take 1 tablet by mouth once as needed for Migraine May repeat in 2 hours if needed 9 tablet 5    lisinopril (PRINIVIL;ZESTRIL) 10 MG Skin:     Findings: No bruising or rash. Results for orders placed or performed in visit on 12/11/20   COVID-19   Result Value Ref Range    SARS-CoV-2, PCR Not Detected Not Detected       ASSESSMENT/ PLAN:  1. Bilateral pulmonary embolism (HCC)  Continue Eliquis continue current plan of care CTA in October showed improvement. Could take up to a year for all the pulmonary emboli to totally dissolve continue this current plan of care. Her exercise tolerance is good no orthopnea no edema. Tolerating Eliquis well. 2. Pulmonary arterial hypertension (HCC)  Her pulmonary hypertension went from severe to mild it is our hope that this will continue to improve over the course of the next year. Continue the current plan of care activity and follow-up    3. Dysthymia  Mood better with 50 mg Pristiq and follow    4. Mixed hyperlipidemia    - CBC; Future  - Comprehensive Metabolic Panel; Future  - TSH without Reflex; Future  - Lipid Panel; Future    5. Vitamin D deficiency    - Vitamin D 25 Hydroxy; Future    6. Need for hepatitis C screening test    - Hepatitis C Antibody; Future    7. Screening for HIV (human immunodeficiency virus)    - HIV Rapid 1&2; Future    8. Essential hypertension  Blood pressure appears stable follow    Chart medications labs vaccines reviewed plan on bone density with her mammogram in the summer. Covid vaccine when it becomes available.   Follow-up

## 2020-12-21 NOTE — PROGRESS NOTES
" ELOISE Comer  [facility]   Respiratory Disease Clinic  [address] [place] [address]  Phone: [phone]  Fax: [phone]     Kendal Valladares is a 60 y.o. female.   : 1960  CC:   Chief Complaint   Patient presents with   • saddle emblus   • Shortness of Breath      HPI: Kendal Valladares is a pleasant 60 y.o. female. The patient is here today as a referral from Dr. Ninoska Brantley for further evaluation of saddle embolus.  The patient has known unprovoked PE with right heart strain and subacute LLE DVT 2020.  She received catheter directed thrombolysis 2020.  She is following with vascular and hematology.  She remains on eliquis.  She states that she walks a lot.  She is very concerned regarding pulmonary hypertension.    Shortness of breath, improved.    Cough, yes.  Occasionally. She states that she normally does not cough.  She reports a little bit of congestion today.     Do you have a history of lung problems, no.   Family history of lung problems, no.   Heartburn, no.    Do you have a history of connective tissue disease, \"no\".    She has known pernicious anemia and raynaud's.    Rash, no. Dry mouth, no.  Dry eyes, yes.  Joint pain, no.    Trouble swallowing, no.    Allergies, yes.  She tries not to take anything for allergies.     Do you have pets, yes, 1 dog.    The patient  reports that she has quit smoking. She has never used smokeless tobacco.  Do you drink alcohol? Occasionally   Do you use any illegal drugs or prescription drugs that are not prescribed to you? No.   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 Have you ever taken Amiodarone, no.    The patient's PCP is Ninoska Brantley MD.    The following portions of the patient's history were reviewed and updated as appropriate: allergies, current medications, past family history, past medical history, past social history, past surgical history and problem " "list.  Past Medical History:   Diagnosis Date   • Headache    • Hypertension    • Migraine    • Mitral valve prolapse    • Pernicious anemia    • Raynauds syndrome      Family History   Problem Relation Age of Onset   • Colon polyps Father         > 60 years old   • Colon polyps Sister         < 59 years old   • Colon cancer Neg Hx      Social History     Socioeconomic History   • Marital status:      Spouse name: Not on file   • Number of children: Not on file   • Years of education: Not on file   • Highest education level: Not on file   Tobacco Use   • Smoking status: Former Smoker   • Smokeless tobacco: Never Used   • Tobacco comment: quit 25 years    Substance and Sexual Activity   • Alcohol use: Yes     Comment: occasional   • Drug use: No   • Sexual activity: Defer     Review of Systems   Constitutional: Negative for chills and fever.   HENT: Positive for congestion.    Eyes: Negative for blurred vision.   Respiratory: Positive for cough (mild ) and shortness of breath (improving ).    Cardiovascular: Negative for chest pain.   Gastrointestinal: Negative for diarrhea, nausea and vomiting.   Endocrine: Negative for cold intolerance and heat intolerance.   Genitourinary: Negative for dysuria.   Musculoskeletal: Negative for arthralgias.   Skin: Negative for rash.   Neurological: Negative for dizziness, weakness and light-headedness.   Hematological: Does not bruise/bleed easily.   Psychiatric/Behavioral: Negative for agitation. The patient is not nervous/anxious.      /86   Pulse 81   Temp 97.3 °F (36.3 °C)   Ht 170.2 cm (67\")   Wt 70.7 kg (155 lb 12.8 oz)   SpO2 99% Comment: ra  BMI 24.40 kg/m²   Physical Exam  Constitutional:       Appearance: She is normal weight.   Eyes:      Pupils: Pupils are equal, round, and reactive to light.   Neck:      Musculoskeletal: Normal range of motion and neck supple.   Cardiovascular:      Rate and Rhythm: Normal rate.   Pulmonary:      Effort: Pulmonary " effort is normal. No respiratory distress.   Musculoskeletal: Normal range of motion.   Neurological:      Mental Status: She is alert and oriented to person, place, and time.     patient declined full PE 2' coronavirus pandemic    Pulmonary Functions Testing Results:    My PFT Interpretation: None to review today  Rest/Exercise Pulse Ox Values        Some values may be hidden. Unless noted otherwise, only the newest values recorded on each date are displayed.         Rest/Exercise Pulse Ox Results 20   Rest room air SAT % 97   Exercise room air SAT % 97           Imagind echo 20  Summary   Normal left ventricular chamber size and systolic function.   Left ventricular ejection fraction is visually estimated at 60-65%.   There is mild pulmonary hypertension. RVSP 31%    2d echo 20  Summary   Right ventricle global systolic function is severely reduced .   Severly dilated right ventricle.   Mild tricuspid regurgitation .   There is severe pulmonary hypertension.   Normal left ventricular chamber size and systolic function.   Flattening of the septum along with paradoxical motion, consistent with   right ventricular pressure overload.   Left ventricular ejection fraction is visually estimated at 75% RVSP 73    CTA PULMONARY W CONTRAST 10/2/2020 9:14 AM  HISTORY: R07.9  COMPARISON: CT scans dated 2020 and 2020.   DLP: 234 mGy cm  TECHNIQUE: Helical tomographic images of the chest were obtained after  the administration of intravenous contrast following angiogram  protocol. Additionally, 3D MIP reconstructions in the coronal and  sagittal planes were provided. Automated exposure control was also  utilized to decrease patient radiation dose.  FINDINGS:    Pulmonary arteries: There is adequate enhancement of the pulmonary  arteries to evaluate for central and segmental pulmonary emboli.  Previously identified multifocal pulmonary embolus has continued to  clear, with very little residual  embolus identified. No new embolus is  present. Main pulmonary artery is nondilated, measuring 25 mm in  greatest axial diameter (series 2-image 61).  Aorta and great vessels: Thoracic aorta is normal in caliber. Minimal  calcified plaque in the arch. No flow-limiting stenosis at the arch  origins. Descending thoracic aorta is slightly tortuous.  Neck base: The imaged portion of the base of the neck appears  unremarkable.   Lungs: Previously identified areas of consolidation and groundglass  attenuation are near completely resolved. In particular, a medial  segment right middle lobe consolidation from the prior exam is a  developing a thin linear scar. No new consolidation. No pleural  effusion. The airways are clear..   Heart: The heart is normal in size. There is no pericardial effusion.   Lymph nodes: No pathologically enlarged mediastinal, hilar, or  axillary lymph nodes are present.   Bones and soft tissues: The osseous structures of the thorax and  surrounding soft tissues demonstrate no acute process.   Upper abdomen: The imaged portion of the upper abdomen demonstrates no  acute process.   IMPRESSION:  1. Previously identified multifocal pulmonary embolus has continued to  clear, with very little residual embolus identified. No new embolus is  present.  2. Central pulmonary arteries are nondilated.  3. Developing medial segment right middle lobe scar at the site of  prior consolidation/infarct.  Signed by Dr Norberto Iqbal on 10/2/2020 10:47 AM    Assessment and Plan:   Diagnoses and all orders for this visit:    1. Shortness of breath (Primary)  -     Walking Oximetry  -     Pulmonary Function Test; Future  The patient reports shortness of breath with having saddle embolus.  She states that the shortness of breath is improving.  Patient does have a known smoking history. She is agreeable to complete pulmonary function testing.  Rest and exercise sats were obtained today in the office.  The patient did not  qualify for portable oxygen.  I offered the patient overnight pulse oximetry study on room air.  The patient declined.  2. Pulmonary hypertension (CMS/HCC)  Patient states that she is worried about pulmonary hypertension.  We discussed the various causes of pulmonary hypertension as well as her 2D echo results.  The 2d echo obtained on September 20, 2020 showed right ventricle global systolic function severely reduced.  Severely dilated right ventricle.  Severe pulmonary hypertension with an RVSP of 73 mmHg.  Repeat 2d echo from December 16, 2020 shows normal right atrial size.  Normal right ventricular chamber size and function.  Normal left ventricular chamber size and systolic function.  There was mild pulmonary hypertension with an RVSP of 31 mmHg.  I discussed that this was much improved.  I discussed the various causes of PAH with the patient including classifications.  I discussed the absolute most definitive diagnosis for PAH is determined with a right heart catheterization.  The patient states that she does not want to do that. I would also not recommend a right sided heart catheterization at this time as she has had improvement in her 2d ehco with RVSP only 31 mmHg.  I discussed that she could possibly have WHO group 4 2' PE, but most likely this was acute and not a chronic condition since the pressure is some much better.      3. Saddle embolus of pulmonary artery with acute cor pulmonale, unspecified chronicity (CMS/HCC)  Patient was treated for saddle pulmonary embolism on September 2, 2020 and underwent EKOS.  She was discharged from [facility] on September 5, 2020 with anticoagulants and follow-up with hematology and vascular surgery.  The patient is continuing to follow with hematology and vascular surgery.  She remains on Eliquis and it is recommended for her to continue long-term anticoagulation.  A follow-up CTA chest on October 2, 2020 showed that the previously identified multifocal  pulmonary embolus has continued to clear with very little residual embolus identified.  No new embolus present.  Central pulmonary arteries are nondilated.  Developing medial segment right middle lobe scar at the site of prior consolidation/infarct.  Could consider repeating CT of the chest without contrast in 6 months to 1 year.  Will discuss at her next office visit in 3 months.   4. Acute deep vein thrombosis (DVT) of left peroneal vein (CMS/HCC)  Continue current treatment regimen with Eliquis.    5. Personal history of nicotine dependence  Former smoker.  Obtain PFTs.     Health maintenance:   Influenza vaccine: yes  Pneumovax 23: yes  Prevnar 13: no   Patient's Body mass index is 24.4 kg/m². BMI is within normal parameters. No follow-up required.    Follow up: 3 months with PFT  Felipa Gomez, APRN  12/22/2020  13:37 CST    Please note that portions of this note were completed with a voice recognition program.

## 2020-12-22 ENCOUNTER — OFFICE VISIT (OUTPATIENT)
Dept: PULMONOLOGY | Facility: CLINIC | Age: 60
End: 2020-12-22

## 2020-12-22 VITALS
SYSTOLIC BLOOD PRESSURE: 142 MMHG | BODY MASS INDEX: 24.45 KG/M2 | WEIGHT: 155.8 LBS | HEIGHT: 67 IN | HEART RATE: 81 BPM | OXYGEN SATURATION: 99 % | DIASTOLIC BLOOD PRESSURE: 86 MMHG | TEMPERATURE: 97.3 F

## 2020-12-22 DIAGNOSIS — Z87.891 PERSONAL HISTORY OF NICOTINE DEPENDENCE: ICD-10-CM

## 2020-12-22 DIAGNOSIS — I26.02 SADDLE EMBOLUS OF PULMONARY ARTERY WITH ACUTE COR PULMONALE, UNSPECIFIED CHRONICITY (HCC): ICD-10-CM

## 2020-12-22 DIAGNOSIS — R06.02 SHORTNESS OF BREATH: Primary | ICD-10-CM

## 2020-12-22 DIAGNOSIS — I27.20 PULMONARY HYPERTENSION (HCC): ICD-10-CM

## 2020-12-22 DIAGNOSIS — I82.452 ACUTE DEEP VEIN THROMBOSIS (DVT) OF LEFT PERONEAL VEIN (HCC): ICD-10-CM

## 2020-12-22 PROBLEM — K21.9 GASTROESOPHAGEAL REFLUX DISEASE WITHOUT ESOPHAGITIS: Status: ACTIVE | Noted: 2017-08-26

## 2020-12-22 PROBLEM — D51.0 PERNICIOUS ANEMIA: Status: ACTIVE | Noted: 2017-08-26

## 2020-12-22 PROBLEM — I82.409 DVT (DEEP VENOUS THROMBOSIS) (HCC): Status: ACTIVE | Noted: 2020-12-22

## 2020-12-22 PROBLEM — I10 ESSENTIAL HYPERTENSION: Status: ACTIVE | Noted: 2019-12-01

## 2020-12-22 PROBLEM — I26.99 PULMONARY EMBOLISM: Status: ACTIVE | Noted: 2020-09-02

## 2020-12-22 PROCEDURE — 99214 OFFICE O/P EST MOD 30 MIN: CPT | Performed by: NURSE PRACTITIONER

## 2020-12-22 NOTE — PATIENT INSTRUCTIONS
Pulmonary Hypertension  Pulmonary hypertension is a long-term (chronic) condition in which there is high blood pressure in the arteries in the lungs (pulmonary arteries). This condition occurs when pulmonary arteries become narrow and tight, making it harder for blood to flow through the lungs. This in turn makes the heart work harder to pump blood through the lungs, making it harder for you to breathe.  Over time, pulmonary hypertension can weaken and damage the heart muscle, specifically the right side of the heart. Pulmonary hypertension is a serious condition that can be life-threatening.  What are the causes?  This condition may be caused by different medical conditions. It can be categorized by cause into five groups:  · Group 1: Pulmonary hypertension that is caused by abnormal growth of small blood vessels in the lungs (pulmonary arterial hypertension). The abnormal blood vessel growth may have no known cause, or it may be:  ? Passed from parent to child (hereditary).  ? Caused by another disease, such as a connective tissue disease (including lupus or scleroderma), congenital heart disease, liver disease, or HIV.  ? Caused by certain medicines or poisons (toxins).  · Group 2: Pulmonary hypertension that is caused by weakness of the left chamber of the heart (left ventricle) or heart valve disease.  · Group 3: Pulmonary hypertension that is caused by lung disease or low oxygen levels. Causes in this group include:  ? Emphysema or chronic obstructive pulmonary disease (COPD).  ? Untreated sleep apnea.  ? Pulmonary fibrosis.  ? Long-term exposure to high altitudes in certain people who may already be at higher risk for pulmonary hypertension.  · Group 4: Pulmonary hypertension that is caused by blood clots in the lungs (pulmonary emboli).  · Group 5: Other causes of pulmonary hypertension, such as sickle cell anemia, sarcoidosis, tumors pressing on the pulmonary arteries, and various other diseases.  What are  the signs or symptoms?  Symptoms of this condition include:  · Shortness of breath. You may notice shortness of breath with:  ? Activity, such as walking.  ? Minimal activity, such as getting dressed.  ? No activity, like when you are sitting still.  · A cough. Sometimes, bloody mucus from the lungs may be coughed up (hemoptysis).  · Tiredness and fatigue.  · Dizziness, lightheadedness, or fainting, especially with physical activity.  · Rapid heartbeat, or feeling your heart flutter or skip a beat (palpitations).  · Veins in the neck getting larger.  · Swelling of the lower legs, abdomen, or both.  · Bluish color of the lips and fingertips.  · Chest pain or tightness in the chest.  · Abdominal pain, especially in the upper abdomen.  How is this diagnosed?  This condition may be diagnosed based on one or more of the following tests:  · Chest X-ray.  · Blood tests.  · CT scan.  · Pulmonary function test. This test measures how much air your lungs can hold. It also tests how well air moves in and out of your lungs.  · 6-minute walk test. This tests how severe your condition is in relation to your activity levels.  · Electrocardiogram (ECG). This test records the electrical impulses of the heart.  · Echocardiogram. This test uses sound waves (ultrasound) to produce an image of the heart.  · Cardiac catheterization. This is a procedure in which a thin tube (catheter) is passed into the pulmonary artery and used to test the pressure in your pulmonary artery and the right side of your heart.  · Lung biopsy. This involves having a procedure to remove a small sample of lung tissue for testing. This may help determine an underlying cause of your pulmonary hypertension.  How is this treated?  There is no cure for this condition, but treatment can help to relieve symptoms and slow the progress of the condition. Treatment may include:  · Cardiac rehabilitation. This is a treatment program that includes exercise training,  education, and counseling to help you get stronger and return to an active lifestyle.  · Oxygen therapy.  · Medicines that:  ? Lower blood pressure.  ? Relax (dilate) the pulmonary blood vessels.  ? Help the heart beat more efficiently and pump more blood.  ? Help the body get rid of extra fluid (diuretics).  ? Thin the blood in order to prevent blood clots in the lungs.  · Lung surgery to relieve pressure on the heart, for severe cases that do not respond to medical treatment.  · Heart-lung transplant, or lung transplant. This may be done in very severe cases.  Follow these instructions at home:  Eating and drinking    · Eat a healthy diet that includes plenty of fresh fruits and vegetables, whole grains, and beans.  · Limit your salt (sodium) intake to less than 2,300 mg a day.  Lifestyle  · Do not use any products that contain nicotine or tobacco, such as cigarettes and e-cigarettes. If you need help quitting, ask your health care provider.  · Avoid secondhand smoke.  Activity  · Get plenty of rest.  · Exercise as directed. Talk with your health care provider about what type of exercise is safe for you.  · Avoid hot tubs and saunas.  · Avoid high altitudes.  General instructions  · Take over-the-counter and prescription medicines only as told by your health care provider. Do not change or stop medicines without checking with your health care provider.  · Stay up to date on your vaccines, especially yearly flu (influenza) and pneumonia vaccines.  · If you are a woman of child-bearing age, avoid becoming pregnant. Talk with your health care provider about birth control.  · Consider ways to get support for anxiety and stress of living with pulmonary hypertension. Talk with your health care provider about support groups and online resources.  · Use oxygen therapy at home as directed.  · Keep track of your weight. Weight gain could be a sign that your condition is getting worse.  · Keep all follow-up visits as told by  your health care provider. This is important.  Contact a health care provider if:  · Your cough gets worse.  · You have more shortness of breath than usual, or you start to have trouble doing activities that you could do before.  · You need to use medicines or oxygen more frequently or in higher dosages than usual.  Get help right away if:  · You have severe shortness of breath.  · You have chest pain or pressure.  · You cough up blood.  · You have swelling of your feet or legs that gets worse.  · You have rapid weight gain over a period of 1-2 days.  · Your medicines or oxygen do not provide relief.  Summary  · Pulmonary hypertension is a chronic condition in which there is high blood pressure in the arteries in the lungs (pulmonary arteries).  · Pulmonary hypertension is a serious condition that can be life-threatening. It can be caused by a variety of illnesses.  · Treatment may involve taking medicines and using oxygen therapy. Severe cases may require surgery or a transplant.  This information is not intended to replace advice given to you by your health care provider. Make sure you discuss any questions you have with your health care provider.  Document Revised: 11/30/2018 Document Reviewed: 03/13/2018  Elsevier Patient Education © 2020 [employer]

## 2020-12-22 NOTE — PROCEDURES
Walking Oximetry  Performed by: Felipa Gomez APRN  Authorized by: Felipa Gomez APRN     Rest room air SAT %:  97  Exercise room air SAT %:  97

## 2021-01-28 ENCOUNTER — HOSPITAL ENCOUNTER (OUTPATIENT)
Dept: VASCULAR LAB | Age: 61
Discharge: HOME | End: 2021-01-28
Payer: COMMERCIAL

## 2021-01-28 DIAGNOSIS — M79.89 LEG SWELLING: ICD-10-CM

## 2021-01-28 DIAGNOSIS — M79.605 LEG PAIN, LEFT: ICD-10-CM

## 2021-01-28 PROCEDURE — 93971 EXTREMITY STUDY: CPT

## 2021-02-01 ENCOUNTER — OFFICE VISIT (OUTPATIENT)
Dept: HEMATOLOGY | Age: 61
End: 2021-02-01
Payer: COMMERCIAL

## 2021-02-01 ENCOUNTER — HOSPITAL ENCOUNTER (OUTPATIENT)
Dept: INFUSION THERAPY | Age: 61
Discharge: HOME | End: 2021-02-01
Payer: COMMERCIAL

## 2021-02-01 VITALS
HEART RATE: 83 BPM | SYSTOLIC BLOOD PRESSURE: 102 MMHG | BODY MASS INDEX: 24.48 KG/M2 | HEIGHT: 67 IN | WEIGHT: 156 LBS | DIASTOLIC BLOOD PRESSURE: 68 MMHG | TEMPERATURE: 98.5 F | OXYGEN SATURATION: 98 %

## 2021-02-01 DIAGNOSIS — D64.9 NORMOCYTIC ANEMIA: ICD-10-CM

## 2021-02-01 DIAGNOSIS — I26.02 ACUTE SADDLE PULMONARY EMBOLISM WITH ACUTE COR PULMONALE (HCC): Primary | ICD-10-CM

## 2021-02-01 DIAGNOSIS — I82.452 ACUTE DEEP VEIN THROMBOSIS (DVT) OF LEFT PERONEAL VEIN (HCC): ICD-10-CM

## 2021-02-01 DIAGNOSIS — D51.0 PERNICIOUS ANEMIA: Chronic | ICD-10-CM

## 2021-02-01 DIAGNOSIS — D51.0 PERNICIOUS ANEMIA: ICD-10-CM

## 2021-02-01 LAB
BASOPHILS ABSOLUTE: 0.05 K/UL (ref 0.01–0.08)
BASOPHILS RELATIVE PERCENT: 0.7 % (ref 0.1–1.2)
EOSINOPHILS ABSOLUTE: 0.27 K/UL (ref 0.04–0.54)
EOSINOPHILS RELATIVE PERCENT: 4 % (ref 0.7–7)
HCT VFR BLD CALC: 42.3 % (ref 34.1–44.9)
HEMOGLOBIN: 13.8 G/DL (ref 11.2–15.7)
LYMPHOCYTES ABSOLUTE: 1.9 K/UL (ref 1.18–3.74)
LYMPHOCYTES RELATIVE PERCENT: 27.9 % (ref 19.3–53.1)
MCH RBC QN AUTO: 27.7 PG (ref 25.6–32.2)
MCHC RBC AUTO-ENTMCNC: 32.6 G/DL (ref 32.3–35.5)
MCV RBC AUTO: 84.9 FL (ref 79.4–94.8)
MONOCYTES ABSOLUTE: 0.4 K/UL (ref 0.24–0.82)
MONOCYTES RELATIVE PERCENT: 5.9 % (ref 4.7–12.5)
NEUTROPHILS ABSOLUTE: 4.2 K/UL (ref 1.56–6.13)
NEUTROPHILS RELATIVE PERCENT: 61.5 % (ref 34–71.1)
PDW BLD-RTO: 15.2 % (ref 11.7–14.4)
PLATELET # BLD: 179 K/UL (ref 182–369)
PMV BLD AUTO: 11.7 FL (ref 7.4–10.4)
RBC # BLD: 4.98 M/UL (ref 3.93–5.22)
WBC # BLD: 6.82 K/UL (ref 3.98–10.04)

## 2021-02-01 PROCEDURE — 99211 OFF/OP EST MAY X REQ PHY/QHP: CPT

## 2021-02-01 PROCEDURE — 99213 OFFICE O/P EST LOW 20 MIN: CPT | Performed by: NURSE PRACTITIONER

## 2021-02-01 PROCEDURE — 85025 COMPLETE CBC W/AUTO DIFF WBC: CPT

## 2021-02-01 RX ORDER — MULTIVIT-MIN/IRON/FOLIC ACID/K 18-600-40
2000 CAPSULE ORAL DAILY
COMMUNITY

## 2021-02-01 NOTE — PROGRESS NOTES
Progress Note      Pt Name: Romelia Salinas: 1960  MRN: [id number]    Date of evaluation: 2/1/2021  History Obtained From:  Patient, EMR    Portions of this note have been copied forward, however, changed to reflect the most current clinical status of this patient. Chief Complaint   Patient presents with    Other     Acute saddle pulmonary embolism with acute cor pulmonale     HISTORY OF PRESENT ILLNESS:    Corona Lane is a 57-year-old  female with a history of an unprovoked PE with right heart strain and subacute LLE DVT 9/2/2020. She received catheter directed thrombolysis 9/2/2020. Evaluation was negative for antiphospholipid syndrome. 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 Ronaldo Mark continues Eliquis 5 mg po BID, prescribed by Dr. Mayo Mayen. She reports strict compliance and has had no bleeding issues. She has mild exertional dyspnea, chest tightness is improved. She established care with pulmonology and is scheduled for PFT next week. Ronaldo Mark is also monitored for a history of pernicious anemia. She continues monthly B12 injections, self-administered. HEMATOLOGY HISTORY: Unprovoked PE, subacute LLE DVT, 9/2/2020    Diagnosis  · Unprovoked pulmonary embolism, severe 9/2/2020  · Subacute LLE DVT     Treatment summary  · 9/2/2020-catheter directed thrombolysis-FARHAD     Brina Lovett was seen in hematology consultation on 9/3/2020 as an inpatient consultation at [facility].  She presented to the hospital after outpatient imaging showed pulmonary embolus and right-sided heart strain.    She had complaint of dyspnea since April 2020. Ronaldo Mark denies a personal history of VTE.    She is a former smoker, having quit at the age of 26. Previously on estradiol, which was completed at least 1 year prior to PE in September, 2020. Ronaldo Mark retired in July, 2020.   She admits to being rather sedentary, working at a desk. She is more active since her long term. Zay Lin had also endured a 2-hour flight from [place] in July, 2020. She reports her mother had a history of miscarriages. She was admitted to [facility] ICU 9/2/2020. Duplex lower extremity showed subacute DVT in the LLE. 2D echo showed global systolic function severely reduced and severely dilated right ventricle. · 5/1/9709- LKD showed significant burden of pulmonary emboli in both lungs with RV strain. · 9/2/2020-BLE venous Doppler with evidence of subacute DVT in the LLE involving the peroneal veins  · 9/2/2020-2D echo showed severely reduced right ventricular global systolic function.  Severely dilated right ventricle.  Severe pulmonary hypertension.  Flattening of the septum along with paradoxical motion.  LVEF 75%. · 9/2/2020- catheter directed thrombolysis-EKOS  · 9/3/2020-Heparin drip x24 hours, followed by Lovenox 70 mg subcut q12 hours  · Eliquis 10 mg po BID x 7 days, followed by 5 mg BID thereafter, initiated 9/17/2020 (prescribed by Dr. Sarah Branham)    Labs 9/4/2020:  Lupus Anticoagulant: not detected  Beta-2 glycoprotein IGM & IgG: negative  Cardiolipin antibodies IgM & IgG: negative     Labs 9/18/2020:  · JAK2: Negative for V617F, exon 12, MPL, CALR and KIT mutations  · FLOW cytometry: Negative for evidence of PNH population  · Factor V Leiden: Heterozygous for single or 506Q mutation  · Factor II: Heterozygous [id number] mutation  · MTHFR: Heterozygous N4817H variant (typically not associated with increased risk of thrombosis)  · Homocysteine: 13.6, normal  · Antithrombin activity: Elevated at 167% (no known clinical significance)  · Functional protein C: 128%  · Free protein S: 123%    Recommend long-term anticoagulation  Recommend discussing genetic variants with family, which was discussed    Recommend age-appropriate health maintenance screenings.   · Bilateral mammograms 8/21/2020 at [facility]: BI-RADS Category 2   · Colonoscopy 11/12/2018 by Dr. Dedrick Marquez. Recall 5 years  · Ovarian screening: Maris Crystal has a history of RIGOBERTO, BSO 12/15/2011 and no longer has GYN exams    Past Medical History:   Diagnosis Date    Cervical disc disease 8/26/2017    DVT (deep venous thrombosis) (HCC)     Essential hypertension 12/1/2019    Gastroesophageal reflux disease without esophagitis 8/26/2017    Hyperlipidemia     Major depressive disorder in remission ([facility] [address] 8/26/2017    Migraine without aura 8/26/2017    Pernicious anemia 8/26/2017    Raynaud's phenomenon 8/26/2017     Past Surgical History:   Procedure Laterality Date    ARTERIOGRAM Bilateral 9/2/2020    [facility]    RIGOBERTO AND BSO       Current Outpatient Medications   Medication Sig Dispense Refill    Cholecalciferol (VITAMIN D) 50 MCG (2000 UT) CAPS capsule Take 2,000 Units by mouth daily      Multiple Vitamins-Minerals (PRESERVISION AREDS 2+MULTI VIT PO) Take 1 tablet by mouth 2 times daily      rizatriptan (MAXALT) 10 MG tablet May repeat in 2 hours if needed do not exceed 2 doses in 24 hours 9 tablet 5    cyanocobalamin 1000 MCG/ML injection Inject 1 mL into the muscle every 30 days 3 mL 2    tiZANidine (ZANAFLEX) 4 MG tablet Take 1 tablet by mouth every 12 hours as needed (muscle spasms) 180 tablet 1    verapamil (CALAN SR) 240 MG extended release tablet Take 1 tablet by mouth daily 90 tablet 3    desvenlafaxine succinate (PRISTIQ) 50 MG TB24 extended release tablet Take 1 tablet by mouth daily 90 tablet 3    apixaban (ELIQUIS) 5 MG TABS tablet Take 1 tablet by mouth 2 times daily 180 tablet 0    nortriptyline (PAMELOR) 10 MG capsule TAKE TWO CAPSULES BY MOUTH EVERY NIGHT AT BEDTIME 180 capsule 3    lisinopril (PRINIVIL;ZESTRIL) 10 MG tablet TAKE 1 TABLET BY MOUTH EVERY DAY 90 tablet 3    b complex vitamins capsule Take 1 capsule by mouth daily       No current facility-administered medications for this visit.        Allergies Allergen Reactions    Demerol  [Meperidine Hcl]      Other reaction(s): Unknown    Pcn [Penicillins] Rash     Social History     Tobacco Use    Smoking status: Never Smoker    Smokeless tobacco: Never Used   Substance Use Topics    Alcohol use: Yes     Comment: rare    Drug use: No     Family History   Problem Relation Age of Onset    High Blood Pressure Mother     Coronary Art Dis Mother     Thyroid Disease Mother         Hypothyroidism    Breast Cancer Mother 80    Colon Polyps Father     Prostate Cancer Father     Anemia Father         Pernicious    Colon Polyps Sister     Anemia Brother         Pernicious    Cancer Brother 46        prostate cancer    High Blood Pressure Paternal Grandmother        Review of Systems   Constitutional: Negative for fatigue and fever. No night sweats   HENT: Negative for dental problem, hearing loss, mouth sores, nosebleeds, sore throat and trouble swallowing. Eyes: Negative for photophobia, discharge, redness and itching. No blurring of vision, no double vision   Respiratory: Positive for shortness of breath (exertional, minimal). Negative for cough and wheezing. No hemoptysis   Cardiovascular: Negative for chest pain, palpitations and leg swelling. Gastrointestinal: Negative for abdominal pain, constipation, diarrhea, nausea and vomiting. Endocrine: Negative for cold intolerance, heat intolerance, polydipsia and polyuria. Genitourinary: Negative for dysuria, frequency, hematuria and urgency. Musculoskeletal: Negative for arthralgias, joint swelling and myalgias. Skin: Negative for pallor and rash. Allergic/Immunologic: Negative for environmental allergies and immunocompromised state. Neurological: Negative for seizures, syncope and numbness. Hematological: Negative for adenopathy. Does not bruise/bleed easily. Psychiatric/Behavioral: Negative for agitation, behavioral problems, confusion and suicidal ideas.  The patient is not nervous/anxious. Physical Exam  Vitals signs reviewed. Constitutional:       General: She is not in acute distress. Appearance: She is well-developed. She is not toxic-appearing or diaphoretic. HENT:      Head: Normocephalic and atraumatic. Right Ear: External ear normal.      Left Ear: External ear normal.      Nose: Nose normal.      Mouth/Throat:      Mouth: Mucous membranes are moist.   Eyes:      General: No scleral icterus. Right eye: No discharge. Left eye: No discharge. Conjunctiva/sclera: Conjunctivae normal.   Neck:      Musculoskeletal: Neck supple. Trachea: No tracheal deviation. Cardiovascular:      Rate and Rhythm: Normal rate and regular rhythm. Heart sounds: No murmur. Pulmonary:      Effort: Pulmonary effort is normal. No respiratory distress. Breath sounds: Normal breath sounds. No wheezing or rales. Chest:      Chest wall: No tenderness. Abdominal:      General: Bowel sounds are normal. There is no distension. Palpations: Abdomen is soft. There is no mass. Tenderness: There is no abdominal tenderness. There is no guarding. Genitourinary:     Comments: Exam deferred  Musculoskeletal:         General: No tenderness or deformity. Comments: Normal ROM all four extremities   Lymphadenopathy:      Cervical: No cervical adenopathy. Right cervical: No superficial cervical adenopathy. Left cervical: No superficial cervical adenopathy. Upper Body:      Right upper body: No supraclavicular adenopathy. Left upper body: No supraclavicular adenopathy. Comments: No bulky palpable cervical, clavicular, axillary or inguinal adenopathies on the left or right. Skin:     General: Skin is warm and dry. Findings: No rash. Neurological:      Mental Status: She is alert and oriented to person, place, and time.       Comments: follows commands, non-focal   Psychiatric:         Behavior: Behavior normal. Behavior is cooperative. Thought Content: Thought content normal.         Judgment: Judgment normal.      Comments: Alert and oriented to person, place and time. Vitals:    02/01/21 0942   BP: 102/68   Pulse: 83   Temp: 98.5 °F (36.9 °C)   SpO2: 98%   Weight: 156 lb (70.8 kg)   Height: 5' 7\" (1.702 m)      Wt Readings from Last 3 Encounters:   02/01/21 156 lb (70.8 kg)   11/05/20 153 lb (69.4 kg)   10/01/20 154 lb 4.8 oz (70 kg)       LABS:  CBC 2/1/2021: WBC 6.82, Hgb 13.8/MCV 84.9, platelets 127,644      ASSESSMENT/PLAN:    1. Acute saddle pulmonary embolism with acute cor pulmonale (HCC)    2. Acute deep vein thrombosis (DVT) of left peroneal vein (HCC)    3. Normocytic anemia    4. Pernicious anemia         #Pulmonary embolism, unprovoked event  Prothrombotic work-up 9/18/2020 revealed  · Factor V Leiden: Heterozygous for single or 506Q mutation  · Factor II: Heterozygous [id number] mutation    CTA chest contrast 10/2/2020: Previously identified multifocal pulmonary embolus has continued to clear, with very little residual embolus identified. No new embolus. Developing medial segment RML scar at the site of prior consolidation/infarct    Recommend long-term anticoagulation, continue Eliquis as prescribed  Recommend discussing genetic variants with family, which was previously discussed     #Mild normocytic anemia, resolved  Hgb improved, 13.8/MCV 84.9 (Hgb was 10.9 on 9/4/2020)     #Hypertension  Stable, B/P 102/68    #Pernicious anemia  Continues monthly self injected B12      Recommend routine, age-appropriate cancer screenings. Health maintenance:  · Bilateral mammograms 8/21/2020 at [facility]: BI-RADS Category 2, arranged by Dr. Bill Brizuela  · Colonoscopy 11/12/2018 by Dr. Jaylin Giron. Recall 5 years  · Ovarian screening: Tacos Orlando has a history of RIGOBERTO, BSO 12/15/2011 and no longer has GYN exams    Return in about 6 months (around 8/1/2021) for follow up with EMILIANO Jackson.   Extend appointment to annually at that time. I have seen, examined and reviewed patient medication list, appropriate labs and imaging studies. Relevant medical records and other physician notes have been reviewed. I answered all questions to the best of my knowledge and to the patient's satisfaction. Dictated utilizing Dragon transcription software.          EMILIANO Almaguer  8:49 PM  2/1/2021

## 2021-02-02 ENCOUNTER — VIRTUAL VISIT (OUTPATIENT)
Dept: VASCULAR SURGERY | Age: 61
End: 2021-02-02
Payer: COMMERCIAL

## 2021-02-02 DIAGNOSIS — D68.51 FACTOR V LEIDEN (HCC): Primary | ICD-10-CM

## 2021-02-02 DIAGNOSIS — I82.5Z2 CHRONIC DEEP VEIN THROMBOSIS (DVT) OF DISTAL VEIN OF LEFT LOWER EXTREMITY (HCC): ICD-10-CM

## 2021-02-02 PROCEDURE — 99213 OFFICE O/P EST LOW 20 MIN: CPT | Performed by: NURSE PRACTITIONER

## 2021-02-02 NOTE — PROGRESS NOTES
Reena Baldwin (:  1960) is a 57 y.o. female,Established patient, here for evaluation of the following chief complaint(s): No chief complaint on file. SUBJECTIVE/OBJECTIVE:  She presents for follow-up of known DVT. She has no known history of DVT. Her current treatment includes eliquis. She does not have a family history of hypercoagulability. Risk factors for hypercoagulable state include: factor v leiden. She does not have an IVC filter. She has no symptoms of DVT. Differential diagnosis includes but is not limited to CHF, thyroid disease, venous disease, DVT, SVT, peripheral vascular disease.           Reena Baldwin is a 57 y.o. female with the following history as [facility] in [facility]:  Patient Active Problem List    Diagnosis Date Noted    Factor V Leiden ([facility] [address] 2021    Dysfunction of right cardiac ventricle 2020    VTE (venous thromboembolism) 2020    Bilateral pulmonary embolism ([facility] [address] 2020    Pulmonary arterial hypertension ([facility] [address] 2020    Pulmonary embolism (HCC)     [facility] thrombosis) ([facility])      Class: Present on Admission    Essential hypertension 2019    Myopia 2018    Nuclear senile cataract 2018    Pseudophakia 2018    Tear film insufficiency 2018    Numbness in both hands     Hyperlipidemia 2017    Migraine without aura 2017    Pernicious anemia 2017    Raynaud's phenomenon 2017    Cervical disc disease 2017    Dysthymia 2017    Gastroesophageal reflux disease without esophagitis 2017    Breast density 2015     Current Outpatient Medications   Medication Sig Dispense Refill    Cholecalciferol (VITAMIN D) 50 MCG ( UT) CAPS capsule Take 2,000 Units by mouth daily      Multiple Vitamins-Minerals (PRESERVISION AREDS 2+MULTI VIT PO) Take 1 tablet by mouth 2 times daily  rizatriptan (MAXALT) 10 MG tablet May repeat in 2 hours if needed do not exceed 2 doses in 24 hours 9 tablet 5    cyanocobalamin 1000 MCG/ML injection Inject 1 mL into the muscle every 30 days 3 mL 2    tiZANidine (ZANAFLEX) 4 MG tablet Take 1 tablet by mouth every 12 hours as needed (muscle spasms) 180 tablet 1    verapamil (CALAN SR) 240 MG extended release tablet Take 1 tablet by mouth daily 90 tablet 3    desvenlafaxine succinate (PRISTIQ) 50 MG TB24 extended release tablet Take 1 tablet by mouth daily 90 tablet 3    apixaban (ELIQUIS) 5 MG TABS tablet Take 1 tablet by mouth 2 times daily 180 tablet 0    nortriptyline (PAMELOR) 10 MG capsule TAKE TWO CAPSULES BY MOUTH EVERY NIGHT AT BEDTIME 180 capsule 3    lisinopril (PRINIVIL;ZESTRIL) 10 MG tablet TAKE 1 TABLET BY MOUTH EVERY DAY 90 tablet 3    b complex vitamins capsule Take 1 capsule by mouth daily       No current facility-administered medications for this visit.       Allergies: Demerol  [meperidine hcl] and Pcn [penicillins]  Past Medical History:   Diagnosis Date    Cervical disc disease 8/26/2017    D[facility] thrombosis) ([facility])     Essential hypertension 12/1/2019    Gastroesophageal reflux disease without esophagitis 8/26/2017    Hyperlipidemia     [facility] 8/26/2017    Migraine without aura 8/26/2017    Pernicious anemia 8/26/2017    Raynaud's phenomenon 8/26/2017     Past Surgical History:   Procedure Laterality Date    ARTERIOGRAM Bilateral 9/2/2020    ECOS-PULMONARY performed by Yue Olguin MD at [facility] OR    [facility] AND BSO       Family History   Problem Relation Age of Onset    High Blood Pressure Mother     Coronary Art Dis Mother     Thyroid Disease Mother         Hypothyroidism    Breast Cancer Mother 80    Colon Polyps Father     Prostate Cancer Father     Anemia Father         Pernicious    Colon Polyps Sister     Anemia Brother         Pernicious    Cancer Brother 46 prostate cancer    High Blood Pressure Paternal Grandmother      Social History     Tobacco Use    Smoking status: Never Smoker    Smokeless tobacco: Never Used   Substance Use Topics    Alcohol use: Yes     Comment: rare       ROS  Eyes  no sudden vision change or amaurosis. Respiratory  no significant shortness of breath,  Cardiovascular  no chest pain or syncope.  has not had  significant leg swelling. No claudication. Musculoskeletal  no gait disturbance  Skin  no new wound. Neurologic   No speech difficulty or lateralizing weakness. All other review of systems are negative. No flowsheet data found. Physical Exam    Due to this being a TeleHealth encounter, evaluation of the following organ systems is limited: Vitals/Constitutional/EENT/Resp/CV/GI//MS/Neuro/Skin/Heme-Lymph-Imm. Constitutional  well developed, well nourished. No diaphoresis or acute distress. Neck- ROM appears normal  Extremities -No cyanosis, clubbing, minimal edema. No signs atheroembolic event. Pulmonary  effort appears normal.  No respiratory distress. No accessory muscle use  Neurologic  alert and oriented X 3. Cranial Nerves II-XII grossly intact  Skin  intact. No rash, erythema, or pallor. Psychiatric  mood, affect, and behavior appear normal.  Judgment and thought processes appear normal.    Risk factors for atherosclerosis of all vascular beds have been reviewed with the patient including:  Family history, tobacco abuse in all forms, elevated cholesterol, hyperlipidemia, and diabetes. Venous Scan: DVT involving left leg involving the peroneal vein(s). This is non-occlusive  Individual films reviewed: Yes. Test results were reviewed with the patient  Disease process is stable and chronic      Reviewed previous studies including: venous scan  Individual images were reviewed. I agree with the findings  Results were discussed with the patient.           ASSESSMENT/PLAN: 1. Factor V Leiden ([facility] [address]  2. Chronic deep vein thrombosis (DVT) of distal vein of left lower extremity (HCC)        Discussed management of venous scan which includes:  continue anticoagulation for life  Support hose for life during day  Call with any sudden leg swelling    Call the office if pain, swelling, and tenderness extends beyond where it is today. Wear  support hose every day from the time they get up in the morning until they go to be at night. Use warm moist heat for comfort if needed. An electronic signature was used to authenticate this note. --EMILIANO Noyola           Sasha Cobos is a 57 y.o. female being evaluated by a Virtual Visit (video visit) encounter to address concerns as mentioned above. A caregiver was present when appropriate. Due to this being a TeleHealth encounter (During [place] [address] public health emergency), evaluation of the following organ systems was limited: Vitals/Constitutional/EENT/Resp/CV/GI//MS/Neuro/Skin/Heme-Lymph-Imm. Pursuant to the emergency declaration under the [address] [place] [address] authority and the [employer] and [employer] Act, this Virtual Visit was conducted with patient's (and/or legal guardian's) consent, to reduce the patient's risk of exposure to COVID-19 and provide necessary medical care. The patient (and/or legal guardian) has also been advised to contact this office for worsening conditions or problems, and seek emergency medical treatment and/or call 911 if deemed necessary. Patient identification was verified at the start of the visit: Yes      Services were provided through a video synchronous discussion virtually to substitute for in-person clinic visit. Patient and provider were located at their individual homes. An electronic signature was used to authenticate this note.     --EMILIANO Noyola

## 2021-02-09 ENCOUNTER — OFFICE VISIT (OUTPATIENT)
Age: 61
End: 2021-02-09

## 2021-02-09 VITALS — OXYGEN SATURATION: 99 % | TEMPERATURE: 96.5 F | HEART RATE: 85 BPM

## 2021-02-09 DIAGNOSIS — Z11.59 SCREENING FOR VIRAL DISEASE: Primary | ICD-10-CM

## 2021-02-09 LAB — SARS-COV-2, PCR: NOT DETECTED

## 2021-02-09 PROCEDURE — 99999 PR OFFICE/OUTPT VISIT,PROCEDURE ONLY: CPT | Performed by: NURSE PRACTITIONER

## 2021-02-12 ENCOUNTER — HOSPITAL ENCOUNTER (OUTPATIENT)
Dept: PULMONOLOGY | Age: 61
Discharge: HOME | End: 2021-02-12
Payer: COMMERCIAL

## 2021-02-12 DIAGNOSIS — R06.02 SHORTNESS OF BREATH: ICD-10-CM

## 2021-02-12 PROCEDURE — 94727 GAS DIL/WSHOT DETER LNG VOL: CPT

## 2021-02-12 PROCEDURE — 94729 DIFFUSING CAPACITY: CPT

## 2021-02-12 PROCEDURE — 94010 BREATHING CAPACITY TEST: CPT

## 2021-02-12 RX ORDER — ALBUTEROL SULFATE 90 UG/1
4 AEROSOL, METERED RESPIRATORY (INHALATION)
Status: ACTIVE | OUTPATIENT
Start: 2021-02-12 | End: 2021-02-12

## 2021-02-12 NOTE — LETTER
Pulmonary Fuction Testing Lab  [facility], [address] [place] [address]  [phone]  Fax [phone]   January 13, 2021    Dear Hilario Jett,      This letter is to remind you of your upcoming appointment on 2/12/2021 at 2:30 PM. Due to the COVID-19 pandemic and states restrictions all patients are required to have a COVID-19 test done prior to Pulmonary Function Testing. You must do your COVID-19 test on 2/8/2021 between 8 AM and 12 PM. You need to self quarantine once your test is completed until your appointment. Your Covid test must be done 4 days prior to you pulmonary test. We are required to have results back prior to your appointment, otherwise you will need to be rescheduled. If you choice to have your Covid test done at another facility rather than at the [facility], you will need to have the printed test results with you on the day of your appointment. Below is the address of the testing location:    [facility]-   Location: [address] [place] [address] Monday thru Friday- 8 AM- 12 PM     On the day of your appointment you will come through the Main Entrance of the hospital in order to register at the [facility] [place] registration. Upon entry you will be screened for any temperature, symptoms and history. After being screened you will be directed to the registration area. This helps us identify any potential COVID-19 exposure early on and take appropriate steps for the health and saftey of all our patients and health care professionals. Thank you for entrusting us at [facility]) with your care. We look forward to caring for you.     [facility]) Pulmonary Function Lab    Fco Dean  Registered Respiratory Therapist

## 2021-02-26 ENCOUNTER — TELEPHONE (OUTPATIENT)
Dept: PULMONOLOGY | Facility: CLINIC | Age: 61
End: 2021-02-26

## 2021-02-26 NOTE — TELEPHONE ENCOUNTER
PFTs with possible mild abnormalities related to the PE.  No COPD.  Will further discuss at her next office visit.

## 2021-02-26 NOTE — TELEPHONE ENCOUNTER
Regarding: Test Results Question  Contact: [phone]  ----- Message from Nuria Campbell CMA sent at 2/26/2021 10:23 AM CST -----  Your response will be seen by the patient.     ----- Message from Kendal Valladares to PatriciaFelipa APRN sent at 2/26/2021 10:38 AM -----   I haven't seen any results yet from my PFT a couple of weeks ago. My online account says results are available, but there's nothing there. Would you please let me know what the results were?  Thank you, I appreciate your help.

## 2021-02-26 NOTE — PROCEDURES
FEV1/FVC is 72% of predicted. FEF 25-75% is 68% of predicted. This suggests mild obstructive defect. FVC is 108% of predicted. FEV1 is 98% of predicted. There is no evidence of restrictive defect. There is moderate decrease in diffusing capacity.

## 2021-03-11 DIAGNOSIS — E55.9 VITAMIN D DEFICIENCY: ICD-10-CM

## 2021-03-11 DIAGNOSIS — Z11.59 NEED FOR HEPATITIS C SCREENING TEST: ICD-10-CM

## 2021-03-11 DIAGNOSIS — E78.2 MIXED HYPERLIPIDEMIA: Chronic | ICD-10-CM

## 2021-03-11 DIAGNOSIS — Z11.4 SCREENING FOR HIV (HUMAN IMMUNODEFICIENCY VIRUS): ICD-10-CM

## 2021-03-11 LAB
ALBUMIN SERPL-MCNC: 4.7 G/DL (ref 3.5–5.2)
ALP BLD-CCNC: 92 U/L (ref 35–104)
ALT SERPL-CCNC: 27 U/L (ref 5–33)
ANION GAP SERPL CALCULATED.3IONS-SCNC: 12 MMOL/L (ref 7–19)
AST SERPL-CCNC: 24 U/L (ref 5–32)
BILIRUB SERPL-MCNC: 0.4 MG/DL (ref 0.2–1.2)
BUN BLDV-MCNC: 12 MG/DL (ref 8–23)
CALCIUM SERPL-MCNC: 9.7 MG/DL (ref 8.8–10.2)
CHLORIDE BLD-SCNC: 104 MMOL/L (ref 98–111)
CHOLESTEROL, TOTAL: 200 MG/DL (ref 160–199)
CO2: 28 MMOL/L (ref 22–29)
CREAT SERPL-MCNC: 0.7 MG/DL (ref 0.5–0.9)
GFR AFRICAN AMERICAN: >59
GFR NON-AFRICAN AMERICAN: >60
GLUCOSE BLD-MCNC: 90 MG/DL (ref 74–109)
HCT VFR BLD CALC: 43.8 % (ref 37–47)
HDLC SERPL-MCNC: 60 MG/DL (ref 65–121)
HEMOGLOBIN: 13.8 G/DL (ref 12–16)
HEPATITIS C ANTIBODY INTERPRETATION: NORMAL
HIV-1 P24 AG: NORMAL
LDL CHOLESTEROL CALCULATED: 123 MG/DL
MCH RBC QN AUTO: 26.5 PG (ref 27–31)
MCHC RBC AUTO-ENTMCNC: 31.5 G/DL (ref 33–37)
MCV RBC AUTO: 84.1 FL (ref 81–99)
PDW BLD-RTO: 14.7 % (ref 11.5–14.5)
PLATELET # BLD: 275 K/UL (ref 130–400)
PMV BLD AUTO: 12 FL (ref 9.4–12.3)
POTASSIUM SERPL-SCNC: 4.6 MMOL/L (ref 3.5–5)
RAPID HIV 1&2: NORMAL
RBC # BLD: 5.21 M/UL (ref 4.2–5.4)
SODIUM BLD-SCNC: 144 MMOL/L (ref 136–145)
TOTAL PROTEIN: 6.8 G/DL (ref 6.6–8.7)
TRIGL SERPL-MCNC: 83 MG/DL (ref 0–149)
TSH SERPL DL<=0.05 MIU/L-ACNC: 1.16 UIU/ML (ref 0.27–4.2)
VITAMIN D 25-HYDROXY: 33.9 NG/ML
WBC # BLD: 6.1 K/UL (ref 4.8–10.8)

## 2021-03-19 ENCOUNTER — OFFICE VISIT (OUTPATIENT)
Dept: INTERNAL MEDICINE | Age: 61
End: 2021-03-19
Payer: COMMERCIAL

## 2021-03-19 VITALS
HEART RATE: 70 BPM | HEIGHT: 67 IN | OXYGEN SATURATION: 98 % | DIASTOLIC BLOOD PRESSURE: 86 MMHG | BODY MASS INDEX: 24.48 KG/M2 | SYSTOLIC BLOOD PRESSURE: 132 MMHG | WEIGHT: 156 LBS

## 2021-03-19 DIAGNOSIS — Z78.0 POSTMENOPAUSAL: ICD-10-CM

## 2021-03-19 DIAGNOSIS — F34.1 DYSTHYMIA: ICD-10-CM

## 2021-03-19 DIAGNOSIS — E55.9 VITAMIN D DEFICIENCY: ICD-10-CM

## 2021-03-19 DIAGNOSIS — D68.51 FACTOR V LEIDEN (HCC): ICD-10-CM

## 2021-03-19 DIAGNOSIS — I10 ESSENTIAL HYPERTENSION: ICD-10-CM

## 2021-03-19 DIAGNOSIS — Z00.00 ANNUAL PHYSICAL EXAM: Primary | ICD-10-CM

## 2021-03-19 DIAGNOSIS — E78.2 MIXED HYPERLIPIDEMIA: Chronic | ICD-10-CM

## 2021-03-19 DIAGNOSIS — Z12.31 SCREENING MAMMOGRAM, ENCOUNTER FOR: ICD-10-CM

## 2021-03-19 DIAGNOSIS — I26.99 BILATERAL PULMONARY EMBOLISM (HCC): ICD-10-CM

## 2021-03-19 PROCEDURE — 99396 PREV VISIT EST AGE 40-64: CPT | Performed by: INTERNAL MEDICINE

## 2021-03-19 NOTE — PROGRESS NOTES
Chief Complaint   Patient presents with    Annual Exam       HPI: Patient is here today for annual physical exam and to follow-up medical problems hypertension hyperlipidemia recent DVT and pulmonary embolism. Patient is gradually improving her strength she is quite active she still gets more short of breath than she used to but it is improving.     Past Medical History:   Diagnosis Date    Cervical disc disease 8/26/2017    DVT (deep venous thrombosis) (HCC)     Essential hypertension 12/1/2019    Gastroesophageal reflux disease without esophagitis 8/26/2017    Hyperlipidemia     Major depressive disorder in remission ([facility] [address] 8/26/2017    Migraine without aura 8/26/2017    Pernicious anemia 8/26/2017    Raynaud's phenomenon 8/26/2017       Past Surgical History:   Procedure Laterality Date    ARTERIOGRAM Bilateral 9/2/2020    ECOS-PULMONARY performed by Karolina Warner MD at [facility] OR    [facility]         Family History   Problem Relation Age of Onset    High Blood Pressure Mother     Coronary Art Dis Mother     Thyroid Disease Mother         Hypothyroidism    Breast Cancer Mother 80    Colon Polyps Father     Prostate Cancer Father     Anemia Father         Pernicious    Colon Polyps Sister     Anemia Brother         Pernicious    Cancer Brother 46        prostate cancer    High Blood Pressure Paternal Grandmother        Social History     Socioeconomic History    Marital status:      Spouse name: Not on file    Number of children: Not on file    Years of education: Not on file    Highest education level: Not on file   Occupational History    Not on file   Social Needs    Financial resource strain: Not on file    Food insecurity     Worry: Not on file     Inability: Not on file    Transportation needs     Medical: Not on file     Non-medical: Not on file   Tobacco Use    Smoking status: Never Smoker    Smokeless tobacco: Never Used   Substance and Sexual Activity    Alcohol TABLET BY MOUTH EVERY DAY 90 tablet 3     No current facility-administered medications for this visit. Review of Systems   Constitutional: Negative for chills, fatigue and fever. HENT: Negative for congestion and sinus pressure. Eyes: Negative for discharge and redness. Respiratory: Positive for shortness of breath. Negative for cough. Cardiovascular: Negative for chest pain, palpitations and leg swelling. Gastrointestinal: Negative for abdominal distention and abdominal pain. Genitourinary: Negative for dysuria, frequency and urgency. Musculoskeletal: Negative for arthralgias and back pain. Skin: Negative for rash and wound. Neurological: Negative for dizziness, weakness, light-headedness and headaches. Psychiatric/Behavioral: Negative for sleep disturbance. /86   Pulse 70   Ht 5' 7\" (1.702 m)   Wt 156 lb (70.8 kg)   SpO2 98%   BMI 24.43 kg/m²   BP Readings from Last 7 Encounters:   03/19/21 132/86   02/01/21 102/68   12/17/20 120/78   11/05/20 112/80   10/01/20 120/70   09/18/20 98/66   09/14/20 100/60     Wt Readings from Last 7 Encounters:   03/19/21 156 lb (70.8 kg)   02/01/21 156 lb (70.8 kg)   11/05/20 153 lb (69.4 kg)   10/01/20 154 lb 4.8 oz (70 kg)   09/18/20 152 lb 6.4 oz (69.1 kg)   09/14/20 150 lb (68 kg)   09/05/20 156 lb 9.6 oz (71 kg)     BMI Readings from Last 7 Encounters:   03/19/21 24.43 kg/m²   02/01/21 24.43 kg/m²   11/05/20 23.96 kg/m²   10/01/20 24.17 kg/m²   09/18/20 23.87 kg/m²   09/14/20 23.49 kg/m²   09/05/20 24.53 kg/m²     Resp Readings from Last 7 Encounters:   09/05/20 18   09/02/20 (!) 3   10/24/16 16       Physical Exam  Constitutional:       General: She is not in acute distress. Appearance: Normal appearance. She is well-developed. HENT:      Right Ear: External ear normal. Tympanic membrane is not injected. Left Ear: External ear normal. Tympanic membrane is not injected.       Mouth/Throat:      Pharynx: No oropharyngeal exudate. Eyes:      General: No scleral icterus. Conjunctiva/sclera: Conjunctivae normal.   Neck:      Musculoskeletal: Neck supple. Thyroid: No thyroid mass or thyromegaly. Vascular: No carotid bruit. Cardiovascular:      Rate and Rhythm: Normal rate and regular rhythm. Heart sounds: S1 normal and S2 normal. No murmur. No S3 or S4 sounds. Pulmonary:      Effort: Pulmonary effort is normal. No respiratory distress. Breath sounds: Normal breath sounds. No wheezing or rales. Abdominal:      General: Bowel sounds are normal. There is no distension. Palpations: Abdomen is soft. There is no mass. Tenderness: There is no abdominal tenderness. Lymphadenopathy:      Cervical: No cervical adenopathy. Upper Body:      Right upper body: No supraclavicular adenopathy. Left upper body: No supraclavicular adenopathy. Skin:     Findings: No rash. Neurological:      Mental Status: She is alert and oriented to person, place, and time. Cranial Nerves: No cranial nerve deficit.          Results for orders placed or performed in visit on 03/11/21   HIV Rapid 1&2   Result Value Ref Range    Rapid HIV 1&2 Non-reactive Non-reactive    HIV-1 P24 Ag Non-reactive Non-reactive   Hepatitis C Antibody   Result Value Ref Range    Hep C Ab Interp Non-Reactive    Vitamin D 25 Hydroxy   Result Value Ref Range    Vit D, 25-Hydroxy 33.9 >=30 ng/mL   Lipid Panel   Result Value Ref Range    Cholesterol, Total 200 (H) 160 - 199 mg/dL    Triglycerides 83 0 - 149 mg/dL    HDL 60 (L) 65 - 121 mg/dL    LDL Calculated 123 <100 mg/dL   TSH without Reflex   Result Value Ref Range    TSH 1.160 0.270 - 4.200 uIU/mL   Comprehensive Metabolic Panel   Result Value Ref Range    Sodium 144 136 - 145 mmol/L    Potassium 4.6 3.5 - 5.0 mmol/L    Chloride 104 98 - 111 mmol/L    CO2 28 22 - 29 mmol/L    Anion Gap 12 7 - 19 mmol/L    Glucose 90 74 - 109 mg/dL    BUN 12 8 - 23 mg/dL    CREATININE 0.7 0.5 - 0.9 mg/dL    GFR Non-African American >60 >60    GFR African American >59 >59    Calcium 9.7 8.8 - 10.2 mg/dL    Total Protein 6.8 6.6 - 8.7 g/dL    Albumin 4.7 3.5 - 5.2 g/dL    Total Bilirubin 0.4 0.2 - 1.2 mg/dL    Alkaline Phosphatase 92 35 - 104 U/L    ALT 27 5 - 33 U/L    AST 24 5 - 32 U/L   CBC   Result Value Ref Range    WBC 6.1 4.8 - 10.8 K/uL    RBC 5.21 4.20 - 5.40 M/uL    Hemoglobin 13.8 12.0 - 16.0 g/dL    Hematocrit 43.8 37.0 - 47.0 %    MCV 84.1 81.0 - 99.0 fL    MCH 26.5 (L) 27.0 - 31.0 pg    MCHC 31.5 (L) 33.0 - 37.0 g/dL    RDW 14.7 (H) 11.5 - 14.5 %    Platelets 684 319 - 464 K/uL    MPV 12.0 9.4 - 12.3 fL       ASSESSMENT/ PLAN:  1. Annual physical exam  Chart medications labs vaccines reviewed keep up-to-date with routine medical care and follow-up call with any problems or complaints keep up-to-date with bone density mammogram colonoscopy and follow    2. Bilateral pulmonary embolism ([facility] [address]  Ongoing therapy with Eliquis continue current care monitor    3. Screening mammogram, encounter for    - JOANA DIGITAL SCREEN W OR WO CAD BILATERAL; Future    4. Postmenopausal  Bone density will be due we put in an order  - DEXA BONE DENSITY 2 SITES; Future    5. Mixed hyperlipidemia  Stable without medical therapy at this time  - CBC; Future  - Comprehensive Metabolic Panel; Future  - TSH without Reflex; Future  - Lipid Panel; Future    6. Vitamin D deficiency  Vitamin D levels vitamin D supplementation  - Vitamin D 25 Hydroxy; Future    7. Dysthymia  She does well with the Pristiq tried altering this and did not do as well continue with current meds risk-benefit weighed    8. Essential hypertension  Pressure stable a little higher today than it has been we will monitor this keep working on exercise continue to follow    9.  Factor V Leiden ([facility] [address]  Chronic anticoagulation patient had unprovoked bilateral PE and needs chronic ongoing anticoagulation    We recommend the COVID-19 vaccine and she has had her first dose

## 2021-03-22 PROBLEM — Z87.891 PERSONAL HISTORY OF NICOTINE DEPENDENCE: Chronic | Status: ACTIVE | Noted: 2020-12-22

## 2021-03-22 PROBLEM — K21.9 GASTROESOPHAGEAL REFLUX DISEASE WITHOUT ESOPHAGITIS: Chronic | Status: ACTIVE | Noted: 2017-08-26

## 2021-03-22 NOTE — PROGRESS NOTES
" ELOISE Comer  [facility]   Res[facility] Disease Clinic  [address] [place] [address]  Phone: [phone]  Fax: [phone]       Chief Complaint  Shortness of Breath    Subjective    History of Present Illness      Kendal Valladares presents to [facility] PULMONARY [facility] CRITICAL CARE MEDICINE   History of Present Illness   The patient had a known unprovoked PE with right heart strain and subacute LLE DVT 9/2/2020.  She received catheter directed thrombolysis 9/2/2020.  She is following with vascular and hematology.  She remains on eliquis.  The 2d echo obtained on September 20, 2020 showed right ventricle global systolic function severely reduced.  Severely dilated right ventricle.  Severe pulmonary hypertension with an RVSP of 73 mmHg.  Repeat 2d echo from December 16, 2020 showed normal right atrial size.  Normal right ventricular chamber size and function.  Normal left ventricular chamber size and systolic function.  There was only mild pulmonary hypertension with an RVSP of 31 mmHg.  Prothrombotic work-up 9/18/2020 per hem/onc revealed Factor V Leiden: Heterozygous for single or 506Q mutation, Factor II: Heterozygous [id number] mutation.  They recommend long term anticoagulation.  The patient presents today to discuss pulmonary function testing.  Pulmonary function test is consistent with moderate small airway disease, reduced residual volume, slightly reduced diffusion, and flattened inspiratory loop.  The patient complains of shortness of breath with exertion.  She states that she has some right-sided leg pain.  She states that she was hiking on Saturday and a large dog ran into her leg.  I did not notice bruising or swelling to the area.  Patient denies fevers, chills, cough with purulent sputum.    Objective   Vital Signs:   /64   Pulse 77   Ht 170.2 cm (67\")   Wt 71.2 kg (157 lb)   SpO2 98% Comment: ra  BMI 24.59 kg/m²     Physical Exam  Vitals " reviewed.   Constitutional:       General: She is not in acute distress.     Appearance: She is well-developed.      Interventions: Face mask in place.   HENT:      Head: Normocephalic and atraumatic.   Eyes:      General: No scleral icterus.     Conjunctiva/sclera: Conjunctivae normal.      Pupils: Pupils are equal, round, and reactive to light.   Cardiovascular:      Rate and Rhythm: Normal rate.   Pulmonary:      Effort: Pulmonary effort is normal. No respiratory distress.      Breath sounds: Normal breath sounds. No wheezing or rales.   Musculoskeletal:         General: Normal range of motion.      Cervical back: Normal range of motion and neck supple.   Skin:     General: Skin is warm and dry.   Neurological:      Mental Status: She is alert and oriented to person, place, and time.   Psychiatric:         Behavior: Behavior normal.         Thought Content: Thought content normal.         Judgment: Judgment normal.        Result Review :   The following data was reviewed by: ELOISE Comer on 03/23/2021:  CTA PULMONARY W CONTRAST 10/2/2020 9:14 AM   HISTORY: R07.9   COMPARISON: CT scans dated 9/4/2020 and 9/2/2020.   DLP: 234 mGy cm   TECHNIQUE: Helical tomographic images of the chest were obtained after   the administration of intravenous contrast following angiogram   protocol. Additionally, 3D MIP reconstructions in the coronal and   sagittal planes were provided. Automated exposure control was also   utilized to decrease patient radiation dose.   FINDINGS:     Pulmonary arteries: There is adequate enhancement of the pulmonary   arteries to evaluate for central and segmental pulmonary emboli.   Previously identified multifocal pulmonary embolus has continued to   clear, with very little residual embolus identified. No new embolus is   present. Main pulmonary artery is nondilated, measuring 25 mm in   greatest axial diameter (series 2-image 61).   Aorta and great vessels: Thoracic aorta is normal in  caliber. Minimal   calcified plaque in the arch. No flow-limiting stenosis at the arch   origins. Descending thoracic aorta is slightly tortuous.   Neck base: The imaged portion of the base of the neck appears   unremarkable.   Lungs: Previously identified areas of consolidation and groundglass   attenuation are near completely resolved. In particular, a medial   segment right middle lobe consolidation from the prior exam is a   developing a thin linear scar. No new consolidation. No pleural   effusion. The airways are clear..   Heart: The heart is normal in size. There is no pericardial effusion.   Lymph nodes: No pathologically enlarged mediastinal, hilar, or   axillary lymph nodes are present.   Bones and soft tissues: The osseous structures of the thorax and   surrounding soft tissues demonstrate no acute process.   Upper abdomen: The imaged portion of the upper abdomen demonstrates no   acute process.   IMPRESSION:   1. Previously identified multifocal pulmonary embolus has continued to   clear, with very little residual embolus identified. No new embolus is   present.   2. Central pulmonary arteries are nondilated.   3. Developing medial segment right middle lobe scar at the site of   prior consolidation/infarct.   Signed by Dr Norberto Iqbal on 10/2/2020 10:47 AM    Rest/Exercise Pulse Ox Values        Some values may be hidden. Unless noted otherwise, only the newest values recorded on each date are displayed.         Rest/Exercise Pulse Ox Results 12/22/20   Rest room air SAT % 97   Exercise room air SAT % 97         SCANNED - PULMONARY RESULTS (02/12/2021 00:00)      My interpretation of the PFT: Spirometry shows reduced mid flow consistent with moderate small airway disease.  Lung volumes show a slightly reduced total lung capacity and severely reduced residual volume.  Diffusion shows a moderate diffusion impairment that remains only slightly reduced when corrected for alveolar volume.  The inspiratory  loop is somewhat flattened.         Assessment and Plan    Diagnoses and all orders for this visit:    1. Small airways disease (Primary)  Comments:  Trial Anoro.  Inhaler demonstration was provided.  Orders:  -     umeclidinium-vilanterol (Anoro Ellipta) 62.5-25 MCG/INH aerosol powder  inhaler; Inhale 1 puff Daily for 7 days.  Dispense: 1 each; Refill: 0    2. Gastroesophageal reflux disease without esophagitis  Comments:  Continue antacid as needed    3. Personal history of nicotine dependence  Comments:  Former smoker    4. Abnormal PFT  Comments:  Flat inspiratory loop.  Refer to ENT for further evaluation.  Orders:  -     Ambulatory Referral to ENT (Otolaryngology)    5. Scarring of lung  Comments:  Per CT 10/22/2020 - Developing medial segment right middle lobe scar at the site of prior consolidation/infarct. f/u CT in 1 month.     Orders:  -     Cancel: CT Chest Without Contrast; Future  -     CT Chest Without Contrast; Future      Health maintenance:   Influenza vaccine: yes  Pneumovax 23: yes  Prevnar 13: no  Covid 19: has had 1st dose of moderna vaccine   Patient's Body mass index is 24.59 kg/m². BMI is within normal parameters. No follow-up required.    Follow Up   Felipa Gomez, APRN  3/23/2021  13:52 CDT  Return in about 7 months (around 10/25/2021).  Patient was given instructions and counseling regarding her condition or for health maintenance advice. Please see specific information pulled into the AVS if appropriate.

## 2021-03-23 ENCOUNTER — OFFICE VISIT (OUTPATIENT)
Dept: PULMONOLOGY | Facility: CLINIC | Age: 61
End: 2021-03-23

## 2021-03-23 ENCOUNTER — TELEPHONE (OUTPATIENT)
Dept: PULMONOLOGY | Facility: CLINIC | Age: 61
End: 2021-03-23

## 2021-03-23 VITALS
WEIGHT: 157 LBS | DIASTOLIC BLOOD PRESSURE: 64 MMHG | BODY MASS INDEX: 24.64 KG/M2 | SYSTOLIC BLOOD PRESSURE: 120 MMHG | HEART RATE: 77 BPM | OXYGEN SATURATION: 98 % | HEIGHT: 67 IN

## 2021-03-23 DIAGNOSIS — Z87.891 PERSONAL HISTORY OF NICOTINE DEPENDENCE: Chronic | ICD-10-CM

## 2021-03-23 DIAGNOSIS — J98.4 SCARRING OF LUNG: ICD-10-CM

## 2021-03-23 DIAGNOSIS — R94.2 ABNORMAL PFT: ICD-10-CM

## 2021-03-23 DIAGNOSIS — J98.4 SMALL AIRWAYS DISEASE: Primary | ICD-10-CM

## 2021-03-23 DIAGNOSIS — K21.9 GASTROESOPHAGEAL REFLUX DISEASE WITHOUT ESOPHAGITIS: Chronic | ICD-10-CM

## 2021-03-23 PROCEDURE — 99214 OFFICE O/P EST MOD 30 MIN: CPT | Performed by: NURSE PRACTITIONER

## 2021-03-23 PROCEDURE — 94664 DEMO&/EVAL PT USE INHALER: CPT | Performed by: NURSE PRACTITIONER

## 2021-03-23 RX ORDER — CYANOCOBALAMIN 1000 UG/ML
1000 INJECTION, SOLUTION INTRAMUSCULAR; SUBCUTANEOUS
COMMUNITY
Start: 2020-12-30 | End: 2021-03-23 | Stop reason: SDUPTHER

## 2021-03-23 NOTE — TELEPHONE ENCOUNTER
----- Message from ELOISE Comer sent at 3/23/2021  1:51 PM CDT -----  Regarding: CT chest  Will you please call and the patient know that she will actually be due for the follow-up CT in 1 month.  I will change the order and we will call her with the results of that thank you.

## 2021-03-23 NOTE — PATIENT INSTRUCTIONS
Gastroesophageal Reflux Disease, Adult  Gastroesophageal reflux (SUPA) happens when acid from the stomach flows up into the tube that connects the mouth and the stomach (esophagus). Normally, food travels down the esophagus and stays in the stomach to be digested. With SUPA, food and stomach acid sometimes move back up into the esophagus. You may have a disease called gastroesophageal reflux disease (GERD) if the reflux:  · Happens often.  · Causes frequent or very bad symptoms.  · Causes problems such as damage to the esophagus.  When this happens, the esophagus becomes sore and swollen (inflamed). Over time, GERD can make small holes (ulcers) in the lining of the esophagus.  What are the causes?  This condition is caused by a problem with the muscle between the esophagus and the stomach. When this muscle is weak or not normal, it does not close properly to keep food and acid from coming back up from the stomach. The muscle can be weak because of:  · Tobacco use.  · Pregnancy.  · Having a certain type of hernia (hiatal hernia).  · Alcohol use.  · Certain foods and drinks, such as coffee, chocolate, onions, and peppermint.  What increases the risk?  You are more likely to develop this condition if you:  · Are overweight.  · Have a disease that affects your connective tissue.  · Use NSAID medicines.  What are the signs or symptoms?  Symptoms of this condition include:  · Heartburn.  · Difficult or painful swallowing.  · The feeling of having a lump in the throat.  · A bitter taste in the mouth.  · Bad breath.  · Having a lot of saliva.  · Having an upset or bloated stomach.  · Belching.  · Chest pain. Different conditions can cause chest pain. Make sure you see your doctor if you have chest pain.  · Shortness of breath or noisy breathing (wheezing).  · Ongoing (chronic) cough or a cough at night.  · Wearing away of the surface of teeth (tooth enamel).  · Weight loss.  How is this treated?  Treatment will depend on how  bad your symptoms are. Your doctor may suggest:  · Changes to your diet.  · Medicine.  · Surgery.  Follow these instructions at home:  Eating and drinking    · Follow a diet as told by your doctor. You may need to avoid foods and drinks such as:  ? Coffee and tea (with or without caffeine).  ? Drinks that contain alcohol.  ? Energy drinks and sports drinks.  ? Bubbly (carbonated) drinks or sodas.  ? Chocolate and cocoa.  ? Peppermint and mint flavorings.  ? Garlic and onions.  ? Horseradish.  ? Spicy and acidic foods. These include peppers, chili powder, prado powder, vinegar, hot sauces, and BBQ sauce.  ? Citrus fruit juices and citrus fruits, such as oranges, deja, and limes.  ? Tomato-based foods. These include red sauce, chili, salsa, and pizza with red sauce.  ? Fried and fatty foods. These include donuts, french fries, potato chips, and high-fat dressings.  ? High-fat meats. These include hot dogs, rib eye steak, sausage, ham, and nava.  ? High-fat dairy items, such as whole milk, butter, and cream cheese.  · Eat small meals often. Avoid eating large meals.  · Avoid drinking large amounts of liquid with your meals.  · Avoid eating meals during the 2-3 hours before bedtime.  · Avoid lying down right after you eat.  · Do not exercise right after you eat.  Lifestyle    · Do not use any products that contain nicotine or tobacco. These include cigarettes, e-cigarettes, and chewing tobacco. If you need help quitting, ask your doctor.  · Try to lower your stress. If you need help doing this, ask your doctor.  · If you are overweight, lose an amount of weight that is healthy for you. Ask your doctor about a safe weight loss goal.  General instructions  · Pay attention to any changes in your symptoms.  · Take over-the-counter and prescription medicines only as told by your doctor. Do not take aspirin, ibuprofen, or other NSAIDs unless your doctor says it is okay.  · Wear loose clothes. Do not wear anything tight  around your waist.  · Raise (elevate) the head of your bed about 6 inches (15 cm).  · Avoid bending over if this makes your symptoms worse.  · Keep all follow-up visits as told by your doctor. This is important.  Contact a doctor if:  · You have new symptoms.  · You lose weight and you do not know why.  · You have trouble swallowing or it hurts to swallow.  · You have wheezing or a cough that keeps happening.  · Your symptoms do not get better with treatment.  · You have a hoarse voice.  Get help right away if:  · You have pain in your arms, neck, jaw, teeth, or back.  · You feel sweaty, dizzy, or light-headed.  · You have chest pain or shortness of breath.  · You throw up (vomit) and your throw-up looks like blood or coffee grounds.  · You pass out (faint).  · Your poop (stool) is bloody or black.  · You cannot swallow, drink, or eat.  Summary  · If a person has gastroesophageal reflux disease (GERD), food and stomach acid move back up into the esophagus and cause symptoms or problems such as damage to the esophagus.  · Treatment will depend on how bad your symptoms are.  · Follow a diet as told by your doctor.  · Take all medicines only as told by your doctor.  This information is not intended to replace advice given to you by your health care provider. Make sure you discuss any questions you have with your health care provider.  Document Revised: 06/26/2019 Document Reviewed: 06/26/2019  [employer] Patient Education © 2021 [employer]

## 2021-03-23 NOTE — TELEPHONE ENCOUNTER
It is listed as past medical history in Dr. Brantley's office notes, Harriet Verdugo office notes, and Felipa Reilly's offic notes.  We include it in pulmonary because if reflux flairs then often times lung problems flair. Thus antacid as needed.

## 2021-03-23 NOTE — TELEPHONE ENCOUNTER
Patient notified CT will be done in one month.    She is asking why she has reflux on her office note? She does not take an medicine for it.

## 2021-03-31 ASSESSMENT — ENCOUNTER SYMPTOMS
EYE DISCHARGE: 0
ABDOMINAL PAIN: 0
SINUS PRESSURE: 0
COUGH: 0
SHORTNESS OF BREATH: 1
BACK PAIN: 0
ABDOMINAL DISTENTION: 0
EYE REDNESS: 0

## 2021-04-06 ENCOUNTER — OFFICE VISIT (OUTPATIENT)
Dept: ENT CLINIC | Age: 61
End: 2021-04-06
Payer: COMMERCIAL

## 2021-04-06 VITALS — HEIGHT: 67 IN | WEIGHT: 152 LBS | BODY MASS INDEX: 23.86 KG/M2

## 2021-04-06 DIAGNOSIS — R09.89 GLOBUS SENSATION: Primary | ICD-10-CM

## 2021-04-06 DIAGNOSIS — J38.1 VOCAL CORD POLYPS: ICD-10-CM

## 2021-04-06 PROBLEM — R09.A2 GLOBUS SENSATION: Status: ACTIVE | Noted: 2021-04-06

## 2021-04-06 PROCEDURE — 99203 OFFICE O/P NEW LOW 30 MIN: CPT | Performed by: PHYSICIAN ASSISTANT

## 2021-04-06 PROCEDURE — 31575 DIAGNOSTIC LARYNGOSCOPY: CPT | Performed by: PHYSICIAN ASSISTANT

## 2021-04-06 RX ORDER — OMEPRAZOLE 20 MG/1
20 CAPSULE, DELAYED RELEASE ORAL
Qty: 60 CAPSULE | Refills: 2 | Status: SHIPPED | OUTPATIENT
Start: 2021-04-06 | End: 2021-07-21

## 2021-04-06 RX ORDER — UMECLIDINIUM BROMIDE AND VILANTEROL TRIFENATATE 62.5; 25 UG/1; UG/1
1 POWDER RESPIRATORY (INHALATION)
COMMUNITY
Start: 2021-03-23 | End: 2021-11-03 | Stop reason: ALTCHOICE

## 2021-04-06 ASSESSMENT — ENCOUNTER SYMPTOMS
EYE PAIN: 0
RHINORRHEA: 0
SORE THROAT: 0
FACIAL SWELLING: 0
SINUS PRESSURE: 0
TROUBLE SWALLOWING: 0
VOICE CHANGE: 0
EYE DISCHARGE: 0
SINUS PAIN: 0

## 2021-04-06 NOTE — ASSESSMENT & PLAN NOTE
Bilateral vocal cord polyps-incidentally noted today with laryngoscopy with no vocal complaints  Plan: We will treat this with PPI therapy for 3 months. She will need a repeat laryngoscopy after the 3-month therapy to see if the polyps have reduced in size. Patient is reminded to call if she notices any vocal changes.

## 2021-04-06 NOTE — ASSESSMENT & PLAN NOTE
Globus sensation secondary to LPR with no evidence of laryngomalacia  Plan: I will place the patient on omeprazole 20 mg twice a day for 3 months for her symptoms. She is to follow-up in 1 month as a virtual visit for reevaluation.

## 2021-04-06 NOTE — PROGRESS NOTES
[facility] OTOLARYNGOLOGY/ENT  Maddie Solis is a pleasant 58-year-old  female that was referred by Essie Castellano due to recent findings of a flat inspiratory loop after PFTs. Maddie Solis reports that she initially had a saddle pulmonary emboli that resulted in bilateral PEs. She was treated by Dr. Kristina Mejia with thrombolytics and overall has done well. Her work-up demonstrated a new diagnosis of factor V Leiden as well as a factor II deficiency. She is currently followed by hematology with chronic anticoag therapy. Currently she reports no problems with dysphagia to include solid and liquids. She also denies any complaints of a sore throat or any feeling of respiratory distress. She currently does not require O2 support.         Allergies: Demerol  [meperidine hcl] and Pcn [penicillins]      Current Outpatient Medications   Medication Sig Dispense Refill    umeclidinium-vilanterol (ANORO ELLIPTA) 62.5-25 MCG/INH AEPB inhaler Inhale 1 puff into the lungs Daily      omeprazole (PRILOSEC) 20 MG delayed release capsule Take 1 capsule by mouth 2 times daily (before meals) 60 capsule 2    Cholecalciferol (VITAMIN D) 50 MCG (2000 UT) CAPS capsule Take 2,000 Units by mouth daily      Multiple Vitamins-Minerals (PRESERVISION AREDS 2+MULTI VIT PO) Take 1 tablet by mouth 2 times daily      rizatriptan (MAXALT) 10 MG tablet May repeat in 2 hours if needed do not exceed 2 doses in 24 hours 9 tablet 5    cyanocobalamin 1000 MCG/ML injection Inject 1 mL into the muscle every 30 days 3 mL 2    tiZANidine (ZANAFLEX) 4 MG tablet Take 1 tablet by mouth every 12 hours as needed (muscle spasms) 180 tablet 1    verapamil (CALAN SR) 240 MG extended release tablet Take 1 tablet by mouth daily 90 tablet 3    desvenlafaxine succinate (PRISTIQ) 50 MG TB24 extended release tablet Take 1 tablet by mouth daily 90 tablet 3    apixaban (ELIQUIS) 5 MG TABS tablet Take 1 tablet by mouth 2 times daily 180 tablet 0    nortriptyline (PAMELOR) 10 MG capsule TAKE TWO CAPSULES BY MOUTH EVERY NIGHT AT BEDTIME 180 capsule 3    lisinopril (PRINIVIL;ZESTRIL) 10 MG tablet TAKE 1 TABLET BY MOUTH EVERY DAY 90 tablet 3     No current facility-administered medications for this visit. Past Surgical History:   Procedure Laterality Date    ARTERIOGRAM Bilateral 2020    [facility]    [facility]         Past Medical History:   Diagnosis Date    Cervical disc disease 2017    [facility]     Essential hypertension 2019    Gastroesophageal reflux disease without esophagitis 2017    Hyperlipidemia     Major depressive disorder in remission ([facility] 2017    Migraine without aura 2017    Pernicious anemia 2017    Raynaud's phenomenon 2017       Family History   Problem Relation Age of Onset    High Blood Pressure Mother     Coronary Art Dis Mother     Thyroid Disease Mother         Hypothyroidism    Breast Cancer Mother 80    Colon Polyps Father     Prostate Cancer Father     Anemia Father         Pernicious    Colon Polyps Sister     Anemia Brother         Pernicious    Cancer Brother 46        prostate cancer    High Blood Pressure Paternal Grandmother        Social History     Tobacco Use    Smoking status: Former Smoker     Quit date:      Years since quittin.2    Smokeless tobacco: Never Used   Substance Use Topics    Alcohol use: Yes     Comment: rare           REVIEW OF SYSTEMS:  all other systems reviewed and are negative  Review of Systems   Constitutional: Negative for chills and fever. HENT: Negative for congestion, dental problem, ear discharge, ear pain, facial swelling, hearing loss, nosebleeds, postnasal drip, rhinorrhea, sinus pressure, sinus pain, sneezing, sore throat, tinnitus, trouble swallowing and voice change. Eyes: Negative for pain and discharge.    Neurological: Negative for dizziness and headaches. Comments:     PHYSICAL EXAM:    Ht 5' 7\" (1.702 m)   Wt 152 lb (68.9 kg)   BMI 23.81 kg/m²   Body mass index is 23.81 kg/m². General Appearance: well developed  and well nourished  Head/ Face: normocephalic and atraumatic  Vocal Quality: good/ normal  Ears: Right Ear: External: external ears normal Otoscopy Ear Canal: canal clear Otoscopy TM: TM's normal and TM's mobile Left Ear: External: external ears normal Otoscopy Ear Canal: canal clear Otoscopy TM: TM's normal and TM's mobile  Hearing: grossly intact  Nose: see endoscopy report  Neck: supple and adenopathy none palpable  Thyroid: normal, tender No and nodules No    Assessment & Plan:    Problem List Items Addressed This Visit     Vocal cord polyps     Bilateral vocal cord polyps-incidentally noted today with laryngoscopy with no vocal complaints  Plan: We will treat this with PPI therapy for 3 months. She will need a repeat laryngoscopy after the 3-month therapy to see if the polyps have reduced in size. Patient is reminded to call if she notices any vocal changes. Globus sensation - Primary     Globus sensation secondary to LPR with no evidence of laryngomalacia  Plan: I will place the patient on omeprazole 20 mg twice a day for 3 months for her symptoms. She is to follow-up in 1 month as a virtual visit for reevaluation. Relevant Orders    ND LARYNGOSCOPY FLEXIBLE DIAGNOSTIC (Completed)          Orders Placed This Encounter   Procedures    ND LARYNGOSCOPY FLEXIBLE DIAGNOSTIC     The nares were anesthetized with 4% lidocaine aerosol bilaterally. Each nare was individually evaluated where she is found to have mildly engorged turbinates with no nasal polyps. The right nare was utilized for the full procedure. The posterior wall the nasopharyngeal region demonstrated no masses or abnormalities.   The scope was advanced to the oropharyngeal region where the patient was noted to have normal swallowing to observation. There was no masses or erythema present. The scope was advanced to the epiglottis where the vocal cords were well visualized. The vocal cords demonstrated bilateral nodules to be present. The vocal cords were noted to be symmetrical and fully functional.  No masses were appreciated with mild erythema at the base of the cords with no edema. The patient tolerated the procedure nicely with no complications. Orders Placed This Encounter   Medications    omeprazole (PRILOSEC) 20 MG delayed release capsule     Sig: Take 1 capsule by mouth 2 times daily (before meals)     Dispense:  60 capsule     Refill:  2       Electronically signed by Tawanda Romero PA-C on 4/6/21 at 12:28 PM CDT          Please note that this chart was generated using dragon dictation software. Although every effort was made to ensure the accuracy of this automated transcription, some errors in transcription may have occurred.

## 2021-04-13 RX ORDER — APIXABAN 5 MG/1
TABLET, FILM COATED ORAL
Qty: 180 TABLET | Refills: 0 | Status: SHIPPED | OUTPATIENT
Start: 2021-04-13 | End: 2021-04-14 | Stop reason: SDUPTHER

## 2021-04-28 ENCOUNTER — TELEPHONE (OUTPATIENT)
Dept: PULMONOLOGY | Facility: CLINIC | Age: 61
End: 2021-04-28

## 2021-05-04 ENCOUNTER — HOSPITAL ENCOUNTER (OUTPATIENT)
Dept: CT IMAGING | Age: 61
Discharge: HOME | End: 2021-05-04
Payer: COMMERCIAL

## 2021-05-04 ENCOUNTER — TELEPHONE (OUTPATIENT)
Dept: PULMONOLOGY | Facility: CLINIC | Age: 61
End: 2021-05-04

## 2021-05-04 DIAGNOSIS — J98.4 SCARRING OF LUNG: ICD-10-CM

## 2021-05-04 PROCEDURE — 71250 CT THORAX DX C-: CPT

## 2021-05-05 NOTE — PROGRESS NOTES
Spoke with patient and relayed test results. Patient voiced understanding. Will fax to Dr. Ninoska Brantley and she will follow up with her.

## 2021-05-06 ENCOUNTER — TELEPHONE (OUTPATIENT)
Dept: INTERNAL MEDICINE | Age: 61
End: 2021-05-06

## 2021-05-06 ENCOUNTER — TELEMEDICINE (OUTPATIENT)
Dept: ENT CLINIC | Age: 61
End: 2021-05-06

## 2021-05-06 DIAGNOSIS — J38.1 VOCAL CORD POLYPS: ICD-10-CM

## 2021-05-06 DIAGNOSIS — K21.9 LARYNGOPHARYNGEAL REFLUX (LPR): Primary | ICD-10-CM

## 2021-05-06 PROCEDURE — 99024 POSTOP FOLLOW-UP VISIT: CPT | Performed by: PHYSICIAN ASSISTANT

## 2021-05-06 NOTE — PROGRESS NOTES
Fabián Garza (:  1960) is a 57 y.o. female,Established patient, here for evaluation of the following chief complaint(s): No chief complaint on file. ASSESSMENT/PLAN:  1. Laryngopharyngeal reflux (LPR)  2. Vocal cord polyps      Plan: I have recommended the patient to continue her PPI therapy for another 2 months. She is to follow-up with me in 1 month as a virtual visit for reevaluation. She was reminded to call if she has any questions or problems. I will need to repeat her laryngoscopy in July to reevaluate the vocal cord polyps after completion of therapy. She was reminded to call if she notices any changes in her voice. No follow-ups on file. SUBJECTIVE/OBJECTIVE:  STANISLAW Patel is a pleasant 25-year-old  female that presents for a 1 month follow-up after being treated for LPR. She is currently on omeprazole 20 mg twice a day. Her laryngoscopy demonstrated bilateral vocal cord nodules that were present. Currently she denies any hoarseness or any significant decline in her voice quality. She reports that she is having no dysphagia with solids or liquids. She also reports that she is able to breathe better with the medications and feels like she was having esophageal spasms prior to the medications. Overall she is quite happy with results. No flowsheet data found. Physical Exam  Patient's vocal's were normal quality with no evidence of hoarseness. The patient was noted to be in no acute distress to video evaluation. The rest of physical exam cannot be performed due to a virtual visit. On this date 2021 I have spent 10 minutes reviewing previous notes, test results and face to face (virtual) with the patient discussing the diagnosis and importance of compliance with the treatment plan as well as documenting on the day of the visit. Fabián Garza, was evaluated through a synchronous (real-time) audio-video encounter.  The patient (or guardian if applicable) is aware that this is a billable service. Verbal consent to proceed has been obtained within the past 12 months. The visit was conducted pursuant to the emergency declaration under the [address] [place] [address] authority and the [employer] General Act. Patient identification was verified, and a caregiver was present when appropriate. The patient was located in a state where the provider was credentialed to provide care.       An electronic signature was used to authenticate this note.    --Tawanda Romero PA-C       Electronically signed by Tawanda Romero PA-C on 5/6/21 at 1:19 PM CDT

## 2021-05-06 NOTE — TELEPHONE ENCOUNTER
The office that ordered the CT of chest wanted her to call PCP due to results:      A mild to moderate nonspecific left axillary   lymphadenopathy. The etiology and clinical significance is not   certain. This may be clinically correlated. She is asking if any thing else needs to be ordered?

## 2021-05-06 NOTE — TELEPHONE ENCOUNTER
Can you find out when she got her second Covid vaccine we just have down the one from March 1 so I assume she got it about April 1. If she got it in her left arm this lymphadenopathy is probably from that. They are recommending waiting 8 weeks to do mammograms and other tests after the Covid vaccine because of lymphadenopathy.   If you can find out if it if she had the vaccine in early April this is probably still reactive lymphadenopathy from that let me know when she had the vaccine and which arm thanks

## 2021-06-08 ENCOUNTER — TELEMEDICINE (OUTPATIENT)
Dept: ENT CLINIC | Age: 61
End: 2021-06-08
Payer: COMMERCIAL

## 2021-06-08 DIAGNOSIS — K21.9 LARYNGOPHARYNGEAL REFLUX (LPR): Primary | ICD-10-CM

## 2021-06-08 DIAGNOSIS — J38.1 VOCAL CORD POLYPS: ICD-10-CM

## 2021-06-08 PROCEDURE — 99212 OFFICE O/P EST SF 10 MIN: CPT | Performed by: PHYSICIAN ASSISTANT

## 2021-06-08 NOTE — LETTER
[facility] ENT  [address] [facility] [address] [facility] [address]  Phone: [phone]  Fax: [phone]    Gallo Cuellar    June 8, 2021     Apurva Sharp MD  [facility] [address] [facility] [address]    Patient: Naresh Cyr   MR Number: [id number]   YOB: 1960   Date of Visit: 6/8/2021       Dear Apurva Sharp:    Thank you for referring Darrel Mitchell to me for evaluation/treatment. Below are the relevant portions of my assessment and plan of care. ASSESSMENT/PLAN:  1. Laryngopharyngeal reflux (LPR)  2. Vocal cord polyps    Plan: I advised the patient to continue her PPI therapy for another month. I will have her to follow-up in 1 month in person for repeat laryngoscopy to reevaluate the vocal cord polyps. If the polyps are substantially smaller or resolved, I will decrease her medicine to once a day. The patient was reminded to call if she had any further problems or questions. No follow-ups on file. If you have questions, please do not hesitate to call me. I look forward to following Mirta Paiz along with you.     Sincerely,        LUIS CASTILLO PA-C

## 2021-06-08 NOTE — PROGRESS NOTES
Gerardo Kern (:  1960) is a 57 y.o. female,Established patient, here for evaluation of the following chief complaint(s): No chief complaint on file. ASSESSMENT/PLAN:  1. Laryngopharyngeal reflux (LPR)  2. Vocal cord polyps    Plan: I advised the patient to continue her PPI therapy for another month. I will have her to follow-up in 1 month in person for repeat laryngoscopy to reevaluate the vocal cord polyps. If the polyps are substantially smaller or resolved, I will decrease her medicine to once a day. The patient was reminded to call if she had any further problems or questions. No follow-ups on file. SUBJECTIVE/OBJECTIVE:  STANISLAW Prince is a pleasant 71-year-old  female that presents for a 1 month follow-up after treatment for LPR. She has been on PPI therapy now for 2 months and reports that she has noticed her symptoms improving. She admits to less clearing of the throat and also denies any problems with a globus sensation. She is able to eat solid foods with no dysphagia. She also reports that she has tolerated the medication well with no issues with nausea/vomiting or diarrhea. No flowsheet data found. Physical Exam  Patient was noted with normal vocals with no evidence of hoarseness. She is noted and no acute distress to visualization. The rest of her physical examination could not be performed due to a virtual visit. On this date 2021 I have spent 10 minutes reviewing previous notes, test results and face to face (virtual) with the patient discussing the diagnosis and importance of compliance with the treatment plan as well as documenting on the day of the visit. Gerardo Kern, was evaluated through a synchronous (real-time) audio-video encounter. The patient (or guardian if applicable) is aware that this is a billable service. Verbal consent to proceed has been obtained within the past 12 months.  The visit was conducted pursuant to the emergency declaration under the [address] [place] [address], [address] [place] [address] authority and the [employer] General Act. Patient identification was verified, and a caregiver was present when appropriate. The patient was located in a state where the provider was credentialed to provide care.       An electronic signature was used to authenticate this note.    --Susanna Pena PA-C     Electronically signed by Susanna Pena PA-C on 6/8/21 at 1:18 PM CDT

## 2021-07-21 ENCOUNTER — OFFICE VISIT (OUTPATIENT)
Dept: ENT CLINIC | Age: 61
End: 2021-07-21
Payer: COMMERCIAL

## 2021-07-21 VITALS
WEIGHT: 152 LBS | DIASTOLIC BLOOD PRESSURE: 74 MMHG | HEIGHT: 67 IN | SYSTOLIC BLOOD PRESSURE: 126 MMHG | BODY MASS INDEX: 23.86 KG/M2

## 2021-07-21 DIAGNOSIS — J38.1 VOCAL CORD POLYPS: Primary | ICD-10-CM

## 2021-07-21 PROCEDURE — 31575 DIAGNOSTIC LARYNGOSCOPY: CPT | Performed by: PHYSICIAN ASSISTANT

## 2021-07-21 PROCEDURE — 99214 OFFICE O/P EST MOD 30 MIN: CPT | Performed by: PHYSICIAN ASSISTANT

## 2021-07-21 RX ORDER — OMEPRAZOLE 40 MG/1
40 CAPSULE, DELAYED RELEASE ORAL NIGHTLY
Qty: 30 CAPSULE | Refills: 3 | Status: SHIPPED | OUTPATIENT
Start: 2021-07-21 | End: 2021-10-21 | Stop reason: SDUPTHER

## 2021-07-21 ASSESSMENT — ENCOUNTER SYMPTOMS
RHINORRHEA: 0
EYE PAIN: 0
EYE DISCHARGE: 0
SINUS PAIN: 0
FACIAL SWELLING: 0
SORE THROAT: 0
TROUBLE SWALLOWING: 0
VOICE CHANGE: 0
SINUS PRESSURE: 0

## 2021-07-21 NOTE — PROGRESS NOTES
Esperanza Carrington is a pleasant 40-year-old  female that presents for a 3-month follow-up after being treated for LPR. She reports that she has experienced no issues with dysphagia and also has noticed no changes in her vocals. Overall she has noticed no overt reflux specifically at night. Overall she feels like she is improved. Allergies: Demerol  [meperidine hcl] and Pcn [penicillins]      Current Outpatient Medications   Medication Sig Dispense Refill    omeprazole (PRILOSEC) 40 MG delayed release capsule Take 1 capsule by mouth nightly 30 capsule 3    apixaban (ELIQUIS) 5 MG TABS tablet Take 1 tablet by mouth 2 times daily 180 tablet 1    umeclidinium-vilanterol (ANORO ELLIPTA) 62.5-25 MCG/INH AEPB inhaler Inhale 1 puff into the lungs Daily      Cholecalciferol (VITAMIN D) 50 MCG (2000 UT) CAPS capsule Take 2,000 Units by mouth daily      Multiple Vitamins-Minerals (PRESERVISION AREDS 2+MULTI VIT PO) Take 1 tablet by mouth 2 times daily      rizatriptan (MAXALT) 10 MG tablet May repeat in 2 hours if needed do not exceed 2 doses in 24 hours 9 tablet 5    cyanocobalamin 1000 MCG/ML injection Inject 1 mL into the muscle every 30 days 3 mL 2    tiZANidine (ZANAFLEX) 4 MG tablet Take 1 tablet by mouth every 12 hours as needed (muscle spasms) 180 tablet 1    verapamil (CALAN SR) 240 MG extended release tablet Take 1 tablet by mouth daily 90 tablet 3    desvenlafaxine succinate (PRISTIQ) 50 MG TB24 extended release tablet Take 1 tablet by mouth daily 90 tablet 3    nortriptyline (PAMELOR) 10 MG capsule TAKE TWO CAPSULES BY MOUTH EVERY NIGHT AT BEDTIME 180 capsule 3    lisinopril (PRINIVIL;ZESTRIL) 10 MG tablet TAKE 1 TABLET BY MOUTH EVERY DAY 90 tablet 3     No current facility-administered medications for this visit.        Past Surgical History:   Procedure Laterality Date    ARTERIOGRAM Bilateral 9/2/2020    ECOS-PULMONARY performed by Candi Mitchell MD at [address] [place] [address]  [facility]         Past Medical History:   Diagnosis Date    Cervical disc disease 2017    [facility]     Essential hypertension 2019    Gastroesophageal reflux disease without esophagitis 2017    Hyperlipidemia     [facility]    Migraine without aura 2017    Pernicious anemia 2017    Raynaud's phenomenon 2017       Family History   Problem Relation Age of Onset    High Blood Pressure Mother     Coronary Art Dis Mother     Thyroid Disease Mother         Hypothyroidism    Breast Cancer Mother 80    Colon Polyps Father     Prostate Cancer Father     Anemia Father         Pernicious    Colon Polyps Sister     Anemia Brother         Pernicious    Cancer Brother 46        prostate cancer    High Blood Pressure Paternal Grandmother        Social History     Tobacco Use    Smoking status: Former Smoker     Quit date:      Years since quittin.    Smokeless tobacco: Never Used   Substance Use Topics    Alcohol use: Yes     Comment: rare           REVIEW OF SYSTEMS:  all other systems reviewed and are negative  Review of Systems   Constitutional: Negative for chills and fever. HENT: Negative for congestion, dental problem, ear discharge, ear pain, facial swelling, hearing loss, nosebleeds, postnasal drip, rhinorrhea, sinus pressure, sinus pain, sneezing, sore throat, tinnitus, trouble swallowing and voice change. Eyes: Negative for pain and discharge. Neurological: Negative for dizziness and headaches. Comments:     PHYSICAL EXAM:    /74   Ht 5' 7\" (1.702 m)   Wt 152 lb (68.9 kg)   BMI 23.81 kg/m²   Body mass index is 23.81 kg/m².     General Appearance: well developed  and well nourished  Head/ Face: normocephalic and atraumatic  Vocal Quality: good/ normal  Ears: Right Ear: External: external ears normal Otoscopy Ear Canal: canal clear Otoscopy TM: TM's normal and Electronically signed by Rocky Ramirez PA-C on 7/21/21 at 1:33 PM CDT            Please note that this chart was generated using dragon dictation software. Although every effort was made to ensure the accuracy of this automated transcription, some errors in transcription may have occurred.

## 2021-07-21 NOTE — ASSESSMENT & PLAN NOTE
Bilateral vocal cord polyps-currently stable by repeat laryngoscopy today  Plan: I recommended switching the patient to omeprazole 40 mg nightly and then to follow-up in 3 months for repeat laryngoscopy for reevaluation. Nery Balbuena was reminded to call if she notices any worsening of her vocals or any dysphagia.

## 2021-07-30 DIAGNOSIS — D64.9 NORMOCYTIC ANEMIA: Primary | ICD-10-CM

## 2021-07-30 DIAGNOSIS — I82.452 ACUTE DEEP VEIN THROMBOSIS (DVT) OF LEFT PERONEAL VEIN (HCC): ICD-10-CM

## 2021-07-30 DIAGNOSIS — D51.0 PERNICIOUS ANEMIA: ICD-10-CM

## 2021-08-02 ENCOUNTER — HOSPITAL ENCOUNTER (OUTPATIENT)
Dept: INFUSION THERAPY | Age: 61
End: 2021-08-02
Payer: COMMERCIAL

## 2021-08-11 ENCOUNTER — OFFICE VISIT (OUTPATIENT)
Dept: HEMATOLOGY | Age: 61
End: 2021-08-11
Payer: COMMERCIAL

## 2021-08-11 ENCOUNTER — HOSPITAL ENCOUNTER (OUTPATIENT)
Dept: INFUSION THERAPY | Age: 61
Discharge: HOME | End: 2021-08-11
Payer: COMMERCIAL

## 2021-08-11 VITALS
OXYGEN SATURATION: 97 % | HEIGHT: 67 IN | WEIGHT: 153.7 LBS | DIASTOLIC BLOOD PRESSURE: 80 MMHG | BODY MASS INDEX: 24.12 KG/M2 | HEART RATE: 83 BPM | SYSTOLIC BLOOD PRESSURE: 120 MMHG

## 2021-08-11 DIAGNOSIS — Z86.718 HISTORY OF DEEP VENOUS THROMBOSIS (DVT) OF DISTAL VEIN OF LEFT LOWER EXTREMITY: ICD-10-CM

## 2021-08-11 DIAGNOSIS — D64.9 NORMOCYTIC ANEMIA: ICD-10-CM

## 2021-08-11 DIAGNOSIS — D51.0 PERNICIOUS ANEMIA: ICD-10-CM

## 2021-08-11 DIAGNOSIS — Z86.711 HISTORY OF PULMONARY EMBOLUS (PE): Primary | ICD-10-CM

## 2021-08-11 DIAGNOSIS — I82.452 ACUTE DEEP VEIN THROMBOSIS (DVT) OF LEFT PERONEAL VEIN (HCC): ICD-10-CM

## 2021-08-11 DIAGNOSIS — Z79.01 ANTICOAGULATED: ICD-10-CM

## 2021-08-11 LAB
BASOPHILS ABSOLUTE: 0.03 K/UL (ref 0.01–0.08)
BASOPHILS RELATIVE PERCENT: 0.4 % (ref 0.1–1.2)
EOSINOPHILS ABSOLUTE: 0.7 K/UL (ref 0.04–0.54)
EOSINOPHILS RELATIVE PERCENT: 10.1 % (ref 0.7–7)
HCT VFR BLD CALC: 44.7 % (ref 34.1–44.9)
HEMOGLOBIN: 14.1 G/DL (ref 11.2–15.7)
LYMPHOCYTES ABSOLUTE: 1.62 K/UL (ref 1.18–3.74)
LYMPHOCYTES RELATIVE PERCENT: 23.3 % (ref 19.3–53.1)
MCH RBC QN AUTO: 26.9 PG (ref 25.6–32.2)
MCHC RBC AUTO-ENTMCNC: 31.5 G/DL (ref 32.3–35.5)
MCV RBC AUTO: 85.1 FL (ref 79.4–94.8)
MONOCYTES ABSOLUTE: 0.46 K/UL (ref 0.24–0.82)
MONOCYTES RELATIVE PERCENT: 6.6 % (ref 4.7–12.5)
NEUTROPHILS ABSOLUTE: 4.15 K/UL (ref 1.56–6.13)
NEUTROPHILS RELATIVE PERCENT: 59.6 % (ref 34–71.1)
PDW BLD-RTO: 14.8 % (ref 11.7–14.4)
PLATELET # BLD: 216 K/UL (ref 182–369)
PMV BLD AUTO: 11.6 FL (ref 7.4–10.4)
RBC # BLD: 5.25 M/UL (ref 3.93–5.22)
WBC # BLD: 6.96 K/UL (ref 3.98–10.04)

## 2021-08-11 PROCEDURE — 85025 COMPLETE CBC W/AUTO DIFF WBC: CPT

## 2021-08-11 PROCEDURE — 99211 OFF/OP EST MAY X REQ PHY/QHP: CPT

## 2021-08-11 PROCEDURE — 36415 COLL VENOUS BLD VENIPUNCTURE: CPT

## 2021-08-11 PROCEDURE — 99213 OFFICE O/P EST LOW 20 MIN: CPT | Performed by: NURSE PRACTITIONER

## 2021-08-11 RX ORDER — CALCIUM CARBONATE 500(1250)
500 TABLET ORAL DAILY
COMMUNITY

## 2021-08-11 NOTE — PROGRESS NOTES
Progress Note      Pt Name: Sharon Bush: 1960  MRN: [id number]    Date of evaluation: 2021  History Obtained From:  Patient, EMR    Portions of this note have been copied forward, however, changed to reflect the most current clinical status of this patient. Chief Complaint   Patient presents with    Follow-up     Acute saddle pulmonary embolism with acute cor pulmonale ([address]     HISTORY OF PRESENT ILLNESS:    Pat Ponce is a 77-year-old  female with a history of an unprovoked PE with right heart strain and subacute LLE DVT 2020. She received catheter directed thrombolysis 2020. Evaluation was negative for antiphospholipid syndrome. 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 Radha Harper continues Eliquis 5 mg po BID, managed by EMILIANO Sheriff/Dr. Justin Reyes. She denies bleeding issues and states she has not missed any doses. She has mild exertional dyspnea, chest tightness is improved. She established care with pulmonology and is scheduled for PFT next week. Brina self injects monthly B12 is also monitored for a history of pernicious anemia. Hgb is stable, 14.1. Radha Harper states she is feeling well. She denies dyspnea, chest pain, palpitations, lower extremity claudication etc.    HEMATOLOGY HISTORY: Unprovoked PE, subacute LLE DVT, 2020    Diagnosis  · Unprovoked pulmonary embolism, severe 2020  · Subacute LLE DVT     Treatment summary  · 2020-catheter directed thrombolysis-FARHAD     Brina Lovett was seen in hematology consultation on 9/3/2020 as an inpatient consultation at [facility].  She presented to the hospital after outpatient imaging showed pulmonary embolus and right-sided heart strain.    She had complaint of dyspnea since 2020. Radha Harper denies a personal history of VTE.    She is a former smoker, having quit at the age of 26.   Previously on estradiol, which was completed at least 1 year prior to PE in September, 2020. Bryan Holland retired in July, 2020. She admits to being rather sedentary, working at a desk. She is more active since her residential. Bryan Holland had also endured a 2-hour flight from [place] in July, 2020. She reports her mother had a history of miscarriages. She was admitted to [facility] ICU 9/2/2020. Duplex lower extremity showed subacute DVT in the LLE. 2D echo showed global systolic function severely reduced and severely dilated right ventricle. · 2/3/4332- BCM showed significant burden of pulmonary emboli in both lungs with RV strain. · 9/2/2020-BLE venous Doppler with evidence of subacute DVT in the LLE involving the peroneal veins  · 9/2/2020-2D echo showed severely reduced right ventricular global systolic function.  Severely dilated right ventricle.  Severe pulmonary hypertension.  Flattening of the septum along with paradoxical motion.  LVEF 75%.   · 9/2/2020- catheter directed thrombolysis-EKOS  · 9/3/2020-Heparin drip x24 hours, followed by Lovenox 70 mg subcut q12 hours  · Eliquis 10 mg po BID x 7 days, followed by 5 mg BID thereafter, initiated 9/17/2020 (prescribed by Dr. Morro Hernandez)    Labs 9/4/2020:  Lupus Anticoagulant: not detected  Beta-2 glycoprotein IGM & IgG: negative  Cardiolipin antibodies IgM & IgG: negative     Labs 9/18/2020:  · JAK2: Negative for V617F, exon 12, MPL, CALR and KIT mutations  · FLOW cytometry: Negative for evidence of PNH population  · Factor V Leiden: Heterozygous for single or 506Q mutation  · Factor II: Heterozygous [id number] mutation  · MTHFR: Heterozygous [id number] variant (typically not associated with increased risk of thrombosis)  · Homocysteine: 13.6, normal  · Antithrombin activity: Elevated at 167% (no known clinical significance)  · Functional protein C: 128%  · Free protein S: 123%    Recommend long-term anticoagulation  Recommend discussing genetic variants with family, which was discussed    Recommend age-appropriate health maintenance screenings. · [facility]: BI-RADS Category 2   · Colonoscopy 11/12/2018 by Dr. James Martínez.  Recall 5 years  · Ovarian screening: Thalia Suarez has a history of RIGOBERTO, BSO 12/15/2011 and no longer has GYN exams    Past Medical History:   Diagnosis Date    Cervical disc disease 8/26/2017    DVT (deep venous thrombosis) (HCC)     Essential hypertension 12/1/2019    Gastroesophageal reflux disease without esophagitis 8/26/2017    Hyperlipidemia     Laryngeal polyp     followed by Sanchez Leary PA-C    Major depressive disorder in remission ([address] 8/26/2017    Migraine without aura 8/26/2017    Pernicious anemia 8/26/2017    Raynaud's phenomenon 8/26/2017     Past Surgical History:   Procedure Laterality Date    ARTERIOGRAM Bilateral 9/2/2020    ECOS-PULMONARY performed by Parag Carter MD at [facility] RIGOBERTO AND BSO       Current Outpatient Medications   Medication Sig Dispense Refill    calcium carbonate (OSCAL) 500 MG TABS tablet Take 500 mg by mouth daily      omeprazole (PRILOSEC) 40 MG delayed release capsule Take 1 capsule by mouth nightly 30 capsule 3    apixaban (ELIQUIS) 5 MG TABS tablet Take 1 tablet by mouth 2 times daily 180 tablet 1    umeclidinium-vilanterol (ANORO ELLIPTA) 62.5-25 MCG/INH AEPB inhaler Inhale 1 puff into the lungs Daily      Cholecalciferol (VITAMIN D) 50 MCG (2000 UT) CAPS capsule Take 2,000 Units by mouth daily      Multiple Vitamins-Minerals (PRESERVISION AREDS 2+MULTI VIT PO) Take 1 tablet by mouth 2 times daily      rizatriptan (MAXALT) 10 MG tablet May repeat in 2 hours if needed do not exceed 2 doses in 24 hours 9 tablet 5    cyanocobalamin 1000 MCG/ML injection Inject 1 mL into the muscle every 30 days 3 mL 2    tiZANidine (ZANAFLEX) 4 MG tablet Take 1 tablet by mouth every 12 hours as needed (muscle spasms) 180 tablet 1    verapamil (CALAN SR) 240 MG extended release tablet Take 1 tablet by mouth daily 90 tablet 3    desvenlafaxine succinate (PRISTIQ) 50 MG TB24 extended release tablet Take 1 tablet by mouth daily 90 tablet 3    nortriptyline (PAMELOR) 10 MG capsule TAKE TWO CAPSULES BY MOUTH EVERY NIGHT AT BEDTIME 180 capsule 3    lisinopril (PRINIVIL;ZESTRIL) 10 MG tablet TAKE 1 TABLET BY MOUTH EVERY DAY 90 tablet 3     No current facility-administered medications for this visit. Allergies   Allergen Reactions    Demerol  [Meperidine Hcl]      Other reaction(s): Unknown    Pcn [Penicillins] Rash     Social History     Tobacco Use    Smoking status: Former Smoker     Quit date:      Years since quittin.6    Smokeless tobacco: Never Used   Vaping Use    Vaping Use: Never used   Substance Use Topics    Alcohol use: Yes     Comment: rare    Drug use: No     Family History   Problem Relation Age of Onset    High Blood Pressure Mother     Coronary Art Dis Mother     Thyroid Disease Mother         Hypothyroidism    Breast Cancer Mother 80    Colon Polyps Father     Prostate Cancer Father     Anemia Father         Pernicious    Cancer Father         bladder cancer     Colon Polyps Sister     Anemia Brother         Pernicious    Cancer Brother 46        prostate cancer, bladder cancer    High Blood Pressure Paternal Grandmother      Review of Systems   Constitutional: Negative for fatigue and fever. HENT: Negative for dental problem, hearing loss, mouth sores, nosebleeds, sore throat and trouble swallowing. Eyes: Negative for discharge and itching. Respiratory: Negative for cough, shortness of breath and wheezing. Cardiovascular: Negative for chest pain, palpitations and leg swelling. Gastrointestinal: Negative for abdominal pain, constipation, diarrhea, nausea and vomiting. Endocrine: Negative for cold intolerance and heat intolerance. Genitourinary: Negative for dysuria, frequency, hematuria and urgency.    Musculoskeletal: Negative for arthralgias, joint swelling and myalgias. Skin: Negative for pallor and rash. Allergic/Immunologic: Negative for environmental allergies and immunocompromised state. Neurological: Negative for seizures, syncope and numbness. Hematological: Negative for adenopathy. Does not bruise/bleed easily. Psychiatric/Behavioral: Negative for agitation, behavioral problems, confusion and suicidal ideas. The patient is not nervous/anxious. Physical Exam  Vitals reviewed. Constitutional:       General: She is not in acute distress. Appearance: She is well-developed. She is not toxic-appearing or diaphoretic. Comments: Wearing a facial mask. HENT:      Head: Normocephalic and atraumatic. Right Ear: External ear normal.      Left Ear: External ear normal.      Nose: Nose normal.      Mouth/Throat:      Mouth: Mucous membranes are moist.   Eyes:      General: No scleral icterus. Right eye: No discharge. Left eye: No discharge. Conjunctiva/sclera: Conjunctivae normal.   Neck:      Trachea: No tracheal deviation. Cardiovascular:      Rate and Rhythm: Normal rate and regular rhythm. Heart sounds: No murmur heard. Pulmonary:      Effort: Pulmonary effort is normal. No respiratory distress. Breath sounds: Normal breath sounds. No wheezing or rales. Chest:      Chest wall: No tenderness. Abdominal:      General: Bowel sounds are normal. There is no distension. Palpations: Abdomen is soft. There is no mass. Tenderness: There is no abdominal tenderness. There is no guarding. Genitourinary:     Comments: Exam deferred  Musculoskeletal:         General: No tenderness or deformity. Cervical back: Neck supple. No muscular tenderness. Comments: Normal ROM all four extremities   Lymphadenopathy:      Cervical: No cervical adenopathy. Right cervical: No superficial or deep cervical adenopathy.      Left cervical: No superficial or deep cervical long-term anticoagulation, continue Eliquis as prescribed by vascular surgery. Recommend discussing genetic variants with family, which was previously discussed     #Pernicious anemia  Hgb improved, 14.1/MCV 85.1  Continues monthly B12 injection    #Body mass index is 24.07 kg/m². Federal guidelines recommend that people under the age of 72 should have a BMI of 18.5-25 and people age 72 and older should have a BMI of 23-30. If yours is outside the range, we recommend you utilize a diet and exercise program to get yours into the range. We also recommend you speak with your primary care doctor should any specific advice be needed regarding special diets or programs which would be appropriate for your circumstances. #Family history of bladder cancer  Nery Balbuena reports a history of bladder cancer in both her father and brother  UA was negative for blood on 9/2/2020  She denies hematuria      Recommend routine, age-appropriate cancer screenings. Health maintenance:  · Bilateral mammograms 8/21/2020 at [facility]: BI-RADS Category 2, arranged by Dr. Glenn Bender  · Colonoscopy 11/12/2018 by Dr. Alejandro Mathur. Recall 5 years  · Ovarian screening: Nery Balbuena has a history of RIGOBERTO, BSO 12/15/2011 and no longer has GYN exams    Return in about 1 year (around 8/11/2022) for follow up with EMILIANO Conteh. Extend appointment to annually at that time. I have seen, examined and reviewed patient medication list, appropriate labs and imaging studies. Relevant medical records and other physician notes have been reviewed. I answered all questions to the best of my knowledge and to the patient's satisfaction. Dictated utilizing Dragon transcription software.          EMILIANO Emmanuel  4:31 PM  8/15/2021

## 2021-08-15 ASSESSMENT — ENCOUNTER SYMPTOMS
VOMITING: 0
EYE DISCHARGE: 0
SHORTNESS OF BREATH: 0
COUGH: 0
ABDOMINAL PAIN: 0
TROUBLE SWALLOWING: 0
WHEEZING: 0
SORE THROAT: 0
EYE ITCHING: 0
NAUSEA: 0
DIARRHEA: 0
CONSTIPATION: 0

## 2021-08-24 ENCOUNTER — HOSPITAL ENCOUNTER (OUTPATIENT)
Dept: WOMENS IMAGING | Age: 61
Discharge: HOME | End: 2021-08-24
Payer: COMMERCIAL

## 2021-08-24 DIAGNOSIS — Z78.0 POSTMENOPAUSAL: ICD-10-CM

## 2021-08-24 DIAGNOSIS — Z12.31 SCREENING MAMMOGRAM, ENCOUNTER FOR: ICD-10-CM

## 2021-08-24 PROCEDURE — 77067 SCR MAMMO BI INCL CAD: CPT

## 2021-08-24 PROCEDURE — 77080 DXA BONE DENSITY AXIAL: CPT

## 2021-09-08 ENCOUNTER — TELEPHONE (OUTPATIENT)
Dept: INTERNAL MEDICINE | Age: 61
End: 2021-09-08

## 2021-09-08 NOTE — TELEPHONE ENCOUNTER
----- Message from Santiago Willemblake sent at 8/31/2021 11:05 AM CDT -----  Subject: Message to Provider    QUESTIONS  Information for Provider? Patient has been having low blood pressure for a   couple weeks now and she says she's been dizzy when her blood pressure   gets low.   ---------------------------------------------------------------------------  --------------  CALL BACK INFO  What is the best way for the office to contact you? OK to leave message on   voicemail  Preferred Call Back Phone Number? [phone]  ---------------------------------------------------------------------------  --------------  SCRIPT ANSWERS  Relationship to Patient? Self  Have your symptoms changed? Yes  (Is the patient requesting to be seen urgently for their symptoms?)? No  Is this follow up request related to routine Diabetes Management? No  Are you having any new concerns about your existing condition?  Yes

## 2021-09-09 NOTE — TELEPHONE ENCOUNTER
I called her back to check her bp - bp is running 83/69 told her to stop lisinopril she feels okay she is getting keep an eye on her blood pressure she has been exercising a lot more this past year thinks that might be part of what is got lower. She is going to stay on her other blood pressure medication.   She also needed her appointment rescheduled please cancel 9/21/2021 and I told her to come on October 1 at 1030 there is an opening then it says it is blocked but you can book her in that opening please thank you

## 2021-10-01 ENCOUNTER — OFFICE VISIT (OUTPATIENT)
Dept: INTERNAL MEDICINE | Age: 61
End: 2021-10-01
Payer: COMMERCIAL

## 2021-10-01 VITALS
HEIGHT: 67 IN | SYSTOLIC BLOOD PRESSURE: 110 MMHG | BODY MASS INDEX: 23.39 KG/M2 | OXYGEN SATURATION: 99 % | WEIGHT: 149 LBS | HEART RATE: 77 BPM | DIASTOLIC BLOOD PRESSURE: 60 MMHG

## 2021-10-01 DIAGNOSIS — D68.51 FACTOR V LEIDEN (HCC): ICD-10-CM

## 2021-10-01 DIAGNOSIS — I10 ESSENTIAL HYPERTENSION: Primary | ICD-10-CM

## 2021-10-01 DIAGNOSIS — Z23 INFLUENZA VACCINE NEEDED: ICD-10-CM

## 2021-10-01 DIAGNOSIS — R59.0 LAD (LYMPHADENOPATHY), AXILLARY: ICD-10-CM

## 2021-10-01 DIAGNOSIS — Z86.711 HISTORY OF PULMONARY EMBOLUS (PE): ICD-10-CM

## 2021-10-01 PROCEDURE — 90674 CCIIV4 VAC NO PRSV 0.5 ML IM: CPT | Performed by: INTERNAL MEDICINE

## 2021-10-01 PROCEDURE — 90471 IMMUNIZATION ADMIN: CPT | Performed by: INTERNAL MEDICINE

## 2021-10-01 PROCEDURE — 99214 OFFICE O/P EST MOD 30 MIN: CPT | Performed by: INTERNAL MEDICINE

## 2021-10-01 SDOH — ECONOMIC STABILITY: FOOD INSECURITY: WITHIN THE PAST 12 MONTHS, YOU WORRIED THAT YOUR FOOD WOULD RUN OUT BEFORE YOU GOT MONEY TO BUY MORE.: NEVER TRUE

## 2021-10-01 SDOH — ECONOMIC STABILITY: FOOD INSECURITY: WITHIN THE PAST 12 MONTHS, THE FOOD YOU BOUGHT JUST DIDN'T LAST AND YOU DIDN'T HAVE MONEY TO GET MORE.: NEVER TRUE

## 2021-10-01 ASSESSMENT — SOCIAL DETERMINANTS OF HEALTH (SDOH): HOW HARD IS IT FOR YOU TO PAY FOR THE VERY BASICS LIKE FOOD, HOUSING, MEDICAL CARE, AND HEATING?: NOT VERY HARD

## 2021-10-01 NOTE — PROGRESS NOTES
Chief Complaint   Patient presents with    6 Month Follow-Up     hx of pulmonary embolism       HPI: Patient is here today for follow-up of multiple medical issues history of large PE and factor V Leiden hypertension but despite the same she is really doing well blood pressure is a little bit up-and-down in general though she is feeling well she denies headache chest pain dyspnea abdominal pain. Pt is having erratic bp 101/73 to 131/78. Highest 148/78.   Increase lisinopril to 10mg stop verapiamil    Past Medical History:   Diagnosis Date    Cervical disc disease 8/26/2017    DVT (deep venous thrombosis) (HCC)     Essential hypertension 12/1/2019    Gastroesophageal reflux disease without esophagitis 8/26/2017    Hyperlipidemia     Laryngeal polyp     followed by Madhu Ramirez PA-C    Major depressive disorder in remission ([facility] 8/26/2017    Migraine without aura 8/26/2017    Pernicious anemia 8/26/2017    Raynaud's phenomenon 8/26/2017       Past Surgical History:   Procedure Laterality Date    ARTERIOGRAM Bilateral 9/2/2020    ECOS-PULMONARY performed by Robson Aranda MD at [place] [address]         Family History   Problem Relation Age of Onset    High Blood Pressure Mother     Coronary Art Dis Mother     Thyroid Disease Mother         Hypothyroidism    Breast Cancer Mother 80    Colon Polyps Father     Prostate Cancer Father     Anemia Father         Pernicious    Cancer Father         bladder cancer     Colon Polyps Sister     Anemia Brother         Pernicious    Cancer Brother 46        prostate cancer, bladder cancer    High Blood Pressure Paternal Grandmother        Social History     Socioeconomic History    Marital status:      Spouse name: Not on file    Number of children: Not on file    Years of education: Not on file    Highest education level: Not on file   Occupational History    Not on file   Tobacco Use    Smoking status: Former Smoker     Packs/day: 0.50     Years: 25.00     Pack years: 12.50     Quit date:      Years since quittin.7    Smokeless tobacco: Never Used   Vaping Use    Vaping Use: Never used   Substance and Sexual Activity    Alcohol use: Yes     Comment: rare    Drug use: No    Sexual activity: Not on file   Other Topics Concern    Not on file   Social History Narrative    Not on file     Social Determinants of Health     Financial Resource Strain: Low Risk     Difficulty of Paying Living Expenses: Not very hard   Food Insecurity: No Food Insecurity    Worried About Running Out of Food in the Last Year: Never true    Artemio of Food in the Last Year: Never true   Transportation Needs:     Lack of Transportation (Medical):  Lack of Transportation (Non-Medical):    Physical Activity:     Days of Exercise per Week:     Minutes of Exercise per Session:    Stress:     Feeling of Stress :    Social Connections:     Frequency of Communication with Friends and Family:     Frequency of Social Gatherings with Friends and Family:     Attends Mandaen Services:     Active Member of Clubs or Organizations:     Attends Club or Organization Meetings:     Marital Status:    Intimate Partner Violence:     Fear of Current or Ex-Partner:     Emotionally Abused:     Physically Abused:     Sexually Abused:         Allergies   Allergen Reactions    Demerol  [Meperidine Hcl]      Other reaction(s): Unknown    Pcn [Penicillins] Rash       Current Outpatient Medications   Medication Sig Dispense Refill    lisinopril (PRINIVIL;ZESTRIL) 10 MG tablet TAKE ONE TABLET BY MOUTH ONE TIME DAILY (Patient taking differently: Take 5 mg by mouth daily ) 90 tablet 3    calcium carbonate (OSCAL) 500 MG TABS tablet Take 500 mg by mouth daily      omeprazole (PRILOSEC) 40 MG delayed release capsule Take 1 capsule by mouth nightly 30 capsule 3    apixaban (ELIQUIS) 5 MG TABS tablet Take 1 tablet by mouth 2 times daily 180 tablet 1    umeclidinium-vilanterol (ANORO ELLIPTA) 62.5-25 MCG/INH AEPB inhaler Inhale 1 puff into the lungs Daily      Cholecalciferol (VITAMIN D) 50 MCG (2000 UT) CAPS capsule Take 2,000 Units by mouth daily      Multiple Vitamins-Minerals (PRESERVISION AREDS 2+MULTI VIT PO) Take 1 tablet by mouth 2 times daily      rizatriptan (MAXALT) 10 MG tablet May repeat in 2 hours if needed do not exceed 2 doses in 24 hours 9 tablet 5    cyanocobalamin 1000 MCG/ML injection Inject 1 mL into the muscle every 30 days 3 mL 2    tiZANidine (ZANAFLEX) 4 MG tablet Take 1 tablet by mouth every 12 hours as needed (muscle spasms) 180 tablet 1    verapamil (CALAN SR) 240 MG extended release tablet Take 1 tablet by mouth daily 90 tablet 3    desvenlafaxine succinate (PRISTIQ) 50 MG TB24 extended release tablet Take 1 tablet by mouth daily 90 tablet 3    nortriptyline (PAMELOR) 10 MG capsule TAKE TWO CAPSULES BY MOUTH EVERY NIGHT AT BEDTIME 180 capsule 3     No current facility-administered medications for this visit. Review of Systems    /60   Pulse 77   Ht 5' 7\" (1.702 m)   Wt 149 lb (67.6 kg)   SpO2 99%   BMI 23.34 kg/m²   BP Readings from Last 7 Encounters:   10/01/21 110/60   08/11/21 120/80   07/21/21 126/74   03/19/21 132/86   02/01/21 102/68   12/17/20 120/78   11/05/20 112/80     Wt Readings from Last 7 Encounters:   10/01/21 149 lb (67.6 kg)   08/11/21 153 lb 11.2 oz (69.7 kg)   07/21/21 152 lb (68.9 kg)   04/06/21 152 lb (68.9 kg)   03/19/21 156 lb (70.8 kg)   02/01/21 156 lb (70.8 kg)   11/05/20 153 lb (69.4 kg)     BMI Readings from Last 7 Encounters:   10/01/21 23.34 kg/m²   08/11/21 24.07 kg/m²   07/21/21 23.81 kg/m²   04/06/21 23.81 kg/m²   03/19/21 24.43 kg/m²   02/01/21 24.43 kg/m²   11/05/20 23.96 kg/m²     Resp Readings from Last 7 Encounters:   09/05/20 18   09/02/20 (!) 3   10/24/16 16       Physical Exam  Constitutional:       General: She is not in acute distress.   Eyes:      General: No scleral icterus. Cardiovascular:      Heart sounds: Normal heart sounds. Pulmonary:      Breath sounds: Normal breath sounds. Musculoskeletal:      Cervical back: Neck supple. Lymphadenopathy:      Cervical: No cervical adenopathy. Skin:     Findings: No rash. Psychiatric:         Mood and Affect: Mood normal.         Results for orders placed or performed in visit on 08/24/21   Vitamin D 25 Hydroxy   Result Value Ref Range    Vit D, 25-Hydroxy 49.0 >=30 ng/mL   Lipid Panel   Result Value Ref Range    Cholesterol, Total 208 (H) 160 - 199 mg/dL    Triglycerides 79 0 - 149 mg/dL    HDL 59 (L) 65 - 121 mg/dL    LDL Calculated 133 <100 mg/dL   TSH without Reflex   Result Value Ref Range    TSH 1.070 0.270 - 4.200 uIU/mL   Comprehensive Metabolic Panel   Result Value Ref Range    Sodium 141 136 - 145 mmol/L    Potassium 4.1 3.5 - 5.0 mmol/L    Chloride 101 98 - 111 mmol/L    CO2 29 22 - 29 mmol/L    Anion Gap 11 7 - 19 mmol/L    Glucose 92 74 - 109 mg/dL    BUN 15 8 - 23 mg/dL    CREATININE 0.9 0.5 - 0.9 mg/dL    GFR Non-African American >60 >60    GFR African American >59 >59    Calcium 9.4 8.8 - 10.2 mg/dL    Total Protein 6.7 6.6 - 8.7 g/dL    Albumin 4.5 3.5 - 5.2 g/dL    Total Bilirubin 0.4 0.2 - 1.2 mg/dL    Alkaline Phosphatase 83 35 - 104 U/L    ALT 17 5 - 33 U/L    AST 20 5 - 32 U/L   CBC   Result Value Ref Range    WBC 6.3 4.8 - 10.8 K/uL    RBC 5.26 4.20 - 5.40 M/uL    Hemoglobin 13.9 12.0 - 16.0 g/dL    Hematocrit 45.4 37.0 - 47.0 %    MCV 86.3 81.0 - 99.0 fL    MCH 26.4 (L) 27.0 - 31.0 pg    MCHC 30.6 (L) 33.0 - 37.0 g/dL    RDW 14.5 11.5 - 14.5 %    Platelets 609 971 - 203 K/uL    MPV 11.7 9.4 - 12.3 fL       ASSESSMENT/ PLAN:  1.  Essential hypertension  Patient has a lot of hot flashes while she was out of town she forgot her verapamil and she noticed she had no hot flashes we will increase her lisinopril to 10 mg a day discontinue the verapamil watch closely if migraine headaches tachycardia or hypertension make a new plan there other meds or other plans and there is also of course higher doses of lisinopril watch blood pressure closely if staying higher than 135/85 we need to know  - Urinalysis with Microscopic; Future    2. Influenza vaccine needed  Patient is also vaccinated against COVID-19 which we recommend  - INFLUENZA, MDCK QUADV, 2 YRS AND OLDER, IM, PF, PREFILL SYR OR SDV, 0.5ML (FLUCELVAX QUADV, PF)    3. History of pulmonary embolus (PE)  Long-term anticoagulant therapy continue current plan of care no bleeding issues watch closely    4. Factor V Leiden ([facility]  Factor V Leiden unprovoked clot severe clot no contraindications to anticoagulation continue current plan of care    5. LAD (lymphadenopathy), axillary  Patient has some incidental lymphadenopathy in the axilla on the arm where she had her Covid vaccine. It looks like it was likely related to that we reviewed the record and we will watch we discussed and assess at the next visit. Labs are reviewed and interpreted  Chart, medications, labs, vaccines reviewed. Keep up to date with routine care and follow up. Call with any problems or complaints. Keep up to date with routine screening recomendations and vaccines.

## 2021-10-10 PROBLEM — R59.0 LAD (LYMPHADENOPATHY), AXILLARY: Status: ACTIVE | Noted: 2021-10-10

## 2021-10-15 NOTE — PROGRESS NOTES
" ELOISE Comer  [facility]   Respiratory Disease Clinic  [address] [place] [address]  Phone: [phone]  Fax: [phone]       Chief Complaint  small airways disease    Subjective    History of Present Illness    Kendal Valladares presents to [facility]   History of Present Illness   The patient had a known unprovoked PE with right heart strain and subacute LLE DVT 9/2/2020.  She received catheter directed thrombolysis 9/2/2020.  She is following with vascular and hematology.  At the patient's last office visit she was found to have a flat inspiratory loop on her PFT.  The patient was referred to ENT for further evaluation.  The patient was found to have vocal cord polyps and is currently on reflux therapy.  She is continuing to follow with ENT.  She was trialed on Anoro but did not feel to have any further benefit in her breathing.  Today the patient has no pulmonary complaints.  She denies fevers, chills, cough with purulent sputum.  No other aggravating or alleviating factors.      Objective   Vital Signs:   /80   Pulse 80   Ht 170.2 cm (67\")   Wt 68.5 kg (151 lb)   SpO2 96%   BMI 23.65 kg/m²     Physical Exam  Vitals reviewed.   Constitutional:       General: She is not in acute distress.     Appearance: She is well-developed.      Interventions: Face mask in place.   HENT:      Head: Normocephalic and atraumatic.   Eyes:      General: No scleral icterus.     Conjunctiva/sclera: Conjunctivae normal.      Pupils: Pupils are equal, round, and reactive to light.   Cardiovascular:      Rate and Rhythm: Normal rate.   Pulmonary:      Effort: Pulmonary effort is normal. No respiratory distress.      Breath sounds: Normal breath sounds. No wheezing or rales.   Musculoskeletal:         General: Normal range of motion.      Cervical back: Normal range of motion and neck supple.   Skin:     General: Skin is warm and dry. "   Neurological:      Mental Status: She is alert and oriented to person, place, and time.   Psychiatric:         Behavior: Behavior normal.         Thought Content: Thought content normal.         Judgment: Judgment normal.        Result Review :  The following data was reviewed by: ELOISE Comer on 10/19/2021:  CT Chest Without Contrast (05/04/2021 00:00)    Rest/Exercise Pulse Ox Values        Some values may be hidden. Unless noted otherwise, only the newest values recorded on each date are displayed.         Rest/Exercise Pulse Ox Results 12/22/20   Rest room air SAT % 97   Exercise room air SAT % 97               No results found for this or any previous visit.           Assessment and Plan  Diagnoses and all orders for this visit:    1. Small airways disease (Primary)  Comments:  Patient trialed Anoro.  She did not have any benefit.  She has no new pulmonary complaints today.    2. Gastroesophageal reflux disease without esophagitis  Comments:  Continue Prilosec.    3. Personal history of nicotine dependence  Comments:  Former smoker.  The patient does not qualify for low-dose lung cancer screenings as she quit smoking in 1995.      Health maintenance:   Influenza vaccine: yes  Pneumovax 23: yes  Prevnar 13: no  Covid 19: yes  Follow Up  ELOISE Comer  10/19/2021  14:11 CDT  Return if symptoms worsen or fail to improve.  Patient was given instructions and counseling regarding her condition or for health maintenance advice. Please see specific information pulled into the AVS if appropriate.   Please note that portions of this note were completed with a voice recognition program.

## 2021-10-19 ENCOUNTER — OFFICE VISIT (OUTPATIENT)
Dept: PULMONOLOGY | Facility: CLINIC | Age: 61
End: 2021-10-19

## 2021-10-19 VITALS
SYSTOLIC BLOOD PRESSURE: 122 MMHG | HEART RATE: 80 BPM | HEIGHT: 67 IN | DIASTOLIC BLOOD PRESSURE: 80 MMHG | BODY MASS INDEX: 23.7 KG/M2 | WEIGHT: 151 LBS | OXYGEN SATURATION: 96 %

## 2021-10-19 DIAGNOSIS — J98.4 SMALL AIRWAYS DISEASE: Primary | ICD-10-CM

## 2021-10-19 DIAGNOSIS — K21.9 GASTROESOPHAGEAL REFLUX DISEASE WITHOUT ESOPHAGITIS: Chronic | ICD-10-CM

## 2021-10-19 DIAGNOSIS — Z87.891 PERSONAL HISTORY OF NICOTINE DEPENDENCE: Chronic | ICD-10-CM

## 2021-10-19 PROCEDURE — 99213 OFFICE O/P EST LOW 20 MIN: CPT | Performed by: NURSE PRACTITIONER

## 2021-10-19 RX ORDER — OMEPRAZOLE 40 MG/1
CAPSULE, DELAYED RELEASE ORAL
COMMUNITY
Start: 2021-07-21

## 2021-10-19 RX ORDER — CYANOCOBALAMIN 1000 UG/ML
INJECTION, SOLUTION INTRAMUSCULAR; SUBCUTANEOUS
COMMUNITY
Start: 2021-07-12 | End: 2021-10-19 | Stop reason: SDUPTHER

## 2021-10-19 RX ORDER — CALCIUM CARBONATE 500(1250)
500 TABLET ORAL
COMMUNITY

## 2021-10-21 ENCOUNTER — OFFICE VISIT (OUTPATIENT)
Dept: ENT CLINIC | Age: 61
End: 2021-10-21
Payer: COMMERCIAL

## 2021-10-21 VITALS
WEIGHT: 148.6 LBS | BODY MASS INDEX: 23.32 KG/M2 | DIASTOLIC BLOOD PRESSURE: 68 MMHG | HEIGHT: 67 IN | TEMPERATURE: 97.9 F | SYSTOLIC BLOOD PRESSURE: 111 MMHG

## 2021-10-21 DIAGNOSIS — J38.1 VOCAL CORD POLYPS: Primary | ICD-10-CM

## 2021-10-21 PROBLEM — R09.89 GLOBUS SENSATION: Status: RESOLVED | Noted: 2021-04-06 | Resolved: 2021-10-21

## 2021-10-21 PROBLEM — R09.A2 GLOBUS SENSATION: Status: RESOLVED | Noted: 2021-04-06 | Resolved: 2021-10-21

## 2021-10-21 PROCEDURE — 99213 OFFICE O/P EST LOW 20 MIN: CPT | Performed by: PHYSICIAN ASSISTANT

## 2021-10-21 PROCEDURE — 31575 DIAGNOSTIC LARYNGOSCOPY: CPT | Performed by: PHYSICIAN ASSISTANT

## 2021-10-21 RX ORDER — OMEPRAZOLE 40 MG/1
40 CAPSULE, DELAYED RELEASE ORAL NIGHTLY
Qty: 60 CAPSULE | Refills: 5 | Status: SHIPPED | OUTPATIENT
Start: 2021-10-21 | End: 2022-03-15 | Stop reason: SDUPTHER

## 2021-10-21 ASSESSMENT — ENCOUNTER SYMPTOMS
EYE DISCHARGE: 0
FACIAL SWELLING: 0
VOICE CHANGE: 0
EYE PAIN: 0
SORE THROAT: 0
SINUS PRESSURE: 0
RHINORRHEA: 0
SINUS PAIN: 0
TROUBLE SWALLOWING: 0

## 2021-10-21 NOTE — PROGRESS NOTES
Eugenio Easley is a pleasant 79-year-old  female that presents for a 3-month follow-up laryngoscopy due to bilateral vocal cord polyps. Currently she reports of no issues with vocal changes or any dysphagia to include solid or liquids. Overall she has not noticed any breakthrough reflux. She also denies any vocal changes and believes that her voice quality is at baseline. Currently she has maintained with omeprazole 40 mg nightly. She reports of no new complaints.         Allergies: Demerol  [meperidine hcl] and Pcn [penicillins]      Current Outpatient Medications   Medication Sig Dispense Refill    omeprazole (PRILOSEC) 40 MG delayed release capsule Take 1 capsule by mouth nightly 60 capsule 5    lisinopril (PRINIVIL;ZESTRIL) 10 MG tablet TAKE ONE TABLET BY MOUTH ONE TIME DAILY (Patient taking differently: Take 5 mg by mouth daily ) 90 tablet 3    calcium carbonate (OSCAL) 500 MG TABS tablet Take 500 mg by mouth daily      apixaban (ELIQUIS) 5 MG TABS tablet Take 1 tablet by mouth 2 times daily 180 tablet 1    umeclidinium-vilanterol (ANORO ELLIPTA) 62.5-25 MCG/INH AEPB inhaler Inhale 1 puff into the lungs Daily      Cholecalciferol (VITAMIN D) 50 MCG (2000 UT) CAPS capsule Take 2,000 Units by mouth daily      Multiple Vitamins-Minerals (PRESERVISION AREDS 2+MULTI VIT PO) Take 1 tablet by mouth 2 times daily      rizatriptan (MAXALT) 10 MG tablet May repeat in 2 hours if needed do not exceed 2 doses in 24 hours 9 tablet 5    cyanocobalamin 1000 MCG/ML injection Inject 1 mL into the muscle every 30 days 3 mL 2    tiZANidine (ZANAFLEX) 4 MG tablet Take 1 tablet by mouth every 12 hours as needed (muscle spasms) 180 tablet 1    verapamil (CALAN SR) 240 MG extended release tablet Take 1 tablet by mouth daily 90 tablet 3    desvenlafaxine succinate (PRISTIQ) 50 MG TB24 extended release tablet Take 1 tablet by mouth daily 90 tablet 3    nortriptyline (PAMELOR) 10 MG capsule TAKE TWO CAPSULES BY MOUTH EVERY NIGHT AT BEDTIME 180 capsule 3     No current facility-administered medications for this visit. Past Surgical History:   Procedure Laterality Date    ARTERIOGRAM Bilateral 2020    [facility]    [facility]         Past Medical History:   Diagnosis Date    Cervical disc disease 2017    DVT [facility]) ([facility]     Essential hypertension 2019    Gastroesophageal reflux disease without esophagitis 2017    Hyperlipidemia     Laryngeal polyp     followed by Angelina Moya PA-C    Major depressive disorder in remission ([facility] [address] 2017    Migraine without aura 2017    Pernicious anemia 2017    Raynaud's phenomenon 2017       Family History   Problem Relation Age of Onset    High Blood Pressure Mother     Coronary Art Dis Mother     Thyroid Disease Mother         Hypothyroidism    Breast Cancer Mother 80    Colon Polyps Father     Prostate Cancer Father     Anemia Father         Pernicious    Cancer Father         bladder cancer     Colon Polyps Sister     Anemia Brother         Pernicious    Cancer Brother 46        prostate cancer, bladder cancer    High Blood Pressure Paternal Grandmother        Social History     Tobacco Use    Smoking status: Former Smoker     Packs/day: 0.50     Years: 25.00     Pack years: 12.50     Quit date:      Years since quittin.8    Smokeless tobacco: Never Used   Substance Use Topics    Alcohol use: Yes     Comment: rare           REVIEW OF SYSTEMS:  all other systems reviewed and are negative  Review of Systems   Constitutional: Negative for chills and fever. HENT: Negative for congestion, dental problem, ear discharge, ear pain, facial swelling, hearing loss, nosebleeds, postnasal drip, rhinorrhea, sinus pressure, sinus pain, sneezing, sore throat, tinnitus, trouble swallowing and voice change.     Eyes: Negative for pain and discharge. Neurological: Negative for dizziness and headaches. Comments:     PHYSICAL EXAM:    /68 (Site: Left Upper Arm)   Temp 97.9 °F (36.6 °C)   Ht 5' 7\" (1.702 m)   Wt 148 lb 9.6 oz (67.4 kg)   BMI 23.27 kg/m²   Body mass index is 23.27 kg/m². General Appearance: well developed  and well nourished  Head/ Face: normocephalic and atraumatic  Vocal Quality: good/ normal  Ears: Right Ear: External: external ears normal Otoscopy Ear Canal: canal clear Otoscopy TM: TM's normal and TM's mobile Left Ear: External: external ears normal Otoscopy Ear Canal: canal clear Otoscopy TM: TM's normal and TM's mobile  Hearing: grossly intact  Nose: see endoscopy report   Neck exam demonstrated no lymphadenopathy or thyromegaly to exam.  Oral exam demonstrated no lesions to the posterior pharynx with the tongue midline    Assessment & Plan:    Problem List Items Addressed This Visit     Vocal cord polyps - Primary      Persistent bilateral vocal cord polyps-currently without growth based on laryngoscopy  Plan: I recommended the patient to continue her omeprazole once a day. Due to this remaining stable I will repeat her laryngoscopy in 6 months for reevaluation. She was reminded to call if she notices any vocal changes or any dysphagia. Relevant Orders    VT LARYNGOSCOPY FLEXIBLE DIAGNOSTIC (Completed)          Orders Placed This Encounter   Procedures    VT LARYNGOSCOPY FLEXIBLE DIAGNOSTIC     The nares were anesthetized with 4% lidocaine aerosol bilaterally. Each nare was individually evaluated the patient was found to have no evidence of nasal polyps. The left nare was utilized for the full procedure due to mild deviation to the right. The posterior wall of the nasopharyngeal region was noted to be unremarkable to exam.  The scope was advanced to the oropharyngeal region where she was found to have normal swallowing with no erythema or ulcerations.   The scope was advanced to the epiglottis with the vocal cords were well visualized. She was still noted to have bilateral vocal cord polyps with the right side larger than the left but comparatively appears to be the same in size from last laryngoscopy. The vocal cords were symmetrical and fully functional.  There was no erythema or edema to indicate breakthrough reflux. Overall the patient tolerated the procedure nicely with no complications. Orders Placed This Encounter   Medications    omeprazole (PRILOSEC) 40 MG delayed release capsule     Sig: Take 1 capsule by mouth nightly     Dispense:  60 capsule     Refill:  5       Electronically signed by Marva Sethi PA-C on 10/21/21 at 6:29 PM CDT          Please note that this chart was generated using dragon dictation software. Although every effort was made to ensure the accuracy of this automated transcription, some errors in transcription may have occurred.

## 2021-10-21 NOTE — ASSESSMENT & PLAN NOTE
Persistent bilateral vocal cord polyps-currently without growth based on laryngoscopy  Plan: I recommended the patient to continue her omeprazole once a day. Due to this remaining stable I will repeat her laryngoscopy in 6 months for reevaluation. She was reminded to call if she notices any vocal changes or any dysphagia.

## 2021-10-26 RX ORDER — CYANOCOBALAMIN 1000 UG/ML
1000 INJECTION INTRAMUSCULAR; SUBCUTANEOUS
Qty: 3 ML | Refills: 2 | Status: SHIPPED | OUTPATIENT
Start: 2021-10-26 | End: 2021-12-03 | Stop reason: SDUPTHER

## 2021-11-03 PROBLEM — K21.9 GASTROESOPHAGEAL REFLUX DISEASE WITHOUT ESOPHAGITIS: Chronic | Status: RESOLVED | Noted: 2017-08-26 | Resolved: 2021-11-03

## 2021-11-03 PROBLEM — E78.5 HYPERLIPIDEMIA: Chronic | Status: RESOLVED | Noted: 2017-08-26 | Resolved: 2021-11-03

## 2021-11-03 PROBLEM — J98.4 SMALL AIRWAYS DISEASE: Status: ACTIVE | Noted: 2021-10-19

## 2021-11-03 RX ORDER — DESVENLAFAXINE 50 MG/1
50 TABLET, EXTENDED RELEASE ORAL DAILY
Qty: 90 TABLET | Refills: 3 | Status: SHIPPED | OUTPATIENT
Start: 2021-11-03 | End: 2022-03-04

## 2021-11-03 RX ORDER — NORTRIPTYLINE HYDROCHLORIDE 10 MG/1
20 CAPSULE ORAL NIGHTLY
Qty: 180 CAPSULE | Refills: 3 | Status: SHIPPED | OUTPATIENT
Start: 2021-11-03 | End: 2021-11-28

## 2021-11-03 RX ORDER — RIZATRIPTAN BENZOATE 10 MG/1
TABLET ORAL
Qty: 9 TABLET | Refills: 5 | Status: SHIPPED | OUTPATIENT
Start: 2021-11-03

## 2021-11-03 RX ORDER — LISINOPRIL 20 MG/1
20 TABLET ORAL DAILY
Qty: 90 TABLET | Refills: 3 | Status: SHIPPED | OUTPATIENT
Start: 2021-11-03 | End: 2022-08-01

## 2021-11-23 NOTE — TELEPHONE ENCOUNTER
Kori Akers called requesting a refill of the below medication which has been pended for you:     Requested Prescriptions     Pending Prescriptions Disp Refills    nortriptyline (PAMELOR) 10 MG capsule [Pharmacy Med Name: NORTRIPTYLINE HCL 10MG CAPS] 180 capsule 3     Sig: TAKE TWO CAPSULES EVERY EVENING AT BEDTIME       Last Appointment Date: 10/1/2021  Next Appointment Date: 12/3/2021    Allergies   Allergen Reactions    Demerol  [Meperidine Hcl]      Other reaction(s): Unknown    Pcn [Penicillins] Rash

## 2021-11-28 RX ORDER — NORTRIPTYLINE HYDROCHLORIDE 10 MG/1
CAPSULE ORAL
Qty: 180 CAPSULE | Refills: 3 | Status: SHIPPED | OUTPATIENT
Start: 2021-11-28 | End: 2022-08-22

## 2021-12-03 ENCOUNTER — OFFICE VISIT (OUTPATIENT)
Dept: INTERNAL MEDICINE | Age: 61
End: 2021-12-03
Payer: COMMERCIAL

## 2021-12-03 VITALS
SYSTOLIC BLOOD PRESSURE: 137 MMHG | WEIGHT: 146 LBS | HEART RATE: 88 BPM | BODY MASS INDEX: 22.91 KG/M2 | DIASTOLIC BLOOD PRESSURE: 88 MMHG | HEIGHT: 67 IN | RESPIRATION RATE: 16 BRPM

## 2021-12-03 DIAGNOSIS — G43.009 MIGRAINE WITHOUT AURA AND WITHOUT STATUS MIGRAINOSUS, NOT INTRACTABLE: Chronic | ICD-10-CM

## 2021-12-03 DIAGNOSIS — I10 ESSENTIAL HYPERTENSION: Primary | ICD-10-CM

## 2021-12-03 DIAGNOSIS — D51.0 PERNICIOUS ANEMIA: ICD-10-CM

## 2021-12-03 PROCEDURE — 99214 OFFICE O/P EST MOD 30 MIN: CPT | Performed by: INTERNAL MEDICINE

## 2021-12-03 RX ORDER — CYANOCOBALAMIN 1000 UG/ML
1000 INJECTION INTRAMUSCULAR; SUBCUTANEOUS
Qty: 3 ML | Refills: 3 | Status: SHIPPED | OUTPATIENT
Start: 2021-12-03 | End: 2022-07-25

## 2021-12-03 RX ORDER — HYDROCHLOROTHIAZIDE 25 MG/1
25 TABLET ORAL EVERY MORNING
Qty: 30 TABLET | Refills: 3 | Status: SHIPPED | OUTPATIENT
Start: 2021-12-03 | End: 2022-02-07

## 2021-12-03 NOTE — PROGRESS NOTES
Chief Complaint   Patient presents with    Follow-up     Patient reports following up for 2 month follow up, BP has been running high. HPI: Patient is here today to follow-up hypertension history DVT/PE and other medical issues. Generally she feels better no new complaints shortness of breath is much better no chest pressure no pleurisy. Blood pressure running too high looking at her results. We had stopped her ATUL Lopez because she noticed it was worsening hot flashes flashes in general are still better    Past Medical History:   Diagnosis Date    Cervical disc disease 8/26/2017    DVT (deep venous thrombosis) (HCC)     Essential hypertension 12/1/2019    Gastroesophageal reflux disease without esophagitis 8/26/2017    Hyperlipidemia     Laryngeal polyp     followed by Sharda Jones PA-C    Major depressive disorder in remission ([facility] [address] 8/26/2017    Migraine without aura 8/26/2017    Pernicious anemia 8/26/2017    Raynaud's phenomenon 8/26/2017       Past Surgical History:   Procedure Laterality Date    ARTERIOGRAM Bilateral 9/2/2020    ECOS-PULMONARY performed by Zuly Cain MD at [place] [address]         Family History   Problem Relation Age of Onset    High Blood Pressure Mother     Coronary Art Dis Mother     Thyroid Disease Mother         Hypothyroidism    Breast Cancer Mother 80    Colon Polyps Father     Prostate Cancer Father     Anemia Father         Pernicious    Cancer Father         bladder cancer     Colon Polyps Sister     Anemia Brother         Pernicious    Cancer Brother 46        prostate cancer, bladder cancer    High Blood Pressure Paternal Grandmother        Social History     Socioeconomic History    Marital status:      Spouse name: Not on file    Number of children: Not on file    Years of education: Not on file    Highest education level: Not on file   Occupational History    Not on file   Tobacco Use    Smoking status: Former Smoker Packs/day: 0.50     Years: 25.00     Pack years: 12.50     Quit date: 18     Years since quittin.9    Smokeless tobacco: Never Used   Vaping Use    Vaping Use: Never used   Substance and Sexual Activity    Alcohol use: Yes     Comment: rare    Drug use: No    Sexual activity: Not on file   Other Topics Concern    Not on file   Social History Narrative    Not on file     Social Determinants of Health     Financial Resource Strain: Low Risk     Difficulty of Paying Living Expenses: Not very hard   Food Insecurity: No Food Insecurity    Worried About Running Out of Food in the Last Year: Never true    Artemio of Food in the Last Year: Never true   Transportation Needs:     Lack of Transportation (Medical): Not on file    Lack of Transportation (Non-Medical):  Not on file   Physical Activity:     Days of Exercise per Week: Not on file    Minutes of Exercise per Session: Not on file   Stress:     Feeling of Stress : Not on file   Social Connections:     Frequency of Communication with Friends and Family: Not on file    Frequency of Social Gatherings with Friends and Family: Not on file    Attends Denominational Services: Not on file    Active Member of [address] [place] [address] [employer] or Organizations: Not on file    Attends Club or Organization Meetings: Not on file    Marital Status: Not on file   Intimate Partner Violence:     Fear of Current or Ex-Partner: Not on file    Emotionally Abused: Not on file    Physically Abused: Not on file    Sexually Abused: Not on file   Housing Stability:     Unable to Pay for Housing in the Last Year: Not on file    Number of [place] in the Last Year: Not on file    Unstable Housing in the Last Year: Not on file       Allergies   Allergen Reactions    Demerol  [Meperidine Hcl]      Other reaction(s): Unknown    Pcn [Penicillins] Rash       Current Outpatient Medications   Medication Sig Dispense Refill    hydroCHLOROthiazide (HYDRODIURIL) 25 MG tablet Take 1 tablet by mouth every morning 30 tablet 3    cyanocobalamin 1000 MCG/ML injection Inject 1 mL into the muscle every 30 days 3 mL 3    nortriptyline (PAMELOR) 10 MG capsule TAKE TWO CAPSULES EVERY EVENING AT BEDTIME 180 capsule 3    ELIQUIS 5 MG TABS tablet TAKE 1 TABLET BY MOUTH 2 TIMES A  tablet 0    rizatriptan (MAXALT) 10 MG tablet May repeat in 2 hours if needed do not exceed 2 doses in 24 hours 9 tablet 5    desvenlafaxine succinate (PRISTIQ) 50 MG TB24 extended release tablet Take 1 tablet by mouth daily 90 tablet 3    lisinopril (PRINIVIL;ZESTRIL) 20 MG tablet Take 1 tablet by mouth daily 90 tablet 3    omeprazole (PRILOSEC) 40 MG delayed release capsule Take 1 capsule by mouth nightly 60 capsule 5    calcium carbonate (OSCAL) 500 MG TABS tablet Take 500 mg by mouth daily      Cholecalciferol (VITAMIN D) 50 MCG (2000 UT) CAPS capsule Take 2,000 Units by mouth daily      Multiple Vitamins-Minerals (PRESERVISION AREDS 2+MULTI VIT PO) Take 1 tablet by mouth 2 times daily      tiZANidine (ZANAFLEX) 4 MG tablet Take 1 tablet by mouth every 12 hours as needed (muscle spasms) 180 tablet 1    verapamil (CALAN SR) 240 MG extended release tablet Take 1 tablet by mouth daily 90 tablet 3     No current facility-administered medications for this visit.        Review of Systems    /88   Pulse 88   Resp 16   Ht 5' 7\" (1.702 m)   Wt 146 lb (66.2 kg)   BMI 22.87 kg/m²   BP Readings from Last 7 Encounters:   12/03/21 137/88   10/21/21 111/68   10/01/21 110/60   08/11/21 120/80   07/21/21 126/74   03/19/21 132/86   02/01/21 102/68     Wt Readings from Last 7 Encounters:   12/03/21 146 lb (66.2 kg)   10/21/21 148 lb 9.6 oz (67.4 kg)   10/01/21 149 lb (67.6 kg)   08/11/21 153 lb 11.2 oz (69.7 kg)   07/21/21 152 lb (68.9 kg)   04/06/21 152 lb (68.9 kg)   03/19/21 156 lb (70.8 kg)     BMI Readings from Last 7 Encounters:   12/03/21 22.87 kg/m²   10/21/21 23.27 kg/m²   10/01/21 23.34 kg/m²   08/11/21 24.07 kg/m² 07/21/21 23.81 kg/m²   04/06/21 23.81 kg/m²   03/19/21 24.43 kg/m²     Resp Readings from Last 7 Encounters:   12/03/21 16   09/05/20 18   09/02/20 (!) 3   10/24/16 16       Physical Exam    Results for orders placed or performed in visit on 10/01/21   Urinalysis with Microscopic   Result Value Ref Range    Color, UA DARK YELLOW (A) Straw/Yellow    Clarity, UA Clear Clear    Glucose, Ur Negative Negative mg/dL    Bilirubin Urine Negative Negative    Ketones, Urine TRACE (A) Negative mg/dL    Specific Gravity, UA 1.023 1.005 - 1.030    Blood, Urine Negative Negative    pH, UA 7.0 5.0 - 8.0    Protein, UA Negative Negative mg/dL    Urobilinogen, Urine 1.0 <2.0 E.U./dL    Nitrite, Urine Negative Negative    Leukocyte Esterase, Urine TRACE (A) Negative    Bacteria, UA NEGATIVE (A) None Seen /HPF    Crystals, UA NEG (A) None Seen /HPF    Hyaline Casts, UA 1 0 - 8 /HPF    WBC, UA 1 0 - 5 /HPF    RBC, UA 2 0 - 4 /HPF    Epithelial Cells, UA 3 0 - 5 /HPF       ASSESSMENT/ PLAN:  1. Essential hypertension  Lisinopril 20mg daily and add hct 25mg daily. Make need to make other changes- pt will keep us updated on her blood pressure  - hydroCHLOROthiazide (HYDRODIURIL) 25 MG tablet; Take 1 tablet by mouth every morning  Dispense: 30 tablet; Refill: 3  Explained risk benefit of medication I think looking at her readings we would get more benefit from adding HCTZ if that does not control the blood pressure we will have to make other alterations. Would eat a potassium rich diet will follow closely. Patient to send us readings on my chart periodically    2. Pernicious anemia  Check labs B12 therapy and follow  - Comprehensive Metabolic Panel; Future  - Vitamin B12; Future  - Folate; Future    3.  Migraines -had taken verapamil also for migraine prophylaxis we will have to watch that headaches do not worsen get blood pressure better controlled follow

## 2021-12-22 ENCOUNTER — OFFICE VISIT (OUTPATIENT)
Dept: URGENT CARE | Age: 61
End: 2021-12-22
Payer: COMMERCIAL

## 2021-12-22 VITALS
HEIGHT: 67 IN | RESPIRATION RATE: 18 BRPM | SYSTOLIC BLOOD PRESSURE: 103 MMHG | HEART RATE: 77 BPM | DIASTOLIC BLOOD PRESSURE: 79 MMHG | WEIGHT: 143 LBS | BODY MASS INDEX: 22.44 KG/M2 | TEMPERATURE: 97.5 F | OXYGEN SATURATION: 98 %

## 2021-12-22 DIAGNOSIS — R05.9 COUGH: Primary | ICD-10-CM

## 2021-12-22 DIAGNOSIS — D51.0 PERNICIOUS ANEMIA: ICD-10-CM

## 2021-12-22 DIAGNOSIS — Z11.59 SCREENING FOR VIRAL DISEASE: ICD-10-CM

## 2021-12-22 DIAGNOSIS — R09.81 NASAL CONGESTION: ICD-10-CM

## 2021-12-22 LAB
ALBUMIN SERPL-MCNC: 4.5 G/DL (ref 3.5–5.2)
ALP BLD-CCNC: 85 U/L (ref 35–104)
ALT SERPL-CCNC: 31 U/L (ref 5–33)
ANION GAP SERPL CALCULATED.3IONS-SCNC: 10 MMOL/L (ref 7–19)
AST SERPL-CCNC: 33 U/L (ref 5–32)
BILIRUB SERPL-MCNC: <0.2 MG/DL (ref 0.2–1.2)
BUN BLDV-MCNC: 19 MG/DL (ref 8–23)
CALCIUM SERPL-MCNC: 9.6 MG/DL (ref 8.8–10.2)
CHLORIDE BLD-SCNC: 103 MMOL/L (ref 98–111)
CO2: 30 MMOL/L (ref 22–29)
CREAT SERPL-MCNC: 0.9 MG/DL (ref 0.5–0.9)
FOLATE: 18.1 NG/ML (ref 4.8–37.3)
GFR AFRICAN AMERICAN: >59
GFR NON-AFRICAN AMERICAN: >60
GLUCOSE BLD-MCNC: 92 MG/DL (ref 74–109)
INFLUENZA A ANTIBODY: NEGATIVE
INFLUENZA B ANTIBODY: NEGATIVE
POTASSIUM SERPL-SCNC: 4.5 MMOL/L (ref 3.5–5)
SODIUM BLD-SCNC: 143 MMOL/L (ref 136–145)
TOTAL PROTEIN: 6.6 G/DL (ref 6.6–8.7)
VITAMIN B-12: 1333 PG/ML (ref 211–946)

## 2021-12-22 PROCEDURE — 99213 OFFICE O/P EST LOW 20 MIN: CPT | Performed by: NURSE PRACTITIONER

## 2021-12-22 PROCEDURE — 87804 INFLUENZA ASSAY W/OPTIC: CPT | Performed by: NURSE PRACTITIONER

## 2021-12-22 ASSESSMENT — PATIENT HEALTH QUESTIONNAIRE - PHQ9
1. LITTLE INTEREST OR PLEASURE IN DOING THINGS: 0
SUM OF ALL RESPONSES TO PHQ QUESTIONS 1-9: 0
SUM OF ALL RESPONSES TO PHQ QUESTIONS 1-9: 0
DEPRESSION UNABLE TO ASSESS: FUNCTIONAL CAPACITY MOTIVATION LIMITS ACCURACY
SUM OF ALL RESPONSES TO PHQ QUESTIONS 1-9: 0
2. FEELING DOWN, DEPRESSED OR HOPELESS: 0
SUM OF ALL RESPONSES TO PHQ9 QUESTIONS 1 & 2: 0

## 2021-12-22 ASSESSMENT — ENCOUNTER SYMPTOMS
DIARRHEA: 0
SORE THROAT: 0
VOMITING: 0
NAUSEA: 0
RHINORRHEA: 0
ALLERGIC/IMMUNOLOGIC NEGATIVE: 1
COUGH: 1
ABDOMINAL PAIN: 0
SINUS PRESSURE: 0
SHORTNESS OF BREATH: 0

## 2021-12-22 ASSESSMENT — VISUAL ACUITY: OU: 1

## 2021-12-22 NOTE — PROGRESS NOTES
[address] [facility]  [address] [address] [place] [address]  Dept: [phone]  Dept Fax: [phone]  Loc: [phone]    Boo Avila is a 57 y.o. female who presents today for her medical conditions/complaintsas noted below. Boo Avila is c/o of Cough, Head Congestion, and Fatigue        HPI:     Cough  This is a new problem. The current episode started in the past 7 days (since Monday). The problem has been unchanged. The problem occurs every few minutes. The cough is non-productive. Associated symptoms include nasal congestion and postnasal drip. Pertinent negatives include no chills, fever, headaches, myalgias, rash, rhinorrhea, sore throat or shortness of breath. Associated symptoms comments: Fatigue. Nothing aggravates the symptoms. She has tried body position changes and cool air (Tylenol ) for the symptoms. The treatment provided mild relief.        Past Medical History:   Diagnosis Date    Cervical disc disease 8/26/2017    DVT (deep venous thrombosis) (HCC)     Essential hypertension 12/1/2019    Gastroesophageal reflux disease without esophagitis 8/26/2017    Hyperlipidemia     Laryngeal polyp     followed by Avis Henao PA-C    [facility] 8/26/2017    Migraine without aura 8/26/2017    Pernicious anemia 8/26/2017    Raynaud's phenomenon 8/26/2017     Past Surgical History:   Procedure Laterality Date    ARTERIOGRAM Bilateral 9/2/2020    ECOS-PULMONARY performed by Gary Gaffney MD at [facility] OR    [facility]         Family History   Problem Relation Age of Onset    High Blood Pressure Mother     Coronary Art Dis Mother     Thyroid Disease Mother         Hypothyroidism    Breast Cancer Mother 80    Colon Polyps Father     Prostate Cancer Father     Anemia Father         Pernicious    Cancer Father         bladder cancer     Colon Polyps Sister     Anemia Brother         Pernicious    Cancer Brother 46 prostate cancer, bladder cancer    High Blood Pressure Paternal Grandmother        Social History     Tobacco Use    Smoking status: Former Smoker     Packs/day: 0.50     Years: 25.00     Pack years: 12.50     Quit date:      Years since quittin.9    Smokeless tobacco: Never Used   Substance Use Topics    Alcohol use: Yes     Comment: rare      Current Outpatient Medications   Medication Sig Dispense Refill    hydroCHLOROthiazide (HYDRODIURIL) 25 MG tablet Take 1 tablet by mouth every morning 30 tablet 3    cyanocobalamin 1000 MCG/ML injection Inject 1 mL into the muscle every 30 days 3 mL 3    nortriptyline (PAMELOR) 10 MG capsule TAKE TWO CAPSULES EVERY EVENING AT BEDTIME 180 capsule 3    ELIQUIS 5 MG TABS tablet TAKE 1 TABLET BY MOUTH 2 TIMES A  tablet 0    rizatriptan (MAXALT) 10 MG tablet May repeat in 2 hours if needed do not exceed 2 doses in 24 hours 9 tablet 5    desvenlafaxine succinate (PRISTIQ) 50 MG TB24 extended release tablet Take 1 tablet by mouth daily 90 tablet 3    lisinopril (PRINIVIL;ZESTRIL) 20 MG tablet Take 1 tablet by mouth daily 90 tablet 3    omeprazole (PRILOSEC) 40 MG delayed release capsule Take 1 capsule by mouth nightly 60 capsule 5    calcium carbonate (OSCAL) 500 MG TABS tablet Take 500 mg by mouth daily      Cholecalciferol (VITAMIN D) 50 MCG ( UT) CAPS capsule Take 2,000 Units by mouth daily      Multiple Vitamins-Minerals (PRESERVISION AREDS 2+MULTI VIT PO) Take 1 tablet by mouth 2 times daily      tiZANidine (ZANAFLEX) 4 MG tablet Take 1 tablet by mouth every 12 hours as needed (muscle spasms) 180 tablet 1     No current facility-administered medications for this visit.      Allergies   Allergen Reactions    Demerol  [Meperidine Hcl]      Other reaction(s): Unknown    Pcn [Penicillins] Rash       Health Maintenance   Topic Date Due    Breast cancer screen  2022    Potassium monitoring  2022    Creatinine monitoring Heart sounds: Normal heart sounds, S1 normal and S2 normal. No murmur heard. No friction rub. No gallop. Pulmonary:      Effort: Pulmonary effort is normal. No respiratory distress. Breath sounds: Normal breath sounds and air entry. No wheezing, rhonchi or rales. Abdominal:      General: Abdomen is flat. Palpations: Abdomen is soft. Musculoskeletal:         General: No tenderness or deformity. Normal range of motion. Cervical back: Full passive range of motion without pain and neck supple. Lymphadenopathy:      Head:      Right side of head: No tonsillar adenopathy. Left side of head: No tonsillar adenopathy. Skin:     General: Skin is warm and dry. Capillary Refill: Capillary refill takes less than 2 seconds. Neurological:      General: No focal deficit present. Mental Status: She is alert, oriented to person, place, and time and easily aroused. Psychiatric:         Attention and Perception: Attention normal.         Mood and Affect: Mood normal.         Speech: Speech normal.         Behavior: Behavior normal. Behavior is cooperative. /79   Pulse 77   Temp 97.5 °F (36.4 °C) (Temporal)   Resp 18   Ht 5' 7\" (1.702 m)   Wt 143 lb (64.9 kg)   SpO2 98%   BMI 22.40 kg/m²     :Assessment       Diagnosis Orders   1. Cough  POCT Influenza A/B    COVID-19   2. Screening for viral disease  COVID-19   3. Nasal congestion         :Plan        Drink plenty of fluids  Rest  OTC Tylenol or Motrin as needed  COVID test today- we will call with results later today/tonight- you can also view this on my chart  Follow-up with PCP or return to [facility] This Encounter   Procedures    COVID-19    COVID-19    COVID-19     Scheduling Instructions:      1) Due to current limited availability of the COVID-19 test, tests will be prioritized based on responses to questions above. Testing may be delayed due to volume.             2) Print and instruct patient to as a cold, goes away. You can take a few steps at home to cough less and feel better. Follow-up care is a key part of your treatment and safety. Be sure to make and go to all appointments, and call your doctor if you are having problems. It's also a good idea to know your test results and keep a list of the medicines you take. How can you care for yourself at home? · Drink lots of water and other fluids. This helps thin the mucus and soothes a dry or sore throat. Honey or lemon juice in hot water or tea may ease a dry cough. · Take cough medicine as directed by your doctor. · Prop up your head on pillows to help you breathe and ease a dry cough. · Try cough drops to soothe a dry or sore throat. Cough drops don't stop a cough. Medicine-flavored cough drops are no better than candy-flavored drops or hard candy. · Do not smoke. Avoid secondhand smoke. If you need help quitting, talk to your doctor about stop-smoking programs and medicines. These can increase your chances of quitting for good. When should you call for help? Call 911 anytime you think you may need emergency care. For example, call if:    · You have severe trouble breathing. Call your doctor now or seek immediate medical care if:    · You cough up blood.     · You have new or worse trouble breathing.     · You have a new or higher fever.     · You have a new rash. Watch closely for changes in your health, and be sure to contact your doctor if:    · You cough more deeply or more often, especially if you notice more mucus or a change in the color of your mucus.     · You have new symptoms, such as a sore throat, an earache, or sinus pain.     · You do not get better as expected. Where can you learn more? Go to https://OctapolypeUpmann's.PositiveID. org and sign in to your [employer] account. Enter D279 in the [employer]rap[employer] box to learn more about \"Cough: Care Instructions. \"     If you do not have an account, please click on the \"Sign Up Now\" link. Current as of: July 6, 2021               Content Version: 13.1  © 1953-4941 [employer]planner. Care instructions adapted under license by [facility] ([facility]). If you have questions about a medical condition or this instruction, always ask your healthcare professional. [address] any warranty or liability for your use of this information. Patient Education        Learning About Coronavirus [phone])  What is coronavirus (COVID-19)? COVID-19 is a disease caused by a type of coronavirus. This illness was first found in December 2019. It has since spread worldwide. Coronaviruses are a large group of viruses. They cause the common cold. They also cause more serious illnesses like [place] respiratory syndrome (MERS) and severe acute respiratory syndrome (SARS). COVID-19 is caused by a novel coronavirus. That means it's a new type that has not been seen in people before. What are the symptoms? COVID-19 symptoms may include:  · Fever. · Cough. · Trouble breathing. · Chills or repeated shaking with chills. · Muscle and body aches. · Headache. · Sore throat. · New loss of taste or smell. · Vomiting. · Diarrhea. In severe cases, COVID-19 can cause pneumonia and make it hard to breathe without help from a machine. It can cause death. How is it diagnosed? COVID-19 is diagnosed with a viral test. This may also be called a PCR test or antigen test. It looks for evidence of the virus in your breathing passages or lungs (respiratory system). The test is most often done on a sample from the nose, throat, or lungs. It's sometimes done on a sample of saliva. One way a sample is collected is by putting a long swab into the back of your nose. How is it treated? Mild cases of COVID-19 can be treated at home.  Serious cases need treatment in the hospital. Treatment may include medicines to reduce symptoms, plus breathing support such as oxygen therapy or a ventilator. Some people may be placed on their belly to help their oxygen levels. Treatments that may help people who have COVID-19 include:  Antiviral medicines. These medicines treat viral infections. Remdesivir is an example. Immune-based therapy. These medicines help the immune system fight COVID-19. Examples include monoclonal antibodies. Blood thinners. These medicines help prevent blood clots. People with severe illness are at risk for blood clots. How can you protect yourself and others? · Get vaccinated. · Avoid sick people. · Cover your mouth with a tissue when you cough or sneeze. · Wash your hands often, especially after you cough or sneeze. Use soap and water, and scrub for at least 20 seconds. If soap and water aren't available, use an alcohol-based hand . · Avoid touching your mouth, nose, and eyes. Be sure to follow all instructions from the [facility] and your local health authorities. Here are some examples of specific precautions you may need to take. · If you are not fully vaccinated:  ? Wear a mask if you have to go to public areas. ? Avoid crowds and try to stay at least 6 feet away from other people. · If you have been exposed to the virus and are not fully vaccinated:  ? Stay home. Don't go to school, work, or public areas. And don't use public transportation, ride-shares, or taxis unless you have no choice. ? Wear a mask if you have to go to public areas, like the pharmacy. · Even if you're fully vaccinated, there's still a small chance you can get and spread COVID-19. If you live in an area where COVID-19 is spreading quickly, wear a mask if you have to go to indoor public areas. You might also want to wear a mask in crowded outdoor areas if you:  ? Have certain health conditions. ? Live with someone who has a compromised immune system. ? Live with someone who is not fully vaccinated. · If you have been exposed and you are fully vaccinated:  ? Talk to your doctor.  You may need a COVID-19 test.  ? Wear a mask in public indoor spaces for 14 days or until you test negative for COVID-19. If you're sick:  · Leave your home only if you need to get medical care. But call the doctor's office first so they know you're coming. And wear a mask. · Wear a mask whenever you're around other people. · Limit contact with pets and people in your home. If possible, stay in a separate bedroom and use a separate bathroom. · Clean and disinfect your home every day. Use household  and disinfectant wipes or sprays. Take special care to clean things that you touch with your hands. How can you self-isolate when you have COVID-19? If you have COVID-19, there are things you can do to help avoid spreading the virus to others. · Limit contact with people in your home. If possible, stay in a separate bedroom and use a separate bathroom. · Wear a mask when you are around other people. · If you have to leave home, avoid crowds and try to stay at least 6 feet away from other people. · Avoid contact with pets and other animals. · Cover your mouth and nose with a tissue when you cough or sneeze. Then throw it in the trash right away. · Wash your hands often, especially after you cough or sneeze. Use soap and water, and scrub for at least 20 seconds. If soap and water aren't available, use an alcohol-based hand . · Don't share personal household items. These include bedding, towels, cups and glasses, and eating utensils. · [address] in the warmest water allowed for the fabric type, and dry it completely. It's okay to wash other people's laundry with yours. · Clean and disinfect your home. Use household  and disinfectant wipes or sprays. When should you call for help? Call 911 anytime you think you may need emergency care. For example, call if you have life-threatening symptoms, such as:    · You have severe trouble breathing.  (You can't talk at all.)     · You have constant chest pain or pressure.     · You are severely dizzy or lightheaded.     · You are confused or can't think clearly.     · You have pale, gray, or blue-colored skin or lips.     · You pass out (lose consciousness) or are very hard to wake up. Call your doctor now or seek immediate medical care if:    · You have moderate trouble breathing. (You can't speak a full sentence.)     · You are coughing up blood (more than about 1 teaspoon).     · You have signs of low blood pressure. These include feeling lightheaded; being too weak to stand; and having cold, pale, clammy skin. Watch closely for changes in your health, and be sure to contact your doctor if:    · Your symptoms get worse.     · You are not getting better as expected.     · You have new or worse symptoms of anxiety, depression, nightmares, or flashbacks. Call before you go to the doctor's office. Follow their instructions. And wear a mask. Current as of: July 1, 2021               Content Version: 13.1  © 2006-2021 [employer]. Care instructions adapted under license by [facility] ([facility]). If you have questions about a medical condition or this instruction, always ask your healthcare professional. [place] [address] any warranty or liability for your use of this information. Patient Education        Coronavirus (DPYPE-20): Care Instructions  Overview  The coronavirus disease (COVID-19) is caused by a virus. Symptoms may include a fever, a cough, and shortness of breath. It can spread through droplets from coughing and sneezing, breathing, and singing. The virus also can spread when people are in close contact with someone who is infected. Most people have mild symptoms and can take care of themselves at home. If their symptoms get worse, they may need care in a hospital. Treatment may include medicines to reduce symptoms, plus breathing support such as oxygen therapy or a ventilator.   It's important to not spread the virus to others. If you have COVID-19, wear a mask anytime you are around other people. It can help stop the spread of the virus. You need to isolate yourself while you are sick. Leave your home only if you need to get medical care or testing. Follow-up care is a key part of your treatment and safety. Be sure to make and go to all appointments, and call your doctor if you are having problems. It's also a good idea to know your test results and keep a list of the medicines you take. How can you care for yourself at home? · Get extra rest. It can help you feel better. · Drink plenty of fluids. This helps replace fluids lost from fever. Fluids may also help ease a scratchy throat. · You can take acetaminophen (Tylenol) or ibuprofen (Advil, Motrin) to reduce a fever. It may also help with muscle and body aches. Read and follow all instructions on the label. · Use petroleum jelly on sore skin. This can help if the skin around your nose and lips becomes sore from rubbing a lot with tissues. If you use oxygen, use a water-based product instead of petroleum jelly. · Keep track of symptoms such as fever and shortness of breath. This can help you know if you need to call your doctor. It can also help you know when it's safe to be around other people. · In some cases, your doctor might suggest that you get a pulse oximeter. How can you self-isolate when you have COVID-19? If you have COVID-19, there are things you can do to help avoid spreading the virus to others. · Limit contact with people in your home. If possible, stay in a separate bedroom and use a separate bathroom. · Wear a mask when you are around other people. · If you have to leave home, avoid crowds and try to stay at least 6 feet away from other people. · Avoid contact with pets and other animals. · Cover your mouth and nose with a tissue when you cough or sneeze. Then throw it in the trash right away.   · Wash your hands often, especially after you cough or sneeze. Use soap and water, and scrub for at least 20 seconds. If soap and water aren't available, use an alcohol-based hand . · Don't share personal household items. These include bedding, towels, cups and glasses, and eating utensils. · [address] in the warmest water allowed for the fabric type, and dry it completely. It's okay to wash other people's laundry with yours. · Clean and disinfect your home. Use household  and disinfectant wipes or sprays. When can you end self-isolation for COVID-19? If you know or think that you have the virus, you will need to self-isolate. You can be around others after:  · It's been at least 10 days since your symptoms started and  · You haven't had a fever for 24 hours without taking medicines to lower the fever and  · Your symptoms are improving. If you tested positive but have no symptoms, you can end isolation after 10 days. But if you start to have symptoms, follow the steps above. Ask your doctor if you need to be tested before you end isolation. This is especially important if you have a weakened immune system. When should you call for help? Call 911 anytime you think you may need emergency care. For example, call if you have life-threatening symptoms, such as:    · You have severe trouble breathing. (You can't talk at all.)     · You have constant chest pain or pressure.     · You are severely dizzy or lightheaded.     · You are confused or can't think clearly.     · You have pale, gray, or blue-colored skin or lips.     · You pass out (lose consciousness) or are very hard to wake up. Call your doctor now or seek immediate medical care if:    · You have moderate trouble breathing. (You can't speak a full sentence.)     · You are coughing up blood (more than about 1 teaspoon).     · You have signs of low blood pressure. These include feeling lightheaded; being too weak to stand; and having cold, pale, clammy skin. Watch closely for changes in your health, and be sure to contact your doctor if:    · Your symptoms get worse.     · You are not getting better as expected.     · You have new or worse symptoms of anxiety, depression, nightmares, or flashbacks. Call before you go to the doctor's office. Follow their instructions. And wear a mask. Current as of: July 1, 2021               Content Version: 13.1  © 2006-2021 [employer]. Care instructions adapted under license by [facility] ([facility]). If you have questions about a medical condition or this instruction, always ask your healthcare professional. [place] [address] any warranty or liability for your use of this information. Patient given educational materials- see patient instructions. Discussed use, benefit, and side effects of prescribedmedications. All patient questions answered. Pt voiced understanding.        Electronically signed by EMILIANO Garrett CNP on 12/22/2021 at 7:38 AM

## 2021-12-22 NOTE — PATIENT INSTRUCTIONS
Plenty of fluids  Rest  OTC Tylenol or Motrin as needed  COVID test we will call with results but you can review this in my chart once it is resulted  Follow up with PCP or return to Urgent Care for worsening or unresolved symptoms. Patient Education        Cough: Care Instructions  Your Care Instructions     A cough is your body's response to something that bothers your throat or airways. Many things can cause a cough. You might cough because of a cold or the flu, bronchitis, or asthma. Smoking, postnasal drip, allergies, and stomach acid that backs up into your throat also can cause coughs. A cough is a symptom, not a disease. Most coughs stop when the cause, such as a cold, goes away. You can take a few steps at home to cough less and feel better. Follow-up care is a key part of your treatment and safety. Be sure to make and go to all appointments, and call your doctor if you are having problems. It's also a good idea to know your test results and keep a list of the medicines you take. How can you care for yourself at home? · Drink lots of water and other fluids. This helps thin the mucus and soothes a dry or sore throat. Honey or lemon juice in hot water or tea may ease a dry cough. · Take cough medicine as directed by your doctor. · Prop up your head on pillows to help you breathe and ease a dry cough. · Try cough drops to soothe a dry or sore throat. Cough drops don't stop a cough. Medicine-flavored cough drops are no better than candy-flavored drops or hard candy. · Do not smoke. Avoid secondhand smoke. If you need help quitting, talk to your doctor about stop-smoking programs and medicines. These can increase your chances of quitting for good. When should you call for help? Call 911 anytime you think you may need emergency care. For example, call if:    · You have severe trouble breathing.    Call your doctor now or seek immediate medical care if:    · You cough up blood.     · You have new or worse trouble breathing.     · You have a new or higher fever.     · You have a new rash. Watch closely for changes in your health, and be sure to contact your doctor if:    · You cough more deeply or more often, especially if you notice more mucus or a change in the color of your mucus.     · You have new symptoms, such as a sore throat, an earache, or sinus pain.     · You do not get better as expected. Where can you learn more? Go to https://Mojeek.gantto. org and sign in to your [employer] account. Enter D279 in the Zenter box to learn more about \"Cough: Care Instructions. \"     If you do not have an account, please click on the \"Sign Up Now\" link. Current as of: July 6, 2021               Content Version: 13.1  © 6846-2057 [employer]. Care instructions adapted under license by [facility] ([facility]). If you have questions about a medical condition or this instruction, always ask your healthcare professional. [place] [address] any warranty or liability for your use of this information. Patient Education        Learning About Coronavirus [phone])  What is coronavirus (COVID-19)? COVID-19 is a disease caused by a type of coronavirus. This illness was first found in December 2019. It has since spread worldwide. Coronaviruses are a large group of viruses. They cause the common cold. They also cause more serious illnesses like [place] respiratory syndrome (MERS) and severe acute respiratory syndrome (SARS). COVID-19 is caused by a novel coronavirus. That means it's a new type that has not been seen in people before. What are the symptoms? COVID-19 symptoms may include:  · Fever. · Cough. · Trouble breathing. · Chills or repeated shaking with chills. · Muscle and body aches. · Headache. · Sore throat. · New loss of taste or smell. · Vomiting. · Diarrhea.   In severe cases, COVID-19 can cause pneumonia and make it hard to breathe without help from a machine. It can cause death. How is it diagnosed? COVID-19 is diagnosed with a viral test. This may also be called a PCR test or antigen test. It looks for evidence of the virus in your breathing passages or lungs (respiratory system). The test is most often done on a sample from the nose, throat, or lungs. It's sometimes done on a sample of saliva. One way a sample is collected is by putting a long swab into the back of your nose. How is it treated? Mild cases of COVID-19 can be treated at home. Serious cases need treatment in the hospital. Treatment may include medicines to reduce symptoms, plus breathing support such as oxygen therapy or a ventilator. Some people may be placed on their belly to help their oxygen levels. Treatments that may help people who have COVID-19 include:  Antiviral medicines. These medicines treat viral infections. Remdesivir is an example. Immune-based therapy. These medicines help the immune system fight COVID-19. Examples include monoclonal antibodies. Blood thinners. These medicines help prevent blood clots. People with severe illness are at risk for blood clots. How can you protect yourself and others? · Get vaccinated. · Avoid sick people. · Cover your mouth with a tissue when you cough or sneeze. · Wash your hands often, especially after you cough or sneeze. Use soap and water, and scrub for at least 20 seconds. If soap and water aren't available, use an alcohol-based hand . · Avoid touching your mouth, nose, and eyes. Be sure to follow all instructions from the [facility] and your local health authorities. Here are some examples of specific precautions you may need to take. · If you are not fully vaccinated:  ? Wear a mask if you have to go to public areas. ? Avoid crowds and try to stay at least 6 feet away from other people. · If you have been exposed to the virus and are not fully vaccinated:  ? Stay home.  Don't go to school, work, or public areas. And don't use public transportation, ride-shares, or taxis unless you have no choice. ? Wear a mask if you have to go to public areas, like the pharmacy. · Even if you're fully vaccinated, there's still a small chance you can get and spread COVID-19. If you live in an area where COVID-19 is spreading quickly, wear a mask if you have to go to indoor public areas. You might also want to wear a mask in crowded outdoor areas if you:  ? Have certain health conditions. ? Live with someone who has a compromised immune system. ? Live with someone who is not fully vaccinated. · If you have been exposed and you are fully vaccinated:  ? Talk to your doctor. You may need a COVID-19 test.  ? Wear a mask in public indoor spaces for 14 days or until you test negative for COVID-19. If you're sick:  · Leave your home only if you need to get medical care. But call the doctor's office first so they know you're coming. And wear a mask. · Wear a mask whenever you're around other people. · Limit contact with pets and people in your home. If possible, stay in a separate bedroom and use a separate bathroom. · Clean and disinfect your home every day. Use household  and disinfectant wipes or sprays. Take special care to clean things that you touch with your hands. How can you self-isolate when you have COVID-19? If you have COVID-19, there are things you can do to help avoid spreading the virus to others. · Limit contact with people in your home. If possible, stay in a separate bedroom and use a separate bathroom. · Wear a mask when you are around other people. · If you have to leave home, avoid crowds and try to stay at least 6 feet away from other people. · Avoid contact with pets and other animals. · Cover your mouth and nose with a tissue when you cough or sneeze. Then throw it in the trash right away. · Wash your hands often, especially after you cough or sneeze.  Use soap and water, and scrub for at least 20 seconds. If soap and water aren't available, use an alcohol-based hand . · Don't share personal household items. These include bedding, towels, cups and glasses, and eating utensils. · [address] in the warmest water allowed for the fabric type, and dry it completely. It's okay to wash other people's laundry with yours. · Clean and disinfect your home. Use household  and disinfectant wipes or sprays. When should you call for help? Call 911 anytime you think you may need emergency care. For example, call if you have life-threatening symptoms, such as:    · You have severe trouble breathing. (You can't talk at all.)     · You have constant chest pain or pressure.     · You are severely dizzy or lightheaded.     · You are confused or can't think clearly.     · You have pale, gray, or blue-colored skin or lips.     · You pass out (lose consciousness) or are very hard to wake up. Call your doctor now or seek immediate medical care if:    · You have moderate trouble breathing. (You can't speak a full sentence.)     · You are coughing up blood (more than about 1 teaspoon).     · You have signs of low blood pressure. These include feeling lightheaded; being too weak to stand; and having cold, pale, clammy skin. Watch closely for changes in your health, and be sure to contact your doctor if:    · Your symptoms get worse.     · You are not getting better as expected.     · You have new or worse symptoms of anxiety, depression, nightmares, or flashbacks. Call before you go to the doctor's office. Follow their instructions. And wear a mask. Current as of: July 1, 2021               Content Version: 13.1  © 2006-2021 [employer]. Care instructions adapted under license by [facility] ([facility]).  If you have questions about a medical condition or this instruction, always ask your healthcare professional. [place] [address] any warranty or liability for your use of this information. Patient Education        Coronavirus (AIPCD-69): Care Instructions  Overview  The coronavirus disease (COVID-19) is caused by a virus. Symptoms may include a fever, a cough, and shortness of breath. It can spread through droplets from coughing and sneezing, breathing, and singing. The virus also can spread when people are in close contact with someone who is infected. Most people have mild symptoms and can take care of themselves at home. If their symptoms get worse, they may need care in a hospital. Treatment may include medicines to reduce symptoms, plus breathing support such as oxygen therapy or a ventilator. It's important to not spread the virus to others. If you have COVID-19, wear a mask anytime you are around other people. It can help stop the spread of the virus. You need to isolate yourself while you are sick. Leave your home only if you need to get medical care or testing. Follow-up care is a key part of your treatment and safety. Be sure to make and go to all appointments, and call your doctor if you are having problems. It's also a good idea to know your test results and keep a list of the medicines you take. How can you care for yourself at home? · Get extra rest. It can help you feel better. · Drink plenty of fluids. This helps replace fluids lost from fever. Fluids may also help ease a scratchy throat. · You can take acetaminophen (Tylenol) or ibuprofen (Advil, Motrin) to reduce a fever. It may also help with muscle and body aches. Read and follow all instructions on the label. · Use petroleum jelly on sore skin. This can help if the skin around your nose and lips becomes sore from rubbing a lot with tissues. If you use oxygen, use a water-based product instead of petroleum jelly. · Keep track of symptoms such as fever and shortness of breath. This can help you know if you need to call your doctor.  It can also help you know when it's safe to be around other people. · In some cases, your doctor might suggest that you get a pulse oximeter. How can you self-isolate when you have COVID-19? If you have COVID-19, there are things you can do to help avoid spreading the virus to others. · Limit contact with people in your home. If possible, stay in a separate bedroom and use a separate bathroom. · Wear a mask when you are around other people. · If you have to leave home, avoid crowds and try to stay at least 6 feet away from other people. · Avoid contact with pets and other animals. · Cover your mouth and nose with a tissue when you cough or sneeze. Then throw it in the trash right away. · Wash your hands often, especially after you cough or sneeze. Use soap and water, and scrub for at least 20 seconds. If soap and water aren't available, use an alcohol-based hand . · Don't share personal household items. These include bedding, towels, cups and glasses, and eating utensils. · [address] in the warmest water allowed for the fabric type, and dry it completely. It's okay to wash other people's laundry with yours. · Clean and disinfect your home. Use household  and disinfectant wipes or sprays. When can you end self-isolation for COVID-19? If you know or think that you have the virus, you will need to self-isolate. You can be around others after:  · It's been at least 10 days since your symptoms started and  · You haven't had a fever for 24 hours without taking medicines to lower the fever and  · Your symptoms are improving. If you tested positive but have no symptoms, you can end isolation after 10 days. But if you start to have symptoms, follow the steps above. Ask your doctor if you need to be tested before you end isolation. This is especially important if you have a weakened immune system. When should you call for help? Call 911 anytime you think you may need emergency care.  For example, call if you have life-threatening symptoms, such as:    · You have severe trouble breathing. (You can't talk at all.)     · You have constant chest pain or pressure.     · You are severely dizzy or lightheaded.     · You are confused or can't think clearly.     · You have pale, gray, or blue-colored skin or lips.     · You pass out (lose consciousness) or are very hard to wake up. Call your doctor now or seek immediate medical care if:    · You have moderate trouble breathing. (You can't speak a full sentence.)     · You are coughing up blood (more than about 1 teaspoon).     · You have signs of low blood pressure. These include feeling lightheaded; being too weak to stand; and having cold, pale, clammy skin. Watch closely for changes in your health, and be sure to contact your doctor if:    · Your symptoms get worse.     · You are not getting better as expected.     · You have new or worse symptoms of anxiety, depression, nightmares, or flashbacks. Call before you go to the doctor's office. Follow their instructions. And wear a mask. Current as of: July 1, 2021               Content Version: 13.1  © 2006-2021 Healthwise, Incorporated. Care instructions adapted under license by [facility] ([facility]). If you have questions about a medical condition or this instruction, always ask your healthcare professional. [address] any warranty or liability for your use of this information.

## 2021-12-23 LAB — SARS-COV-2, PCR: NOT DETECTED

## 2022-02-04 DIAGNOSIS — I10 ESSENTIAL HYPERTENSION: ICD-10-CM

## 2022-02-04 NOTE — TELEPHONE ENCOUNTER
Eusebio Aviles called requesting a refill of the below medication which has been pended for you:     Requested Prescriptions     Pending Prescriptions Disp Refills    hydroCHLOROthiazide (HYDRODIURIL) 25 MG tablet [Pharmacy Med Name: HYDROCHLOROTHIAZIDE 25MG TABS] 30 tablet 3     Sig: TAKE 1 TABLET BY MOUTH ONE TIME A DAY IN THE MORNING       Last Appointment Date: 12/3/2021  Next Appointment Date: 4/15/2022    Allergies   Allergen Reactions    Demerol  [Meperidine Hcl]      Other reaction(s): Unknown    Pcn [Penicillins] Rash

## 2022-02-07 RX ORDER — HYDROCHLOROTHIAZIDE 25 MG/1
TABLET ORAL
Qty: 30 TABLET | Refills: 3 | Status: SHIPPED | OUTPATIENT
Start: 2022-02-07 | End: 2022-03-22

## 2022-03-04 RX ORDER — APIXABAN 5 MG/1
TABLET, FILM COATED ORAL
Qty: 180 TABLET | Refills: 0 | Status: SHIPPED | OUTPATIENT
Start: 2022-03-04 | End: 2022-08-23

## 2022-03-04 RX ORDER — DESVENLAFAXINE 50 MG/1
TABLET, EXTENDED RELEASE ORAL
Qty: 90 TABLET | Refills: 3 | Status: SHIPPED | OUTPATIENT
Start: 2022-03-04

## 2022-03-04 NOTE — TELEPHONE ENCOUNTER
Left message with Ms. Elisa Steiner to inform her of an upcoming follow up visit with [facility] on 03/24/2022.

## 2022-03-08 DIAGNOSIS — M79.89 LEG SWELLING: ICD-10-CM

## 2022-03-08 DIAGNOSIS — M79.605 LEG PAIN, LEFT: Primary | ICD-10-CM

## 2022-03-15 RX ORDER — OMEPRAZOLE 40 MG/1
40 CAPSULE, DELAYED RELEASE ORAL NIGHTLY
Qty: 30 CAPSULE | Refills: 1 | Status: SHIPPED | OUTPATIENT
Start: 2022-03-15 | End: 2022-09-13

## 2022-03-21 DIAGNOSIS — I10 ESSENTIAL HYPERTENSION: ICD-10-CM

## 2022-03-22 RX ORDER — HYDROCHLOROTHIAZIDE 25 MG/1
TABLET ORAL
Qty: 90 TABLET | Refills: 3 | Status: SHIPPED | OUTPATIENT
Start: 2022-03-22 | End: 2022-08-25 | Stop reason: ALTCHOICE

## 2022-04-12 ENCOUNTER — OFFICE VISIT (OUTPATIENT)
Dept: VASCULAR SURGERY | Age: 62
End: 2022-04-12
Payer: COMMERCIAL

## 2022-04-12 ENCOUNTER — HOSPITAL ENCOUNTER (OUTPATIENT)
Dept: VASCULAR LAB | Age: 62
Discharge: HOME | End: 2022-04-12
Payer: COMMERCIAL

## 2022-04-12 VITALS
HEART RATE: 70 BPM | TEMPERATURE: 97 F | RESPIRATION RATE: 18 BRPM | OXYGEN SATURATION: 98 % | SYSTOLIC BLOOD PRESSURE: 101 MMHG | DIASTOLIC BLOOD PRESSURE: 70 MMHG

## 2022-04-12 DIAGNOSIS — I82.5Z2 CHRONIC DEEP VEIN THROMBOSIS (DVT) OF DISTAL VEIN OF LEFT LOWER EXTREMITY (HCC): ICD-10-CM

## 2022-04-12 DIAGNOSIS — D68.51 FACTOR V LEIDEN (HCC): Primary | ICD-10-CM

## 2022-04-12 DIAGNOSIS — M79.605 LEG PAIN, LEFT: ICD-10-CM

## 2022-04-12 DIAGNOSIS — M79.89 LEG SWELLING: ICD-10-CM

## 2022-04-12 PROCEDURE — 93971 EXTREMITY STUDY: CPT

## 2022-04-12 PROCEDURE — 99214 OFFICE O/P EST MOD 30 MIN: CPT | Performed by: NURSE PRACTITIONER

## 2022-04-12 RX ORDER — OXYMETAZOLINE HYDROCHLORIDE 1 G/100G
CREAM TOPICAL DAILY
COMMUNITY
End: 2022-08-25 | Stop reason: ALTCHOICE

## 2022-04-12 NOTE — PROGRESS NOTES
Leela Brooks (:  1960) is a 60 y.o. female,Established patient, here for evaluation of the following chief complaint(s):  Follow-up (venous left)            SUBJECTIVE/OBJECTIVE:  She presents for follow-up of known DVT. She has a hx of 1 event and PE. She has Factor V Leiden. Her current treatment includes eliquis. She does not have a family history of hypercoagulability She does not have an IVC filter. She has no symptoms of DVT. Differential diagnosis includes but is not limited to CHF, thyroid disease, venous disease, DVT, SVT, peripheral vascular disease.           Leela Brooks is a 60 y.o. female with the following history as recorded in 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    LAD (lymphadenopathy), axillary 10/10/2021    Vocal cord polyps 2021    Factor V Leiden ([facility] 2021    Dysfunction of right cardiac ventricle 2020    VTE (venous thromboembolism) 2020    Bilateral pulmonary embolism ([address] 2020    Pulmonary arterial hypertension ([address] 2020    Pulmonary embolism (HCC)     DVT (deep venous thrombosis) ([address]     Essential hypertension 2019    Myopia 2018    Nuclear senile cataract 2018    Pseudophakia 2018    Tear film insufficiency 2018    Migraine without aura 2017    Pernicious anemia 2017    Raynaud's phenomenon 2017    Cervical disc disease 2017    Dysthymia 2017    Breast density 2015     Current Outpatient Medications   Medication Sig Dispense Refill    Oxymetazoline HCl (RHOFADE) 1 % CREA Apply topically      hydroCHLOROthiazide (HYDRODIURIL) 25 MG tablet TAKE ONE TABLET ONCE A DAY IN THE MORNING 90 tablet 3    omeprazole (PRILOSEC) 40 MG delayed release capsule Take 1 capsule by mouth nightly 30 capsule 1    ELIQUIS 5 MG TABS tablet TAKE ONE TABLET BY MOUTH TWO TIMES A  tablet 0    desvenlafaxine succinate (PRISTIQ) 50 MG TB24 extended release tablet TAKE ONE TABLET ONE TIME DAILY 90 tablet 3    cyanocobalamin 1000 MCG/ML injection Inject 1 mL into the muscle every 30 days 3 mL 3    nortriptyline (PAMELOR) 10 MG capsule TAKE TWO CAPSULES EVERY EVENING AT BEDTIME 180 capsule 3    rizatriptan (MAXALT) 10 MG tablet May repeat in 2 hours if needed do not exceed 2 doses in 24 hours 9 tablet 5    lisinopril (PRINIVIL;ZESTRIL) 20 MG tablet Take 1 tablet by mouth daily 90 tablet 3    calcium carbonate (OSCAL) 500 MG TABS tablet Take 500 mg by mouth daily      Cholecalciferol (VITAMIN D) 50 MCG (2000 UT) CAPS capsule Take 2,000 Units by mouth daily      Multiple Vitamins-Minerals (PRESERVISION AREDS 2+MULTI VIT PO) Take 1 tablet by mouth 2 times daily      tiZANidine (ZANAFLEX) 4 MG tablet Take 1 tablet by mouth every 12 hours as needed (muscle spasms) 180 tablet 1     No current facility-administered medications for this visit.      Allergies: Demerol  [meperidine hcl] and Pcn [penicillins]  Past Medical History:   Diagnosis Date    Cervical disc disease 8/26/2017    [facility] thrombosis) [facility]     Essential hypertension 12/1/2019    Gastroesophageal reflux disease without esophagitis 8/26/2017    Hyperlipidemia     Laryngeal polyp     followed by Ricardo Oneill PA-C    [facility] depressive disorder [facility] 8/26/2017    Migraine without aura 8/26/2017    Pernicious anemia 8/26/2017    Raynaud's phenomenon 8/26/2017     Past Surgical History:   Procedure Laterality Date    ARTERIOGRAM Bilateral 9/2/2020    EC[facility]    [facility]       Family History   Problem Relation Age of Onset    High Blood Pressure Mother     Coronary Art Dis Mother     Thyroid Disease Mother         Hypothyroidism    Breast Cancer Mother 80    Colon Polyps Father     Prostate Cancer Father     Anemia Father         Pernicious    Cancer Father         bladder cancer     Colon Polyps Sister     Anemia Brother         Pernicious    Cancer Brother 46        prostate cancer, bladder cancer    High Blood Pressure Paternal Grandmother      Social History     Tobacco Use    Smoking status: Former Smoker     Packs/day: 0.50     Years: 25.00     Pack years: 12.50     Quit date:      Years since quittin.2    Smokeless tobacco: Never Used   Substance Use Topics    Alcohol use: Yes     Comment: rare       ROS  Eyes - no sudden vision change or amaurosis. Respiratory - no significant shortness of breath,  Cardiovascular - no chest pain or syncope. no  significant leg swelling. no claudication. Musculoskeletal - no gait disturbance  Skin - no new wound. Neurologic -  No speech difficulty or lateralizing weakness. All other review of systems are negative. Physical Exam    /70 Comment: right  Pulse 70   Temp 97 °F (36.1 °C)   Resp 18   SpO2 98%       Neck- carotid pulses 2+ to palpation with no bruit  Cardiovascular - Regular rate and rhythm. Pulmonary - effort appears normal.  No respiratory distress. Lungs - Breath sounds normal. No wheezes or rales. Extremities -  Radial and brachial pulses are 2+ to palpation bilaterally. Right femoral pulse: present 2+; Right popliteal pulse: absent Right DP: absent; Right PT absent; Left femoral pulse: present 2+; Left popliteal pulse: absent; Left DP: absent; Left PT: absent No cyanosis, clubbing,  no  significant edema. No signs atheroembolic event. Neurologic - alert and oriented X 3. Physiologic. Face symmetric. Skin - warm, dry, and intact. no  wound  Psychiatric - mood, affect, and behavior appear normal.  Judgment and thought processes appear normal.    Risk factors for atherosclerosis of all vascular beds have been reviewed with the patient including:  Family history, tobacco abuse in all forms, elevated cholesterol, hyperlipidemia, and diabetes.     Venous Scan: negative     Individual films reviewed: Yes. Test results were reviewed with the patient  Disease process is stable and chronic    after I reviewed the ultrasound images, I see complete resolution of the thrombus          Reviewed previous studies including: venous scan  Individual images were reviewed. I agree with the findings  Results were discussed with the patient. ASSESSMENT/PLAN:  1. Factor V Leiden ([facility]  2. Chronic deep vein thrombosis (DVT) of distal vein of left lower extremity (HCC)        Discussed management of venous scan which includes:  continue eliquis to reduce risk of venous thrombosis    Recommend no smoking - discussed the effect tobacco has on illness;   Proceed with lifelong anticoagulation    Follow up with us as needed   Patient instructed to keep leg elevated as much as possible due to the increased swelling that is associated with DVT. Call the office if pain, swelling, and tenderness extends beyond where it is today. Wear  support hose every day from the time they get up in the morning until they go to be at night. Use warm moist heat for comfort if needed. An electronic signature was used to authenticate this note.     --EMILIANO Strickland

## 2022-04-15 ENCOUNTER — OFFICE VISIT (OUTPATIENT)
Dept: INTERNAL MEDICINE | Age: 62
End: 2022-04-15
Payer: COMMERCIAL

## 2022-04-15 VITALS
WEIGHT: 144 LBS | HEART RATE: 70 BPM | BODY MASS INDEX: 22.6 KG/M2 | HEIGHT: 67 IN | OXYGEN SATURATION: 97 % | SYSTOLIC BLOOD PRESSURE: 110 MMHG | DIASTOLIC BLOOD PRESSURE: 68 MMHG

## 2022-04-15 DIAGNOSIS — D68.51 FACTOR V LEIDEN (HCC): ICD-10-CM

## 2022-04-15 DIAGNOSIS — Z12.31 SCREENING MAMMOGRAM FOR BREAST CANCER: ICD-10-CM

## 2022-04-15 DIAGNOSIS — E55.9 VITAMIN D DEFICIENCY: ICD-10-CM

## 2022-04-15 DIAGNOSIS — I10 ESSENTIAL HYPERTENSION: Primary | ICD-10-CM

## 2022-04-15 DIAGNOSIS — F34.1 DYSTHYMIA: ICD-10-CM

## 2022-04-15 DIAGNOSIS — E78.2 MIXED HYPERLIPIDEMIA: ICD-10-CM

## 2022-04-15 PROCEDURE — 99214 OFFICE O/P EST MOD 30 MIN: CPT | Performed by: INTERNAL MEDICINE

## 2022-04-15 NOTE — PROGRESS NOTES
Chief Complaint   Patient presents with    Follow-up     4 months    Hypertension       HPI: Patient is here today to follow-up hypertension and other medical issues overall she is doing okay.   She denies headache chest pressure chest pain she has been feeling pretty well shortness of breath has been less no specific new issue    Past Medical History:   Diagnosis Date    Cervical disc disease 8/26/2017    DVT (deep venous thrombosis) ([facility]     Essential hypertension 12/1/2019    Gastroesophageal reflux disease without esophagitis 8/26/2017    Hyperlipidemia     Laryngeal polyp     followed by Candida Gaines PA-C    Major depressive disorder in remission ([facility] 8/26/2017    Migraine without aura 8/26/2017    Pernicious anemia 8/26/2017    Raynaud's phenomenon 8/26/2017       Past Surgical History:   Procedure Laterality Date    ARTERIOGRAM Bilateral 9/2/2020    ECOS-PULMONARY performed by Froylan Pierce MD at [address] [place] [address], LAPAROSCOPIC N/A 4/27/2022    laparoscopic robot assisted cholecystectomy with ICG performed by Mimi Martínez MD at [place] [address]         Family History   Problem Relation Age of Onset    High Blood Pressure Mother     Coronary Art Dis Mother     Thyroid Disease Mother         Hypothyroidism    Breast Cancer Mother 80    Colon Polyps Father     Prostate Cancer Father     Anemia Father         Pernicious    Cancer Father         bladder cancer     Colon Polyps Sister     Anemia Brother         Pernicious    Cancer Brother 46        prostate cancer, bladder cancer    High Blood Pressure Paternal Grandmother        Social History     Socioeconomic History    Marital status:      Spouse name: Not on file    Number of children: Not on file    Years of education: Not on file    Highest education level: Not on file   Occupational History    Not on file   Tobacco Use    Smoking status: Former Smoker     Packs/day: 0.50     Years: 25.00 Pack years: 12.50     Quit date:      Years since quittin.3    Smokeless tobacco: Never Used   Vaping Use    Vaping Use: Never used   Substance and Sexual Activity    Alcohol use: Yes     Comment: rare    Drug use: No    Sexual activity: Not on file   Other Topics Concern    Not on file   Social History Narrative    Not on file     Social Determinants of Health     Financial Resource Strain: Low Risk     Difficulty of Paying Living Expenses: Not very hard   Food Insecurity: No Food Insecurity    Worried About Running Out of Food in the Last Year: Never true    Artemio of Food in the Last Year: Never true   Transportation Needs:     Lack of Transportation (Medical): Not on file    Lack of Transportation (Non-Medical):  Not on file   Physical Activity:     Days of Exercise per Week: Not on file    Minutes of Exercise per Session: Not on file   Stress:     Feeling of Stress : Not on file   Social Connections:     Frequency of Communication with Friends and Family: Not on file    Frequency of Social Gatherings with Friends and Family: Not on file    Attends Taoist Services: Not on file    Active Member of [address] [place] [address] or Organizations: Not on file    Attends Club or Organization Meetings: Not on file    Marital Status: Not on file   Intimate Partner Violence:     Fear of Current or Ex-Partner: Not on file    Emotionally Abused: Not on file    Physically Abused: Not on file    Sexually Abused: Not on file   Housing Stability:     Unable to Pay for Housing in the Last Year: Not on file    Number of [place] in the Last Year: Not on file    Unstable Housing in the Last Year: Not on file       Allergies   Allergen Reactions    Demerol  [Meperidine Hcl]      Other reaction(s): Unknown    Pcn [Penicillins] Rash    Versed [Midazolam] Nausea And Vomiting       Current Outpatient Medications   Medication Sig Dispense Refill    HYDROcodone-acetaminophen (NORCO) 5-325 MG per tablet Take 1 tablet by mouth every 8 hours as needed for Pain for up to 3 days. 9 tablet 0    levoFLOXacin (LEVAQUIN) 750 MG tablet Take 1 tablet by mouth daily for 4 days 4 tablet 0    Oxymetazoline HCl (RHOFADE) 1 % CREA Apply topically daily       hydroCHLOROthiazide (HYDRODIURIL) 25 MG tablet TAKE ONE TABLET ONCE A DAY IN THE MORNING (Patient taking differently: 12.5 mg ) 90 tablet 3    omeprazole (PRILOSEC) 40 MG delayed release capsule Take 1 capsule by mouth nightly 30 capsule 1    ELIQUIS 5 MG TABS tablet TAKE ONE TABLET BY MOUTH TWO TIMES A  tablet 0    desvenlafaxine succinate (PRISTIQ) 50 MG TB24 extended release tablet TAKE ONE TABLET ONE TIME DAILY 90 tablet 3    cyanocobalamin 1000 MCG/ML injection Inject 1 mL into the muscle every 30 days 3 mL 3    nortriptyline (PAMELOR) 10 MG capsule TAKE TWO CAPSULES EVERY EVENING AT BEDTIME 180 capsule 3    rizatriptan (MAXALT) 10 MG tablet May repeat in 2 hours if needed do not exceed 2 doses in 24 hours 9 tablet 5    lisinopril (PRINIVIL;ZESTRIL) 20 MG tablet Take 1 tablet by mouth daily 90 tablet 3    calcium carbonate (OSCAL) 500 MG TABS tablet Take 500 mg by mouth daily      Cholecalciferol (VITAMIN D) 50 MCG (2000 UT) CAPS capsule Take 2,000 Units by mouth daily      Multiple Vitamins-Minerals (PRESERVISION AREDS 2+MULTI VIT PO) Take 1 tablet by mouth 2 times daily      tiZANidine (ZANAFLEX) 4 MG tablet Take 1 tablet by mouth every 12 hours as needed (muscle spasms) 180 tablet 1     No current facility-administered medications for this visit.        Review of Systems    /68   Pulse 70   Ht 5' 7\" (1.702 m)   Wt 144 lb (65.3 kg)   SpO2 97%   BMI 22.55 kg/m²   BP Readings from Last 7 Encounters:   04/28/22 131/77   04/27/22 113/68   04/15/22 110/68   04/12/22 101/70   12/22/21 103/79   12/03/21 137/88   10/21/21 111/68     Wt Readings from Last 7 Encounters:   04/25/22 140 lb (63.5 kg)   04/15/22 144 lb (65.3 kg)   12/22/21 143 lb (64.9 kg) 12/03/21 146 lb (66.2 kg)   10/21/21 148 lb 9.6 oz (67.4 kg)   10/01/21 149 lb (67.6 kg)   08/11/21 153 lb 11.2 oz (69.7 kg)     BMI Readings from Last 7 Encounters:   04/25/22 21.93 kg/m²   04/15/22 22.55 kg/m²   12/22/21 22.40 kg/m²   12/03/21 22.87 kg/m²   10/21/21 23.27 kg/m²   10/01/21 23.34 kg/m²   08/11/21 24.07 kg/m²     Resp Readings from Last 7 Encounters:   04/28/22 20   04/27/22 14   04/12/22 18   12/22/21 18   12/03/21 16   09/05/20 18   09/02/20 (!) 3       Physical Exam  Constitutional:       General: She is not in acute distress. Eyes:      General: No scleral icterus. Cardiovascular:      Heart sounds: Normal heart sounds. Pulmonary:      Breath sounds: Normal breath sounds. Musculoskeletal:      Cervical back: Neck supple. Lymphadenopathy:      Cervical: No cervical adenopathy. Skin:     Findings: No rash. Results for orders placed or performed in visit on 12/22/21   COVID-19   Result Value Ref Range    SARS-CoV-2, PCR Not Detected Not Detected   POCT Influenza A/B   Result Value Ref Range    Influenza A Ab negative     Influenza B Ab negative        ASSESSMENT/ PLAN:  1. Essential hypertension  Blood pressure reviewed she is starting to have some hot flashes again but okay with the changes in therapy and sticking with this current regimen she is on lisinopril HCTZ we are following. She felt like calan SR was causing side effects in the past and we dc'ed it and changed to current regimen-- some  sy returned - no changes for now    2. Factor V Leiden ([address]  Factor V Leiden chronic anticoagulant therapy had DVT and PE in the past    3. Vitamin D deficiency  Follow vitamin D she takes over-the-counter D3 2000 units daily  - Vitamin D 25 Hydroxy; Future    4. Mixed hyperlipidemia  Monitor and follow her lipids. We reviewed her labs discussed interpreted and scheduled for the labs  - CBC; Future  - Comprehensive Metabolic Panel; Future  - TSH; Future  - Lipid Panel; Future    5. Dysthymia  Pristiq has worked well for her she is also active gets outside in the sun hikes etc.    6. Screening mammogram for breast cancer    - [facility] DIGITAL SCREEN W OR WO CAD BILATERAL; Future    Chart, medications, labs, vaccines reviewed. Keep up to date with routine care and follow up. Call with any problems or complaints. Keep up to date with routine screening recomendations and vaccines.

## 2022-04-24 ENCOUNTER — HOSPITAL ENCOUNTER (EMERGENCY)
Age: 62
Discharge: LEFT BEFORE DISCHARGE | End: 2022-04-24

## 2022-04-24 ENCOUNTER — NURSE TRIAGE (OUTPATIENT)
Dept: OTHER | Facility: CLINIC | Age: 62
End: 2022-04-24

## 2022-04-24 VITALS
OXYGEN SATURATION: 92 % | SYSTOLIC BLOOD PRESSURE: 121 MMHG | TEMPERATURE: 98.5 F | DIASTOLIC BLOOD PRESSURE: 67 MMHG | HEART RATE: 67 BPM | BODY MASS INDEX: 21.93 KG/M2 | WEIGHT: 140 LBS

## 2022-04-24 NOTE — TELEPHONE ENCOUNTER
Subjective: Caller states \"She has had N/V since this afternoon. She is a little pale. \"     Onset: 4/24/22 @ about 12:30      Pain Severity: RUQ pain that is moderate to severe    Temperature: no fever    What has been tried: took a neighbors Promethazine (RN asked her and her  to not take another person's prescriptions)  stated understanding      Recommended disposition: Go to ED Now    Care advice provided, patient verbalizes understanding; denies any other questions or concerns; instructed to call back for any new or worsening symptoms. [facility]    Attention Provider: Thank you for allowing me to participate in the care of your patient. The patient was connected to triage in response to symptoms provided. Please do not respond through this encounter as the response is not directed to a shared pool.       Reason for Disposition   [1] SEVERE vomiting (e.g., 6 or more times/day) AND [2] present > 8 hours (Exception: patient sounds well, is drinking liquids, does not sound dehydrated, and vomiting has lasted less than 24 hours)    Protocols used: VOMITING-ADULT-

## 2022-04-25 ENCOUNTER — HOSPITAL ENCOUNTER (INPATIENT)
Age: 62
LOS: 3 days | Discharge: HOME | DRG: 418 | End: 2022-04-28
Attending: EMERGENCY MEDICINE | Admitting: HOSPITALIST
Payer: COMMERCIAL

## 2022-04-25 ENCOUNTER — APPOINTMENT (OUTPATIENT)
Dept: CT IMAGING | Age: 62
DRG: 418 | End: 2022-04-25
Payer: COMMERCIAL

## 2022-04-25 ENCOUNTER — APPOINTMENT (OUTPATIENT)
Dept: ULTRASOUND IMAGING | Age: 62
DRG: 418 | End: 2022-04-25
Payer: COMMERCIAL

## 2022-04-25 ENCOUNTER — APPOINTMENT (OUTPATIENT)
Dept: MRI IMAGING | Age: 62
DRG: 418 | End: 2022-04-25
Payer: COMMERCIAL

## 2022-04-25 DIAGNOSIS — R74.8 ELEVATED LIVER ENZYMES: ICD-10-CM

## 2022-04-25 DIAGNOSIS — K85.90 ACUTE PANCREATITIS, UNSPECIFIED COMPLICATION STATUS, UNSPECIFIED PANCREATITIS TYPE: ICD-10-CM

## 2022-04-25 DIAGNOSIS — K81.0 ACUTE ACALCULOUS CHOLECYSTITIS: Primary | ICD-10-CM

## 2022-04-25 DIAGNOSIS — K85.80 OTHER ACUTE PANCREATITIS WITHOUT INFECTION OR NECROSIS: ICD-10-CM

## 2022-04-25 DIAGNOSIS — R10.11 RIGHT UPPER QUADRANT ABDOMINAL PAIN: ICD-10-CM

## 2022-04-25 PROBLEM — R10.9 ABDOMINAL PAIN: Status: ACTIVE | Noted: 2022-04-25

## 2022-04-25 PROBLEM — R10.9 ACUTE ABDOMINAL PAIN: Status: ACTIVE | Noted: 2022-04-25

## 2022-04-25 LAB
ALBUMIN SERPL-MCNC: 4.6 G/DL (ref 3.5–5.2)
ALP BLD-CCNC: 202 U/L (ref 35–104)
ALT SERPL-CCNC: 589 U/L (ref 5–33)
ANION GAP SERPL CALCULATED.3IONS-SCNC: 14 MMOL/L (ref 7–19)
AST SERPL-CCNC: 573 U/L (ref 5–32)
BASOPHILS ABSOLUTE: 0 K/UL (ref 0–0.2)
BASOPHILS RELATIVE PERCENT: 0.2 % (ref 0–1)
BILIRUB SERPL-MCNC: 2.6 MG/DL (ref 0.2–1.2)
BILIRUBIN URINE: NEGATIVE
BLOOD, URINE: NEGATIVE
BUN BLDV-MCNC: 18 MG/DL (ref 8–23)
CALCIUM SERPL-MCNC: 10 MG/DL (ref 8.8–10.2)
CHLORIDE BLD-SCNC: 97 MMOL/L (ref 98–111)
CLARITY: CLEAR
CO2: 29 MMOL/L (ref 22–29)
COLOR: ABNORMAL
CREAT SERPL-MCNC: 0.9 MG/DL (ref 0.5–0.9)
EKG P AXIS: 77 DEGREES
EKG P-R INTERVAL: 172 MS
EKG Q-T INTERVAL: 476 MS
EKG QRS DURATION: 98 MS
EKG QTC CALCULATION (BAZETT): 466 MS
EKG T AXIS: 66 DEGREES
EOSINOPHILS ABSOLUTE: 0 K/UL (ref 0–0.6)
EOSINOPHILS RELATIVE PERCENT: 0.2 % (ref 0–5)
GFR AFRICAN AMERICAN: >59
GFR NON-AFRICAN AMERICAN: >60
GLUCOSE BLD-MCNC: 153 MG/DL (ref 74–109)
GLUCOSE URINE: NEGATIVE MG/DL
HCT VFR BLD CALC: 44.1 % (ref 37–47)
HEMOGLOBIN: 14.2 G/DL (ref 12–16)
IMMATURE GRANULOCYTES #: 0 K/UL
KETONES, URINE: ABNORMAL MG/DL
LACTIC ACID: 1.4 MMOL/L (ref 0.5–1.9)
LEUKOCYTE ESTERASE, URINE: NEGATIVE
LIPASE: 2617 U/L (ref 13–60)
LYMPHOCYTES ABSOLUTE: 1.7 K/UL (ref 1.1–4.5)
LYMPHOCYTES RELATIVE PERCENT: 12.7 % (ref 20–40)
MCH RBC QN AUTO: 26.5 PG (ref 27–31)
MCHC RBC AUTO-ENTMCNC: 32.2 G/DL (ref 33–37)
MCV RBC AUTO: 82.4 FL (ref 81–99)
MONOCYTES ABSOLUTE: 0.7 K/UL (ref 0–0.9)
MONOCYTES RELATIVE PERCENT: 4.9 % (ref 0–10)
NEUTROPHILS ABSOLUTE: 10.9 K/UL (ref 1.5–7.5)
NEUTROPHILS RELATIVE PERCENT: 81.7 % (ref 50–65)
NITRITE, URINE: NEGATIVE
PDW BLD-RTO: 13.9 % (ref 11.5–14.5)
PH UA: 6 (ref 5–8)
PLATELET # BLD: 311 K/UL (ref 130–400)
PMV BLD AUTO: 10.7 FL (ref 9.4–12.3)
POTASSIUM SERPL-SCNC: 3.9 MMOL/L (ref 3.5–5)
PROTEIN UA: NEGATIVE MG/DL
RBC # BLD: 5.35 M/UL (ref 4.2–5.4)
SODIUM BLD-SCNC: 140 MMOL/L (ref 136–145)
SPECIFIC GRAVITY UA: >=1.045 (ref 1–1.03)
TOTAL PROTEIN: 6.5 G/DL (ref 6.6–8.7)
TROPONIN: <0.01 NG/ML (ref 0–0.03)
UROBILINOGEN, URINE: 1 E.U./DL
WBC # BLD: 13.3 K/UL (ref 4.8–10.8)

## 2022-04-25 PROCEDURE — 99285 EMERGENCY DEPT VISIT HI MDM: CPT

## 2022-04-25 PROCEDURE — 36415 COLL VENOUS BLD VENIPUNCTURE: CPT

## 2022-04-25 PROCEDURE — 83690 ASSAY OF LIPASE: CPT

## 2022-04-25 PROCEDURE — 74183 MRI ABD W/O CNTR FLWD CNTR: CPT

## 2022-04-25 PROCEDURE — 85025 COMPLETE CBC W/AUTO DIFF WBC: CPT

## 2022-04-25 PROCEDURE — 2580000003 HC RX 258: Performed by: EMERGENCY MEDICINE

## 2022-04-25 PROCEDURE — 2580000003 HC RX 258: Performed by: NURSE PRACTITIONER

## 2022-04-25 PROCEDURE — 83605 ASSAY OF LACTIC ACID: CPT

## 2022-04-25 PROCEDURE — 84484 ASSAY OF TROPONIN QUANT: CPT

## 2022-04-25 PROCEDURE — 1210000000 HC MED SURG R&B

## 2022-04-25 PROCEDURE — 6360000002 HC RX W HCPCS: Performed by: NURSE PRACTITIONER

## 2022-04-25 PROCEDURE — 93010 ELECTROCARDIOGRAM REPORT: CPT | Performed by: INTERNAL MEDICINE

## 2022-04-25 PROCEDURE — 96365 THER/PROPH/DIAG IV INF INIT: CPT

## 2022-04-25 PROCEDURE — 6360000004 HC RX CONTRAST MEDICATION: Performed by: INTERNAL MEDICINE

## 2022-04-25 PROCEDURE — 2500000003 HC RX 250 WO HCPCS: Performed by: EMERGENCY MEDICINE

## 2022-04-25 PROCEDURE — 2580000003 HC RX 258: Performed by: HOSPITALIST

## 2022-04-25 PROCEDURE — 2500000003 HC RX 250 WO HCPCS: Performed by: NURSE PRACTITIONER

## 2022-04-25 PROCEDURE — 6370000000 HC RX 637 (ALT 250 FOR IP): Performed by: NURSE PRACTITIONER

## 2022-04-25 PROCEDURE — 87040 BLOOD CULTURE FOR BACTERIA: CPT

## 2022-04-25 PROCEDURE — 80053 COMPREHEN METABOLIC PANEL: CPT

## 2022-04-25 PROCEDURE — 74177 CT ABD & PELVIS W/CONTRAST: CPT

## 2022-04-25 PROCEDURE — 6360000002 HC RX W HCPCS: Performed by: EMERGENCY MEDICINE

## 2022-04-25 PROCEDURE — 93005 ELECTROCARDIOGRAM TRACING: CPT | Performed by: EMERGENCY MEDICINE

## 2022-04-25 PROCEDURE — 96375 TX/PRO/DX INJ NEW DRUG ADDON: CPT

## 2022-04-25 PROCEDURE — 96367 TX/PROPH/DG ADDL SEQ IV INF: CPT

## 2022-04-25 PROCEDURE — 76705 ECHO EXAM OF ABDOMEN: CPT

## 2022-04-25 PROCEDURE — 99221 1ST HOSP IP/OBS SF/LOW 40: CPT | Performed by: INTERNAL MEDICINE

## 2022-04-25 PROCEDURE — A9577 INJ MULTIHANCE: HCPCS | Performed by: INTERNAL MEDICINE

## 2022-04-25 PROCEDURE — 6360000004 HC RX CONTRAST MEDICATION: Performed by: EMERGENCY MEDICINE

## 2022-04-25 PROCEDURE — 6360000002 HC RX W HCPCS: Performed by: HOSPITALIST

## 2022-04-25 PROCEDURE — 81003 URINALYSIS AUTO W/O SCOPE: CPT

## 2022-04-25 RX ORDER — MORPHINE SULFATE 4 MG/ML
4 INJECTION, SOLUTION INTRAMUSCULAR; INTRAVENOUS ONCE
Status: COMPLETED | OUTPATIENT
Start: 2022-04-25 | End: 2022-04-25

## 2022-04-25 RX ORDER — ACETAMINOPHEN 650 MG/1
650 SUPPOSITORY RECTAL EVERY 6 HOURS PRN
Status: DISCONTINUED | OUTPATIENT
Start: 2022-04-25 | End: 2022-04-28 | Stop reason: HOSPADM

## 2022-04-25 RX ORDER — HYDROMORPHONE HYDROCHLORIDE 1 MG/ML
1 INJECTION, SOLUTION INTRAMUSCULAR; INTRAVENOUS; SUBCUTANEOUS EVERY 4 HOURS PRN
Status: DISCONTINUED | OUTPATIENT
Start: 2022-04-25 | End: 2022-04-27

## 2022-04-25 RX ORDER — LISINOPRIL 20 MG/1
20 TABLET ORAL DAILY
Status: DISCONTINUED | OUTPATIENT
Start: 2022-04-25 | End: 2022-04-27

## 2022-04-25 RX ORDER — SODIUM CHLORIDE 0.9 % (FLUSH) 0.9 %
5-40 SYRINGE (ML) INJECTION EVERY 12 HOURS SCHEDULED
Status: DISCONTINUED | OUTPATIENT
Start: 2022-04-25 | End: 2022-04-27 | Stop reason: SDUPTHER

## 2022-04-25 RX ORDER — NORTRIPTYLINE HYDROCHLORIDE 10 MG/1
20 CAPSULE ORAL NIGHTLY
Status: DISCONTINUED | OUTPATIENT
Start: 2022-04-25 | End: 2022-04-28 | Stop reason: HOSPADM

## 2022-04-25 RX ORDER — POTASSIUM CHLORIDE 7.45 MG/ML
10 INJECTION INTRAVENOUS PRN
Status: DISCONTINUED | OUTPATIENT
Start: 2022-04-25 | End: 2022-04-28 | Stop reason: HOSPADM

## 2022-04-25 RX ORDER — SODIUM CHLORIDE, SODIUM LACTATE, POTASSIUM CHLORIDE, CALCIUM CHLORIDE 600; 310; 30; 20 MG/100ML; MG/100ML; MG/100ML; MG/100ML
INJECTION, SOLUTION INTRAVENOUS CONTINUOUS
Status: DISCONTINUED | OUTPATIENT
Start: 2022-04-25 | End: 2022-04-28

## 2022-04-25 RX ORDER — MAGNESIUM SULFATE IN WATER 40 MG/ML
2000 INJECTION, SOLUTION INTRAVENOUS PRN
Status: DISCONTINUED | OUTPATIENT
Start: 2022-04-25 | End: 2022-04-28 | Stop reason: HOSPADM

## 2022-04-25 RX ORDER — SODIUM CHLORIDE 9 MG/ML
25 INJECTION, SOLUTION INTRAVENOUS CONTINUOUS
Status: DISCONTINUED | OUTPATIENT
Start: 2022-04-25 | End: 2022-04-25

## 2022-04-25 RX ORDER — FAMOTIDINE 20 MG/1
20 TABLET, FILM COATED ORAL 2 TIMES DAILY
Status: DISCONTINUED | OUTPATIENT
Start: 2022-04-25 | End: 2022-04-27

## 2022-04-25 RX ORDER — METRONIDAZOLE 500 MG/100ML
500 INJECTION, SOLUTION INTRAVENOUS EVERY 8 HOURS
Status: DISCONTINUED | OUTPATIENT
Start: 2022-04-25 | End: 2022-04-25

## 2022-04-25 RX ORDER — ENOXAPARIN SODIUM 100 MG/ML
40 INJECTION SUBCUTANEOUS DAILY
Status: DISCONTINUED | OUTPATIENT
Start: 2022-04-25 | End: 2022-04-28 | Stop reason: HOSPADM

## 2022-04-25 RX ORDER — ONDANSETRON 2 MG/ML
4 INJECTION INTRAMUSCULAR; INTRAVENOUS EVERY 8 HOURS PRN
Status: DISCONTINUED | OUTPATIENT
Start: 2022-04-25 | End: 2022-04-26

## 2022-04-25 RX ORDER — SODIUM CHLORIDE 9 MG/ML
25 INJECTION, SOLUTION INTRAVENOUS PRN
Status: DISCONTINUED | OUTPATIENT
Start: 2022-04-25 | End: 2022-04-25

## 2022-04-25 RX ORDER — LEVOFLOXACIN 5 MG/ML
750 INJECTION, SOLUTION INTRAVENOUS EVERY 24 HOURS
Status: DISCONTINUED | OUTPATIENT
Start: 2022-04-25 | End: 2022-04-28

## 2022-04-25 RX ORDER — POLYETHYLENE GLYCOL 3350 17 G/17G
17 POWDER, FOR SOLUTION ORAL DAILY PRN
Status: DISCONTINUED | OUTPATIENT
Start: 2022-04-25 | End: 2022-04-28 | Stop reason: HOSPADM

## 2022-04-25 RX ORDER — SODIUM CHLORIDE 9 MG/ML
INJECTION, SOLUTION INTRAVENOUS CONTINUOUS
Status: DISCONTINUED | OUTPATIENT
Start: 2022-04-25 | End: 2022-04-25

## 2022-04-25 RX ORDER — METRONIDAZOLE 500 MG/100ML
500 INJECTION, SOLUTION INTRAVENOUS ONCE
Status: COMPLETED | OUTPATIENT
Start: 2022-04-25 | End: 2022-04-25

## 2022-04-25 RX ORDER — CEFTRIAXONE 2 G/50ML
2000 INJECTION, SOLUTION INTRAVENOUS ONCE
Status: COMPLETED | OUTPATIENT
Start: 2022-04-25 | End: 2022-04-25

## 2022-04-25 RX ORDER — ACETAMINOPHEN 325 MG/1
650 TABLET ORAL EVERY 6 HOURS PRN
Status: DISCONTINUED | OUTPATIENT
Start: 2022-04-25 | End: 2022-04-28 | Stop reason: HOSPADM

## 2022-04-25 RX ORDER — ONDANSETRON 2 MG/ML
4 INJECTION INTRAMUSCULAR; INTRAVENOUS ONCE
Status: COMPLETED | OUTPATIENT
Start: 2022-04-25 | End: 2022-04-25

## 2022-04-25 RX ORDER — HYDROCHLOROTHIAZIDE 25 MG/1
12.5 TABLET ORAL DAILY
Status: DISCONTINUED | OUTPATIENT
Start: 2022-04-25 | End: 2022-04-27

## 2022-04-25 RX ORDER — METOCLOPRAMIDE HYDROCHLORIDE 5 MG/ML
10 INJECTION INTRAMUSCULAR; INTRAVENOUS ONCE
Status: COMPLETED | OUTPATIENT
Start: 2022-04-25 | End: 2022-04-25

## 2022-04-25 RX ORDER — 0.9 % SODIUM CHLORIDE 0.9 %
1000 INTRAVENOUS SOLUTION INTRAVENOUS ONCE
Status: COMPLETED | OUTPATIENT
Start: 2022-04-25 | End: 2022-04-25

## 2022-04-25 RX ORDER — SODIUM CHLORIDE 0.9 % (FLUSH) 0.9 %
5-40 SYRINGE (ML) INJECTION PRN
Status: DISCONTINUED | OUTPATIENT
Start: 2022-04-25 | End: 2022-04-27 | Stop reason: SDUPTHER

## 2022-04-25 RX ORDER — DESVENLAFAXINE 50 MG/1
50 TABLET, EXTENDED RELEASE ORAL DAILY
Status: DISCONTINUED | OUTPATIENT
Start: 2022-04-25 | End: 2022-04-28 | Stop reason: HOSPADM

## 2022-04-25 RX ADMIN — ENOXAPARIN SODIUM 40 MG: 100 INJECTION SUBCUTANEOUS at 08:31

## 2022-04-25 RX ADMIN — SODIUM CHLORIDE, POTASSIUM CHLORIDE, SODIUM LACTATE AND CALCIUM CHLORIDE: 600; 310; 30; 20 INJECTION, SOLUTION INTRAVENOUS at 16:52

## 2022-04-25 RX ADMIN — HYDROMORPHONE HYDROCHLORIDE 1 MG: 1 INJECTION, SOLUTION INTRAMUSCULAR; INTRAVENOUS; SUBCUTANEOUS at 20:27

## 2022-04-25 RX ADMIN — SODIUM CHLORIDE 1000 ML: 9 INJECTION, SOLUTION INTRAVENOUS at 04:54

## 2022-04-25 RX ADMIN — SODIUM CHLORIDE, PRESERVATIVE FREE 10 ML: 5 INJECTION INTRAVENOUS at 20:27

## 2022-04-25 RX ADMIN — GADOBENATE DIMEGLUMINE 13 ML: 529 INJECTION, SOLUTION INTRAVENOUS at 15:42

## 2022-04-25 RX ADMIN — SODIUM CHLORIDE, PRESERVATIVE FREE 10 ML: 5 INJECTION INTRAVENOUS at 08:31

## 2022-04-25 RX ADMIN — METRONIDAZOLE 500 MG: 500 SOLUTION INTRAVENOUS at 06:16

## 2022-04-25 RX ADMIN — IOPAMIDOL 90 ML: 755 INJECTION, SOLUTION INTRAVENOUS at 05:48

## 2022-04-25 RX ADMIN — SODIUM CHLORIDE: 9 INJECTION, SOLUTION INTRAVENOUS at 11:22

## 2022-04-25 RX ADMIN — ONDANSETRON 4 MG: 2 INJECTION INTRAMUSCULAR; INTRAVENOUS at 17:15

## 2022-04-25 RX ADMIN — DESVENLAFAXINE 50 MG: 50 TABLET, EXTENDED RELEASE ORAL at 16:03

## 2022-04-25 RX ADMIN — HYDROMORPHONE HYDROCHLORIDE 1 MG: 1 INJECTION, SOLUTION INTRAMUSCULAR; INTRAVENOUS; SUBCUTANEOUS at 11:57

## 2022-04-25 RX ADMIN — ONDANSETRON 4 MG: 2 INJECTION INTRAMUSCULAR; INTRAVENOUS at 04:53

## 2022-04-25 RX ADMIN — LEVOFLOXACIN 750 MG: 5 INJECTION, SOLUTION INTRAVENOUS at 17:17

## 2022-04-25 RX ADMIN — METRONIDAZOLE 500 MG: 500 INJECTION, SOLUTION INTRAVENOUS at 16:07

## 2022-04-25 RX ADMIN — METOCLOPRAMIDE 10 MG: 5 INJECTION, SOLUTION INTRAMUSCULAR; INTRAVENOUS at 20:27

## 2022-04-25 RX ADMIN — MORPHINE SULFATE 4 MG: 4 INJECTION, SOLUTION INTRAMUSCULAR; INTRAVENOUS at 04:52

## 2022-04-25 RX ADMIN — CEFTRIAXONE 2000 MG: 2 INJECTION, SOLUTION INTRAVENOUS at 05:40

## 2022-04-25 RX ADMIN — FAMOTIDINE 20 MG: 20 TABLET, FILM COATED ORAL at 08:31

## 2022-04-25 ASSESSMENT — ENCOUNTER SYMPTOMS
RHINORRHEA: 0
DIARRHEA: 0
SHORTNESS OF BREATH: 0
BACK PAIN: 0
NAUSEA: 1
VOMITING: 1
SORE THROAT: 0
ABDOMINAL PAIN: 1

## 2022-04-25 ASSESSMENT — PAIN DESCRIPTION - DESCRIPTORS
DESCRIPTORS: ACHING
DESCRIPTORS: ACHING

## 2022-04-25 ASSESSMENT — PAIN SCALES - GENERAL
PAINLEVEL_OUTOF10: 5
PAINLEVEL_OUTOF10: 7
PAINLEVEL_OUTOF10: 4
PAINLEVEL_OUTOF10: 2

## 2022-04-25 ASSESSMENT — PAIN - FUNCTIONAL ASSESSMENT: PAIN_FUNCTIONAL_ASSESSMENT: ACTIVITIES ARE NOT PREVENTED

## 2022-04-25 ASSESSMENT — PAIN DESCRIPTION - ORIENTATION
ORIENTATION: RIGHT
ORIENTATION: RIGHT

## 2022-04-25 ASSESSMENT — PAIN DESCRIPTION - LOCATION
LOCATION: ABDOMEN

## 2022-04-25 NOTE — H&P
[place], [place], [address]    DEPARTMENT OF HOSPITALIST MEDICINE      HISTORY & PHYSICAL:          REASON FOR ADMISSION:  Chief Complaint   Patient presents with    Abdominal Pain     pain started last night    Emesis        HISTORY OF PRESENT ILLNESS:  Chirag Morales is an 60 y.o. female with past medical history of cervical disc disease, hypertension, GERD, hyperlipidemia, DVT, PE, factor V and Raynaud's. Patient presented to the emergency room with complaint of right upper quadrant pain nausea vomiting. Had a similar episode earlier in the week that resolved and then it resurfaced today. Pain is right upper quadrant and radiates through to her back. When it at onset levels of 5 but progresses and accompanied by vomiting. Reports no fever. No diarrhea. ER about reveals normal electrolytes glucose 153 she had alk phos of 202, , , lipase 2617, and a white count of 13.3. H&H normal.  CT of the abdomen and pelvis showed acute acalculous cholecystitis, acute pancreatitis, and normal appendix. She has diverticulosis but no diverticulitis. Patient was also sent for ultrasound of the gallbladder that does show calculus cholecystitis. Dr. Jacinto Sutherland was consulted from the ER as well as GI. Terrence Arndt   Patient will be admitted to hospitalist service    PAST MEDICAL HISTORY:  Past Medical History:   Diagnosis Date    Cervical disc disease 8/26/2017    [facility]     Essential hypertension 12/1/2019    Gastroesophageal reflux disease without esophagitis 8/26/2017    Hyperlipidemia     Laryngeal polyp     followed by Ricardo Oneill PA-C    Major depressive disorder in remission ([facility] 8/26/2017    Migraine without aura 8/26/2017    Pernicious anemia 8/26/2017    Raynaud's phenomenon 8/26/2017         PAST SURGICAL HISTORY:  Past Surgical History:   Procedure Laterality Date    ARTERIOGRAM Bilateral 9/2/2020    [facility]UL[facility]ARY [facility] Elaina Hall MD [facility]          SOCIAL HISTORY:  Social History     Socioeconomic History    Marital status:      Spouse name: None    Number of children: None    Years of education: None    Highest education level: None   Occupational History    None   Tobacco Use    Smoking status: Former Smoker     Packs/day: 0.50     Years: 25.00     Pack years: 12.50     Quit date:      Years since quittin.3    Smokeless tobacco: Never Used   Vaping Use    Vaping Use: Never used   Substance and Sexual Activity    Alcohol use: Yes     Comment: rare    Drug use: No    Sexual activity: None   Other Topics Concern    None   Social History Narrative    None     Social Determinants of Health     Financial Resource Strain: Low Risk     Difficulty of Paying Living Expenses: Not very hard   Food Insecurity: No Food Insecurity    Worried About Running Out of Food in the Last Year: Never true    Artemio of Food in the Last Year: Never true   Transportation Needs:     Lack of Transportation (Medical): Not on file    Lack of Transportation (Non-Medical):  Not on file   Physical Activity:     Days of Exercise per Week: Not on file    Minutes of Exercise per Session: Not on file   Stress:     Feeling of Stress : Not on file   Social Connections:     Frequency of Communication with Friends and Family: Not on file    Frequency of Social Gatherings with Friends and Family: Not on file    Attends Druze Services: Not on file    Active Member of Clubs or Organizations: Not on file    Attends Club or Organization Meetings: Not on file    Marital Status: Not on file   Intimate Partner Violence:     Fear of Current or Ex-Partner: Not on file    Emotionally Abused: Not on file    Physically Abused: Not on file    Sexually Abused: Not on file   Housing Stability:     Unable to Pay for Housing in the Last Year: Not on file    Number of [place] in the Last Year: Not on file    Unstable Housing in the Last Year: Not on file        FAMILY HISTORY:  Family History   Problem Relation Age of Onset    High Blood Pressure Mother     Coronary Art Dis Mother     Thyroid Disease Mother         Hypothyroidism    Breast Cancer Mother 80    Colon Polyps Father     Prostate Cancer Father     Anemia Father         Pernicious    Cancer Father         bladder cancer     Colon Polyps Sister     Anemia Brother         Pernicious    Cancer Brother 46        prostate cancer, bladder cancer    High Blood Pressure Paternal Grandmother          ALLERGIES:  Allergies   Allergen Reactions    Demerol  [Meperidine Hcl]      Other reaction(s): Unknown    Pcn [Penicillins] Rash    Versed [Midazolam] Nausea And Vomiting        PRIOR TO ADMISSION MEDS:  Medications Prior to Admission: Oxymetazoline HCl (RHOFADE) 1 % CREA, Apply topically daily   hydroCHLOROthiazide (HYDRODIURIL) 25 MG tablet, TAKE ONE TABLET ONCE A DAY IN THE MORNING (Patient taking differently: 12.5 mg )  omeprazole (PRILOSEC) 40 MG delayed release capsule, Take 1 capsule by mouth nightly  ELIQUIS 5 MG TABS tablet, TAKE ONE TABLET BY MOUTH TWO TIMES A DAY  desvenlafaxine succinate (PRISTIQ) 50 MG TB24 extended release tablet, TAKE ONE TABLET ONE TIME DAILY  cyanocobalamin 1000 MCG/ML injection, Inject 1 mL into the muscle every 30 days  nortriptyline (PAMELOR) 10 MG capsule, TAKE TWO CAPSULES EVERY EVENING AT BEDTIME  rizatriptan (MAXALT) 10 MG tablet, May repeat in 2 hours if needed do not exceed 2 doses in 24 hours  lisinopril (PRINIVIL;ZESTRIL) 20 MG tablet, Take 1 tablet by mouth daily  calcium carbonate (OSCAL) 500 MG TABS tablet, Take 500 mg by mouth daily  Cholecalciferol (VITAMIN D) 50 MCG (2000 UT) CAPS capsule, Take 2,000 Units by mouth daily  Multiple Vitamins-Minerals (PRESERVISION AREDS 2+MULTI VIT PO), Take 1 tablet by mouth 2 times daily  tiZANidine (ZANAFLEX) 4 MG tablet, Take 1 tablet by mouth every 12 hours as needed (muscle spasms)     REVIEW OF SYSTEMS:  Constitutional:  No fevers, chills,  Positive for nausea, vomiting, + tiredness & fatigue   Head:  No head injury, facial trauma   Eyes:  No acute visual changes, exudate, trauma   Ears:  No acute hearing loss, earaches   Nose: No nasal discharge, epistaxis   Neck: No new hoarseness, voice change, or new masses   Lungs:   No hemoptysis, pleurisy   Heart:  No chest pressure with exertion, palpitations,    Abdomen:   No new masses, no bright red blood per rectum   Extremities: No acute pain while ambulating, no new lesions   Skin: No new changes in skin color, no rashes or lesions   Neurologic: No new motor or sensory changes     14 point review of systems addressed with patient which is essentially negative except as specifically addressed above:    PHYSICAL EXAM:  BP (!) 104/55   Pulse 63   Temp 96.3 °F (35.7 °C) (Temporal)   Resp 18   Ht 5' 7\" (1.702 m)   Wt 140 lb (63.5 kg)   SpO2 99%   BMI 21.93 kg/m²   No intake/output data recorded.       PHYSICAL EXAMINATION:    Vital Signs: Please see the chart   NEO:  Awake, alert, oriented x 3, patient appears tired and fatigued   Head/Eyes:  Normocephalic, atraumatic, EOMI and PERRLA bilaterally   ENT: Moist mucous membranes, nasal passages clear   Neck: Supple, full range of motion, no carotid bruit, trachea midline   Respiratory:   Bilateral fair air entry in both lung fields, mild B/L crackles, symmetric expansion of chest   Cardiovascular:  Regular rate and rhythm, S1+S2+0, no murmurs/rubs   Urology: No bilateral CVA tenderness, no suprapubic tenderness   Abdomen:   Soft, RUQ bowel sounds +ve, no organomegaly   Muscle/Joints: Moves all, full range of motion, no muscle spasms   Extremities: No clubbing, no cyanosis, no calf tenderness, no edema   Pulses: 2+ bilaterally, symmetrical   Skin: Warm, dry, no pallor/cyanosis/jaundice, no rashes/lesions   Neurologic: Awake, alert, oriented x 3, cranial nerves II-XII intact, no focal neurological deficits, sensory system intact   Psychiatric: Normal mood, non-suicidal         LABORATORY DATA:    CBC:  Recent Labs     04/25/22 0440   WBC 13.3*   HGB 14.2   HCT 44.1        BMP:  Recent Labs     04/25/22 0440      K 3.9   CL 97*   CO2 29   BUN 18   CREATININE 0.9   CALCIUM 10.0     Recent Labs     04/25/22 0440   *   *   BILITOT 2.6*   ALKPHOS 202*     Coag Panel: No results for input(s): INR, PROTIME, APTT in the last 72 hours. Cardiac Enzymes:   Recent Labs     04/25/22 0440   TROPONINI <0.01     ABGs:No results found for: PHART, PO2ART, MVI7VLP  Urinalysis:  Lab Results   Component Value Date    NITRU Negative 10/01/2021    WBCUA 1 10/01/2021    BACTERIA NEGATIVE 10/01/2021    RBCUA 2 10/01/2021    BLOODU Negative 10/01/2021    SPECGRAV 1.023 10/01/2021    GLUCOSEU Negative 10/01/2021     A1C: No results for input(s): LABA1C in the last 72 hours. ABG:No results for input(s): PHART, GCW5KDF, PO2ART, QCC6BLE, BEART, HGBAE, D9SWJBME, CARBOXHGBART in the last 72 hours. EKG:   Please see chart      IMAGING:  CT ABDOMEN PELVIS W IV CONTRAST Additional Contrast? None    Result Date: 4/25/2022  1. Acute acalculous cholecystitis. 2. Acute pancreatitis. 3. Normal appendix. 4. The diverticulosis of the colon. No evidence for diverticulitis. Signed by Dr Shruthi Nelson    Result Date: 4/25/2022  1. Calculus cholecystitis.  Signed by Dr Shayne Beckham and Plan:    Principal Problem:    Acute abdominal pain   NPO   Surgery Consult   GI consult   /hr , received one liter bolus in ER   Pain control-dilaudid   Nausea control with zofran   Continue Rocephin and Flagyl for now   Active Problems:    Acute pancreatitis  ` Hydration   NPO   GI plans MRCP    Essential hypertension   moniter   meds held for now since pt is mildly hypotensive and NPO    Factor V Leiden ([address]   lovenox   Hold eliquis for now  Depression   Continue home meds when no longer NPO  Resolved Problems:    * No resolved hospital problems. *      Patient  is on DVT prophylaxis  Current medications reviewed  Lab work reviewed  Radiology/Chest x-ray films reviewed  Discussed with the nurse and addressed all questions/concerns  Discussed with Patient and/or Family at the bedside in detail . .. they verbalize understanding and agree with the management plan. EMILIANO Phelan - CNP  1:35 PM 4/25/2022      DISCLAIMER: This note was created with electronic voice recognition which does have occasional errors. If you have any questions regarding the content within the note please do not hesitate to contact me. .. Thanks.

## 2022-04-25 NOTE — ED PROVIDER NOTES
[facility]  eMERGENCY dEPARTMENT eNCOUnter      Pt Name: Chrissy Newell  MRN: [id number]  [place] 1960  Date of evaluation: 4/25/2022  Provider: Walt Coppola MD    [address] [place] [address]       Chief Complaint   Patient presents with    Abdominal Pain     pain started last night    Emesis         HISTORY OF PRESENT ILLNESS   (Location/Symptom, Timing/Onset,Context/Setting, Quality, Duration, Modifying Factors, Severity)  Note limiting factors. Chrissy Newell is a 60 y.o. female who presents to the emergency department with abdominal pain nausea and vomiting. Her symptoms started yesterday morning in the right upper quadrant and gradually worsened. Her pain is now generalized. She had similar pain last week after eating however it resolved until yesterday. No prior history of gallstones or gallbladder disease. No definite fever. HPI    NursingNotes were reviewed. REVIEW OF SYSTEMS    (2-9 systems for level 4, 10 or more for level 5)     Review of Systems   Constitutional: Negative for chills and fever. HENT: Negative for rhinorrhea and sore throat. Respiratory: Negative for shortness of breath. Cardiovascular: Negative for chest pain and leg swelling. Gastrointestinal: Positive for abdominal pain, nausea and vomiting. Negative for diarrhea. Genitourinary: Negative for difficulty urinating. Musculoskeletal: Negative for back pain and neck pain. Skin: Negative for rash. Neurological: Negative for weakness and headaches. Psychiatric/Behavioral: Negative for confusion. A complete review of systems was performed and is negative except as noted above in the HPI.        PAST MEDICAL HISTORY     Past Medical History:   Diagnosis Date    Cervical disc disease 8/26/2017    [facility] thrombosis) [facility] [address]     Essential hypertension 12/1/2019    Gastroesophageal reflux disease without esophagitis 8/26/2017    Hyperlipidemia     Laryngeal polyp     followed by Aditya Murcia PA-C  Major depressive disorder in remission ([facility] [address] 8/26/2017    Migraine without aura 8/26/2017    Pernicious anemia 8/26/2017    Raynaud's phenomenon 8/26/2017         SURGICAL HISTORY       Past Surgical History:   Procedure Laterality Date    ARTERIOGRAM Bilateral 9/2/2020    ECOS-PULMONARY performed by Luis Roberts MD at [place] [address]       Previous Medications    CALCIUM CARBONATE (OSCAL) 500 MG TABS TABLET    Take 500 mg by mouth daily    CHOLECALCIFEROL (VITAMIN D) 50 MCG (2000 UT) CAPS CAPSULE    Take 2,000 Units by mouth daily    CYANOCOBALAMIN 1000 MCG/ML INJECTION    Inject 1 mL into the muscle every 30 days    DESVENLAFAXINE SUCCINATE (PRISTIQ) 50 MG TB24 EXTENDED RELEASE TABLET    TAKE ONE TABLET ONE TIME DAILY    ELIQUIS 5 MG TABS TABLET    TAKE ONE TABLET BY MOUTH TWO TIMES A DAY    HYDROCHLOROTHIAZIDE (HYDRODIURIL) 25 MG TABLET    TAKE ONE TABLET ONCE A DAY IN THE MORNING    LISINOPRIL (PRINIVIL;ZESTRIL) 20 MG TABLET    Take 1 tablet by mouth daily    MULTIPLE VITAMINS-MINERALS (PRESERVISION AREDS 2+MULTI VIT PO)    Take 1 tablet by mouth 2 times daily    NORTRIPTYLINE (PAMELOR) 10 MG CAPSULE    TAKE TWO CAPSULES EVERY EVENING AT BEDTIME    OMEPRAZOLE (PRILOSEC) 40 MG DELAYED RELEASE CAPSULE    Take 1 capsule by mouth nightly    OXYMETAZOLINE HCL (RHOFADE) 1 % CREA    Apply topically    RIZATRIPTAN (MAXALT) 10 MG TABLET    May repeat in 2 hours if needed do not exceed 2 doses in 24 hours    TIZANIDINE (ZANAFLEX) 4 MG TABLET    Take 1 tablet by mouth every 12 hours as needed (muscle spasms)       ALLERGIES     Demerol  [meperidine hcl], Pcn [penicillins], and Versed [midazolam]    FAMILY HISTORY       Family History   Problem Relation Age of Onset    High Blood Pressure Mother     Coronary Art Dis Mother     Thyroid Disease Mother         Hypothyroidism    Breast Cancer Mother 80    Colon Polyps Father     Prostate Cancer Father     Anemia Father Pernicious    Cancer Father         bladder cancer     Colon Polyps Sister     Anemia Brother         Pernicious    Cancer Brother 46        prostate cancer, bladder cancer    High Blood Pressure Paternal Grandmother           SOCIAL HISTORY       Social History     Socioeconomic History    Marital status:      Spouse name: None    Number of children: None    Years of education: None    Highest education level: None   Occupational History    None   Tobacco Use    Smoking status: Former Smoker     Packs/day: 0.50     Years: 25.00     Pack years: 12.50     Quit date:      Years since quittin.3    Smokeless tobacco: Never Used   Vaping Use    Vaping Use: Never used   Substance and Sexual Activity    Alcohol use: Yes     Comment: rare    Drug use: No    Sexual activity: None   Other Topics Concern    None   Social History Narrative    None     Social Determinants of Health     Financial Resource Strain: Low Risk     Difficulty of Paying Living Expenses: Not very hard   Food Insecurity: No Food Insecurity    Worried About Running Out of Food in the Last Year: Never true    Artemio of Food in the Last Year: Never true   Transportation Needs:     Lack of Transportation (Medical): Not on file    Lack of Transportation (Non-Medical):  Not on file   Physical Activity:     Days of Exercise per Week: Not on file    Minutes of Exercise per Session: Not on file   Stress:     Feeling of Stress : Not on file   Social Connections:     Frequency of Communication with Friends and Family: Not on file    Frequency of Social Gatherings with Friends and Family: Not on file    Attends Sabianism Services: Not on file    Active Member of Clubs or Organizations: Not on file    Attends Club or Organization Meetings: Not on file    Marital Status: Not on file   Intimate Partner Violence:     Fear of Current or Ex-Partner: Not on file    Emotionally Abused: Not on file    Physically Abused: Not on file    Sexually Abused: Not on file   Housing Stability:     Unable to Pay for Housing in the Last Year: Not on file    Number of Places Lived in the Last Year: Not on file    Unstable Housing in the Last Year: Not on file       SCREENINGS    [place] Coma Scale  Eye Opening: Spontaneous  Best Verbal Response: Oriented  Best Motor Response: Obeys commands  [place] Coma Scale Score: 15        PHYSICAL EXAM    (up to 7 for level 4, 8 or more for level 5)     ED Triage Vitals   BP Temp Temp Source Pulse Resp SpO2 Height Weight   04/25/22 0420 04/25/22 0417 04/25/22 0417 04/25/22 0420 04/25/22 0420 04/25/22 0420 -- --   125/75 97.8 °F (36.6 °C) Oral 74 20 96 %         Physical Exam  Vitals and nursing note reviewed. Constitutional:       General: She is not in acute distress. Appearance: She is well-developed. She is ill-appearing and diaphoretic. Comments: Appears in pain and nauseous. HENT:      Head: Normocephalic and atraumatic. Eyes:      Pupils: Pupils are equal, round, and reactive to light. Cardiovascular:      Rate and Rhythm: Normal rate and regular rhythm. Heart sounds: Normal heart sounds. Pulmonary:      Effort: Pulmonary effort is normal. No respiratory distress. Breath sounds: Normal breath sounds. Abdominal:      General: Bowel sounds are normal. There is no distension. Palpations: Abdomen is soft. Tenderness: There is generalized abdominal tenderness and tenderness in the right upper quadrant and epigastric area. Musculoskeletal:         General: Normal range of motion. Cervical back: Normal range of motion and neck supple. Skin:     General: Skin is warm. Findings: No rash. Neurological:      Mental Status: She is alert and oriented to person, place, and time. Cranial Nerves: No cranial nerve deficit. Motor: No abnormal muscle tone.       Coordination: Coordination normal.   Psychiatric:         Behavior: Behavior normal.         DIAGNOSTIC RESULTS     EKG: All EKG's are interpreted by the Emergency Department Physician who either signs or Co-signs this chart in the absence of a cardiologist.  Normal sinus rhythm rate 54 minimal amount of ST depression which is new from prior      RADIOLOGY:   Non-plain film images such as CT, Ultrasound and MRI are read by the radiologist. Early Gunnels images are visualized and preliminarily interpreted by the emergency physician with the below findings:        Interpretation per the Radiologist below, if available at the time of this note:    CT ABDOMEN PELVIS W IV CONTRAST Additional Contrast? None    (Results Pending)         ED BEDSIDE ULTRASOUND:   Performed by ED Physician - none    LABS:  Labs Reviewed   CBC WITH AUTO DIFFERENTIAL - Abnormal; Notable for the following components:       Result Value    WBC 13.3 (*)     MCH 26.5 (*)     MCHC 32.2 (*)     Neutrophils % 81.7 (*)     Lymphocytes % 12.7 (*)     Neutrophils Absolute 10.9 (*)     All other components within normal limits   COMPREHENSIVE METABOLIC PANEL - Abnormal; Notable for the following components:    Chloride 97 (*)     Glucose 153 (*)     Total Protein 6.5 (*)     Total Bilirubin 2.6 (*)     Alkaline Phosphatase 202 (*)      (*)      (*)     All other components within normal limits   LIPASE - Abnormal; Notable for the following components:    Lipase 2,617 (*)     All other components within normal limits   TROPONIN   URINALYSIS WITH REFLEX TO CULTURE       All other labs were within normal range or not returned as of this dictation.     EMERGENCY DEPARTMENT COURSE and DIFFERENTIALDIAGNOSIS/MDM:   Vitals:    Vitals:    04/25/22 0417 04/25/22 0420 04/25/22 0600   BP:  125/75 100/64   Pulse:  74 75   Resp:  20 18   Temp: 97.8 °F (36.6 °C) 97.8 °F (36.6 °C)    TempSrc: Oral     SpO2:  96% 100%       MDM  JAHAIRA Dr Rajwinder Art at end of shift pending ct    CONSULTS:  None    PROCEDURES:  Unless otherwise notedbelow, none     Procedures    FINAL IMPRESSION     1.  Right upper quadrant abdominal pain          DISPOSITION/PLAN   DISPOSITION        PATIENT REFERRED TO:  @FUP@    DISCHARGE MEDICATIONS:  New Prescriptions    No medications on file          (Please note that portions of this note were completed with a voice recognition program.  Efforts were made to edit the dictations butoccasionally words are mis-transcribed.)    Jeet Soto MD (electronically signed)  AttendingEmergency Physician         Jeet Soto MD  04/25/22 6518

## 2022-04-25 NOTE — PROGRESS NOTES
Stopped by two different times to try to see patient in consult but she was in MRI both times. Will plan to see her in the morning. In general- based on imaging and labs, appears consistent with gallstone pancreatitis with element of cholecystitis and possible choledocholithiasis. Will follow up results of MRCP and repeat labs. If increasing bilirubin and liver enzymes or positive MRCP- the ERCP per GI. If labs improving, especially lipase,and abdominal pain improving, then would shoot for cholecystectomy prior to dc home- likely Wednesday. Will depend on multiple factors, and again, will come again to see her in the AM tomorrow.

## 2022-04-25 NOTE — CONSULTS
Pt Name: Ky Morse  MRN: [id number]  [id number]  YOB: 1960  Admit Date: 4/25/2022  4:14 AM  Date of evaluation: 4/25/2022  Primary Care Physician: Roselia Garcia MD   [id number]       Requesting Provider: Dr. Bessy Alfonso    Indication: Abdominal pain. Elevated liver function test.  Cholelithiasis/cholecystitis. History:  The patient is a 60 y.o. female admitted to the hospital with 1 day history of abdominal pain.:  The patient initially yesterday morning she starting significant abdominal pain in the right upper quadrant area. The pain is going to the back. The pain is associated with symptoms of nausea and vomiting. The pain was moderate to severe intensity and she decided come to the hospital last night. She has similar attack of pain about a week back but that was resolved. She denies any previous history of similar symptoms. She is on proton pump inhibitor apparently placed by an ENT doctor as he found some polyps in her throat and told her that is because of acid reflux. Patient denies any heartburns or regurgitation ever. No dysphagia odynophagia. No previous history of any abdominal pain or cramping. No constipation or diarrhea on a regular basis. No fever or chills. No chest pain or palpitation. Her last colonoscopy was in 2018. During this hospitalization she had ultrasound and CT scan done and the reports are somewhat conflicting. One of the scan revealed no gallstone. The second revealed gallstone with cholecystitis. Both of the imaging study shows mild dilatation of the duct without any evidence of any stones. She is also been diagnosed with acute pancreatitis.       Past Medical History:        Diagnosis Date    Cervical disc disease 8/26/2017    DVT (deep venous thrombosis) ([facility] [address]     Essential hypertension 12/1/2019    Gastroesophageal reflux disease without esophagitis 8/26/2017    Hyperlipidemia     Laryngeal polyp     followed by Lissette Banuelos Pavan Vergara PA-C    Major depressive disorder in remission ([facility] [address] 8/26/2017    Migraine without aura 8/26/2017    Pernicious anemia 8/26/2017    Raynaud's phenomenon 8/26/2017     Past Surgical History:        Procedure Laterality Date    ARTERIOGRAM Bilateral 9/2/2020    ECOS-PULMONARY [facility] Kirti Torres MD [facility]       Allergies:  Demerol  [meperidine hcl], Pcn [penicillins], and Versed [midazolam]  Home Meds:  Prior to Admission medications    Medication Sig Start Date End Date Taking?  Authorizing Provider   Oxymetazoline HCl (RHOFADE) 1 % CREA Apply topically daily     Historical Provider, MD   hydroCHLOROthiazide (HYDRODIURIL) 25 MG tablet TAKE ONE TABLET ONCE A DAY IN THE MORNING  Patient taking differently: 12.5 mg  3/22/22   Seth Cope MD   omeprazole (PRILOSEC) 40 MG delayed release capsule Take 1 capsule by mouth nightly 3/15/22   Kadie Faria PA-C   ELIQUIS 5 MG TABS tablet TAKE ONE TABLET BY MOUTH TWO TIMES A DAY 3/4/22   EMILIANO Jay   desvenlafaxine succinate (PRISTIQ) 50 MG TB24 extended release tablet TAKE ONE TABLET ONE TIME DAILY 3/4/22   Seth Cope MD   cyanocobalamin 1000 MCG/ML injection Inject 1 mL into the muscle every 30 days 12/3/21   Seth Cope MD   nortriptyline (PAMELOR) 10 MG capsule TAKE TWO CAPSULES EVERY EVENING AT BEDTIME 11/28/21   Seth Cope MD   rizatriptan (MAXALT) 10 MG tablet May repeat in 2 hours if needed do not exceed 2 doses in 24 hours 11/3/21   Seth Cope MD   lisinopril (PRINIVIL;ZESTRIL) 20 MG tablet Take 1 tablet by mouth daily 11/3/21   Seth Cope MD   calcium carbonate (OSCAL) 500 MG TABS tablet Take 500 mg by mouth daily    Historical Provider, MD   Cholecalciferol (VITAMIN D) 50 MCG (2000 UT) CAPS capsule Take 2,000 Units by mouth daily    Historical Provider, MD   Multiple Vitamins-Minerals (PRESERVISION AREDS 2+MULTI VIT PO) Take 1 tablet by mouth 2 times daily    Historical Provider, MD tiZANidine (ZANAFLEX) 4 MG tablet Take 1 tablet by mouth every 12 hours as needed (muscle spasms) 20   Fredi Campbell MD   verapamil (CALAN SR) 240 MG extended release tablet Take 1 tablet by mouth daily 20  Fredi Campbell MD        Current Meds:       sodium chloride flush  5-40 mL IntraVENous 2 times per day    enoxaparin  40 mg SubCUTAneous Daily    famotidine  20 mg Oral BID        sodium chloride 125 mL/hr at 22 1122         Social History:   Social History     Socioeconomic History    Marital status:      Spouse name: Not on file    Number of children: Not on file    Years of education: Not on file    Highest education level: Not on file   Occupational History    Not on file   Tobacco Use    Smoking status: Former Smoker     Packs/day: 0.50     Years: 25.00     Pack years: 12.50     Quit date:      Years since quittin.3    Smokeless tobacco: Never Used   Vaping Use    Vaping Use: Never used   Substance and Sexual Activity    Alcohol use: Yes     Comment: rare    Drug use: No    Sexual activity: Not on file   Other Topics Concern    Not on file   Social History Narrative    Not on file     Social Determinants of Health     Financial Resource Strain: Low Risk     Difficulty of Paying Living Expenses: Not very hard   Food Insecurity: No Food Insecurity    Worried About Running Out of Food in the Last Year: Never true    [address] in the Last Year: Never true   Transportation Needs:     Lack of Transportation (Medical): Not on file    Lack of Transportation (Non-Medical):  Not on file   Physical Activity:     Days of Exercise per Week: Not on file    Minutes of Exercise per Session: Not on file   Stress:     Feeling of Stress : Not on file   Social Connections:     Frequency of Communication with Friends and Family: Not on file    Frequency of Social Gatherings with Friends and Family: Not on file    Attends Cheondoism Services: Not on file  Active Member of Clubs or Organizations: Not on file    Attends Club or Organization Meetings: Not on file    Marital Status: Not on file   Intimate Partner Violence:     Fear of Current or Ex-Partner: Not on file    Emotionally Abused: Not on file    Physically Abused: Not on file    Sexually Abused: Not on file   Housing Stability:     Unable to Pay for Housing in the Last Year: Not on file    Number of [place] in the Last Year: Not on file    Unstable Housing in the Last Year: Not on file       Family History:   Family History   Problem Relation Age of Onset    High Blood Pressure Mother     Coronary Art Dis Mother     Thyroid Disease Mother         Hypothyroidism    Breast Cancer Mother 80    Colon Polyps Father     Prostate Cancer Father     Anemia Father         Pernicious    Cancer Father         bladder cancer     Colon Polyps Sister     Anemia Brother         Pernicious    Cancer Brother 46        prostate cancer, bladder cancer    High Blood Pressure Paternal Grandmother          ROS:  Noncontributory. Physical Exam:  BP (!) 104/55   Pulse 63   Temp 96.3 °F (35.7 °C) (Temporal)   Resp 18   Ht 5' 7\" (1.702 m)   Wt 140 lb (63.5 kg)   SpO2 99%   BMI 21.93 kg/m²     General appearance: alert and cooperative with exam, appears stated age, no acute distress   Head: normal cephalic, atraumatic. EOMI bilaterally, no neck lymphadenopathy appreciated, no carotid bruits noted  Lungs: Clear to percussion and auscultation. No wheezing or rales. Heart: S1 S2 are audible. No added sound. Abdomen: Soft, non distended and tender. No hepatosplenomegaly. Bowel sounds are audible. No masses palpable. Skin: warm, dry, no obvious rash, non-jaundice  Extremities: No cyanosis or clubbing or peripheral edema noted. Dorsalis pedis pulses were intact.         Labs:     Recent Labs     04/25/22  0440   WBC 13.3*   RBC 5.35   HGB 14.2   HCT 44.1   MCV 82.4   MCH 26.5*   MCHC 32.2*    Recent Labs     04/25/22  0440      K 3.9   ANIONGAP 14   CL 97*   CO2 29   BUN 18   CREATININE 0.9   GLUCOSE 153*   CALCIUM 10.0     No results for input(s): MG, PHOS in the last 72 hours. Recent Labs     04/25/22  0440   *   *   BILITOT 2.6*   ALKPHOS 202*     HgBA1c:  No components found for: HGBA1C  FLP:    Lab Results   Component Value Date    TRIG 79 08/24/2021    HDL 59 08/24/2021    LDLCALC 133 08/24/2021    LDLDIRECT 71 04/08/2013     TSH:    Lab Results   Component Value Date    TSH 1.070 08/24/2021     Troponin T:   Recent Labs     04/25/22 0440   TROPONINI <0.01     INR: No results for input(s): INR in the last 72 hours. Recent Labs     04/25/22 0440   LIPASE 2,617*       Radiology:    CT ABDOMEN PELVIS W IV CONTRAST Additional Contrast? None    Result Date: 4/25/2022  1. Acute acalculous cholecystitis. 2. Acute pancreatitis. 3. Normal appendix. 4. The diverticulosis of the colon. No evidence for diverticulitis. Signed by Dr Mariangel Pratt    Result Date: 4/25/2022  1. Calculus cholecystitis. Signed by Dr Mi Worthy:  Patient admitted to the hospital with abdominal pain and nausea and vomiting. She has been diagnosed acute pancreatitis along with cholelithiasis and cholecystitis. Most likely etiology is gallstone pancreatitis. She is feeling somewhat better and possibly that she may pass the stone. In my opinion MRCP is indicated to definitely rule out bile duct stone and then to proceed with the ERCP. Impression:  1. Abdominal pain. 2.  Acute pancreatitis. 3.  Nausea and vomiting. 4.  Cholelithiasis. 5.  Cholecystitis. 6.  Elevated liver function test.    Plan:  1. Differential diagnosis patient symptoms discussed at length with her in the presence of family member. Gallstone pancreatitis were discussed. 2.  Patient is currently not on any IV fluids.   I will start her on aggressive IV fluid hydration this was relayed to the RN to call the hospitalist.  Continue n.p.o. and pain control. 3.  Surgical consult for cholecystectomy in near future. 4.  I will schedule patient for MRCP and based on MRCP finding further recommendation be done.     Cherylene Lek, MD  4/25/2022, 12:18 PM

## 2022-04-25 NOTE — ED PROVIDER NOTES
[facility]  eMERGENCYdEPARTMENT eNCOUnter      Pt Name: Annemarie Jean  MRN: [id number]  [place] 1960  Date of evaluation: 4/25/2022  Xavier Hill MD    Emergency Department care of this patient was assumed at 0630 from Dr. Paloma Alvarez. We have discussed the case and the plan of care. I have seen and evaluated patient and reviewed ED course. Patient currently awaiting CT scan of the abdomen pelvis. CHIEF COMPLAINT       Chief Complaint   Patient presents with    Abdominal Pain     pain started last night    Emesis         PHYSICAL EXAM    (up to 7 for level 4, 8 or more for level 5)     ED Triage Vitals   BP Temp Temp Source Pulse Resp SpO2 Height Weight   04/25/22 0420 04/25/22 0417 04/25/22 0417 04/25/22 0420 04/25/22 0420 04/25/22 0420 -- --   125/75 97.8 °F (36.6 °C) Oral 74 20 96 %         Physical Exam     Alert and speaking. Patient's vital signs have remained stable. She is in no acute respiratory distress. Abdominal examination reveals mild to moderate tenderness in the right upper quadrant. There is no guarding or distention noted. DIAGNOSTIC RESULTS       RADIOLOGY:   Non-plain film imagessuch as CT, Ultrasound and MRI are read by the radiologist. Plain radiographic images are visualized and preliminarily interpreted by the emergency physician with the below findings:      CT ABDOMEN PELVIS W IV CONTRAST Additional Contrast? None   Final Result   1. Acute acalculous cholecystitis. 2. Acute pancreatitis. 3. Normal appendix. 4. The diverticulosis of the colon. No evidence for diverticulitis.    Signed by Dr Melia Guevara    (Results Pending)           LABS:  Labs Reviewed   CBC WITH AUTO DIFFERENTIAL - Abnormal; Notable for the following components:       Result Value    WBC 13.3 (*)     MCH 26.5 (*)     MCHC 32.2 (*)     Neutrophils % 81.7 (*)     Lymphocytes % 12.7 (*)     Neutrophils Absolute 10.9 (*)     All other components within normal limits   COMPREHENSIVE METABOLIC PANEL - Abnormal; Notable for the following components:    Chloride 97 (*)     Glucose 153 (*)     Total Protein 6.5 (*)     Total Bilirubin 2.6 (*)     Alkaline Phosphatase 202 (*)      (*)      (*)     All other components within normal limits   LIPASE - Abnormal; Notable for the following components:    Lipase 2,617 (*)     All other components within normal limits   CULTURE, BLOOD 2   CULTURE, BLOOD 1   TROPONIN   URINALYSIS WITH REFLEX TO CULTURE   LACTIC ACID       All other labs were within normal range or not returned as of this dictation. EMERGENCY DEPARTMENT COURSE and DIFFERENTIAL DIAGNOSIS/MDM:   Vitals:    Vitals:    04/25/22 0600 04/25/22 0630 04/25/22 0700 04/25/22 0707   BP: 100/64 (!) 93/53  110/69   Pulse: 75 72 68 64   Resp: 18      Temp:       TempSrc:       SpO2: 100% 100%  100%       MDM    Reassessment    Patient's pain is fairly well controlled at this time. She has already received IV antibiotics. We will add on blood cultures and a serum lactic acid for further evaluation. Patient will require inpatient admission for evaluation and treatment. CONSULTS:    Surgical consultation was obtained from Dr. Stella Luna. We reviewed patient's CT scan findings along with laboratory studies. Advised admission to the medicine service with plans for right upper quadrant ultrasound, IV antibiotics and further evaluation. Case was discussed with the hospitalist service [facility]) regarding plans for inpatient admission. 4007: Case was discussed with Dr. Almond Siemens regarding GI consultation. We reviewed patient's laboratory studies and CT scan findings and U/S findings. PROCEDURES:  Unless otherwise noted below, none     Procedures    FINAL IMPRESSION      1. Acute acalculous cholecystitis    2. Right upper quadrant abdominal pain    3. Acute pancreatitis, unspecified complication status, unspecified pancreatitis type    4.  Elevated liver enzymes DISPOSITION/PLAN   DISPOSITION Decision To Admit    (Please note that portions ofthis note were completed with a voice recognition program.  Efforts were made to edit the dictations but occasionally words are mis-transcribed.)    Amelie Roldan MD(electronically signed)  Attending Emergency Physician          Amelie Roldan MD  04/25/22 9524       Amelie Roldan MD  04/25/22 6511

## 2022-04-26 ENCOUNTER — ANESTHESIA EVENT (OUTPATIENT)
Dept: OPERATING ROOM | Age: 62
DRG: 418 | End: 2022-04-26
Payer: COMMERCIAL

## 2022-04-26 LAB
ALBUMIN SERPL-MCNC: 3.5 G/DL (ref 3.5–5.2)
ALP BLD-CCNC: 133 U/L (ref 35–104)
ALT SERPL-CCNC: 302 U/L (ref 5–33)
ANION GAP SERPL CALCULATED.3IONS-SCNC: 8 MMOL/L (ref 7–19)
AST SERPL-CCNC: 150 U/L (ref 5–32)
BASOPHILS ABSOLUTE: 0 K/UL (ref 0–0.2)
BASOPHILS RELATIVE PERCENT: 0.3 % (ref 0–1)
BILIRUB SERPL-MCNC: 0.4 MG/DL (ref 0.2–1.2)
BILIRUBIN DIRECT: 0.2 MG/DL (ref 0–0.3)
BILIRUBIN, INDIRECT: 0.2 MG/DL (ref 0.1–1)
BUN BLDV-MCNC: 14 MG/DL (ref 8–23)
CALCIUM SERPL-MCNC: 8.8 MG/DL (ref 8.8–10.2)
CHLORIDE BLD-SCNC: 105 MMOL/L (ref 98–111)
CO2: 28 MMOL/L (ref 22–29)
CREAT SERPL-MCNC: 0.7 MG/DL (ref 0.5–0.9)
EOSINOPHILS ABSOLUTE: 0.1 K/UL (ref 0–0.6)
EOSINOPHILS RELATIVE PERCENT: 1.2 % (ref 0–5)
GFR AFRICAN AMERICAN: >59
GFR NON-AFRICAN AMERICAN: >60
GLUCOSE BLD-MCNC: 90 MG/DL (ref 74–109)
HCT VFR BLD CALC: 34.7 % (ref 37–47)
HEMOGLOBIN: 10.9 G/DL (ref 12–16)
IMMATURE GRANULOCYTES #: 0 K/UL
LIPASE: 200 U/L (ref 13–60)
LYMPHOCYTES ABSOLUTE: 1.3 K/UL (ref 1.1–4.5)
LYMPHOCYTES RELATIVE PERCENT: 19.4 % (ref 20–40)
MCH RBC QN AUTO: 26.8 PG (ref 27–31)
MCHC RBC AUTO-ENTMCNC: 31.4 G/DL (ref 33–37)
MCV RBC AUTO: 85.3 FL (ref 81–99)
MONOCYTES ABSOLUTE: 0.4 K/UL (ref 0–0.9)
MONOCYTES RELATIVE PERCENT: 5.5 % (ref 0–10)
NEUTROPHILS ABSOLUTE: 5 K/UL (ref 1.5–7.5)
NEUTROPHILS RELATIVE PERCENT: 73.3 % (ref 50–65)
PDW BLD-RTO: 14.1 % (ref 11.5–14.5)
PLATELET # BLD: 221 K/UL (ref 130–400)
PMV BLD AUTO: 11.1 FL (ref 9.4–12.3)
POTASSIUM REFLEX MAGNESIUM: 4.2 MMOL/L (ref 3.5–5)
RBC # BLD: 4.07 M/UL (ref 4.2–5.4)
SODIUM BLD-SCNC: 141 MMOL/L (ref 136–145)
TOTAL PROTEIN: 4.9 G/DL (ref 6.6–8.7)
WBC # BLD: 6.8 K/UL (ref 4.8–10.8)

## 2022-04-26 PROCEDURE — 6360000002 HC RX W HCPCS: Performed by: NURSE PRACTITIONER

## 2022-04-26 PROCEDURE — 85025 COMPLETE CBC W/AUTO DIFF WBC: CPT

## 2022-04-26 PROCEDURE — 1210000000 HC MED SURG R&B

## 2022-04-26 PROCEDURE — 6360000002 HC RX W HCPCS: Performed by: HOSPITALIST

## 2022-04-26 PROCEDURE — 80076 HEPATIC FUNCTION PANEL: CPT

## 2022-04-26 PROCEDURE — 99233 SBSQ HOSP IP/OBS HIGH 50: CPT | Performed by: INTERNAL MEDICINE

## 2022-04-26 PROCEDURE — 6370000000 HC RX 637 (ALT 250 FOR IP): Performed by: INTERNAL MEDICINE

## 2022-04-26 PROCEDURE — 83690 ASSAY OF LIPASE: CPT

## 2022-04-26 PROCEDURE — 99231 SBSQ HOSP IP/OBS SF/LOW 25: CPT | Performed by: SURGERY

## 2022-04-26 PROCEDURE — 6360000002 HC RX W HCPCS: Performed by: INTERNAL MEDICINE

## 2022-04-26 PROCEDURE — 36415 COLL VENOUS BLD VENIPUNCTURE: CPT

## 2022-04-26 PROCEDURE — 80048 BASIC METABOLIC PNL TOTAL CA: CPT

## 2022-04-26 PROCEDURE — 2580000003 HC RX 258: Performed by: HOSPITALIST

## 2022-04-26 RX ORDER — ONDANSETRON 2 MG/ML
4 INJECTION INTRAMUSCULAR; INTRAVENOUS EVERY 6 HOURS PRN
Status: DISCONTINUED | OUTPATIENT
Start: 2022-04-26 | End: 2022-04-28 | Stop reason: HOSPADM

## 2022-04-26 RX ORDER — TRAZODONE HYDROCHLORIDE 50 MG/1
50 TABLET ORAL NIGHTLY PRN
Status: DISCONTINUED | OUTPATIENT
Start: 2022-04-26 | End: 2022-04-28 | Stop reason: HOSPADM

## 2022-04-26 RX ORDER — METOCLOPRAMIDE HYDROCHLORIDE 5 MG/ML
10 INJECTION INTRAMUSCULAR; INTRAVENOUS EVERY 6 HOURS PRN
Status: DISCONTINUED | OUTPATIENT
Start: 2022-04-26 | End: 2022-04-28 | Stop reason: HOSPADM

## 2022-04-26 RX ADMIN — ONDANSETRON 4 MG: 2 INJECTION INTRAMUSCULAR; INTRAVENOUS at 08:00

## 2022-04-26 RX ADMIN — TRAZODONE HYDROCHLORIDE 50 MG: 50 TABLET ORAL at 23:04

## 2022-04-26 RX ADMIN — ENOXAPARIN SODIUM 40 MG: 100 INJECTION SUBCUTANEOUS at 14:27

## 2022-04-26 RX ADMIN — METOCLOPRAMIDE 10 MG: 5 INJECTION, SOLUTION INTRAMUSCULAR; INTRAVENOUS at 21:15

## 2022-04-26 RX ADMIN — HYDROMORPHONE HYDROCHLORIDE 1 MG: 1 INJECTION, SOLUTION INTRAMUSCULAR; INTRAVENOUS; SUBCUTANEOUS at 14:27

## 2022-04-26 RX ADMIN — ONDANSETRON 4 MG: 2 INJECTION INTRAMUSCULAR; INTRAVENOUS at 14:27

## 2022-04-26 RX ADMIN — LEVOFLOXACIN 750 MG: 5 INJECTION, SOLUTION INTRAVENOUS at 16:47

## 2022-04-26 RX ADMIN — SODIUM CHLORIDE, POTASSIUM CHLORIDE, SODIUM LACTATE AND CALCIUM CHLORIDE: 600; 310; 30; 20 INJECTION, SOLUTION INTRAVENOUS at 04:26

## 2022-04-26 ASSESSMENT — PAIN - FUNCTIONAL ASSESSMENT: PAIN_FUNCTIONAL_ASSESSMENT: PREVENTS OR INTERFERES SOME ACTIVE ACTIVITIES AND ADLS

## 2022-04-26 ASSESSMENT — ENCOUNTER SYMPTOMS
ABDOMINAL PAIN: 1
SORE THROAT: 0
SHORTNESS OF BREATH: 0
BACK PAIN: 0
COUGH: 0
EYE PAIN: 0
RESPIRATORY NEGATIVE: 1
EYES NEGATIVE: 1
NAUSEA: 1
CONSTIPATION: 0
VOMITING: 1
DIARRHEA: 0
EYE REDNESS: 0
ALLERGIC/IMMUNOLOGIC NEGATIVE: 1

## 2022-04-26 ASSESSMENT — PAIN DESCRIPTION - LOCATION: LOCATION: ABDOMEN

## 2022-04-26 ASSESSMENT — PAIN DESCRIPTION - ORIENTATION: ORIENTATION: MID

## 2022-04-26 ASSESSMENT — PAIN SCALES - GENERAL: PAINLEVEL_OUTOF10: 6

## 2022-04-26 NOTE — PROGRESS NOTES
GI  - PROGRESS NOTE    Subjective:   Admit Date: 4/25/2022  PCP: Fredi Campbell MD    Patient being seen for: Abdominal pain. Gallstone pancreatitis. HPI: Patient is feeling much better. Mild discomfort. No further nausea and vomiting. No cramping or bloating. No significant abdominal pain. No fever or chills. Medications:  Scheduled Meds:   sodium chloride flush  5-40 mL IntraVENous 2 times per day    enoxaparin  40 mg SubCUTAneous Daily    famotidine  20 mg Oral BID    desvenlafaxine succinate  50 mg Oral Daily    [Held by provider] hydroCHLOROthiazide  12.5 mg Oral Daily    [Held by provider] lisinopril  20 mg Oral Daily    nortriptyline  20 mg Oral Nightly    levofloxacin  750 mg IntraVENous Q24H       Continuous Infusions:   lactated ringers 200 mL/hr at 04/26/22 0426       PRN Meds:.sodium chloride flush, potassium chloride, magnesium sulfate, polyethylene glycol, acetaminophen **OR** acetaminophen, HYDROmorphone, ondansetron      Labs:     Recent Labs     04/25/22 0440 04/26/22  0401   WBC 13.3* 6.8   RBC 5.35 4.07*   HGB 14.2 10.9*   HCT 44.1 34.7*   MCV 82.4 85.3   MCH 26.5* 26.8*   MCHC 32.2* 31.4*    221     Recent Labs     04/25/22  0440 04/26/22  0401    141   K 3.9 4.2   ANIONGAP 14 8   CL 97* 105   CO2 29 28   BUN 18 14   CREATININE 0.9 0.7   GLUCOSE 153* 90   CALCIUM 10.0 8.8     No results for input(s): MG, PHOS in the last 72 hours.   Recent Labs     04/25/22  0440 04/26/22  0401   * 150*   * 302*   BILITOT 2.6* 0.4   ALKPHOS 202* 133*     HgBA1c:  No components found for: HGBA1C  FLP:    Lab Results   Component Value Date    TRIG 79 08/24/2021    HDL 59 08/24/2021    LDLCALC 133 08/24/2021    LDLDIRECT 71 04/08/2013     TSH:    Lab Results   Component Value Date    TSH 1.070 08/24/2021     Troponin T:   Recent Labs     04/25/22  0440   TROPONINI <0.01     INR: No results for input(s): INR in the last 72 hours. Recent Labs     04/25/22  0440 04/26/22  0401   LIPASE 2,617* 200*     -----------------------------------------------------------------  RAD:     CT ABDOMEN PELVIS W IV CONTRAST Additional Contrast? None    Result Date: 4/25/2022  Examination. CT ABDOMEN PELVIS W IV CONTRAST 4/25/2022 5:47 AM History: Right upper quadrant abdominal pain, nausea and vomiting. DLP: 337 mGycm. The automated exposure control was utilized to minimize the patient's radiation exposure. The CT scan of the abdomen and pelvis is performed after intravenous contrast enhancement. The images are acquired in axial plane with subsequent reconstruction in coronal and sagittal planes. There is no previous study for comparison. Okay the lung bases included in the study appear unremarkable. The limited visualized cardiomediastinal structures are unremarkable. No cardiomegaly. A small sliding-type hiatal hernia. The liver and spleen are normal. Moderately distended gallbladder seen. No gallstone. A moderate thickening and enhancement of the wall of the gallbladder and there is a surrounding significant peritoneal infiltration and fluid. There is a mild dilatation of common bile duct measuring 9 mm in diameter. There is peripancreatic retroperitoneal fat infiltration. There is normal and uniform enhancement of the pancreas. There is thickening of the anterior pararenal fascia with adjacent fluid accumulation. Limited visualized pancreatic duct is not dilated. The adrenal glands are normal. There are tiny cortical low density nodules in both kidneys which are too small to be further characterized. No calculi. No hydronephrosis. The limited visualized ureters are unremarkable. The urinary bladder is decompressed. No obvious intrinsic abnormality. The uterus is not visualized. No adnexal masses. The moderate thickening of the wall of the stomach which may be due to decompression.  There is an mild retroperitoneal fat infiltration around the duodenal particularly the descending duodenum. The remaining small bowel is unremarkable and nondistended. Appendix is normal. Significant volume stool is seen in the colon. There is diverticulosis of the distal colon. No evidence for diverticulitis. Atheromatous changes of the abdominal aorta and iliac arteries. There is no evidence of abdominal or pelvic lymphadenopathy. No evidence of mesenteric lymphadenopathy. The images reviewed in bone window show no acute bony abnormality. Chronic degenerative changes of the lumbar spine are seen with severe degeneration of the disc L5-S1. There is a mild levoscoliosis. 1. Acute acalculous cholecystitis. 2. Acute pancreatitis. 3. Normal appendix. 4. The diverticulosis of the colon. No evidence for diverticulitis. Signed by Dr Dumont Other    Result Date: 4/25/2022  Examination. US GALLBLADDER RUQ 4/25/2022 7:56 AM History: Right upper quadrant abdominal pain. The ultrasound examination of the abdomen is performed. There is no previous similar study for comparison. The correlation made with CT scan of the abdomen obtained earlier today. The gallbladder is moderately distended. There are several echogenic areas in the gallbladder with acoustic shadowing representing calculi. Gallbladder wall measures 4.2 mm in thickness. There is trace fluid around the gallbladder. Common bile duct measures 7 mm. The liver appears normal. No intrinsic abnormality. The pancreas is incompletely visualized. The head and tail are obscured by the bowel gas. Pancreatic duct is minimally dilated and measures 3 mm. Normal right kidney is seen measuring 9.8 x 3.5 x 4.8 cm. No evidence of mass or hydronephrosis. Abdominal aorta and IVC are unremarkable. 1. Calculus cholecystitis.  Signed by Dr Omar Nick MRCP    Result Date: 4/25/2022  EXAM: MRI abdomen without and with IV contrast including 3-D MRCP sequences INDICATION: Acute pancreatitis, elevated liver function tests, dilated common bile duct on prior imaging COMPARISON: CT and ultrasound 4/25/2022 FINDINGS: Liver: No hepatic steatosis or morphologic changes of cirrhosis. Atrophy of hepatic segment 4 with mild increased enhancement likely related to fibrosis (similar to CT chest from 2020, suggesting benign chronic process). No suspicious focal liver lesion. Bile ducts: Common bile duct is upper limits of normal in caliber measuring 7 mm diameter. No evidence of choledocholithiasis. Gallbladder: Gallbladder contains multiple intraluminal stones. The gallbladder is distended, mildly thick-walled, and pericholecystic fluid and stranding are present. Pancreas: Diffuse peripancreatic ill-defined fluid and inflammatory stranding is noted. No organized peripancreatic collection. No area of decreased pancreatic enhancement to suggest necrosis. Conventional pancreatic ductal anatomy. Spleen: Normal. Adrenals: Normal. Kidneys: Small renal cysts. No solid renal mass. No hydronephrosis. Vasculature: Nonaneurysmal abdominal aorta. Splenic vein, portal vein, and SMV appear widely patent. Other Findings: No aggressive osseous lesion. No abnormal bowel distention or evidence of active bowel inflammation. No abdominal lymphadenopathy. No acute finding included lower chest.    1.  Diffuse peripancreatic ill-defined fluid and inflammatory stranding, consistent with acute edematous pancreatitis. No area of pancreatic necrosis identified. No organized peripancreatic collection to suggest pseudocyst. 2.  Distended gallbladder with multiple intraluminal gallstones and mild gallbladder wall thickening with surrounding inflammatory stranding. Findings highly suggestive of acute cholecystitis. 3.  Common bile is upper limits of normal measuring 7 mm diameter. No evidence of choledocholithiasis or obstructing mass identified.  Signed by Dr Tonny Camejo          Physical Exam:     /61   Pulse 74   Temp 99.1 °F (37.3 °C) (Temporal)   Resp 16   Ht 5' 7\" (1.702 m)   Wt 140 lb (63.5 kg)   SpO2 96%   BMI 21.93 kg/m²     General appearance: alert and cooperative with exam, appears stated age, no acute distress   Head: normal cephalic, atraumatic. EOMI bilaterally, no neck lymphadenopathy appreciated, no carotid bruits noted  Lungs: Clear to percussion and auscultation. No wheezing or rales. Heart: S1 S2 are audible. No added sound. Abdomen: Soft, non distended and tender. No hepatosplenomegaly. Bowel sounds are audible. No masses palpable. Skin: warm, dry, no obvious rash, non-jaundice  Extremities: No cyanosis or clubbing or peripheral edema noted. Dorsalis pedis pulses were intact. Asssessment:   Patient mid to the hospital symptoms abdominal pain along with nausea and vomiting. Work-up revealed acute pancreatitis, most likely secondary to gallstones. She also noted to have cholelithiasis and Cholecystitis. She is feeling much better now. Her liver function test also improved. MRCP did not show any evidence of bile duct stone. Mild agitation was noted. Most likely she passed the stone. Pancreatitis is also improving clinically. Impression:   1. Abdominal pain. 2.  Nausea and vomiting. 3.  Gallstone pancreatitis. 4.  Cholelithiasis and cholecystitis. Plan:   1. MRCP finding and lab results were discussed. I told her that most likely she passed the stone. 2.  Surgical note was appreciated. In my opinion when she go for laparoscopy cholecystectomy and intraoperative cholangiogram should be done. If there is evidence of stone in the bile duct then an ERCP can be done after her cholecystectomy. 3.  I will start her on clear liquid diet. Continue supportive treatment with intravenous fluid hydration and pain control and follow labs.     Abel Dixon MD  4/26/2022, 11:06 AM

## 2022-04-26 NOTE — CONSULTS
Ms. Salima Contreras is a 60year old female who presented to the ER with a complaint of severe upper abdominal pain two night ago. She reports that she had one episode about 3 weeks ago of somewhat similar pain which improved on its own. She states that this pain started shortly after eating some chocolate cream pie. The pain was located in the upper abdomen and constant. She came to the ER, but he wait was very long so she went home. The pain did not improve, and she went back to the ER. CT was consistent with cholecystitis and pancreatitis. US did show gallstones. Her bilirubin, liver enzymes, lipase and WBC were allelevated. She was admitted for treatment of pancreatitis and cholecystitis. She had an MRCP yesterday that did not show any CBD stones. The patient reports that she is feeling quite a bit better today, some soreness and nausea, but overall much better.     Past Medical History:   Diagnosis Date    Cervical disc disease 8/26/2017    DVT (deep venous thrombosis) (HCC)     Essential hypertension 12/1/2019    Gastroesophageal reflux disease without esophagitis 8/26/2017    Hyperlipidemia     Laryngeal polyp     followed by Mervin Farmer PA-C    Major depressive disorder in remission ([facility] [address] 8/26/2017    Migraine without aura 8/26/2017    Pernicious anemia 8/26/2017    Raynaud's phenomenon 8/26/2017     Past Surgical History:   Procedure Laterality Date    ARTERIOGRAM Bilateral 9/2/2020    ECOS-PULMONARY performed by Mariela Oakley MD at [address]       Current Facility-Administered Medications   Medication Dose Route Frequency Provider Last Rate Last Admin    ondansetron (ZOFRAN) injection 4 mg  4 mg IntraVENous Q6H PRN Umair Kaur MD   4 mg at 04/26/22 1427    sodium chloride flush 0.9 % injection 5-40 mL  5-40 mL IntraVENous 2 times per day EMILIANO Delong - CNP   10 mL at 04/25/22 2027    sodium chloride flush 0.9 % injection 5-40 mL  5-40 mL IntraVENous PRN EMILIANO Delong - CNP        potassium chloride 10 mEq/100 mL IVPB (Peripheral Line)  10 mEq IntraVENous PRN Lawence Pennant, APRN - CNP        magnesium sulfate 2000 mg in 50 mL IVPB premix  2,000 mg IntraVENous PRN Lawence Pennant, APRN - CNP        enoxaparin (LOVENOX) injection 40 mg  40 mg SubCUTAneous Daily Lawence Pennant, APRN - CNP   40 mg at 04/26/22 1427    polyethylene glycol (GLYCOLAX) packet 17 g  17 g Oral Daily PRN Lawence Pennant, APRN - CNP        famotidine (PEPCID) tablet 20 mg  20 mg Oral BID Lawence Pennant, APRN - CNP   20 mg at 04/25/22 0831    acetaminophen (TYLENOL) tablet 650 mg  650 mg Oral Q6H PRN Lawence Pennant, APRN - CNP        Or    acetaminophen (TYLENOL) suppository 650 mg  650 mg Rectal Q6H PRN Lawence Pennant, APRN - CNP        HYDROmorphone HCl PF (DILAUDID) injection 1 mg  1 mg IntraVENous Q4H PRN Lawence Pennant, APRN - CNP   1 mg at 04/26/22 1427    desvenlafaxine succinate (PRISTIQ) extended release tablet 50 mg  50 mg Oral Daily Lawence Pennant, APRN - CNP   50 mg at 04/25/22 1603    [Held by provider] hydroCHLOROthiazide (HYDRODIURIL) tablet 12.5 mg  12.5 mg Oral Daily Lawence Pennant, APRN - CNP        [Held by provider] lisinopril (PRINIVIL;ZESTRIL) tablet 20 mg  20 mg Oral Daily Lawence Pennant, APRN - CNP        nortriptyline (PAMELOR) capsule 20 mg  20 mg Oral Nightly Lawence Pennant, APRN - CNP        lactated ringers infusion   IntraVENous Continuous Kathrin Reece  mL/hr at 04/26/22 0426 New Bag at 04/26/22 0426    levoFLOXacin (LEVAQUIN) 750 MG/150ML infusion 750 mg  750 mg IntraVENous Q24H Kathrin Reece MD   Stopped at 04/25/22 1847     Allergies: Demerol  [meperidine hcl], Pcn [penicillins], and Versed [midazolam]    Family History   Problem Relation Age of Onset    High Blood Pressure Mother     Coronary Art Dis Mother     Thyroid Disease Mother         Hypothyroidism    Breast Cancer Mother 80    Colon Polyps Father     Prostate Cancer Father     Anemia Father         Pernicious    Cancer Father         bladder cancer     Colon Polyps Sister     Anemia Brother         Pernicious    Cancer Brother 46        prostate cancer, bladder cancer    High Blood Pressure Paternal Grandmother        Social History     Tobacco Use    Smoking status: Former Smoker     Packs/day: 0.50     Years: 25.00     Pack years: 12.50     Quit date:      Years since quittin.3    Smokeless tobacco: Never Used   Substance Use Topics    Alcohol use: Yes     Comment: rare       Review of Systems   Constitutional: Positive for fatigue. Negative for chills and fever. HENT: Negative for congestion and sore throat. Eyes: Negative for pain and redness. Respiratory: Negative for cough and shortness of breath. Cardiovascular: Negative for chest pain and palpitations. Gastrointestinal: Positive for abdominal pain, nausea and vomiting. Negative for constipation and diarrhea. Endocrine: Negative for polydipsia and polyphagia. Genitourinary: Negative for dysuria and hematuria. Musculoskeletal: Negative for arthralgias and back pain. Skin: Negative for rash and wound. Neurological: Negative for dizziness and headaches. Psychiatric/Behavioral: Negative for confusion and dysphoric mood. Physical Exam  Vitals reviewed. Constitutional:       General: She is not in acute distress. Appearance: Normal appearance. HENT:      Head: Normocephalic and atraumatic. Nose: Nose normal.   Eyes:      General: No scleral icterus. Pupils: Pupils are equal, round, and reactive to light. Cardiovascular:      Rate and Rhythm: Normal rate and regular rhythm. Pulmonary:      Effort: Pulmonary effort is normal. No respiratory distress. Abdominal:      General: There is no distension. Palpations: Abdomen is soft. Tenderness: There is abdominal tenderness (epigastric). There is no guarding or rebound. Musculoskeletal:         General: No swelling. Normal range of motion. Cervical back: Neck supple. No rigidity. Skin:     General: Skin is warm and dry. Coloration: Skin is not jaundiced. Neurological:      General: No focal deficit present. Mental Status: She is alert. Mental status is at baseline. Psychiatric:         Mood and Affect: Mood normal.         Behavior: Behavior normal.       Impression   1. Acute acalculous cholecystitis. 2. Acute pancreatitis. 3. Normal appendix. 4. The diverticulosis of the colon. No evidence for diverticulitis. Signed by Dr Olympia Lesches     Examination. US GALLBLADDER RUQ 4/25/2022 7:56 AM   History: Right upper quadrant abdominal pain. The ultrasound examination of the abdomen is performed. There is no   previous similar study for comparison. The correlation made with CT   scan of the abdomen obtained earlier today. The gallbladder is moderately distended. There are several echogenic   areas in the gallbladder with acoustic shadowing representing calculi. Gallbladder wall measures 4.2 mm in thickness. There is trace fluid   around the gallbladder. Common bile duct measures 7 mm. The liver appears normal. No intrinsic abnormality. The pancreas is   incompletely visualized. The head and tail are obscured by the bowel   gas. Pancreatic duct is minimally dilated and measures 3 mm. Normal right kidney is seen measuring 9.8 x 3.5 x 4.8 cm. No evidence   of mass or hydronephrosis. Abdominal aorta and IVC are unremarkable.       Impression   1. Calculus cholecystitis. Signed by Dr Gustavo Tenorio   1.  Diffuse peripancreatic ill-defined fluid and inflammatory   stranding, consistent with acute edematous pancreatitis. No area of   pancreatic necrosis identified. No organized peripancreatic collection   to suggest pseudocyst.   2.  Distended gallbladder with multiple intraluminal gallstones and   mild gallbladder wall thickening with surrounding inflammatory   stranding. Findings highly suggestive of acute cholecystitis. 3.  Common bile is upper limits of normal measuring 7 mm diameter. No   evidence of choledocholithiasis or obstructing mass identified. Signed by Dr Dominic Miller       CBC:   Lab Results   Component Value Date    WBC 6.8 04/26/2022    RBC 4.07 04/26/2022    HGB 10.9 04/26/2022    HCT 34.7 04/26/2022    HCT 43.5 05/19/2011    MCV 85.3 04/26/2022    MCH 26.8 04/26/2022    MCHC 31.4 04/26/2022    RDW 14.1 04/26/2022     04/26/2022     05/19/2011    MPV 11.1 04/26/2022     CMP:    Lab Results   Component Value Date     04/26/2022     05/19/2011    K 4.2 04/26/2022    K 4.9 05/19/2011     04/26/2022     05/19/2011    CO2 28 04/26/2022    BUN 14 04/26/2022    CREATININE 0.7 04/26/2022    CREATININE 0.7 05/19/2011    GFRAA >59 04/26/2022    LABGLOM >60 04/26/2022    GLUCOSE 90 04/26/2022    PROT 4.9 04/26/2022    PROT 6.6 05/19/2011    LABALBU 3.5 04/26/2022    LABALBU 4.5 05/19/2011    CALCIUM 8.8 04/26/2022    BILITOT 0.4 04/26/2022    ALKPHOS 133 04/26/2022    ALKPHOS 66 05/19/2011     04/26/2022     04/26/2022     Lipase down to 200 from >2000  Bilirubin down to 0.4 from 2.6    Assessment and plan:  60year old female with gallstones pancreatitis and cholecystitis  The risks and benefits of laparoscopic robot assisted cholecystectomy were discussed with the patient, including but not limited to, bleeding, infection, injury to surrounding structures, need for open surgery, and post operative diarrhea. The patient expressed understanding. Will plan for surgery tomorrow now that lipase is trending down and pain is improving. Okay to have clear liquids tonight.     Karie Tavarez MD  4/26/2022  2:40 PM

## 2022-04-26 NOTE — PROGRESS NOTES
[facility]      Progress Note    Patient:  Ayush Crowley  YOB: 1960  Date of Service: 4/26/2022  MRN: [id number]   Acct: [de-identified]   Primary Care Physician: Jere Foster MD  Advance Directive: Full Code  Admit Date: 4/25/2022       Hospital Day: 2    Portions of this note have been copied forward, however, updated to reflect the most current clinical status of this patient. CHIEF COMPLAINT upper quadrant abdominal pain    SUBJECTIVE: Feeling better today. Anxious for surgery in a.m. CUMULATIVE HOSPITAL COURSE:     Ayush Crowley is an 60 y.o. female with past medical history of cervical disc disease, hypertension, GERD, hyperlipidemia, DVT, PE, factor V and Raynaud's. Patient presented to the emergency room with complaint of right upper quadrant pain nausea vomiting. Had a similar episode earlier in the week that resolved and then it resurfaced today. Pain is right upper quadrant and radiates through to her back. When it at onset levels of 5 but progresses and accompanied by vomiting. Reports no fever. No diarrhea. ER about reveals normal electrolytes glucose 153 she had alk phos of 202, , , lipase 2617, and a white count of 13.3. H&H normal.  CT of the abdomen and pelvis showed acute acalculous cholecystitis, acute pancreatitis, and normal appendix. She has diverticulosis but no diverticulitis. Patient was also sent for ultrasound of the gallbladder that does show calculus cholecystitis. Dr. Disha Grider was consulted  And will take pt to OR tomorrow. Review of Systems   Constitutional: Negative. HENT: Negative. Eyes: Negative. Respiratory: Negative. Cardiovascular: Negative. Gastrointestinal: Positive for abdominal pain and nausea. Endocrine: Negative. Genitourinary: Negative. Musculoskeletal: Negative. Allergic/Immunologic: Negative. Neurological: Negative. Hematological: Negative. Psychiatric/Behavioral: Negative. Objective:   VITALS:  /61   Pulse 74   Temp 99.1 °F (37.3 °C) (Temporal)   Resp 16   Ht 5' 7\" (1.702 m)   Wt 140 lb (63.5 kg)   SpO2 96%   BMI 21.93 kg/m²   24HR INTAKE/OUTPUT:    Intake/Output Summary (Last 24 hours) at 4/26/2022 1527  Last data filed at 4/26/2022 1003  Gross per 24 hour   Intake 0 ml   Output 300 ml   Net -300 ml           Physical Exam  HENT:      Nose: Nose normal.      Mouth/Throat:      Mouth: Mucous membranes are moist.   Eyes:      Extraocular Movements: Extraocular movements intact. Cardiovascular:      Rate and Rhythm: Normal rate and regular rhythm. Pulses: Normal pulses. Heart sounds: Normal heart sounds. Pulmonary:      Effort: Pulmonary effort is normal.      Breath sounds: Normal breath sounds. Abdominal:      General: Bowel sounds are normal.      Palpations: Abdomen is soft. Tenderness: There is abdominal tenderness. Musculoskeletal:         General: Normal range of motion. Cervical back: Neck supple. Skin:     General: Skin is warm and dry. Neurological:      General: No focal deficit present. Mental Status: She is alert. Psychiatric:         Mood and Affect: Mood normal.            Medications:      lactated ringers 200 mL/hr at 04/26/22 0426      sodium chloride flush  5-40 mL IntraVENous 2 times per day    enoxaparin  40 mg SubCUTAneous Daily    famotidine  20 mg Oral BID    desvenlafaxine succinate  50 mg Oral Daily    [Held by provider] hydroCHLOROthiazide  12.5 mg Oral Daily    [Held by provider] lisinopril  20 mg Oral Daily    nortriptyline  20 mg Oral Nightly    levofloxacin  750 mg IntraVENous Q24H     ondansetron, sodium chloride flush, potassium chloride, magnesium sulfate, polyethylene glycol, acetaminophen **OR** acetaminophen, HYDROmorphone  ADULT DIET;  Clear Liquid  Diet NPO Exceptions are: Sips of Water with Meds     Lab and other Data:     Recent Labs     04/25/22  0440 04/26/22  0401   WBC 13.3* 6.8 HGB 14.2 10.9*    221     Recent Labs     04/25/22  0440 04/26/22  0401    141   K 3.9 4.2   CL 97* 105   CO2 29 28   BUN 18 14   CREATININE 0.9 0.7   GLUCOSE 153* 90     Recent Labs     04/25/22  0440 04/26/22  0401   * 150*   * 302*   BILITOT 2.6* 0.4   ALKPHOS 202* 133*     Troponin T:   Recent Labs     04/25/22  0440   TROPONINI <0.01     Pro-BNP: No results for input(s): BNP in the last 72 hours. INR: No results for input(s): INR in the last 72 hours. UA:  Recent Labs     04/25/22  1700   COLORU DARK YELLOW*   PHUR 6.0   CLARITYU Clear   SPECGRAV >=1.045   LEUKOCYTESUR Negative   UROBILINOGEN 1.0   BILIRUBINUR Negative   BLOODU Negative   GLUCOSEU Negative     A1C: No results for input(s): LABA1C in the last 72 hours. ABG:No results for input(s): PHART, KFT1YNH, PO2ART, ILE7HLN, BEART, HGBAE, D2XTSMTL, CARBOXHGBART in the last 72 hours. RAD:   CT ABDOMEN PELVIS W IV CONTRAST Additional Contrast? None    Result Date: 4/25/2022  Examination. CT ABDOMEN PELVIS W IV CONTRAST 4/25/2022 5:47 AM History: Right upper quadrant abdominal pain, nausea and vomiting. DLP: 337 mGycm. The automated exposure control was utilized to minimize the patient's radiation exposure. The CT scan of the abdomen and pelvis is performed after intravenous contrast enhancement. The images are acquired in axial plane with subsequent reconstruction in coronal and sagittal planes. There is no previous study for comparison. Okay the lung bases included in the study appear unremarkable. The limited visualized cardiomediastinal structures are unremarkable. No cardiomegaly. A small sliding-type hiatal hernia. The liver and spleen are normal. Moderately distended gallbladder seen. No gallstone. A moderate thickening and enhancement of the wall of the gallbladder and there is a surrounding significant peritoneal infiltration and fluid. There is a mild dilatation of common bile duct measuring 9 mm in diameter.  There is peripancreatic retroperitoneal fat infiltration. There is normal and uniform enhancement of the pancreas. There is thickening of the anterior pararenal fascia with adjacent fluid accumulation. Limited visualized pancreatic duct is not dilated. The adrenal glands are normal. There are tiny cortical low density nodules in both kidneys which are too small to be further characterized. No calculi. No hydronephrosis. The limited visualized ureters are unremarkable. The urinary bladder is decompressed. No obvious intrinsic abnormality. The uterus is not visualized. No adnexal masses. The moderate thickening of the wall of the stomach which may be due to decompression. There is an mild retroperitoneal fat infiltration around the duodenal particularly the descending duodenum. The remaining small bowel is unremarkable and nondistended. Appendix is normal. Significant volume stool is seen in the colon. There is diverticulosis of the distal colon. No evidence for diverticulitis. Atheromatous changes of the abdominal aorta and iliac arteries. There is no evidence of abdominal or pelvic lymphadenopathy. No evidence of mesenteric lymphadenopathy. The images reviewed in bone window show no acute bony abnormality. Chronic degenerative changes of the lumbar spine are seen with severe degeneration of the disc L5-S1. There is a mild levoscoliosis. 1. Acute acalculous cholecystitis. 2. Acute pancreatitis. 3. Normal appendix. 4. The diverticulosis of the colon. No evidence for diverticulitis. Signed by Dr Mcgovern Chuck    Result Date: 4/25/2022  Examination. US GALLBLADDER RUQ 4/25/2022 7:56 AM History: Right upper quadrant abdominal pain. The ultrasound examination of the abdomen is performed. There is no previous similar study for comparison. The correlation made with CT scan of the abdomen obtained earlier today. The gallbladder is moderately distended.  There are several echogenic areas in the gallbladder with acoustic shadowing representing calculi. Gallbladder wall measures 4.2 mm in thickness. There is trace fluid around the gallbladder. Common bile duct measures 7 mm. The liver appears normal. No intrinsic abnormality. The pancreas is incompletely visualized. The head and tail are obscured by the bowel gas. Pancreatic duct is minimally dilated and measures 3 mm. Normal right kidney is seen measuring 9.8 x 3.5 x 4.8 cm. No evidence of mass or hydronephrosis. Abdominal aorta and IVC are unremarkable. 1. Calculus cholecystitis. Signed by Dr Lissa Wiley MRCP    Result Date: 4/25/2022  EXAM: MRI abdomen without and with IV contrast including 3-D MRCP sequences INDICATION: Acute pancreatitis, elevated liver function tests, dilated common bile duct on prior imaging COMPARISON: CT and ultrasound 4/25/2022 FINDINGS: Liver: No hepatic steatosis or morphologic changes of cirrhosis. Atrophy of hepatic segment 4 with mild increased enhancement likely related to fibrosis (similar to CT chest from 2020, suggesting benign chronic process). No suspicious focal liver lesion. Bile ducts: Common bile duct is upper limits of normal in caliber measuring 7 mm diameter. No evidence of choledocholithiasis. Gallbladder: Gallbladder contains multiple intraluminal stones. The gallbladder is distended, mildly thick-walled, and pericholecystic fluid and stranding are present. Pancreas: Diffuse peripancreatic ill-defined fluid and inflammatory stranding is noted. No organized peripancreatic collection. No area of decreased pancreatic enhancement to suggest necrosis. Conventional pancreatic ductal anatomy. Spleen: Normal. Adrenals: Normal. Kidneys: Small renal cysts. No solid renal mass. No hydronephrosis. Vasculature: Nonaneurysmal abdominal aorta. Splenic vein, portal vein, and SMV appear widely patent. Other Findings: No aggressive osseous lesion.  No abnormal bowel distention or evidence of active bowel inflammation. No abdominal lymphadenopathy. No acute finding included lower chest.    1.  Diffuse peripancreatic ill-defined fluid and inflammatory stranding, consistent with acute edematous pancreatitis. No area of pancreatic necrosis identified. No organized peripancreatic collection to suggest pseudocyst. 2.  Distended gallbladder with multiple intraluminal gallstones and mild gallbladder wall thickening with surrounding inflammatory stranding. Findings highly suggestive of acute cholecystitis. 3.  Common bile is upper limits of normal measuring 7 mm diameter. No evidence of choledocholithiasis or obstructing mass identified. Signed by Dr Alysia Hager            Assessment/Plan   Principal Problem:    Acute abdominal pain   Clear liquid    Pain could   OR planned at the a.m. Active Problems:    Acute pancreatitis   Labs improved today   Less pain    Essential hypertension   Stable    moniter    Factor V Leiden ([address]   Lovenox  Resolved Problems:    * No resolved hospital problems. *        DVT Prophylaxis:     Discharge planning: TBD      Further Orders per Clinical course/attending. Electronically signed by EMILIANO You CNP on 4/26/2022 at 3:27 PM       EMR Dragon/Transcription disclaimer:   Much of this encounter note is an electronic transcription/translation of spoken language to printed text.  The electronic translation of spoken language may permit erroneous, or at times, nonsensical words or phrases to be inadvertently transcribed; although attempts have made to review the note for such errors, some may still exist.

## 2022-04-27 ENCOUNTER — ANESTHESIA (OUTPATIENT)
Dept: OPERATING ROOM | Age: 62
DRG: 418 | End: 2022-04-27
Payer: COMMERCIAL

## 2022-04-27 VITALS
TEMPERATURE: 97.5 F | DIASTOLIC BLOOD PRESSURE: 68 MMHG | OXYGEN SATURATION: 97 % | RESPIRATION RATE: 14 BRPM | SYSTOLIC BLOOD PRESSURE: 113 MMHG

## 2022-04-27 LAB
ANION GAP SERPL CALCULATED.3IONS-SCNC: 12 MMOL/L (ref 7–19)
BASOPHILS ABSOLUTE: 0 K/UL (ref 0–0.2)
BASOPHILS RELATIVE PERCENT: 0.4 % (ref 0–1)
BUN BLDV-MCNC: 7 MG/DL (ref 8–23)
CALCIUM SERPL-MCNC: 8.8 MG/DL (ref 8.8–10.2)
CHLORIDE BLD-SCNC: 106 MMOL/L (ref 98–111)
CO2: 26 MMOL/L (ref 22–29)
CREAT SERPL-MCNC: 0.6 MG/DL (ref 0.5–0.9)
EOSINOPHILS ABSOLUTE: 0.2 K/UL (ref 0–0.6)
EOSINOPHILS RELATIVE PERCENT: 2.8 % (ref 0–5)
GFR AFRICAN AMERICAN: >59
GFR NON-AFRICAN AMERICAN: >60
GLUCOSE BLD-MCNC: 84 MG/DL (ref 74–109)
HCT VFR BLD CALC: 36.1 % (ref 37–47)
HEMOGLOBIN: 11 G/DL (ref 12–16)
IMMATURE GRANULOCYTES #: 0 K/UL
LYMPHOCYTES ABSOLUTE: 1.6 K/UL (ref 1.1–4.5)
LYMPHOCYTES RELATIVE PERCENT: 29.5 % (ref 20–40)
MCH RBC QN AUTO: 26.6 PG (ref 27–31)
MCHC RBC AUTO-ENTMCNC: 30.5 G/DL (ref 33–37)
MCV RBC AUTO: 87.4 FL (ref 81–99)
MONOCYTES ABSOLUTE: 0.4 K/UL (ref 0–0.9)
MONOCYTES RELATIVE PERCENT: 6.9 % (ref 0–10)
NEUTROPHILS ABSOLUTE: 3.2 K/UL (ref 1.5–7.5)
NEUTROPHILS RELATIVE PERCENT: 60 % (ref 50–65)
PDW BLD-RTO: 13.8 % (ref 11.5–14.5)
PLATELET # BLD: 201 K/UL (ref 130–400)
PMV BLD AUTO: 11.1 FL (ref 9.4–12.3)
POTASSIUM REFLEX MAGNESIUM: 3.8 MMOL/L (ref 3.5–5)
RBC # BLD: 4.13 M/UL (ref 4.2–5.4)
SODIUM BLD-SCNC: 144 MMOL/L (ref 136–145)
WBC # BLD: 5.4 K/UL (ref 4.8–10.8)

## 2022-04-27 PROCEDURE — 6360000002 HC RX W HCPCS: Performed by: NURSE PRACTITIONER

## 2022-04-27 PROCEDURE — 99233 SBSQ HOSP IP/OBS HIGH 50: CPT | Performed by: INTERNAL MEDICINE

## 2022-04-27 PROCEDURE — 2500000003 HC RX 250 WO HCPCS: Performed by: NURSE ANESTHETIST, CERTIFIED REGISTERED

## 2022-04-27 PROCEDURE — 3700000001 HC ADD 15 MINUTES (ANESTHESIA): Performed by: SURGERY

## 2022-04-27 PROCEDURE — 36415 COLL VENOUS BLD VENIPUNCTURE: CPT

## 2022-04-27 PROCEDURE — 2709999900 HC NON-CHARGEABLE SUPPLY: Performed by: SURGERY

## 2022-04-27 PROCEDURE — 6360000002 HC RX W HCPCS: Performed by: HOSPITALIST

## 2022-04-27 PROCEDURE — 47562 LAPAROSCOPIC CHOLECYSTECTOMY: CPT | Performed by: SURGERY

## 2022-04-27 PROCEDURE — 80048 BASIC METABOLIC PNL TOTAL CA: CPT

## 2022-04-27 PROCEDURE — 2500000003 HC RX 250 WO HCPCS: Performed by: SURGERY

## 2022-04-27 PROCEDURE — 6360000002 HC RX W HCPCS: Performed by: INTERNAL MEDICINE

## 2022-04-27 PROCEDURE — 88304 TISSUE EXAM BY PATHOLOGIST: CPT

## 2022-04-27 PROCEDURE — 8E0W4CZ ROBOTIC ASSISTED PROCEDURE OF TRUNK REGION, PERCUTANEOUS ENDOSCOPIC APPROACH: ICD-10-PCS | Performed by: SURGERY

## 2022-04-27 PROCEDURE — 6360000002 HC RX W HCPCS: Performed by: ANESTHESIOLOGY

## 2022-04-27 PROCEDURE — 85025 COMPLETE CBC W/AUTO DIFF WBC: CPT

## 2022-04-27 PROCEDURE — 1210000000 HC MED SURG R&B

## 2022-04-27 PROCEDURE — 7100000000 HC PACU RECOVERY - FIRST 15 MIN: Performed by: SURGERY

## 2022-04-27 PROCEDURE — 2580000003 HC RX 258: Performed by: ANESTHESIOLOGY

## 2022-04-27 PROCEDURE — 6360000002 HC RX W HCPCS: Performed by: NURSE ANESTHETIST, CERTIFIED REGISTERED

## 2022-04-27 PROCEDURE — 3600000009 HC SURGERY ROBOT BASE: Performed by: SURGERY

## 2022-04-27 PROCEDURE — 3600000019 HC SURGERY ROBOT ADDTL 15MIN: Performed by: SURGERY

## 2022-04-27 PROCEDURE — 2580000003 HC RX 258: Performed by: SURGERY

## 2022-04-27 PROCEDURE — 6360000002 HC RX W HCPCS: Performed by: SURGERY

## 2022-04-27 PROCEDURE — 3700000000 HC ANESTHESIA ATTENDED CARE: Performed by: SURGERY

## 2022-04-27 PROCEDURE — 6370000000 HC RX 637 (ALT 250 FOR IP): Performed by: SURGERY

## 2022-04-27 PROCEDURE — C9290 INJ, BUPIVACAINE LIPOSOME: HCPCS | Performed by: SURGERY

## 2022-04-27 PROCEDURE — 7100000001 HC PACU RECOVERY - ADDTL 15 MIN: Performed by: SURGERY

## 2022-04-27 PROCEDURE — S2900 ROBOTIC SURGICAL SYSTEM: HCPCS | Performed by: SURGERY

## 2022-04-27 PROCEDURE — 0FT44ZZ RESECTION OF GALLBLADDER, PERCUTANEOUS ENDOSCOPIC APPROACH: ICD-10-PCS | Performed by: SURGERY

## 2022-04-27 RX ORDER — PROMETHAZINE HYDROCHLORIDE 25 MG/ML
6.25 INJECTION, SOLUTION INTRAMUSCULAR; INTRAVENOUS
Status: DISCONTINUED | OUTPATIENT
Start: 2022-04-27 | End: 2022-04-27 | Stop reason: HOSPADM

## 2022-04-27 RX ORDER — SODIUM CHLORIDE 0.9 % (FLUSH) 0.9 %
5-40 SYRINGE (ML) INJECTION EVERY 12 HOURS SCHEDULED
Status: DISCONTINUED | OUTPATIENT
Start: 2022-04-27 | End: 2022-04-28 | Stop reason: HOSPADM

## 2022-04-27 RX ORDER — SODIUM CHLORIDE 9 MG/ML
INJECTION, SOLUTION INTRAVENOUS PRN
Status: DISCONTINUED | OUTPATIENT
Start: 2022-04-27 | End: 2022-04-27 | Stop reason: SDUPTHER

## 2022-04-27 RX ORDER — SODIUM CHLORIDE, SODIUM LACTATE, POTASSIUM CHLORIDE, CALCIUM CHLORIDE 600; 310; 30; 20 MG/100ML; MG/100ML; MG/100ML; MG/100ML
INJECTION, SOLUTION INTRAVENOUS CONTINUOUS
Status: DISCONTINUED | OUTPATIENT
Start: 2022-04-27 | End: 2022-04-27 | Stop reason: HOSPADM

## 2022-04-27 RX ORDER — CEFAZOLIN SODIUM 1 G/3ML
INJECTION, POWDER, FOR SOLUTION INTRAMUSCULAR; INTRAVENOUS PRN
Status: DISCONTINUED | OUTPATIENT
Start: 2022-04-27 | End: 2022-04-27 | Stop reason: SDUPTHER

## 2022-04-27 RX ORDER — MORPHINE SULFATE 2 MG/ML
2 INJECTION, SOLUTION INTRAMUSCULAR; INTRAVENOUS
Status: DISCONTINUED | OUTPATIENT
Start: 2022-04-27 | End: 2022-04-28 | Stop reason: HOSPADM

## 2022-04-27 RX ORDER — SCOLOPAMINE TRANSDERMAL SYSTEM 1 MG/1
1 PATCH, EXTENDED RELEASE TRANSDERMAL
Status: DISCONTINUED | OUTPATIENT
Start: 2022-04-27 | End: 2022-04-27 | Stop reason: HOSPADM

## 2022-04-27 RX ORDER — SODIUM CHLORIDE 0.9 % (FLUSH) 0.9 %
5-40 SYRINGE (ML) INJECTION EVERY 12 HOURS SCHEDULED
Status: DISCONTINUED | OUTPATIENT
Start: 2022-04-27 | End: 2022-04-27 | Stop reason: HOSPADM

## 2022-04-27 RX ORDER — HYDROMORPHONE HYDROCHLORIDE 1 MG/ML
0.25 INJECTION, SOLUTION INTRAMUSCULAR; INTRAVENOUS; SUBCUTANEOUS EVERY 5 MIN PRN
Status: DISCONTINUED | OUTPATIENT
Start: 2022-04-27 | End: 2022-04-27 | Stop reason: HOSPADM

## 2022-04-27 RX ORDER — MIDAZOLAM HYDROCHLORIDE 1 MG/ML
2 INJECTION INTRAMUSCULAR; INTRAVENOUS
Status: DISCONTINUED | OUTPATIENT
Start: 2022-04-27 | End: 2022-04-27

## 2022-04-27 RX ORDER — SODIUM CHLORIDE 0.9 % (FLUSH) 0.9 %
5-40 SYRINGE (ML) INJECTION PRN
Status: DISCONTINUED | OUTPATIENT
Start: 2022-04-27 | End: 2022-04-28 | Stop reason: HOSPADM

## 2022-04-27 RX ORDER — MORPHINE SULFATE 4 MG/ML
4 INJECTION, SOLUTION INTRAMUSCULAR; INTRAVENOUS
Status: DISCONTINUED | OUTPATIENT
Start: 2022-04-27 | End: 2022-04-28 | Stop reason: HOSPADM

## 2022-04-27 RX ORDER — FAMOTIDINE 20 MG/1
20 TABLET, FILM COATED ORAL ONCE
Status: DISCONTINUED | OUTPATIENT
Start: 2022-04-27 | End: 2022-04-27 | Stop reason: HOSPADM

## 2022-04-27 RX ORDER — HYDROCODONE BITARTRATE AND ACETAMINOPHEN 5; 325 MG/1; MG/1
2 TABLET ORAL EVERY 4 HOURS PRN
Status: DISCONTINUED | OUTPATIENT
Start: 2022-04-27 | End: 2022-04-28 | Stop reason: HOSPADM

## 2022-04-27 RX ORDER — SODIUM CHLORIDE 0.9 % (FLUSH) 0.9 %
5-40 SYRINGE (ML) INJECTION PRN
Status: DISCONTINUED | OUTPATIENT
Start: 2022-04-27 | End: 2022-04-27 | Stop reason: HOSPADM

## 2022-04-27 RX ORDER — METOCLOPRAMIDE HYDROCHLORIDE 5 MG/ML
10 INJECTION INTRAMUSCULAR; INTRAVENOUS
Status: COMPLETED | OUTPATIENT
Start: 2022-04-27 | End: 2022-04-27

## 2022-04-27 RX ORDER — SODIUM CHLORIDE 9 MG/ML
INJECTION, SOLUTION INTRAVENOUS PRN
Status: DISCONTINUED | OUTPATIENT
Start: 2022-04-27 | End: 2022-04-28 | Stop reason: HOSPADM

## 2022-04-27 RX ORDER — PROCHLORPERAZINE EDISYLATE 5 MG/ML
5 INJECTION INTRAMUSCULAR; INTRAVENOUS
Status: COMPLETED | OUTPATIENT
Start: 2022-04-27 | End: 2022-04-27

## 2022-04-27 RX ORDER — ROCURONIUM BROMIDE 10 MG/ML
INJECTION, SOLUTION INTRAVENOUS PRN
Status: DISCONTINUED | OUTPATIENT
Start: 2022-04-27 | End: 2022-04-27 | Stop reason: SDUPTHER

## 2022-04-27 RX ORDER — SUCCINYLCHOLINE CHLORIDE 20 MG/ML
INJECTION INTRAMUSCULAR; INTRAVENOUS PRN
Status: DISCONTINUED | OUTPATIENT
Start: 2022-04-27 | End: 2022-04-27 | Stop reason: SDUPTHER

## 2022-04-27 RX ORDER — SODIUM CHLORIDE 9 MG/ML
INJECTION, SOLUTION INTRAVENOUS PRN
Status: DISCONTINUED | OUTPATIENT
Start: 2022-04-27 | End: 2022-04-27 | Stop reason: HOSPADM

## 2022-04-27 RX ORDER — DEXAMETHASONE SODIUM PHOSPHATE 4 MG/ML
INJECTION, SOLUTION INTRA-ARTICULAR; INTRALESIONAL; INTRAMUSCULAR; INTRAVENOUS; SOFT TISSUE PRN
Status: DISCONTINUED | OUTPATIENT
Start: 2022-04-27 | End: 2022-04-27 | Stop reason: SDUPTHER

## 2022-04-27 RX ORDER — EPHEDRINE SULFATE 50 MG/ML
INJECTION, SOLUTION INTRAVENOUS PRN
Status: DISCONTINUED | OUTPATIENT
Start: 2022-04-27 | End: 2022-04-27 | Stop reason: SDUPTHER

## 2022-04-27 RX ORDER — DIPHENHYDRAMINE HYDROCHLORIDE 50 MG/ML
12.5 INJECTION INTRAMUSCULAR; INTRAVENOUS
Status: DISCONTINUED | OUTPATIENT
Start: 2022-04-27 | End: 2022-04-27 | Stop reason: HOSPADM

## 2022-04-27 RX ORDER — FENTANYL CITRATE 50 UG/ML
INJECTION, SOLUTION INTRAMUSCULAR; INTRAVENOUS PRN
Status: DISCONTINUED | OUTPATIENT
Start: 2022-04-27 | End: 2022-04-27 | Stop reason: SDUPTHER

## 2022-04-27 RX ORDER — PANTOPRAZOLE SODIUM 40 MG/1
40 TABLET, DELAYED RELEASE ORAL
Status: DISCONTINUED | OUTPATIENT
Start: 2022-04-28 | End: 2022-04-28 | Stop reason: HOSPADM

## 2022-04-27 RX ORDER — PROCHLORPERAZINE EDISYLATE 5 MG/ML
10 INJECTION INTRAMUSCULAR; INTRAVENOUS
Status: DISCONTINUED | OUTPATIENT
Start: 2022-04-27 | End: 2022-04-27

## 2022-04-27 RX ORDER — INDOCYANINE GREEN AND WATER 25 MG
KIT INJECTION PRN
Status: DISCONTINUED | OUTPATIENT
Start: 2022-04-27 | End: 2022-04-27 | Stop reason: ALTCHOICE

## 2022-04-27 RX ORDER — SODIUM CHLORIDE 0.9 % (FLUSH) 0.9 %
5-40 SYRINGE (ML) INJECTION EVERY 12 HOURS SCHEDULED
Status: DISCONTINUED | OUTPATIENT
Start: 2022-04-27 | End: 2022-04-27 | Stop reason: SDUPTHER

## 2022-04-27 RX ORDER — PROPOFOL 10 MG/ML
INJECTION, EMULSION INTRAVENOUS PRN
Status: DISCONTINUED | OUTPATIENT
Start: 2022-04-27 | End: 2022-04-27 | Stop reason: SDUPTHER

## 2022-04-27 RX ORDER — DEXMEDETOMIDINE HYDROCHLORIDE 100 UG/ML
INJECTION, SOLUTION INTRAVENOUS PRN
Status: DISCONTINUED | OUTPATIENT
Start: 2022-04-27 | End: 2022-04-27 | Stop reason: SDUPTHER

## 2022-04-27 RX ORDER — LIDOCAINE HYDROCHLORIDE 10 MG/ML
1 INJECTION, SOLUTION EPIDURAL; INFILTRATION; INTRACAUDAL; PERINEURAL
Status: DISCONTINUED | OUTPATIENT
Start: 2022-04-27 | End: 2022-04-27 | Stop reason: HOSPADM

## 2022-04-27 RX ORDER — SODIUM CHLORIDE 0.9 % (FLUSH) 0.9 %
5-40 SYRINGE (ML) INJECTION PRN
Status: DISCONTINUED | OUTPATIENT
Start: 2022-04-27 | End: 2022-04-27 | Stop reason: SDUPTHER

## 2022-04-27 RX ORDER — HYDROMORPHONE HYDROCHLORIDE 1 MG/ML
0.5 INJECTION, SOLUTION INTRAMUSCULAR; INTRAVENOUS; SUBCUTANEOUS EVERY 5 MIN PRN
Status: DISCONTINUED | OUTPATIENT
Start: 2022-04-27 | End: 2022-04-27 | Stop reason: HOSPADM

## 2022-04-27 RX ORDER — BUPIVACAINE HYDROCHLORIDE 2.5 MG/ML
INJECTION, SOLUTION INFILTRATION; PERINEURAL PRN
Status: DISCONTINUED | OUTPATIENT
Start: 2022-04-27 | End: 2022-04-27 | Stop reason: ALTCHOICE

## 2022-04-27 RX ORDER — HYDROCODONE BITARTRATE AND ACETAMINOPHEN 5; 325 MG/1; MG/1
1 TABLET ORAL EVERY 4 HOURS PRN
Status: DISCONTINUED | OUTPATIENT
Start: 2022-04-27 | End: 2022-04-28 | Stop reason: HOSPADM

## 2022-04-27 RX ORDER — LIDOCAINE HYDROCHLORIDE 10 MG/ML
INJECTION, SOLUTION INFILTRATION; PERINEURAL PRN
Status: DISCONTINUED | OUTPATIENT
Start: 2022-04-27 | End: 2022-04-27 | Stop reason: SDUPTHER

## 2022-04-27 RX ADMIN — HYDROMORPHONE HYDROCHLORIDE 0.2 MG: 1 INJECTION, SOLUTION INTRAMUSCULAR; INTRAVENOUS; SUBCUTANEOUS at 15:41

## 2022-04-27 RX ADMIN — FENTANYL CITRATE 100 MCG: 50 INJECTION, SOLUTION INTRAMUSCULAR; INTRAVENOUS at 13:57

## 2022-04-27 RX ADMIN — CEFAZOLIN SODIUM 2000 MG: 1 INJECTION, POWDER, FOR SOLUTION INTRAMUSCULAR; INTRAVENOUS at 14:22

## 2022-04-27 RX ADMIN — PHENYLEPHRINE HYDROCHLORIDE 100 MCG: 10 INJECTION INTRAVENOUS at 15:02

## 2022-04-27 RX ADMIN — METOCLOPRAMIDE 10 MG: 5 INJECTION, SOLUTION INTRAMUSCULAR; INTRAVENOUS at 10:18

## 2022-04-27 RX ADMIN — ENOXAPARIN SODIUM 40 MG: 100 INJECTION SUBCUTANEOUS at 10:18

## 2022-04-27 RX ADMIN — LIDOCAINE HYDROCHLORIDE 50 MG: 10 INJECTION, SOLUTION INFILTRATION; PERINEURAL at 13:57

## 2022-04-27 RX ADMIN — PROCHLORPERAZINE EDISYLATE 5 MG: 5 INJECTION INTRAMUSCULAR; INTRAVENOUS at 16:29

## 2022-04-27 RX ADMIN — ONDANSETRON 4 MG: 2 INJECTION INTRAMUSCULAR; INTRAVENOUS at 14:13

## 2022-04-27 RX ADMIN — EPHEDRINE SULFATE 10 MG: 50 INJECTION INTRAMUSCULAR; INTRAVENOUS; SUBCUTANEOUS at 14:58

## 2022-04-27 RX ADMIN — HYDROMORPHONE HYDROCHLORIDE 0.2 MG: 1 INJECTION, SOLUTION INTRAMUSCULAR; INTRAVENOUS; SUBCUTANEOUS at 15:38

## 2022-04-27 RX ADMIN — SUCCINYLCHOLINE CHLORIDE 100 MG: 20 INJECTION, SOLUTION INTRAMUSCULAR; INTRAVENOUS at 13:57

## 2022-04-27 RX ADMIN — ROCURONIUM BROMIDE 50 MG: 10 INJECTION, SOLUTION INTRAVENOUS at 14:05

## 2022-04-27 RX ADMIN — DEXMEDETOMIDINE HYDROCHLORIDE 20 MCG: 100 INJECTION, SOLUTION, CONCENTRATE INTRAVENOUS at 13:48

## 2022-04-27 RX ADMIN — DEXAMETHASONE SODIUM PHOSPHATE 10 MG: 4 INJECTION, SOLUTION INTRAMUSCULAR; INTRAVENOUS at 14:14

## 2022-04-27 RX ADMIN — METOCLOPRAMIDE 10 MG: 5 INJECTION, SOLUTION INTRAMUSCULAR; INTRAVENOUS at 16:11

## 2022-04-27 RX ADMIN — NORTRIPTYLINE HYDROCHLORIDE 20 MG: 10 CAPSULE ORAL at 20:45

## 2022-04-27 RX ADMIN — EPHEDRINE SULFATE 15 MG: 50 INJECTION INTRAMUSCULAR; INTRAVENOUS; SUBCUTANEOUS at 15:02

## 2022-04-27 RX ADMIN — SUGAMMADEX 200 MG: 100 INJECTION, SOLUTION INTRAVENOUS at 15:40

## 2022-04-27 RX ADMIN — Medication 10 ML: at 21:48

## 2022-04-27 RX ADMIN — SODIUM CHLORIDE, SODIUM LACTATE, POTASSIUM CHLORIDE, AND CALCIUM CHLORIDE: 600; 310; 30; 20 INJECTION, SOLUTION INTRAVENOUS at 15:03

## 2022-04-27 RX ADMIN — PROPOFOL 150 MG: 10 INJECTION, EMULSION INTRAVENOUS at 13:57

## 2022-04-27 RX ADMIN — SODIUM CHLORIDE, SODIUM LACTATE, POTASSIUM CHLORIDE, AND CALCIUM CHLORIDE: 600; 310; 30; 20 INJECTION, SOLUTION INTRAVENOUS at 11:52

## 2022-04-27 RX ADMIN — DEXMEDETOMIDINE HYDROCHLORIDE 10 MCG: 100 INJECTION, SOLUTION, CONCENTRATE INTRAVENOUS at 14:22

## 2022-04-27 RX ADMIN — PROPOFOL 40 MG: 10 INJECTION, EMULSION INTRAVENOUS at 14:19

## 2022-04-27 ASSESSMENT — LIFESTYLE VARIABLES: SMOKING_STATUS: 0

## 2022-04-27 NOTE — PROGRESS NOTES
GI  - PROGRESS NOTE    Subjective:   Admit Date: 4/25/2022  PCP: Salas Lane MD    Patient being seen for: Abdominal pain. Gallstone pancreatitis. HPI: Patient is feeling fine. Still having bowel discomfort. Last night she tried clear liquid diet and could not tolerate and having nausea and vomiting without any significant abdominal pain. No fever or chills. She is awaiting laparoscopy cholecystectomy today. Medications:  Scheduled Meds:   [MAR Hold] sodium chloride flush  5-40 mL IntraVENous 2 times per day    [MAR Hold] enoxaparin  40 mg SubCUTAneous Daily    [MAR Hold] famotidine  20 mg Oral BID    [MAR Hold] desvenlafaxine succinate  50 mg Oral Daily    [Held by provider] hydroCHLOROthiazide  12.5 mg Oral Daily    [Held by provider] lisinopril  20 mg Oral Daily    [MAR Hold] nortriptyline  20 mg Oral Nightly    [MAR Hold] levofloxacin  750 mg IntraVENous Q24H       Continuous Infusions:   [MAR Hold] lactated ringers 200 mL/hr at 04/26/22 0426       PRN Meds:. [MAR Hold] ondansetron, [MAR Hold] metoclopramide, [MAR Hold] traZODone, [MAR Hold] sodium chloride flush, [MAR Hold] potassium chloride, [MAR Hold] magnesium sulfate, [MAR Hold] polyethylene glycol, [MAR Hold] acetaminophen **OR** [MAR Hold] acetaminophen, [MAR Hold] HYDROmorphone      Labs:     Recent Labs     04/25/22  0440 04/26/22  0401 04/27/22  0711   WBC 13.3* 6.8 5.4   RBC 5.35 4.07* 4.13*   HGB 14.2 10.9* 11.0*   HCT 44.1 34.7* 36.1*   MCV 82.4 85.3 87.4   MCH 26.5* 26.8* 26.6*   MCHC 32.2* 31.4* 30.5*    221 201     Recent Labs     04/25/22  0440 04/26/22  0401 04/27/22  0711    141 144   K 3.9 4.2 3.8   ANIONGAP 14 8 12   CL 97* 105 106   CO2 29 28 26   BUN 18 14 7*   CREATININE 0.9 0.7 0.6   GLUCOSE 153* 90 84   CALCIUM 10.0 8.8 8.8     No results for input(s): MG, PHOS in the last 72 hours.   Recent Labs     04/25/22  0440 04/26/22  0401   * 150*   ALT 589* 302*   BILITOT 2.6* 0.4   ALKPHOS 202* 133*     HgBA1c:  No components found for: HGBA1C  FLP:    Lab Results   Component Value Date    TRIG 79 08/24/2021    HDL 59 08/24/2021    LDLCALC 133 08/24/2021    LDLDIRECT 71 04/08/2013     TSH:    Lab Results   Component Value Date    TSH 1.070 08/24/2021     Troponin T:   Recent Labs     04/25/22  0440   TROPONINI <0.01     INR: No results for input(s): INR in the last 72 hours. Recent Labs     04/25/22  0440 04/26/22  0401   LIPASE 2,617* 200*     -----------------------------------------------------------------  RAD:     MRI ABDOMEN W WO CONTRAST MRCP    Result Date: 4/25/2022  EXAM: MRI abdomen without and with IV contrast including 3-D MRCP sequences INDICATION: Acute pancreatitis, elevated liver function tests, dilated common bile duct on prior imaging COMPARISON: CT and ultrasound 4/25/2022 FINDINGS: Liver: No hepatic steatosis or morphologic changes of cirrhosis. Atrophy of hepatic segment 4 with mild increased enhancement likely related to fibrosis (similar to CT chest from 2020, suggesting benign chronic process). No suspicious focal liver lesion. Bile ducts: Common bile duct is upper limits of normal in caliber measuring 7 mm diameter. No evidence of choledocholithiasis. Gallbladder: Gallbladder contains multiple intraluminal stones. The gallbladder is distended, mildly thick-walled, and pericholecystic fluid and stranding are present. Pancreas: Diffuse peripancreatic ill-defined fluid and inflammatory stranding is noted. No organized peripancreatic collection. No area of decreased pancreatic enhancement to suggest necrosis. Conventional pancreatic ductal anatomy. Spleen: Normal. Adrenals: Normal. Kidneys: Small renal cysts. No solid renal mass. No hydronephrosis. Vasculature: Nonaneurysmal abdominal aorta. Splenic vein, portal vein, and SMV appear widely patent. Other Findings: No aggressive osseous lesion.  No abnormal bowel distention or evidence of active bowel inflammation. No abdominal lymphadenopathy. No acute finding included lower chest.    1.  Diffuse peripancreatic ill-defined fluid and inflammatory stranding, consistent with acute edematous pancreatitis. No area of pancreatic necrosis identified. No organized peripancreatic collection to suggest pseudocyst. 2.  Distended gallbladder with multiple intraluminal gallstones and mild gallbladder wall thickening with surrounding inflammatory stranding. Findings highly suggestive of acute cholecystitis. 3.  Common bile is upper limits of normal measuring 7 mm diameter. No evidence of choledocholithiasis or obstructing mass identified. Signed by Dr Angela Guardado          Physical Exam:     /74   Pulse 70   Temp 97.5 °F (36.4 °C) (Temporal)   Resp 18   Ht 5' 7\" (1.702 m)   Wt 140 lb (63.5 kg)   SpO2 98%   BMI 21.93 kg/m²     General appearance: alert and cooperative with exam, appears stated age, no acute distress   Head: normal cephalic, atraumatic. EOMI bilaterally, no neck lymphadenopathy appreciated, no carotid bruits noted  Lungs: Clear to percussion and auscultation. No wheezing or rales. Heart: S1 S2 are audible. No added sound. Abdomen: Soft, non distended and tender. No hepatosplenomegaly. Bowel sounds are audible. No masses palpable. Skin: warm, dry, no obvious rash, non-jaundice  Extremities: No cyanosis or clubbing or peripheral edema noted. Dorsalis pedis pulses were intact. Asssessment:   Patient admitted to hospital symptom abdominal pain along with nausea and vomiting. She is diagnosed with acute pancreatitis secondary to gallstones. She also noted to have cholecystitis. No new liver function test has been done today. She is awaiting surgery for gallbladder today. Impression:   1. Abdominal pain. 2. Nausea and vomiting. 3. Gallstone pancreatitis. 4.  Cholelithiasis and cholecystitis. Plan:   1. Continue supportive treatment. Agree with cholecystectomy. Possible may do intraoperative cholangiogram.  2. If she remains stable after her surgery, she can be discharged home tomorrow.     Beau Morales MD  4/27/2022, 11:52 AM

## 2022-04-27 NOTE — PROGRESS NOTES
Progress Note  Date:2022       Room:[facility] OR [place]/NONE  Patient Name:Brina Simms     YOB: 1960     Age:62 y.o. Subjective    Subjective: 79-year-old lady with a history of pernicious anemia, depression, hyperlipidemia, GERD, hypertension, DVT, presented to hospital with concerns of abdominal pain and emesis. On admission noted to have elevated lipase as well as elevated liver enzymes. Admitted for acute pancreatitis as well as acute cholecystitis.  Initiated on high rate IV fluids with  cc an hour, pain management, n.p.o. initially on ceftriaxone and Flagyl and subsequently transitioned to continue Levaquin. Overall patient has improved compared to admission, liver enzymes downtrending, epigastric pain significantly improved tolerated clear liquid diet, plans for lap lavonne today. Seen in house this morning, denied any acute overnight event, denied any chest pain, shortness of breath, nausea vomit abdominal pain. Review of Systems: 12 point system review negative except as stated above. Objective         Vitals Last 24 Hours:  TEMPERATURE:  Temp  Av.4 °F (36.3 °C)  Min: 95.4 °F (35.2 °C)  Max: 98.6 °F (37 °C)  RESPIRATIONS RANGE: Resp  Avg: 10.4  Min: 0  Max: 18  PULSE OXIMETRY RANGE: SpO2  Av.2 %  Min: 92 %  Max: 100 %  PULSE RANGE: Pulse  Av.4  Min: 70  Max: 100  BLOOD PRESSURE RANGE: Systolic (05JRH), IVP:454 , Min:68 , JXT:732   ; Diastolic (42BKC), UOE:85, Min:49, Max:98    I/O (24Hr): Intake/Output Summary (Last 24 hours) at 2022 1612  Last data filed at 2022 1530  Gross per 24 hour   Intake 1410 ml   Output --   Net 1410 ml       Physical Examination:  General: Well-developed, no acute distress lying comfortably in bed. HEENT: Atraumatic normocephalic, range of motion normal   Cardiac: Normal S1-S2 no murmurs rub or gallop.   Respiratory: clear To auscultation bilaterally, no rhonchi or rales, no wheezing  Abdomen: Soft, positive bowel sounds in all quadrants, no distention, nontender to palpation. extremities: Mild tenderness in the epigastric region, no edema, moves all extremities  Psych: Affect normal and good eye contact, behavioral normal.        Labs/Imaging/Diagnostics    Labs:  CBC:  Recent Labs     04/25/22 0440 04/26/22  0401 04/27/22  0711   WBC 13.3* 6.8 5.4   RBC 5.35 4.07* 4.13*   HGB 14.2 10.9* 11.0*   HCT 44.1 34.7* 36.1*   MCV 82.4 85.3 87.4   RDW 13.9 14.1 13.8    221 201     CHEMISTRIES:  Recent Labs     04/25/22 0440 04/26/22  0401 04/27/22  0711    141 144   K 3.9 4.2 3.8   CL 97* 105 106   CO2 29 28 26   BUN 18 14 7*   CREATININE 0.9 0.7 0.6   GLUCOSE 153* 90 84     PT/INR:No results for input(s): PROTIME, INR in the last 72 hours. APTT:No results for input(s): APTT in the last 72 hours. LIVER PROFILE:  Recent Labs     04/25/22 0440 04/26/22  0401   * 150*   * 302*   BILIDIR  --  0.2   BILITOT 2.6* 0.4   ALKPHOS 202* 133*       Imaging Last 24 Hours:  No results found. Assessment//Plan           Hospital Problems           Last Modified POA    * (Principal) Acute abdominal pain 4/25/2022 Yes    Acute pancreatitis 4/25/2022 Yes    Essential hypertension 4/25/2022 Yes    Factor V Leiden ([facility] [address] 4/25/2022 Yes        Assessment & Plan      Acute gallstone pancreatitis  Acute cholecystitis  Noted significant improvement in lipase level compared to admission. Continue high flow fluids with  cc an hour. Pain management  N.p.o. for now awaiting lap lavonne today. Can resume clear liquid diet post surgery if okay with surgery team.  Noted downtrending liver enzymes. Initially on ceftriaxone and Flagyl today transitioned to Levaquin. GERD: Pantoprazole    Hyperlipidemia: Not on any medications. Hypertension  Hold blood pressure medication as patient currently normotensive. Major depression  Continue Pristiq and nortriptyline    Pernicious anemia. B12 injection outpatient.     Factor V Leyden deficiency  Resume Eliquis in the a.m. post surgery. Electronically signed by   Kenisha Francis MD   Internal Medicine Hospitalist  On 4/27/2022  At 4:21 PM    EMR Dragon/Transcription disclaimer:   Much of this encounter note is an electronic transcription/translation of spoken language to printed text.  The electronic translation of spoken language may permit erroneous, or at times, nonsensical words or phrases to be inadvertently transcribed; although attempts have made to review the note for such errors, some may still exist.

## 2022-04-27 NOTE — ANESTHESIA PRE PROCEDURE
Mehnaz Phan MD   verapamil (CALAN SR) 240 MG extended release tablet Take 1 tablet by mouth daily 12/30/20 12/18/21  Mehnaz Phan MD       Current medications:    No current facility-administered medications for this visit. No current outpatient medications on file.      Facility-Administered Medications Ordered in Other Visits   Medication Dose Route Frequency Provider Last Rate Last Admin    lactated ringers infusion   IntraVENous Continuous Ernestine Mays  mL/hr at 04/27/22 1152 New Bag at 04/27/22 1152    [MAR Hold] ondansetron (ZOFRAN) injection 4 mg  4 mg IntraVENous Q6H PRN Kathrin Reece MD   4 mg at 04/26/22 1427    [MAR Hold] metoclopramide (REGLAN) injection 10 mg  10 mg IntraVENous Q6H PRN Carol Genao MD   10 mg at 04/27/22 1018    [MAR Hold] traZODone (DESYREL) tablet 50 mg  50 mg Oral Nightly PRN Carol Genao MD   50 mg at 04/26/22 2304    [MAR Hold] sodium chloride flush 0.9 % injection 5-40 mL  5-40 mL IntraVENous 2 times per day Mikey Yousif APRN - CNP   10 mL at 04/25/22 2027    [MAR Hold] sodium chloride flush 0.9 % injection 5-40 mL  5-40 mL IntraVENous PRN Mikey Yousif APRN - CNP        [facility] potassium chloride 10 mEq/100 mL IVPB (Peripheral Line)  10 mEq IntraVENous PRN Mikey Yousif, APRN - CNP        [facility] Hold] magnesium sulfate 2000 mg in 50 mL IVPB premix  2,000 mg IntraVENous PRN ence Penbridgert, APRN - CNP        [MAR Hold] enoxaparin (LOVENOX) injection 40 mg  40 mg SubCUTAneous Daily Lawjohn Penbridgert, APRN - CNP   40 mg at 04/27/22 1018    [MAR Hold] polyethylene glycol (GLYCOLAX) packet 17 g  17 g Oral Daily PRN Lawence Penbridgert, APRN - CNP        [MAR Hold] famotidine (PEPCID) tablet 20 mg  20 mg Oral BID Lawence Nica APRN - CNP   20 mg at 04/25/22 0831    [MAR Hold] acetaminophen (TYLENOL) tablet 650 mg  650 mg Oral Q6H PRN Mikey Yousif, EMILIANO - CNP        Or    [MAR Hold] acetaminophen (TYLENOL) suppository 650 mg  650 mg Rectal Q6H PRN [facility] Pen[facility]gert, APRN - CNP        [MAR Hold] HYDROmorphone HCl PF (DILAUDID) injection 1 mg  1 mg IntraVENous Q4H PRN [facility] Pen[facility]ert, APRN - CNP   1 mg at 04/26/22 1427    [MAR Hold] desvenlafaxine succinate (PRISTIQ) extended release tablet 50 mg  50 mg Oral Daily [facility] Pennant, APRN - CNP   50 mg at 04/25/22 1603    [Held by provider] hydroCHLOROthiazide (HYDRODIURIL) tablet 12.5 mg  12.5 mg Oral Daily [facility], APRN - CNP        [Held by provider] lisinopril (PRINIVIL;ZESTRIL) tablet 20 mg  20 mg Oral Daily [facility], APRN - CNP        [MAR Hold] nortriptyline (PAMELOR) capsule 20 mg  20 mg Oral Nightly [facility] Penbridgert, APRN - CNP        [facility] Hold] lactated ringers infusion   IntraVENous Continuous Kathrin Reece  mL/hr at 04/26/22 0426 New Bag at 04/26/22 0426    [MAR Hold] levoFLOXacin (LEVAQUIN) 750 MG/150ML infusion 750 mg  750 mg IntraVENous Q24H Kathrin Reece MD   Stopped at 04/26/22 1825       Allergies:     Allergies   Allergen Reactions    Demerol  [Meperidine Hcl]      Other reaction(s): Unknown    Pcn [Penicillins] Rash    Versed [Midazolam] Nausea And Vomiting       Problem List:    Patient Active Problem List   Diagnosis Code    Breast density R92.2    Migraine without aura G43.009    Pernicious anemia D51.0    Raynaud's phenomenon I73.00    Cervical disc disease M50.90    Dysthymia F34.1    Myopia H52.10    Nuclear senile cataract H25.10    Pseudophakia Z96.1    Tear film insufficiency H04.129    Essential hypertension I10    Pulmonary embolism (HCC) I26.99    VTE (venous thromboembolism) I82.90    Bilateral pulmonary embolism (HCC) I26.99    Pulmonary arterial hypertension (HCC) I27.21    DVT (deep venous thrombosis) (HCC) I82.409    Dysfunction of right cardiac ventricle I51.9    Factor V Leiden ([address] D68.51    Vocal cord polyps J38.1    LAD (lymphadenopathy), axillary R59.0    Small airways disease J98.4    Acute abdominal pain R10.9    Acute pancreatitis K85.90    Abdominal pain R10.9       Past Medical History:        Diagnosis Date    Cervical disc disease 2017    D[facility] thrombosis) [facility]     Essential hypertension 2019    Gastroesophageal reflux disease without esophagitis 2017    Hyperlipidemia     Laryngeal polyp     followed by Yin Torres PA-C    Major depressive disorder in remission ([facility] 2017    Migraine without aura 2017    Pernicious anemia 2017    Raynaud's phenomenon 2017       Past Surgical History:        Procedure Laterality Date    ARTERIOGRAM Bilateral 2020    ECOS-PULMONARY performed by Brigitte Purcell MD at [address]         Social History:    Social History     Tobacco Use    Smoking status: Former Smoker     Packs/day: 0.50     Years: 25.00     Pack years: 12.50     Quit date:      Years since quittin.3    Smokeless tobacco: Never Used   Substance Use Topics    Alcohol use: Yes     Comment: rare                                Counseling given: Not Answered      Vital Signs (Current): There were no vitals filed for this visit.                                            BP Readings from Last 3 Encounters:   22 121/74   04/15/22 110/68   22 101/70       NPO Status:                                                                                 BMI:   Wt Readings from Last 3 Encounters:   22 140 lb (63.5 kg)   04/15/22 144 lb (65.3 kg)   21 143 lb (64.9 kg)     There is no height or weight on file to calculate BMI.    CBC:   Lab Results   Component Value Date    WBC 5.4 2022    RBC 4.13 2022    HGB 11.0 2022    HCT 36.1 2022    HCT 43.5 2011    MCV 87.4 2022    RDW 13.8 2022     2022     2011       CMP:   Lab Results   Component Value Date     2022     2011    K 3.8 2022    K 4.9 2011     04/27/2022     05/19/2011    CO2 26 04/27/2022    BUN 7 04/27/2022    CREATININE 0.6 04/27/2022    CREATININE 0.7 05/19/2011    GFRAA >59 04/27/2022    LABGLOM >60 04/27/2022    GLUCOSE 84 04/27/2022    PROT 4.9 04/26/2022    PROT 6.6 05/19/2011    CALCIUM 8.8 04/27/2022    BILITOT 0.4 04/26/2022    ALKPHOS 133 04/26/2022    ALKPHOS 66 05/19/2011     04/26/2022     04/26/2022       POC Tests: No results for input(s): POCGLU, POCNA, POCK, POCCL, POCBUN, POCHEMO, POCHCT in the last 72 hours. Coags:   Lab Results   Component Value Date    PROTIME 15.6 09/03/2020    INR 1.24 09/03/2020    APTT 76.4 09/04/2020       HCG (If Applicable): No results found for: PREGTESTUR, PREGSERUM, HCG, HCGQUANT     ABGs: No results found for: PHART, PO2ART, KQG7PKD, XYO1KQY, BEART, P1WLGTNA     Type & Screen (If Applicable):  No results found for: LABABO, LABRH    Drug/Infectious Status (If Applicable):  No results found for: HIV, HEPCAB    COVID-19 Screening (If Applicable):   Lab Results   Component Value Date    COVID19 Not Detected 12/22/2021         Anesthesia Evaluation  Patient summary reviewed and Nursing notes reviewed no history of anesthetic complications:   Airway: Mallampati: I  TM distance: >3 FB   Neck ROM: full  Mouth opening: > = 3 FB Dental: normal exam         Pulmonary:normal exam  breath sounds clear to auscultation      (-) asthma, recent URI, sleep apnea and not a current smoker                           Cardiovascular:  Exercise tolerance: good (>4 METS),   (+) hypertension:, GROVER:,     (-) pacemaker, past MI and CABG/stent    ECG reviewed  Rhythm: regular  Rate: normal  Echocardiogram reviewed  Stress test reviewed       Beta Blocker:  Not on Beta Blocker      ROS comment: TTE 9/2/20 - Right ventricle global systolic function is severely reduced . Severly dilated right ventricle. Mild tricuspid regurgitation . There is severe pulmonary hypertension.    Normal left ventricular chamber size and systolic function. Flattening of the septum along with paradoxical motion, consistent with right ventricular pressure overload. Left ventricular ejection fraction is visually estimated at 75%. Stress Echo 7/21/20 - Stress echocardiogram without clinical, electrocardiographic, or   echocardiographic evidence of myocardial ischemia. Patient did not have   chest pain but reported shortness of breath. Duke Treadmill Score was 6. There is a low risk of myocardial ischemia. Neuro/Psych:   (+) depression/anxiety    (-) seizures, TIA and CVA           GI/Hepatic/Renal:   (+) GERD:,      (-) liver disease and no renal disease      ROS comment: Pancreatitis this admission. Endo/Other:    (+) blood dyscrasia::., .    (-) diabetes mellitus, hypothyroidism, hyperthyroidism               Abdominal:             Vascular:   + PE (Active). Other Findings:               Anesthesia Plan      general     ASA 3           MIPS: Postoperative opioids intended and Prophylactic antiemetics administered. Anesthetic plan and risks discussed with patient. Use of blood products discussed with patient whom consented to blood products. Plan discussed with CRNA.     Attending anesthesiologist reviewed and agrees with Quin Diez MD   4/27/2022

## 2022-04-27 NOTE — BRIEF OP NOTE
Brief Postoperative Note      Patient: Dana Pimentel  YOB: 1960  MRN: [id number]    Date of Procedure: 4/27/2022    Pre-Op Diagnosis: gallstone pancreatitis and cholecystitis    Post-Op Diagnosis: Same       Procedure(s):  laparoscopic robot assisted cholecystectomy with ICG    Surgeon(s):  Kelsey Oneil MD    Assistant:  First Assistant: Jessica Cheung RN    Anesthesia: General    Estimated Blood Loss (mL): Minimal    Complications: None    Specimens:   ID Type Source Tests Collected by Time Destination   A : Gallbladder Tissue Gallbladder SURGICAL PATHOLOGY Kelsey Oneil MD 4/27/2022 1421        Implants:  * No implants in log *      Drains: * No LDAs found *    Findings: gallbladder oriented vertically with cystic artery wrapped around, making it difficult to identify critical view of safety      Electronically signed by Kelsey Oneil MD on 4/27/2022 at 3:59 PM

## 2022-04-27 NOTE — ANESTHESIA POSTPROCEDURE EVALUATION
Department of Anesthesiology  Postprocedure Note    Patient: Angie Chavez  MRN: [id number]  YOB: 1960  Date of evaluation: 4/27/2022  Time:  4:01 PM     Procedure Summary     Date: 04/27/22 Room / Location: [address]    Anesthesia Start: 2831 Anesthesia Stop: 8436    Procedure: laparoscopic robot assisted cholecystectomy with ICG (N/A Abdomen) Diagnosis: (gallstone pancreatitis and cholecystitis)    Surgeons: Albino Phelps MD Responsible Provider: EMILIANO Mancuso CRNA    Anesthesia Type: general ASA Status: 3          Anesthesia Type: general    Vira Phase I: Vira Score: 8    Vira Phase II:      Last vitals: Reviewed and per EMR flowsheets.        Anesthesia Post Evaluation    Patient location during evaluation: bedside  Patient participation: waiting for patient participation  Level of consciousness: sleepy but conscious  Pain score: 0  Airway patency: patent  Nausea & Vomiting: no nausea and no vomiting  Complications: no  Cardiovascular status: hemodynamically stable  Respiratory status: acceptable, spontaneous ventilation and face mask  Hydration status: euvolemic

## 2022-04-28 VITALS
HEIGHT: 67 IN | BODY MASS INDEX: 21.97 KG/M2 | HEART RATE: 72 BPM | SYSTOLIC BLOOD PRESSURE: 131 MMHG | DIASTOLIC BLOOD PRESSURE: 77 MMHG | TEMPERATURE: 97.3 F | OXYGEN SATURATION: 98 % | WEIGHT: 140 LBS | RESPIRATION RATE: 20 BRPM

## 2022-04-28 LAB
ALBUMIN SERPL-MCNC: 3.7 G/DL (ref 3.5–5.2)
ALP BLD-CCNC: 105 U/L (ref 35–104)
ALT SERPL-CCNC: 141 U/L (ref 5–33)
ANION GAP SERPL CALCULATED.3IONS-SCNC: 18 MMOL/L (ref 7–19)
AST SERPL-CCNC: 52 U/L (ref 5–32)
BASOPHILS ABSOLUTE: 0 K/UL (ref 0–0.2)
BASOPHILS RELATIVE PERCENT: 0.1 % (ref 0–1)
BILIRUB SERPL-MCNC: 0.3 MG/DL (ref 0.2–1.2)
BILIRUBIN DIRECT: 0.2 MG/DL (ref 0–0.3)
BILIRUBIN, INDIRECT: 0.1 MG/DL (ref 0.1–1)
BUN BLDV-MCNC: 7 MG/DL (ref 8–23)
CALCIUM SERPL-MCNC: 8.8 MG/DL (ref 8.8–10.2)
CHLORIDE BLD-SCNC: 102 MMOL/L (ref 98–111)
CO2: 23 MMOL/L (ref 22–29)
CREAT SERPL-MCNC: 0.7 MG/DL (ref 0.5–0.9)
EOSINOPHILS ABSOLUTE: 0 K/UL (ref 0–0.6)
EOSINOPHILS RELATIVE PERCENT: 0 % (ref 0–5)
GFR AFRICAN AMERICAN: >59
GFR NON-AFRICAN AMERICAN: >60
GLUCOSE BLD-MCNC: 99 MG/DL (ref 74–109)
HCT VFR BLD CALC: 33.1 % (ref 37–47)
HEMOGLOBIN: 10.5 G/DL (ref 12–16)
IMMATURE GRANULOCYTES #: 0 K/UL
LYMPHOCYTES ABSOLUTE: 0.9 K/UL (ref 1.1–4.5)
LYMPHOCYTES RELATIVE PERCENT: 10.9 % (ref 20–40)
MCH RBC QN AUTO: 26.8 PG (ref 27–31)
MCHC RBC AUTO-ENTMCNC: 31.7 G/DL (ref 33–37)
MCV RBC AUTO: 84.4 FL (ref 81–99)
MONOCYTES ABSOLUTE: 0.3 K/UL (ref 0–0.9)
MONOCYTES RELATIVE PERCENT: 3.6 % (ref 0–10)
NEUTROPHILS ABSOLUTE: 7.2 K/UL (ref 1.5–7.5)
NEUTROPHILS RELATIVE PERCENT: 85 % (ref 50–65)
PDW BLD-RTO: 13.6 % (ref 11.5–14.5)
PLATELET # BLD: 226 K/UL (ref 130–400)
PMV BLD AUTO: 12.1 FL (ref 9.4–12.3)
POTASSIUM REFLEX MAGNESIUM: 4.4 MMOL/L (ref 3.5–5)
RBC # BLD: 3.92 M/UL (ref 4.2–5.4)
SODIUM BLD-SCNC: 143 MMOL/L (ref 136–145)
TOTAL PROTEIN: 5 G/DL (ref 6.6–8.7)
WBC # BLD: 8.5 K/UL (ref 4.8–10.8)

## 2022-04-28 PROCEDURE — 36415 COLL VENOUS BLD VENIPUNCTURE: CPT

## 2022-04-28 PROCEDURE — 2580000003 HC RX 258: Performed by: SURGERY

## 2022-04-28 PROCEDURE — 80076 HEPATIC FUNCTION PANEL: CPT

## 2022-04-28 PROCEDURE — 6370000000 HC RX 637 (ALT 250 FOR IP): Performed by: SURGERY

## 2022-04-28 PROCEDURE — 85025 COMPLETE CBC W/AUTO DIFF WBC: CPT

## 2022-04-28 PROCEDURE — 6360000002 HC RX W HCPCS: Performed by: SURGERY

## 2022-04-28 PROCEDURE — 80048 BASIC METABOLIC PNL TOTAL CA: CPT

## 2022-04-28 RX ORDER — LEVOFLOXACIN 500 MG/1
500 TABLET, FILM COATED ORAL DAILY
Qty: 3 TABLET | Refills: 0 | Status: CANCELLED | OUTPATIENT
Start: 2022-04-28 | End: 2022-05-01

## 2022-04-28 RX ORDER — LEVOFLOXACIN 750 MG/1
750 TABLET ORAL DAILY
Qty: 4 TABLET | Refills: 0 | Status: SHIPPED | OUTPATIENT
Start: 2022-04-28 | End: 2022-05-02

## 2022-04-28 RX ORDER — LEVOFLOXACIN 750 MG/1
750 TABLET ORAL EVERY 24 HOURS
Status: DISCONTINUED | OUTPATIENT
Start: 2022-04-28 | End: 2022-04-28 | Stop reason: HOSPADM

## 2022-04-28 RX ORDER — HYDROCODONE BITARTRATE AND ACETAMINOPHEN 5; 325 MG/1; MG/1
1 TABLET ORAL EVERY 8 HOURS PRN
Qty: 9 TABLET | Refills: 0 | Status: SHIPPED | OUTPATIENT
Start: 2022-04-28 | End: 2022-05-01

## 2022-04-28 RX ADMIN — ENOXAPARIN SODIUM 40 MG: 100 INJECTION SUBCUTANEOUS at 07:58

## 2022-04-28 RX ADMIN — PANTOPRAZOLE SODIUM 40 MG: 40 TABLET, DELAYED RELEASE ORAL at 07:57

## 2022-04-28 RX ADMIN — Medication 5 ML: at 08:00

## 2022-04-28 RX ADMIN — DESVENLAFAXINE 50 MG: 50 TABLET, EXTENDED RELEASE ORAL at 07:58

## 2022-04-28 NOTE — CARE COORDINATION
Date / Time of Evaluation:   4/28/2022    11:10 AM  Assessment Completed by:   Henrry Dong RN      Patient Name:   Ronaldo Gustafson  MRN:   [id number]  YOB: 1960    Patient Admission Status:   inpatient    Patient Contact Information:    [place]  [phone] (home)   Telephone Information:   Mobile [phone]     Above information verified? [x]   Yes  []   No    (Best Practice:   Have patient/caregiver verify above address and phone number by stating out loud their current address and reachable phone number. Initial Assessment Completed at bedside with:      [x]   Patient  []   Family/Caregiver/Guardian   []   Other:      Current PCP:    Michelle Schneider MD    PCP verified? [x]   Yes  []   No    Emergency Contacts:    Extended Emergency Contact Information  Primary Emergency Contact: Jassi Lovett  Address: [address] [facility], [address] Phone: [phone]  Mobile Phone: [phone]  Relation: Spouse  Secondary Emergency Contact: Gerardo Guerra  Address: [address] [facility], [address] Phone: [phone]  Mobile Phone: [phone]  Relation: Brother/Sister    Advance Directives:    Does Ms. Leola Kern have an advance directive in her electronic medical record? [x]   Yes not on chart  []   No    Code Status:   Full Code      Have you been vaccinated for COVID-19 (SARS-CoV-2)? [x]   Yes  []   No                   If so, when?     Which :         []   Pfizer-BioNTech  [x]   Moderna x 2 and booster[]   [employer]  []   Other:       Do you have any of the following unmet social needs that would keep you from returning home safely:    []   Yes  [x]   No                    Financial:    Payor: Neema Luciano / Plan: RAND RODRIGUEZ[address] / Product Type: *No Product type* /     Pre-Cert required for SNF:     [x]   Yes  []   No    Have Long Term Care Insurance:      [] Yes  [x]   No      Pharmacy:    [place], [address]  [address]  [address] [address]  Phone: [phone] Fax: [address] , [address], [address] [place] [address]  [address] [address]  Phone: [phone] Fax: [phone]    Potential assistance purchasing medications? []   Yes  [x]   No      ADLS:   Pt reports, she is now retired. Pt reports, she is independent with ADL'S/IADL'S, still driving    Support System:   spouse      Current Home Environment:       Steps:       [x]   Yes  []   No    If yes, how many? Managing    Plans to RETURN to current housing:     [x]   Yes  []   No    Barriers to RETURNING to current housing:  None stated    Currently ACTIVE with Home Health CARE:      []   Yes  [x]   No      DME Provider:   None needed      Had HOME OXYGEN prior to admission:      []   Yes  [x]   No    Active with HD/PD prior to admission:             []   Yes  [x]   No      Transition Plan:  Home w/self care and spouse     Transportation PLAN for Discharge:  Private car    Factors facilitating achievement of predicted outcomes: medically stable    Barriers to discharge:  Acute gallstones pancreatitis, acute cholecystits      Patient Deficits:    []   Yes   [x]   No    Octavia Coma Scale  Eye Opening: Spontaneous  Best Verbal Response: Oriented  Best Motor Response: Obeys commands  Octavia Coma Scale Score: 15      Additional CM Notes: Pt independent at baseline. Lives with spouse. Spouse to transport patient home. No additional homecare needs at this time.         Laron Confer and/or her family were provided with choice of provider:    [x]   Yes   []   No        Isha Mendoza RN  [address]  Phone:  [phone]      Fax:  [phone]  Electronically signed by Isha Mendoza RN on 4/28/2022 at 11:16 AM

## 2022-04-28 NOTE — PROGRESS NOTES
The patients IV has been removed and the cannula is intact. The patients discharge instructions have been provided and she verbalized understanding. Med to beds has been contacted regarding the patients medications and will be bringing the patient her medications to her room. The patient remains in the chair with her call light in reach.

## 2022-04-28 NOTE — DISCHARGE SUMMARY
Discharge Summary      Date:4/28/2022        Patient Name:Brina Pinon     YOB: 1960     Age:62 y.o. Admit Date:4/25/2022   Admission Condition:fair   Discharged Condition:stable  Discharge Date: 04/28/22       Discharge Diagnoses   Principal Problem:    Acute abdominal pain  Active Problems:    Acute pancreatitis    Essential hypertension    Factor V Leiden ([address]  Resolved Problems:    * No resolved hospital problems. [facility] Stay   Narrative of Hospital Course:     62-year-old lady with a history of pernicious anemia, depression, hyperlipidemia, GERD, hypertension, DVT, presented to hospital with concerns of abdominal pain and emesis. On admission noted to have elevated lipase as well as elevated liver enzymes. Admitted for acute pancreatitis as well as acute cholecystitis.  Initiated on high rate IV fluids with  cc an hour, pain management, n.p.o. initially on ceftriaxone and Flagyl and subsequently transitioned to continue Levaquin. Overall patient has improved compared to admission, liver enzymes downtrended, epigastric pain significantly improved tolerated clear liquid diet, seen by gen surg and s/p demarco lavonne 4/27/22. Patient to complete 4 more days of antibiotics outpt, and follow up with PCP for continuous management of chronic medical problem. Physical Examination:  General: Well-developed, no acute distress lying comfortably in bed. HEENT: Atraumatic normocephalic, range of motion normal   Cardiac: Normal S1-S2 no murmurs rub or gallop. Respiratory: clear To auscultation bilaterally, no rhonchi or rales, no wheezing  Abdomen: Soft, positive bowel sounds in all quadrants, no distention, nontender to palpation.   Extremities: Mild tenderness in the epigastric region, no edema, moves all extremities  Psych: Affect normal and good eye contact, behavioral normal.        Consultants:   IP CONSULT TO GENERAL SURGERY  IP CONSULT TO GENERAL SURGERY  IP CONSULT TO GI    Time Spent on Discharge:  35 minutes were spent in patient examination, evaluation, counseling as well as medication reconciliation, prescriptions for required medications, discharge plan and follow up. Surgeries/Procedures Performed:  Procedure(s):  laparoscopic robot assisted cholecystectomy with ICG       Significant Diagnostic Studies:   Recent Labs:  CBC:   Lab Results   Component Value Date    WBC 8.5 04/28/2022    RBC 3.92 04/28/2022    HGB 10.5 04/28/2022    HCT 33.1 04/28/2022    HCT 43.5 05/19/2011    MCV 84.4 04/28/2022    MCH 26.8 04/28/2022    MCHC 31.7 04/28/2022    RDW 13.6 04/28/2022     04/28/2022     05/19/2011     BMP:    Lab Results   Component Value Date    GLUCOSE 99 04/28/2022     04/28/2022     05/19/2011    K 4.4 04/28/2022    K 4.9 05/19/2011     04/28/2022     05/19/2011    CO2 23 04/28/2022    ANIONGAP 18 04/28/2022    BUN 7 04/28/2022    CREATININE 0.7 04/28/2022    CREATININE 0.7 05/19/2011    CALCIUM 8.8 04/28/2022    LABGLOM >60 04/28/2022    GFRAA >59 04/28/2022       Radiology Last 7 Days:  CT ABDOMEN PELVIS W IV CONTRAST Additional Contrast? None    Result Date: 4/25/2022  1. Acute acalculous cholecystitis. 2. Acute pancreatitis. 3. Normal appendix. 4. The diverticulosis of the colon. No evidence for diverticulitis. Signed by Dr Angelo Guardian    Result Date: 4/25/2022  1. Calculus cholecystitis. Signed by Dr Carol Dawkins MRCP    Result Date: 4/25/2022  1. Diffuse peripancreatic ill-defined fluid and inflammatory stranding, consistent with acute edematous pancreatitis. No area of pancreatic necrosis identified. No organized peripancreatic collection to suggest pseudocyst. 2.  Distended gallbladder with multiple intraluminal gallstones and mild gallbladder wall thickening with surrounding inflammatory stranding. Findings highly suggestive of acute cholecystitis.  3.  Common bile is upper limits of normal measuring 7 mm diameter. No evidence of choledocholithiasis or obstructing mass identified. Signed by Dr Lonnie Hoffman      Discharge Plan   Disposition: Home    Provider Follow-Up:   MD Rajan Abreu [address]  [phone]            Patient Instructions   Diet: Full liquid and advance as tolerated    Activity: activity as tolerated      Discharge Medications         Medication List      START taking these medications    HYDROcodone-acetaminophen 5-325 MG per tablet  Commonly known as: Mimi Ammon  Take 1 tablet by mouth every 8 hours as needed for Pain for up to 3 days. levoFLOXacin 750 MG tablet  Commonly known as: LEVAQUIN  Take 1 tablet by mouth daily for 4 days        CHANGE how you take these medications    hydroCHLOROthiazide 25 MG tablet  Commonly known as: HYDRODIURIL  TAKE ONE TABLET ONCE A DAY IN THE MORNING  What changed: See the new instructions.         CONTINUE taking these medications    calcium carbonate 500 MG Tabs tablet  Commonly known as: OSCAL     cyanocobalamin 1000 MCG/ML injection  Inject 1 mL into the muscle every 30 days     desvenlafaxine succinate 50 MG Tb24 extended release tablet  Commonly known as: PRISTIQ  TAKE ONE TABLET ONE TIME DAILY     Eliquis 5 MG Tabs tablet  Generic drug: apixaban  TAKE ONE TABLET BY MOUTH TWO TIMES A DAY     lisinopril 20 MG tablet  Commonly known as: PRINIVIL;ZESTRIL  Take 1 tablet by mouth daily     nortriptyline 10 MG capsule  Commonly known as: PAMELOR  TAKE TWO CAPSULES EVERY EVENING AT BEDTIME     omeprazole 40 MG delayed release capsule  Commonly known as: PRILOSEC  Take 1 capsule by mouth nightly     PRESERVISION AREDS 2+MULTI VIT PO     Rhofade 1 % Crea  Generic drug: Oxymetazoline HCl     rizatriptan 10 MG tablet  Commonly known as: MAXALT  May repeat in 2 hours if needed do not exceed 2 doses in 24 hours     tiZANidine 4 MG tablet  Commonly known as: ZANAFLEX  Take 1 tablet by mouth every 12 hours as needed (muscle spasms)     vitamin D 50 MCG (2000 UT) Caps capsule        STOP taking these medications    verapamil 240 MG extended release tablet  Commonly known as: CALAN SR           Where to Get Your Medications      These medications were sent to [facility], [address]  [address] [place], [place] [address] [place] [address]    Phone: [phone]   · HYDROcodone-acetaminophen 5-325 MG per tablet  · levoFLOXacin 750 MG tablet         Electronically signed by Denise Shabazz MD on 4/28/22 at 11:05 AM CDT

## 2022-04-28 NOTE — PROGRESS NOTES
CLINICAL PHARMACY NOTE: MEDS TO BEDS    Total # of Prescriptions Filled: 2   The following medications were delivered to the patient:  · Norco 5/325 mg  · Levofloxacin 750 mg    Additional Documentation:    Handed scripts to patient at bedside and patients  was present as well

## 2022-04-29 ENCOUNTER — TELEPHONE (OUTPATIENT)
Dept: INTERNAL MEDICINE | Age: 62
End: 2022-04-29

## 2022-04-29 NOTE — TELEPHONE ENCOUNTER
[facility] Transitions Initial Follow Up Call    Outreach made within 2 business days of discharge: Yes    Patient: Chrissy Newell   Patient : 1960  MRN: [id number]   Reason for Admission: Admitted 22 for acute abdominal pain   Discharge Date: 22    Discharge Diagnosis:  Acute abdominal pain  Active Problems:    Acute pancreatitis    Essential hypertension    Factor V Leiden ([address]     Spoke with: Attempted to make contact with patient/caregiver for an initial transitions of care follow up call post discharge without success. Message left with intent of my call, I will reach out again at a later time. Any previously scheduled hospital follow up appointments noted below.       Discharge department/facility: [facility]    Scheduled appointment with PCP within 7-14 days    Follow Up  Future Appointments   Date Time Provider Sandee Esquivel   5/10/2022 11:30 AM Fredi Campbell MD [facility]   2022 10:30 AM MD PETRA Caruso LPS Gen [facility]   2022  9:00 AM EMILIANO Tijerina PAD [facility]   2022  9:20 AM SCHEDULE, L MED ONC MA L MED ONC [facility]   2022  7:30 AM Fredi Campbell MD [address]

## 2022-04-29 NOTE — OP NOTE
OPERATIVE REPORT    PATIENT NAME: Emmanuel Carr RECORD NO. [id number]  SURGEON: Errol Alanis MD MD   Primary Care Physician: Arnold Vasquez MD  Date: 4/27/2022  PREOPERATIVE DIAGNOSIS  Gallstone pancreatitic with cholecystitis    POSTOPERATIVE DIAGNOSIS  Same    PROCEDURE  Robotic Assisted Laparoscopic cholecystectomy with Firefly (ICG)    ASSISTANT  Chandu Solomon    Anesthesia: GEA    EBL: Minimal    Findings: gallbladder thickened and inflamed. Oriented more vertically making it difficult to elevate towards the abdominal wall and with the cystic artery wrapped around the front of the duct. INDICATIONS  The patient presented with a complaint of RUQ abdominal pain. US was consistent with gallstones. PROCEDURE  After informed consent was obtained, the patient was taken to the operating room and placed in supine position. After the induction of adequate general endotracheal anesthesia the abdomen was prepped in the usual sterile fashion. Time out performed. Local anesthestic was administered to the inferior portion of the umbilicus, an incision was made, the base of the umbilicus was elevated, and the veress needle was inserted. The abdominal cavity was insufflated and the 8 mm port was inserted. The laparoscope was advanced and inspection undertaken. There was no evidence of trauma from the trocar insertion. Eight millimeter working ports were placed in line with the umbilicus, two ports on the patient's right and one on the left side. The patient was placed in reverse trendelenberg position and rotated to the left. The [address] operating system was brought into the field and docked. Working instruments were advanced and the fundus of the gallbladder was grasped and elevated. Because of the gallbladder being oriented vertically, elevating towards the abdominal wall did not help exposure much. The gallbladder was retracted laterally, which helped a little.  The peritoneum over the gallbladder was incised with cautery. The cystic artery was found to drape over the front of the cystic duct and caused the duct and neck to bulge outward. With the firefly view I was able to visualize the duct above the artery and an additional structure below the artery. The artery was crefully dissected along its length towards the tip of the gallbladder. The artery was visualized entering the gallbladder. At this point, due to the orientation of the artery and the gallbladder, it was difficult to obtain a traditional view of safety. However, with the artery completely dissected, I felt it was safe to clip the artery and divide it. Once I completed this, it did allow for further exposure of the duct, which was able to be dissected circumferentially and the posterior side visualized more safely without the tension from the artery. Dissection continued up the cystic place with no other ducts visualized in this location. It was then felt to be safe to clip and divide the cystic duct which was accomplished without difficulty. The gallbladder was then released from the undersurface of the liver using cautery. Once free, it was placed it in an Endocatch retrieval bag and removed through the umbilical incision. The area was irrigated and suctioned as needed. The clips were inspected, and were in good position with good hemostasis and no evidence of bile leak. The fascia at the umbilical site was closed using a 0 vicryl and the fascial closure device. The working ports were removed under vision with no bleeding noted. The pneumoperitoneum was released. Skin incisions were closed with interrupted 4-0 Monocryl subcuticular suture followed by Dermabond dressing. Ms. Froylan Salinas tolerated her surgery well and she was taken to PACU in  satisfactory condition.         Rosalba Roldan MD  Electronically signed 4/29/2022 at 4:26 PM

## 2022-04-30 LAB
BLOOD CULTURE, ROUTINE: NORMAL
CULTURE, BLOOD 2: NORMAL

## 2022-05-02 ENCOUNTER — TELEPHONE (OUTPATIENT)
Dept: INTERNAL MEDICINE | Age: 62
End: 2022-05-02

## 2022-05-02 NOTE — TELEPHONE ENCOUNTER
[facility] Transitions Initial Follow Up Call    Outreach made within 2 business days of discharge: Yes    Patient: Tiffani Fox            Patient : 1960        MRN: [id number]   Reason for Admission: Admitted 22 for acute abdominal pain   Discharge Date: 22          Discharge Diagnosis:  Acute abdominal pain  Active Problems:  Acute pancreatitis  Essential hypertension  Factor V Leiden ([address]                Spoke with: 2nd attempt to make contact with patient/caregiver for an initial transitions of care follow up call post discharge without success.   Any previously scheduled hospital follow up appointments noted below.       Discharge department/facility: [facility]    Scheduled appointment with PCP within 7-14 days    Follow Up  Future Appointments   Date Time Provider Sandee Esquivel   5/10/2022 11:30 AM Mari Martinez MD [facility]   2022 10:30 AM MD PETRA Burns LPS Gen SG [facility]   2022  9:00 AM EMILIANO Sierra PAD [facility]ONC [facility]   2022  9:20 AM SCHEDULE, L MED ONC MA L MED ONC [facility] [place]   2022  7:30 AM Mari Martinez MD [facility]       Krystle Tolentino MA

## 2022-05-09 DIAGNOSIS — E55.9 VITAMIN D DEFICIENCY: ICD-10-CM

## 2022-05-09 DIAGNOSIS — E78.2 MIXED HYPERLIPIDEMIA: ICD-10-CM

## 2022-05-09 LAB
ALBUMIN SERPL-MCNC: 4.8 G/DL (ref 3.5–5.2)
ALP BLD-CCNC: 128 U/L (ref 35–104)
ALT SERPL-CCNC: 40 U/L (ref 5–33)
ANION GAP SERPL CALCULATED.3IONS-SCNC: 16 MMOL/L (ref 7–19)
AST SERPL-CCNC: 24 U/L (ref 5–32)
BILIRUB SERPL-MCNC: 0.4 MG/DL (ref 0.2–1.2)
BUN BLDV-MCNC: 17 MG/DL (ref 8–23)
CALCIUM SERPL-MCNC: 10.1 MG/DL (ref 8.8–10.2)
CHLORIDE BLD-SCNC: 103 MMOL/L (ref 98–111)
CHOLESTEROL, TOTAL: 215 MG/DL (ref 160–199)
CO2: 25 MMOL/L (ref 22–29)
CREAT SERPL-MCNC: 0.8 MG/DL (ref 0.5–0.9)
GFR AFRICAN AMERICAN: >59
GFR NON-AFRICAN AMERICAN: >60
GLUCOSE BLD-MCNC: 100 MG/DL (ref 74–109)
HCT VFR BLD CALC: 45.3 % (ref 37–47)
HDLC SERPL-MCNC: 57 MG/DL (ref 65–121)
HEMOGLOBIN: 13.9 G/DL (ref 12–16)
LDL CHOLESTEROL CALCULATED: 129 MG/DL
MCH RBC QN AUTO: 26.2 PG (ref 27–31)
MCHC RBC AUTO-ENTMCNC: 30.7 G/DL (ref 33–37)
MCV RBC AUTO: 85.3 FL (ref 81–99)
PDW BLD-RTO: 14.4 % (ref 11.5–14.5)
PLATELET # BLD: 371 K/UL (ref 130–400)
PMV BLD AUTO: 11.2 FL (ref 9.4–12.3)
POTASSIUM SERPL-SCNC: 4.4 MMOL/L (ref 3.5–5)
RBC # BLD: 5.31 M/UL (ref 4.2–5.4)
SODIUM BLD-SCNC: 144 MMOL/L (ref 136–145)
TOTAL PROTEIN: 6.9 G/DL (ref 6.6–8.7)
TRIGL SERPL-MCNC: 146 MG/DL (ref 0–149)
TSH SERPL DL<=0.05 MIU/L-ACNC: 0.81 UIU/ML (ref 0.27–4.2)
VITAMIN D 25-HYDROXY: 49.1 NG/ML
WBC # BLD: 7.4 K/UL (ref 4.8–10.8)

## 2022-05-10 ENCOUNTER — OFFICE VISIT (OUTPATIENT)
Dept: INTERNAL MEDICINE | Age: 62
End: 2022-05-10
Payer: COMMERCIAL

## 2022-05-10 VITALS
DIASTOLIC BLOOD PRESSURE: 50 MMHG | SYSTOLIC BLOOD PRESSURE: 90 MMHG | BODY MASS INDEX: 22.13 KG/M2 | HEIGHT: 67 IN | OXYGEN SATURATION: 98 % | WEIGHT: 141 LBS | HEART RATE: 80 BPM

## 2022-05-10 DIAGNOSIS — Z09 HOSPITAL DISCHARGE FOLLOW-UP: ICD-10-CM

## 2022-05-10 DIAGNOSIS — K85.80 OTHER ACUTE PANCREATITIS WITHOUT INFECTION OR NECROSIS: ICD-10-CM

## 2022-05-10 DIAGNOSIS — K80.01 CALCULUS OF GALLBLADDER WITH ACUTE CHOLECYSTITIS AND OBSTRUCTION: ICD-10-CM

## 2022-05-10 DIAGNOSIS — Z90.49 S/P CHOLECYSTECTOMY: Primary | ICD-10-CM

## 2022-05-10 LAB
CHOLESTEROL, TOTAL: 199 MG/DL (ref 160–199)
GLUCOSE BLD-MCNC: 95 MG/DL (ref 74–109)
HDLC SERPL-MCNC: 51 MG/DL (ref 65–121)
LDL CHOLESTEROL CALCULATED: 130 MG/DL
TRIGL SERPL-MCNC: 91 MG/DL (ref 0–149)

## 2022-05-10 PROCEDURE — 1111F DSCHRG MED/CURRENT MED MERGE: CPT | Performed by: INTERNAL MEDICINE

## 2022-05-10 PROCEDURE — 99495 TRANSJ CARE MGMT MOD F2F 14D: CPT | Performed by: INTERNAL MEDICINE

## 2022-05-12 ENCOUNTER — OFFICE VISIT (OUTPATIENT)
Dept: SURGERY | Age: 62
End: 2022-05-12

## 2022-05-12 VITALS
WEIGHT: 144.4 LBS | TEMPERATURE: 98.4 F | HEIGHT: 67 IN | BODY MASS INDEX: 22.66 KG/M2 | DIASTOLIC BLOOD PRESSURE: 60 MMHG | SYSTOLIC BLOOD PRESSURE: 100 MMHG

## 2022-05-12 DIAGNOSIS — Z09 POSTOPERATIVE EXAMINATION: Primary | ICD-10-CM

## 2022-05-12 PROCEDURE — 99024 POSTOP FOLLOW-UP VISIT: CPT | Performed by: SURGERY

## 2022-05-12 NOTE — PROGRESS NOTES
Postop Progress Note    Subjective    Toro Fiore presents to the office for postop follow up, 2 weeks s/p laparoscopic robot assisted cholecystectomy. She reports that she has been doing well, denies any acute issues, is tolerating a diet, is having regular bowel movements, and post operative pain is improving. Her only complaint is fatigue. Objective    Vitals:    05/12/22 1016   BP: 100/60   Temp: 98.4 °F (36.9 °C)     General: alert, cooperative and no distress  Abdomen: Soft, NT, ND, incisions C/D/I, no erythema or induration    Pathology:  FINAL DIAGNOSIS:     Gallbladder, cholecystectomy:   1.  Mild chronic cholecystitis. 2.  Cholelithiasis. 3.  Cholesterolosis.    CBG/CBG    Assessment  61 yo female s/p laparoscopic robot assisted cholecystectomy  1) Doing well post operatively.  Okay to have diet and activity as tolerated  2) Follow up in general surgery as needed and call for any questions or concerns    Electronically signed by Andre Contreras MD on 5/12/2022 at 10:26 AM

## 2022-05-21 PROBLEM — Z90.49 S/P CHOLECYSTECTOMY: Status: ACTIVE | Noted: 2022-05-21

## 2022-05-21 PROBLEM — K80.01 CALCULUS OF GALLBLADDER WITH ACUTE CHOLECYSTITIS AND OBSTRUCTION: Status: ACTIVE | Noted: 2022-05-21

## 2022-05-22 NOTE — PROGRESS NOTES
Post-Discharge Transitional Care  Follow Up      Ag Stevenson   YOB: 1960    Date of Office Visit:  5/10/2022  Date of Hospital Admission: 4/25/22  Date of Hospital Discharge: 4/28/22  Risk of hospital readmission (high >=14%. Medium >=10%) :Readmission Risk Score: 10.9 ( )      Care management risk score Rising risk (score 2-5) and Complex Care (Scores >=6): 2     Non face to face  following discharge, date last encounter closed (first attempt may have been earlier): 5/2/2022 11:31 AM    Call initiated 2 business days of discharge: Yes    ASSESSMENT/PLAN:   S/P cholecystectomy  Calculus of gallbladder with acute cholecystitis and obstruction  Other acute pancreatitis without infection or necrosis  Patient had gallbladder removed she is doing well but she is very tired she feels like she is more tired than she would expect we explained how ill she was which she recognizes I think she will continue to improve shortly in time we reviewed labs records and we will follow if not feeling better soon and will let us know    Medical Decision Making: moderate  No follow-ups on file. Not charged based on time       Subjective:   HPI: Patient is here today to follow-up recent hospitalization with acute abdominal pain diagnosed with calculus cholecystitis acute pancreatitis had a cholecystectomy she did well through the hospital and was discharged home for follow-up. Today for moderate level TCM follow up of Hospital problems/diagnosis(es):s/p cholecystectomy/ cholecystitis/ acute pancreatitis  Inpatient course: Discharge summary reviewed- see chart.     Interval history/Current status: Slow to regain her strength and energy very  fatigued tired but improving  Patient Active Problem List   Diagnosis    Breast density    Migraine without aura    Pernicious anemia    Raynaud's phenomenon    Cervical disc disease    Dysthymia    Myopia    Nuclear senile cataract    Pseudophakia    Tear film insufficiency    Essential hypertension    Pulmonary embolism (HCC)    VTE (venous thromboembolism)    Bilateral pulmonary embolism (HCC)    Pulmonary arterial hypertension (HCC)    DVT (deep venous thrombosis) (HCC)    Dysfunction of right cardiac ventricle    Factor V Leiden ([facility] [address]    Vocal cord polyps    LAD (lymphadenopathy), axillary    Small airways disease    Acute abdominal pain    Acute pancreatitis    Abdominal pain    S/P cholecystectomy    Calculus of gallbladder with acute cholecystitis and obstruction       Medications listed as ordered at the time of discharge from hospital     Medication List          Accurate as of May 10, 2022 11:59 PM. If you have any questions, ask your nurse or doctor. CHANGE how you take these medications    hydroCHLOROthiazide 25 MG tablet  Commonly known as: HYDRODIURIL  TAKE ONE TABLET ONCE A DAY IN THE MORNING  What changed: See the new instructions.         CONTINUE taking these medications    calcium carbonate 500 MG Tabs tablet  Commonly known as: OSCAL     cyanocobalamin 1000 MCG/ML injection  Inject 1 mL into the muscle every 30 days     desvenlafaxine succinate 50 MG Tb24 extended release tablet  Commonly known as: PRISTIQ  TAKE ONE TABLET ONE TIME DAILY     Eliquis 5 MG Tabs tablet  Generic drug: apixaban  TAKE ONE TABLET BY MOUTH TWO TIMES A DAY     lisinopril 20 MG tablet  Commonly known as: PRINIVIL;ZESTRIL  Take 1 tablet by mouth daily     nortriptyline 10 MG capsule  Commonly known as: PAMELOR  TAKE TWO CAPSULES EVERY EVENING AT BEDTIME     omeprazole 40 MG delayed release capsule  Commonly known as: PRILOSEC  Take 1 capsule by mouth nightly     PRESERVISION AREDS 2+MULTI VIT PO     Rhofade 1 % Crea  Generic drug: Oxymetazoline HCl     rizatriptan 10 MG tablet  Commonly known as: MAXALT  May repeat in 2 hours if needed do not exceed 2 doses in 24 hours     tiZANidine 4 MG tablet  Commonly known as: ZANAFLEX  Take 1 tablet by mouth every 12 hours as needed (muscle spasms)     vitamin D 50 MCG (2000 UT) Caps capsule              Medications marked \"taking\" at this time  No outpatient medications have been marked as taking for the 5/10/22 encounter (Office Visit) with January Avalos MD.        Medications patient taking as of now reconciled against medications ordered at time of hospital discharge: yes    Meds reconciled and reviewed      Objective:    BP (!) 90/50   Pulse 80   Ht 5' 7\" (1.702 m)   Wt 141 lb (64 kg)   SpO2 98%   BMI 22.08 kg/m²   Pressure today a little low continue to monitor neck is supple sclera anicteric heart S1 is 2 lungs clear extremities without edema patient is tired  Incisions well healing      An electronic signature was used to authenticate this note.   --January Avalos MD

## 2022-06-21 RX ORDER — OMEPRAZOLE 40 MG/1
CAPSULE, DELAYED RELEASE ORAL
Qty: 30 CAPSULE | Refills: 1 | OUTPATIENT
Start: 2022-06-21

## 2022-06-30 ENCOUNTER — TELEPHONE (OUTPATIENT)
Dept: GASTROENTEROLOGY | Age: 62
End: 2022-06-30

## 2022-06-30 ENCOUNTER — APPOINTMENT (OUTPATIENT)
Dept: CT IMAGING | Age: 62
End: 2022-06-30
Payer: COMMERCIAL

## 2022-06-30 ENCOUNTER — NURSE TRIAGE (OUTPATIENT)
Dept: OTHER | Facility: CLINIC | Age: 62
End: 2022-06-30

## 2022-06-30 ENCOUNTER — HOSPITAL ENCOUNTER (EMERGENCY)
Age: 62
Discharge: HOME | End: 2022-06-30
Attending: EMERGENCY MEDICINE
Payer: COMMERCIAL

## 2022-06-30 VITALS
HEART RATE: 66 BPM | RESPIRATION RATE: 16 BRPM | BODY MASS INDEX: 22.6 KG/M2 | SYSTOLIC BLOOD PRESSURE: 132 MMHG | DIASTOLIC BLOOD PRESSURE: 77 MMHG | WEIGHT: 144 LBS | HEIGHT: 67 IN | OXYGEN SATURATION: 100 %

## 2022-06-30 DIAGNOSIS — Z90.49 S/P LAPAROSCOPIC CHOLECYSTECTOMY: ICD-10-CM

## 2022-06-30 DIAGNOSIS — K57.32 DIVERTICULITIS OF COLON: ICD-10-CM

## 2022-06-30 DIAGNOSIS — R10.11 ABDOMINAL PAIN, ACUTE, RIGHT UPPER QUADRANT: Primary | ICD-10-CM

## 2022-06-30 LAB
ALBUMIN SERPL-MCNC: 4.8 G/DL (ref 3.5–5.2)
ALP BLD-CCNC: 99 U/L (ref 35–104)
ALT SERPL-CCNC: 49 U/L (ref 5–33)
ANION GAP SERPL CALCULATED.3IONS-SCNC: 10 MMOL/L (ref 7–19)
AST SERPL-CCNC: 34 U/L (ref 5–32)
BACTERIA: NEGATIVE /HPF
BASOPHILS ABSOLUTE: 0 K/UL (ref 0–0.2)
BASOPHILS RELATIVE PERCENT: 0.3 % (ref 0–1)
BILIRUB SERPL-MCNC: 0.4 MG/DL (ref 0.2–1.2)
BILIRUBIN URINE: NEGATIVE
BLOOD, URINE: NEGATIVE
BUN BLDV-MCNC: 17 MG/DL (ref 8–23)
CALCIUM SERPL-MCNC: 9.6 MG/DL (ref 8.8–10.2)
CHLORIDE BLD-SCNC: 100 MMOL/L (ref 98–111)
CLARITY: CLEAR
CO2: 30 MMOL/L (ref 22–29)
COLOR: YELLOW
CREAT SERPL-MCNC: 0.8 MG/DL (ref 0.5–0.9)
CRYSTALS, UA: ABNORMAL /HPF
EOSINOPHILS ABSOLUTE: 0.6 K/UL (ref 0–0.6)
EOSINOPHILS RELATIVE PERCENT: 5.3 % (ref 0–5)
EPITHELIAL CELLS, UA: 1 /HPF (ref 0–5)
GFR AFRICAN AMERICAN: >59
GFR NON-AFRICAN AMERICAN: >60
GLUCOSE BLD-MCNC: 102 MG/DL (ref 74–109)
GLUCOSE URINE: NEGATIVE MG/DL
HCT VFR BLD CALC: 45.9 % (ref 37–47)
HEMOGLOBIN: 14.3 G/DL (ref 12–16)
HYALINE CASTS: 2 /HPF (ref 0–8)
IMMATURE GRANULOCYTES #: 0 K/UL
KETONES, URINE: NEGATIVE MG/DL
LEUKOCYTE ESTERASE, URINE: ABNORMAL
LIPASE: 34 U/L (ref 13–60)
LYMPHOCYTES ABSOLUTE: 1.4 K/UL (ref 1.1–4.5)
LYMPHOCYTES RELATIVE PERCENT: 12.1 % (ref 20–40)
MCH RBC QN AUTO: 26.3 PG (ref 27–31)
MCHC RBC AUTO-ENTMCNC: 31.2 G/DL (ref 33–37)
MCV RBC AUTO: 84.4 FL (ref 81–99)
MONOCYTES ABSOLUTE: 0.6 K/UL (ref 0–0.9)
MONOCYTES RELATIVE PERCENT: 5 % (ref 0–10)
NEUTROPHILS ABSOLUTE: 9.1 K/UL (ref 1.5–7.5)
NEUTROPHILS RELATIVE PERCENT: 77 % (ref 50–65)
NITRITE, URINE: NEGATIVE
PDW BLD-RTO: 14.6 % (ref 11.5–14.5)
PH UA: 7.5 (ref 5–8)
PLATELET # BLD: 259 K/UL (ref 130–400)
PMV BLD AUTO: 10.9 FL (ref 9.4–12.3)
POTASSIUM REFLEX MAGNESIUM: 3.9 MMOL/L (ref 3.5–5)
PROTEIN UA: NEGATIVE MG/DL
RBC # BLD: 5.44 M/UL (ref 4.2–5.4)
RBC UA: 1 /HPF (ref 0–4)
SODIUM BLD-SCNC: 140 MMOL/L (ref 136–145)
SPECIFIC GRAVITY UA: 1.03 (ref 1–1.03)
TOTAL PROTEIN: 7.1 G/DL (ref 6.6–8.7)
UROBILINOGEN, URINE: 1 E.U./DL
WBC # BLD: 11.8 K/UL (ref 4.8–10.8)
WBC UA: 1 /HPF (ref 0–5)

## 2022-06-30 PROCEDURE — 99285 EMERGENCY DEPT VISIT HI MDM: CPT

## 2022-06-30 PROCEDURE — 2500000003 HC RX 250 WO HCPCS: Performed by: EMERGENCY MEDICINE

## 2022-06-30 PROCEDURE — 80053 COMPREHEN METABOLIC PANEL: CPT

## 2022-06-30 PROCEDURE — 96365 THER/PROPH/DIAG IV INF INIT: CPT

## 2022-06-30 PROCEDURE — 2580000003 HC RX 258: Performed by: EMERGENCY MEDICINE

## 2022-06-30 PROCEDURE — 81001 URINALYSIS AUTO W/SCOPE: CPT

## 2022-06-30 PROCEDURE — 6360000004 HC RX CONTRAST MEDICATION: Performed by: EMERGENCY MEDICINE

## 2022-06-30 PROCEDURE — 74177 CT ABD & PELVIS W/CONTRAST: CPT

## 2022-06-30 PROCEDURE — 83690 ASSAY OF LIPASE: CPT

## 2022-06-30 PROCEDURE — 74177 CT ABD & PELVIS W/CONTRAST: CPT | Performed by: RADIOLOGY

## 2022-06-30 PROCEDURE — 96367 TX/PROPH/DG ADDL SEQ IV INF: CPT

## 2022-06-30 PROCEDURE — 85025 COMPLETE CBC W/AUTO DIFF WBC: CPT

## 2022-06-30 PROCEDURE — 6360000002 HC RX W HCPCS: Performed by: EMERGENCY MEDICINE

## 2022-06-30 PROCEDURE — 36415 COLL VENOUS BLD VENIPUNCTURE: CPT

## 2022-06-30 RX ORDER — ONDANSETRON 4 MG/1
4 TABLET, ORALLY DISINTEGRATING ORAL EVERY 8 HOURS PRN
Qty: 20 TABLET | Refills: 0 | Status: SHIPPED | OUTPATIENT
Start: 2022-06-30 | End: 2022-07-22

## 2022-06-30 RX ORDER — WHEAT DEXTRIN 3 G/3.8 G
4 POWDER (GRAM) ORAL
Qty: 144 G | Refills: 0 | Status: SHIPPED | OUTPATIENT
Start: 2022-06-30 | End: 2022-08-25 | Stop reason: ALTCHOICE

## 2022-06-30 RX ORDER — METRONIDAZOLE 500 MG/1
500 TABLET ORAL 3 TIMES DAILY
Qty: 30 TABLET | Refills: 0 | Status: SHIPPED | OUTPATIENT
Start: 2022-06-30 | End: 2022-07-10

## 2022-06-30 RX ORDER — CIPROFLOXACIN 2 MG/ML
400 INJECTION, SOLUTION INTRAVENOUS ONCE
Status: COMPLETED | OUTPATIENT
Start: 2022-06-30 | End: 2022-06-30

## 2022-06-30 RX ORDER — SODIUM CHLORIDE, SODIUM LACTATE, POTASSIUM CHLORIDE, AND CALCIUM CHLORIDE .6; .31; .03; .02 G/100ML; G/100ML; G/100ML; G/100ML
1000 INJECTION, SOLUTION INTRAVENOUS ONCE
Status: COMPLETED | OUTPATIENT
Start: 2022-06-30 | End: 2022-06-30

## 2022-06-30 RX ORDER — CIPROFLOXACIN 500 MG/1
500 TABLET, FILM COATED ORAL 2 TIMES DAILY
Qty: 20 TABLET | Refills: 0 | Status: SHIPPED | OUTPATIENT
Start: 2022-06-30 | End: 2022-07-10

## 2022-06-30 RX ORDER — DOCUSATE SODIUM 100 MG/1
100 CAPSULE, LIQUID FILLED ORAL 2 TIMES DAILY
Qty: 20 CAPSULE | Refills: 0 | Status: SHIPPED | OUTPATIENT
Start: 2022-06-30 | End: 2022-07-10

## 2022-06-30 RX ORDER — METRONIDAZOLE 500 MG/100ML
500 INJECTION, SOLUTION INTRAVENOUS ONCE
Status: COMPLETED | OUTPATIENT
Start: 2022-06-30 | End: 2022-06-30

## 2022-06-30 RX ADMIN — IOPAMIDOL 70 ML: 755 INJECTION, SOLUTION INTRAVENOUS at 08:06

## 2022-06-30 RX ADMIN — METRONIDAZOLE 500 MG: 500 INJECTION, SOLUTION INTRAVENOUS at 12:56

## 2022-06-30 RX ADMIN — SODIUM CHLORIDE, POTASSIUM CHLORIDE, SODIUM LACTATE AND CALCIUM CHLORIDE 1000 ML: 600; 310; 30; 20 INJECTION, SOLUTION INTRAVENOUS at 11:58

## 2022-06-30 RX ADMIN — CIPROFLOXACIN 400 MG: 2 INJECTION, SOLUTION INTRAVENOUS at 10:36

## 2022-06-30 ASSESSMENT — ENCOUNTER SYMPTOMS
ABDOMINAL PAIN: 1
RHINORRHEA: 0
DIARRHEA: 0
COUGH: 0
VOMITING: 1
VOICE CHANGE: 0
NAUSEA: 1
SHORTNESS OF BREATH: 0
EYE REDNESS: 0
EYE PAIN: 0

## 2022-06-30 ASSESSMENT — PAIN DESCRIPTION - PAIN TYPE: TYPE: ACUTE PAIN

## 2022-06-30 ASSESSMENT — PAIN DESCRIPTION - DESCRIPTORS: DESCRIPTORS: SHARP

## 2022-06-30 ASSESSMENT — PAIN - FUNCTIONAL ASSESSMENT: PAIN_FUNCTIONAL_ASSESSMENT: 0-10

## 2022-06-30 ASSESSMENT — PAIN DESCRIPTION - ORIENTATION: ORIENTATION: RIGHT;UPPER

## 2022-06-30 ASSESSMENT — PAIN DESCRIPTION - LOCATION: LOCATION: ABDOMEN

## 2022-06-30 NOTE — TELEPHONE ENCOUNTER
Subjective: Caller states \"up all night with pain on the right side\"     Current Symptoms:   Just below rib cage  Was going to go to the walk in clinic but just started throwing up  Pancreatitis from a gallstone about six weeks ago  Gall bladder removed-hospitalized  The pain feels different from that episode  Painful when taking a deep breath but denies SOB or CP  Denies diarrhea, last BM yesterday  Denies blood in stool or vomit    Onset:  48 hours ago; worsening    Associated Symptoms: NA    Pain Severity: 7/10; sharp; constant    Temperature: denies     What has been tried: nothing    LMP: NA Pregnant: No    Recommended disposition: Go to ED Now    Care advice provided, patient verbalizes understanding; denies any other questions or concerns; instructed to call back for any new or worsening symptoms. Patient/caller agrees to proceed to nearest Emergency Department. Attention Provider: Thank you for allowing me to participate in the care of your patient. The patient was connected to triage in response to symptoms provided. Please do not respond through this encounter as the response is not directed to a shared pool.     Reason for Disposition   SEVERE abdominal pain (e.g., excruciating)    Protocols used: ABDOMINAL PAIN - UPPER-ADULT-OH

## 2022-06-30 NOTE — TELEPHONE ENCOUNTER
Patient was in the ED 6-30-22 and was told to Schedule an appointment with Nathalie Walton MD (Gastroenterology),please called patient [phone]    Thank You

## 2022-06-30 NOTE — ED PROVIDER NOTES
[facility] EMERGENCY DEPT  EMERGENCY DEPARTMENT ENCOUNTER      Pt Name: Stefan Roman  MRN: [id number]  [place] 1960  Date of evaluation: 6/30/2022  Provider: Kathy Smith MD    [address] [place] [address]       Chief Complaint   Patient presents with    Abdominal Pain    Nausea         HISTORY OF PRESENT ILLNESS   (Location/Symptom, Timing/Onset,Context/Setting, Quality, Duration, Modifying Factors, Severity)  Note limiting factors. Stefan Roman is a 60 y.o. female who presents to the emergency department for evaluation after having gradually worsening right upper quadrant abdominal pain over the last 3 to 4 days. Associated with nausea and one episode of vomiting this morning. Says pain has been constant. Does not seem directly correlated with eating or drinking but does seem to worsen some with food. Patient denies any associated fevers. Had a cholecystectomy done 6 weeks ago. Says pain is in a somewhat similar location but does not feel quite the same as pain she was having from her gallbladder. Has had gallstone pancreatitis in the past and was worried that may be the cause of the pain again today. HPI    NursingNotes were reviewed. REVIEW OF SYSTEMS    (2-9 systems for level 4, 10 or more for level 5)     Review of Systems   Constitutional: Negative for fatigue and fever. HENT: Negative for congestion, rhinorrhea and voice change. Eyes: Negative for pain and redness. Respiratory: Negative for cough and shortness of breath. Cardiovascular: Negative for chest pain. Gastrointestinal: Positive for abdominal pain, nausea and vomiting. Negative for diarrhea. Endocrine: Negative. Genitourinary: Negative. Musculoskeletal: Negative for arthralgias and gait problem. Skin: Negative for rash and wound. Neurological: Negative for weakness and headaches. Hematological: Negative. Psychiatric/Behavioral: Negative. All other systems reviewed and are negative.       A complete review of systems was performed and is negative except as noted above in the HPI.        PAST MEDICAL HISTORY     Past Medical History:   Diagnosis Date    Cervical disc disease 8/26/2017    DVT (deep venous thrombosis) ([address]     Essential hypertension 12/1/2019    Gastroesophageal reflux disease without esophagitis 8/26/2017    Hyperlipidemia     Laryngeal polyp     followed by Philippe Hassan PA-C    Major depressive disorder in remission ([address] 8/26/2017    Migraine without aura 8/26/2017    Pernicious anemia 8/26/2017    Raynaud's phenomenon 8/26/2017         SURGICAL HISTORY       Past Surgical History:   Procedure Laterality Date    ARTERIOGRAM Bilateral 9/2/2020    ECOS-PULMONARY performed by Lisandro Cerda MD at [address] [place] [address], LAPAROSCOPIC N/A 4/27/2022    laparoscopic robot assisted cholecystectomy with ICG performed by Pilar Arndt MD at [place] [address] ([address] [place] [address])           CURRENT MEDICATIONS       Discharge Medication List as of 6/30/2022  1:56 PM      CONTINUE these medications which have NOT CHANGED    Details   Oxymetazoline HCl (RHOFADE) 1 % CREA Apply topically daily Historical Med      hydroCHLOROthiazide (HYDRODIURIL) 25 MG tablet TAKE ONE TABLET ONCE A DAY IN THE MORNING, Disp-90 tablet, R-3Normal      omeprazole (PRILOSEC) 40 MG delayed release capsule Take 1 capsule by mouth nightly, Disp-30 capsule, R-1Normal      ELIQUIS 5 MG TABS tablet TAKE ONE TABLET BY MOUTH TWO TIMES A DAY, Disp-180 tablet, R-0Normal      desvenlafaxine succinate (PRISTIQ) 50 MG TB24 extended release tablet TAKE ONE TABLET ONE TIME DAILY, Disp-90 tablet, R-3Normal      cyanocobalamin 1000 MCG/ML injection Inject 1 mL into the muscle every 30 days, Disp-3 mL, R-3Normal      nortriptyline (PAMELOR) 10 MG capsule TAKE TWO CAPSULES EVERY EVENING AT BEDTIME, Disp-180 capsule, R-3Normal      rizatriptan (MAXALT) 10 MG tablet May repeat in 2 hours if needed do not exceed 2 doses in 24 hours, Disp-9 tablet, R-5Normal      lisinopril (PRINIVIL;ZESTRIL) 20 MG tablet Take 1 tablet by mouth daily, Disp-90 tablet, R-3Normal      calcium carbonate (OSCAL) 500 MG TABS tablet Take 500 mg by mouth dailyHistorical Med      Cholecalciferol (VITAMIN D) 50 MCG ( UT) CAPS capsule Take 2,000 Units by mouth dailyHistorical Med      Multiple Vitamins-Minerals (PRESERVISION AREDS 2+MULTI VIT PO) Take 1 tablet by mouth 2 times dailyHistorical Med      tiZANidine (ZANAFLEX) 4 MG tablet Take 1 tablet by mouth every 12 hours as needed (muscle spasms), Disp-180 tablet, R-1Normal             ALLERGIES     Demerol  [meperidine hcl], Pcn [penicillins], and Versed [midazolam]    FAMILY HISTORY       Family History   Problem Relation Age of Onset    High Blood Pressure Mother     Coronary Art Dis Mother     Thyroid Disease Mother         Hypothyroidism    Breast Cancer Mother 80    Colon Polyps Father     Prostate Cancer Father     Anemia Father         Pernicious    Cancer Father         bladder cancer     Colon Polyps Sister     Anemia Brother         Pernicious    Cancer Brother [facility]        prostate cancer, bladder cancer    High Blood Pressure Paternal Grandmother           SOCIAL HISTORY       Social History     Socioeconomic History    Marital status:      Spouse name: None    Number of children: None    Years of education: None    Highest education level: None   Occupational History    None   Tobacco Use    Smoking status: Former Smoker     Packs/day: 0.50     Years: 25.00     Pack years: 12.50     Quit date:      Years since quittin.5    Smokeless tobacco: Never Used   Vaping Use    Vaping Use: Never used   Substance and Sexual Activity    Alcohol use: Yes     Comment: rare    Drug use: No    Sexual activity: None   Other Topics Concern    None   Social History Narrative    None     Social Determinants of Health     Financial Resource Strain: Low Risk     Difficulty of Paying Living Expenses: Not very hard   Food Insecurity: No Food Insecurity    Worried About Running Out of Food in the Last Year: Never true    Ran Out of Food in the Last Year: Never true   Transportation Needs:     Lack of Transportation (Medical): Not on file    Lack of Transportation (Non-Medical): Not on file   Physical Activity:     Days of Exercise per Week: Not on file    Minutes of Exercise per Session: Not on file   Stress:     Feeling of Stress : Not on file   Social Connections:     Frequency of Communication with Friends and Family: Not on file    Frequency of Social Gatherings with Friends and Family: Not on file    Attends Alevism Services: Not on file    Active Member of [address] [place] [address] [employer] or Organizations: Not on file    Attends Club or Organization Meetings: Not on file    Marital Status: Not on file   Intimate Partner Violence:     Fear of Current or Ex-Partner: Not on file    Emotionally Abused: Not on file    Physically Abused: Not on file    Sexually Abused: Not on file   Housing Stability:     Unable to Pay for Housing in the Last Year: Not on file    Number of [place] in the Last Year: Not on file    Unstable Housing in the Last Year: Not on file       SCREENINGS    [place] Coma Scale  Eye Opening: Spontaneous  Best Verbal Response: Oriented  Best Motor Response: Obeys commands  [place] Coma Scale Score: 15        PHYSICAL EXAM    (up to 7 for level 4, 8 or more for level 5)     ED Triage Vitals [06/30/22 0723]   BP Temp Temp src Heart Rate Resp SpO2 Height Weight   110/77 -- -- 72 16 97 % 5' 7\" (1.702 m) 144 lb (65.3 kg)       Physical Exam  Vitals and nursing note reviewed. Constitutional:       General: She is not in acute distress. Appearance: Normal appearance. She is well-developed. She is not diaphoretic. HENT:      Head: Normocephalic and atraumatic. Mouth/Throat:      Pharynx: No oropharyngeal exudate. Eyes:      General: No scleral icterus.      Pupils: Pupils are equal, round, and reactive to light. Neck:      Trachea: No tracheal deviation. Cardiovascular:      Rate and Rhythm: Normal rate. Pulses: Normal pulses. Heart sounds: Normal heart sounds. Pulmonary:      Effort: Pulmonary effort is normal.      Breath sounds: Normal breath sounds. No stridor. No wheezing or rhonchi. Abdominal:      General: There is no distension. Palpations: Abdomen is soft. Abdomen is not rigid. Tenderness: There is abdominal tenderness in the right upper quadrant. There is no guarding. Hernia: No hernia is present. Musculoskeletal:         General: No deformity. Cervical back: Normal range of motion. Skin:     General: Skin is warm and dry. Findings: No rash. Neurological:      Mental Status: She is alert and oriented to person, place, and time. Cranial Nerves: No cranial nerve deficit. Coordination: Coordination normal.   Psychiatric:         Behavior: Behavior normal.         DIAGNOSTIC RESULTS     EKG: All EKG's are interpreted by the Emergency Department Physician who either signs or Co-signs this chart in the absence of a cardiologist.        RADIOLOGY:   Non-plain film images such as CT, Ultrasound and MRI are read by the radiologist. [facility] images are visualized and preliminarily interpreted by the emergency physician with the below findings:        Interpretation per the Radiologist below, if available at the time of this note:    CT ABDOMEN PELVIS W IV CONTRAST Additional Contrast? None   Final Result   1. Mild hepatomegaly. 2. Splenic cysts. 3. Mild circumferential wall thickening of ascending colon with diverticulosis in hepatic flexure with surrounding fat stranding - indicating acute diverticulitis. 4. Status post cholecystectomy since prior exam with resolving right upper quadrant inflammation. Comparison: MRI of abdomen dated 04/25/22 images. CT scan of abdomen and pelvis dated 04/25/22 images.    Recommendation: Follow up as clinically indicated. All CT scans at this facility utilize dose modulation, iterative reconstruction, and/or weight based dosing when appropriate to reduce radiation dose to as low as reasonably achievable. Electronically Signed by Cali Huber MD at 30-Jun-2022 10:53:39 AM                     ED BEDSIDE ULTRASOUND:   Performed by ED Physician - none    LABS:  Labs Reviewed   CBC WITH AUTO DIFFERENTIAL - Abnormal; Notable for the following components:       Result Value    WBC 11.8 (*)     RBC 5.44 (*)     MCH 26.3 (*)     MCHC 31.2 (*)     RDW 14.6 (*)     Neutrophils % 77.0 (*)     Lymphocytes % 12.1 (*)     Eosinophils % 5.3 (*)     Neutrophils Absolute 9.1 (*)     All other components within normal limits   COMPREHENSIVE METABOLIC PANEL W/ REFLEX TO MG FOR LOW K - Abnormal; Notable for the following components:    CO2 30 (*)     ALT 49 (*)     AST 34 (*)     All other components within normal limits   URINALYSIS WITH REFLEX TO CULTURE - Abnormal; Notable for the following components:    Leukocyte Esterase, Urine TRACE (*)     All other components within normal limits   MICROSCOPIC URINALYSIS - Abnormal; Notable for the following components:    Bacteria, UA NEGATIVE (*)     Crystals, UA NEG (*)     All other components within normal limits   LIPASE       All other labs were within normal range or not returned as of this dictation.     Medications   iopamidol (ISOVUE-370) 76 % injection 90 mL (70 mLs IntraVENous Given 6/30/22 0806)   lactated ringers bolus (0 mLs IntraVENous Stopped 6/30/22 1159)   ciprofloxacin (CIPRO) IVPB 400 mg (0 mg IntraVENous Stopped 6/30/22 1159)   metronidazole (FLAGYL) 500 mg in 0.9% NaCl 100 mL IVPB premix (0 mg IntraVENous Stopped 6/30/22 1356)       EMERGENCY DEPARTMENT COURSE and DIFFERENTIALDIAGNOSIS/MDM:   Vitals:    Vitals:    06/30/22 1203 06/30/22 1206 06/30/22 1231 06/30/22 1351   BP: (!) 50/24 101/77 139/75 132/77   Pulse:   69 66   Resp:   15 16   SpO2: 91% 97% 100% 100%   Weight:       Height:           [facility]    ED Course as of 06/30/22 1530   Thu Jun 30, 2022   0944 WBC(!): 11.8 [RADHA]   1024 Discussed CT results and reviewed imaging with Dr. Milly Manzo with general surgery to further rule out any surgical complications as a cause of pain and tenderness in the right upper quadrant. Appears to be due to diverticulitis at the hepatic flexure. Laboratory evaluation overall unremarkable with borderline elevation white blood cell count and LFTs. Plan for discharge with antibiotics, stool softeners, fiber supplement, and outpatient GI follow-up. Advised patient she may benefit from colonoscopy after treatment of the acute diverticulitis [RADHA]      ED Course User Index  [RADHA] Juanito Balderas MD     Evaluation and work-up here revealed no signs of emergent or life-threatening pathology that would necessitate admission for further work-up or management at this time. Patient is felt to be stable for discharge home with return precautions for worsening of the condition or development of new concerning symptoms. Patient was encouraged to follow-up with their primary care doctor in the appropriate timeframe. Necessary prescriptions and information have been provided for treatment at home. Patient voices understanding and agreement with the plan. CONSULTS:  None    PROCEDURES:  Unless otherwise notedbelow, none     Procedures      FINAL IMPRESSION     1. Abdominal pain, acute, right upper quadrant    2. Diverticulitis of colon    3. S/P laparoscopic cholecystectomy          DISPOSITION/PLAN   DISPOSITION Decision To Discharge 06/30/2022 10:10:53 AM      No notes of EC Admission Criteria type on file.     PATIENT REFERRED TO:  [facility]GENC[facility]PT  [facility]  [phone]    If symptoms worsen    Anthony Ocampo MD  [place] [address]  [phone]    Schedule an appointment as soon as possible for a visit DISCHARGE MEDICATIONS:  Discharge Medication List as of 6/30/2022  1:56 PM      START taking these medications    Details   ciprofloxacin (CIPRO) 500 MG tablet Take 1 tablet by mouth 2 times daily for 10 days, Disp-20 tablet, R-0Normal      metroNIDAZOLE (FLAGYL) 500 MG tablet Take 1 tablet by mouth 3 times daily for 10 days, Disp-30 tablet, R-0Normal      ondansetron (ZOFRAN ODT) 4 MG disintegrating tablet Take 1 tablet by mouth every 8 hours as needed for Nausea or Vomiting, Disp-20 tablet, R-0Normal      docusate sodium (COLACE) 100 MG capsule Take 1 capsule by mouth 2 times daily for 10 days, Disp-20 capsule, R-0Normal      Wheat Dextrin (BENEFIBER) POWD Take 4 g by mouth 3 times daily (with meals), Disp-144 g, R-0Normal                (Please note that portions of this note were completed with a voice recognition program.  Efforts were made to edit the dictations butoccasionally words are mis-transcribed.)    Bogdan Mancuso MD (electronically signed)  AttendingEmergency Physician          Bogdan Rivera MD  06/30/22 5078

## 2022-06-30 NOTE — PROGRESS NOTES
CLINICAL PHARMACY NOTE: MEDS TO BEDS    Total # of Prescriptions Filled: 5   The following medications were delivered to the patient:  · Docusate 100mg  · miralax  · Ondansetron 4mg odt  · Metronidazole 500mg  · Ciprofloxacin 500mg    Additional Documentation:  The patients  paid with a credit card over the phone, the medications were delivered and handed to the patient at her bedside.

## 2022-07-22 ENCOUNTER — TELEPHONE (OUTPATIENT)
Dept: INTERNAL MEDICINE | Age: 62
End: 2022-07-22

## 2022-07-22 ENCOUNTER — HOSPITAL ENCOUNTER (EMERGENCY)
Age: 62
Discharge: HOME | End: 2022-07-22
Attending: EMERGENCY MEDICINE
Payer: COMMERCIAL

## 2022-07-22 ENCOUNTER — APPOINTMENT (OUTPATIENT)
Dept: CT IMAGING | Age: 62
End: 2022-07-22
Payer: COMMERCIAL

## 2022-07-22 VITALS
DIASTOLIC BLOOD PRESSURE: 87 MMHG | RESPIRATION RATE: 12 BRPM | TEMPERATURE: 98.3 F | HEART RATE: 74 BPM | OXYGEN SATURATION: 92 % | SYSTOLIC BLOOD PRESSURE: 131 MMHG

## 2022-07-22 DIAGNOSIS — K57.92 ACUTE DIVERTICULITIS: Primary | ICD-10-CM

## 2022-07-22 LAB
ALBUMIN SERPL-MCNC: 4.8 G/DL (ref 3.5–5.2)
ALP BLD-CCNC: 96 U/L (ref 35–104)
ALT SERPL-CCNC: 59 U/L (ref 5–33)
ANION GAP SERPL CALCULATED.3IONS-SCNC: 11 MMOL/L (ref 7–19)
AST SERPL-CCNC: 42 U/L (ref 5–32)
BASOPHILS ABSOLUTE: 0 K/UL (ref 0–0.2)
BASOPHILS RELATIVE PERCENT: 0.3 % (ref 0–1)
BILIRUB SERPL-MCNC: 0.5 MG/DL (ref 0.2–1.2)
BILIRUBIN URINE: NEGATIVE
BLOOD, URINE: NEGATIVE
BUN BLDV-MCNC: 14 MG/DL (ref 8–23)
CALCIUM SERPL-MCNC: 9.7 MG/DL (ref 8.8–10.2)
CHLORIDE BLD-SCNC: 99 MMOL/L (ref 98–111)
CLARITY: CLEAR
CO2: 28 MMOL/L (ref 22–29)
COLOR: YELLOW
CREAT SERPL-MCNC: 0.8 MG/DL (ref 0.5–0.9)
EOSINOPHILS ABSOLUTE: 0.3 K/UL (ref 0–0.6)
EOSINOPHILS RELATIVE PERCENT: 2.1 % (ref 0–5)
GFR AFRICAN AMERICAN: >59
GFR NON-AFRICAN AMERICAN: >60
GLUCOSE BLD-MCNC: 98 MG/DL (ref 74–109)
GLUCOSE URINE: NEGATIVE MG/DL
HCT VFR BLD CALC: 43.9 % (ref 37–47)
HEMOGLOBIN: 13.7 G/DL (ref 12–16)
IMMATURE GRANULOCYTES #: 0 K/UL
KETONES, URINE: NEGATIVE MG/DL
LEUKOCYTE ESTERASE, URINE: NEGATIVE
LIPASE: 32 U/L (ref 13–60)
LYMPHOCYTES ABSOLUTE: 1.9 K/UL (ref 1.1–4.5)
LYMPHOCYTES RELATIVE PERCENT: 12.7 % (ref 20–40)
MCH RBC QN AUTO: 26.2 PG (ref 27–31)
MCHC RBC AUTO-ENTMCNC: 31.2 G/DL (ref 33–37)
MCV RBC AUTO: 83.9 FL (ref 81–99)
MONOCYTES ABSOLUTE: 0.8 K/UL (ref 0–0.9)
MONOCYTES RELATIVE PERCENT: 5.2 % (ref 0–10)
NEUTROPHILS ABSOLUTE: 11.8 K/UL (ref 1.5–7.5)
NEUTROPHILS RELATIVE PERCENT: 79.4 % (ref 50–65)
NITRITE, URINE: NEGATIVE
PDW BLD-RTO: 14.8 % (ref 11.5–14.5)
PH UA: 7 (ref 5–8)
PLATELET # BLD: 261 K/UL (ref 130–400)
PMV BLD AUTO: 11.6 FL (ref 9.4–12.3)
POTASSIUM REFLEX MAGNESIUM: 3.6 MMOL/L (ref 3.5–5)
PROTEIN UA: NEGATIVE MG/DL
RBC # BLD: 5.23 M/UL (ref 4.2–5.4)
SODIUM BLD-SCNC: 138 MMOL/L (ref 136–145)
SPECIFIC GRAVITY UA: 1.01 (ref 1–1.03)
TOTAL PROTEIN: 7.2 G/DL (ref 6.6–8.7)
UROBILINOGEN, URINE: 0.2 E.U./DL
WBC # BLD: 14.8 K/UL (ref 4.8–10.8)

## 2022-07-22 PROCEDURE — 80053 COMPREHEN METABOLIC PANEL: CPT

## 2022-07-22 PROCEDURE — 36415 COLL VENOUS BLD VENIPUNCTURE: CPT

## 2022-07-22 PROCEDURE — 85025 COMPLETE CBC W/AUTO DIFF WBC: CPT

## 2022-07-22 PROCEDURE — 74177 CT ABD & PELVIS W/CONTRAST: CPT | Performed by: RADIOLOGY

## 2022-07-22 PROCEDURE — 81003 URINALYSIS AUTO W/O SCOPE: CPT

## 2022-07-22 PROCEDURE — 74177 CT ABD & PELVIS W/CONTRAST: CPT

## 2022-07-22 PROCEDURE — 6370000000 HC RX 637 (ALT 250 FOR IP): Performed by: EMERGENCY MEDICINE

## 2022-07-22 PROCEDURE — 83690 ASSAY OF LIPASE: CPT

## 2022-07-22 PROCEDURE — 6360000004 HC RX CONTRAST MEDICATION: Performed by: EMERGENCY MEDICINE

## 2022-07-22 PROCEDURE — 99285 EMERGENCY DEPT VISIT HI MDM: CPT

## 2022-07-22 RX ORDER — CIPROFLOXACIN 500 MG/1
500 TABLET, FILM COATED ORAL ONCE
Status: COMPLETED | OUTPATIENT
Start: 2022-07-22 | End: 2022-07-22

## 2022-07-22 RX ORDER — METRONIDAZOLE 500 MG/1
500 TABLET ORAL ONCE
Status: COMPLETED | OUTPATIENT
Start: 2022-07-22 | End: 2022-07-22

## 2022-07-22 RX ORDER — CIPROFLOXACIN 500 MG/1
500 TABLET, FILM COATED ORAL 2 TIMES DAILY
Qty: 20 TABLET | Refills: 0 | Status: SHIPPED | OUTPATIENT
Start: 2022-07-22 | End: 2022-07-23

## 2022-07-22 RX ORDER — METRONIDAZOLE 500 MG/1
500 TABLET ORAL 3 TIMES DAILY
Qty: 30 TABLET | Refills: 0 | Status: SHIPPED | OUTPATIENT
Start: 2022-07-22 | End: 2022-07-23

## 2022-07-22 RX ORDER — DOCUSATE SODIUM 100 MG/1
100 CAPSULE, LIQUID FILLED ORAL 2 TIMES DAILY
Qty: 20 CAPSULE | Refills: 0 | Status: SHIPPED | OUTPATIENT
Start: 2022-07-22 | End: 2022-07-23

## 2022-07-22 RX ORDER — ONDANSETRON 4 MG/1
4 TABLET, ORALLY DISINTEGRATING ORAL EVERY 8 HOURS PRN
Qty: 20 TABLET | Refills: 0 | Status: SHIPPED | OUTPATIENT
Start: 2022-07-22 | End: 2022-07-23

## 2022-07-22 RX ADMIN — METRONIDAZOLE 500 MG: 500 TABLET ORAL at 21:10

## 2022-07-22 RX ADMIN — IOPAMIDOL 70 ML: 755 INJECTION, SOLUTION INTRAVENOUS at 19:44

## 2022-07-22 RX ADMIN — CIPROFLOXACIN 500 MG: 500 TABLET, FILM COATED ORAL at 21:10

## 2022-07-22 ASSESSMENT — ENCOUNTER SYMPTOMS
SHORTNESS OF BREATH: 0
DIARRHEA: 0
EYE PAIN: 0
EYE REDNESS: 0
ABDOMINAL PAIN: 1
VOICE CHANGE: 0
VOMITING: 0
RHINORRHEA: 0
COUGH: 0

## 2022-07-22 NOTE — TELEPHONE ENCOUNTER
Pt believes she may be having another diverticulitis flare up. She wants to know if she needs more abx or if she needs to be seen. Pt states she is in a lot of pain.

## 2022-07-23 RX ORDER — ONDANSETRON 4 MG/1
4 TABLET, ORALLY DISINTEGRATING ORAL EVERY 8 HOURS PRN
Qty: 20 TABLET | Refills: 0 | Status: SHIPPED | OUTPATIENT
Start: 2022-07-23

## 2022-07-23 RX ORDER — CIPROFLOXACIN 500 MG/1
500 TABLET, FILM COATED ORAL 2 TIMES DAILY
Qty: 20 TABLET | Refills: 0 | Status: SHIPPED | OUTPATIENT
Start: 2022-07-23 | End: 2022-08-02

## 2022-07-23 RX ORDER — METRONIDAZOLE 500 MG/1
500 TABLET ORAL 3 TIMES DAILY
Qty: 30 TABLET | Refills: 0 | Status: SHIPPED | OUTPATIENT
Start: 2022-07-23 | End: 2022-08-02

## 2022-07-23 RX ORDER — DOCUSATE SODIUM 100 MG/1
100 CAPSULE, LIQUID FILLED ORAL 2 TIMES DAILY
Qty: 20 CAPSULE | Refills: 0 | Status: SHIPPED | OUTPATIENT
Start: 2022-07-23 | End: 2022-08-02

## 2022-07-23 NOTE — ED PROVIDER NOTES
[facility] EMERGENCY DEPT  EMERGENCY DEPARTMENT ENCOUNTER      Pt Name: Karen Stack  MRN: [id number]  [place] 1960  Date of evaluation: 7/22/2022  Provider: Julio Cesar Panda MD    CHIEF COMPLAINT       Chief Complaint   Patient presents with    Abdominal Pain     Started this morning, RUQ         HISTORY OF PRESENT ILLNESS   (Location/Symptom, Timing/Onset,Context/Setting, Quality, Duration, Modifying Factors, Severity)  Note limiting factors. Karen Stack is a 60 y.o. female who presents to the emergency department with complaint of right upper quadrant pain that started this morning. Patient was seen here recently by myself for similar pain in the right upper quadrant and found to have of diverticulitis around the hepatic flexure of the colon. Patient also recently had a laparoscopic cholecystectomy. Had some inflammation in the gallbladder fossa on prior CT scan but not felt to represent postoperative infection. Patient denies any other symptoms such as nausea, vomiting, diarrhea, constipation, fever. Reports that while she was on the antibiotics for diverticulitis, she had resolution of symptoms which had not return for about 10 days after completing the antibiotic course until today. With complaint of      NursingNotes were reviewed. REVIEW OF SYSTEMS    (2-9 systems for level 4, 10 or more for level 5)     Review of Systems   Constitutional:  Negative for fatigue and fever. HENT:  Negative for congestion, rhinorrhea and voice change. Eyes:  Negative for pain and redness. Respiratory:  Negative for cough and shortness of breath. Cardiovascular:  Negative for chest pain. Gastrointestinal:  Positive for abdominal pain. Negative for diarrhea and vomiting. Endocrine: Negative. Genitourinary: Negative. Musculoskeletal:  Negative for arthralgias and gait problem. Skin:  Negative for rash and wound. Neurological:  Negative for weakness and headaches. Hematological: Negative. Psychiatric/Behavioral: Negative. All other systems reviewed and are negative. A complete review of systems was performed and is negative except as noted above in the HPI.        PAST MEDICAL HISTORY     Past Medical History:   Diagnosis Date    Cervical disc disease 8/26/2017    DVT (deep venous thrombosis) (HCC)     Essential hypertension 12/1/2019    Gastroesophageal reflux disease without esophagitis 8/26/2017    Hyperlipidemia     Laryngeal polyp     followed by Sharda Jones PA-C    Major depressive disorder in remission ([facility] [address] 8/26/2017    Migraine without aura 8/26/2017    Pernicious anemia 8/26/2017    Raynaud's phenomenon 8/26/2017         SURGICAL HISTORY       Past Surgical History:   Procedure Laterality Date    ARTERIOGRAM Bilateral 9/2/2020    ECOS-PULMONARY performed by Zuly Cain MD at [address] , LAPAROSCOPIC N/A 4/27/2022    laparoscopic robot assisted cholecystectomy with ICG performed by Elaine Miguel MD at [address] [place] [address] ([address] [place] [address])           CURRENT MEDICATIONS       Discharge Medication List as of 7/22/2022  9:19 PM        CONTINUE these medications which have NOT CHANGED    Details   Wheat Dextrin (BENEFIBER) POWD Take 4 g by mouth 3 times daily (with meals), Disp-144 g, R-0Normal      Oxymetazoline HCl (RHOFADE) 1 % CREA Apply topically daily Historical Med      hydroCHLOROthiazide (HYDRODIURIL) 25 MG tablet TAKE ONE TABLET ONCE A DAY IN THE MORNING, Disp-90 tablet, R-3Normal      omeprazole (PRILOSEC) 40 MG delayed release capsule Take 1 capsule by mouth nightly, Disp-30 capsule, R-1Normal      ELIQUIS 5 MG TABS tablet TAKE ONE TABLET BY MOUTH TWO TIMES A DAY, Disp-180 tablet, R-0Normal      desvenlafaxine succinate (PRISTIQ) 50 MG TB24 extended release tablet TAKE ONE TABLET ONE TIME DAILY, Disp-90 tablet, R-3Normal      cyanocobalamin 1000 MCG/ML injection Inject 1 mL into the muscle every 30 days, Disp-3 mL, R-3Normal      nortriptyline (PAMELOR) 10 MG capsule TAKE TWO CAPSULES EVERY EVENING AT BEDTIME, Disp-180 capsule, R-3Normal      rizatriptan (MAXALT) 10 MG tablet May repeat in 2 hours if needed do not exceed 2 doses in 24 hours, Disp-9 tablet, R-5Normal      lisinopril (PRINIVIL;ZESTRIL) 20 MG tablet Take 1 tablet by mouth daily, Disp-90 tablet, R-3Normal      calcium carbonate (OSCAL) 500 MG TABS tablet Take 500 mg by mouth dailyHistorical Med      Cholecalciferol (VITAMIN D) 50 MCG (2000) CAPS capsule Take 2,000 Units by mouth dailyHistorical Med      Multiple Vitamins-Minerals (PRESERVISION AREDS 2+MULTI VIT PO) Take 1 tablet by mouth 2 times dailyHistorical Med      tiZANidine (ZANAFLEX) 4 MG tablet Take 1 tablet by mouth every 12 hours as needed (muscle spasms), Disp-180 tablet, R-1Normal             ALLERGIES     Demerol  [meperidine hcl], Pcn [penicillins], and Versed [midazolam]    FAMILY HISTORY       Family History   Problem Relation Age of Onset    High Blood Pressure Mother     Coronary Art Dis Mother     Thyroid Disease Mother         Hypothyroidism    Breast Cancer Mother 80    Colon Polyps Father     Prostate Cancer Father     Anemia Father         Pernicious    Cancer Father         bladder cancer     Colon Polyps Sister     Anemia Brother         Pernicious    Cancer Brother 46        prostate cancer, bladder cancer    High Blood Pressure Paternal Grandmother           SOCIAL HISTORY       Social History     Socioeconomic History    Marital status:      Spouse name: None    Number of children: None    Years of education: None    Highest education level: None   Tobacco Use    Smoking status: Former     Packs/day: 0.50     Years: 25.00     Pack years: 12.50     Types: Cigarettes     Quit date:      Years since quittin.5    Smokeless tobacco: Never   Vaping Use    Vaping Use: Never used   Substance and Sexual Activity    Alcohol use: Yes     Comment: rare    Drug use: No     Social Determinants of Health Financial Resource Strain: Low Risk     Difficulty of Paying Living Expenses: Not very hard   Food Insecurity: No Food Insecurity    Worried About Running Out of Food in the Last Year: Never true    Ran Out of Food in the Last Year: Never true       SCREENINGS    Octavia Coma Scale  Eye Opening: Spontaneous  Best Verbal Response: Oriented  Best Motor Response: Obeys commands  Dewart Coma Scale Score: 15        PHYSICAL EXAM    (up to 7 for level 4, 8 or more for level 5)     ED Triage Vitals [07/22/22 1705]   BP Temp Temp src Heart Rate Resp SpO2 Height Weight   117/73 98.3 °F (36.8 °C) -- 87 17 95 % -- --       Physical Exam  Vitals and nursing note reviewed. Constitutional:       General: She is not in acute distress. Appearance: Normal appearance. She is well-developed. She is not diaphoretic. HENT:      Head: Normocephalic and atraumatic. Mouth/Throat:      Pharynx: No oropharyngeal exudate. Eyes:      General: No scleral icterus. Pupils: Pupils are equal, round, and reactive to light. Neck:      Trachea: No tracheal deviation. Cardiovascular:      Rate and Rhythm: Normal rate. Pulses: Normal pulses. Heart sounds: Normal heart sounds. Pulmonary:      Effort: Pulmonary effort is normal.      Breath sounds: Normal breath sounds. No stridor. No wheezing or rhonchi. Abdominal:      General: There is no distension. Palpations: Abdomen is soft. Abdomen is not rigid. Tenderness: There is abdominal tenderness in the right upper quadrant. There is no guarding. Hernia: No hernia is present. Musculoskeletal:         General: No deformity. Cervical back: Normal range of motion. Skin:     General: Skin is warm and dry. Findings: No rash. Neurological:      Mental Status: She is alert and oriented to person, place, and time. Cranial Nerves: No cranial nerve deficit.       Coordination: Coordination normal.   Psychiatric:         Behavior: Behavior normal.       DIAGNOSTIC RESULTS     EKG: All EKG's are interpreted by the Emergency Department Physician who either signs or Co-signs this chart in the absence of a cardiologist.      RADIOLOGY:   Non-plain film images such as CT, Ultrasound and MRI are read by the radiologist. Plainradiographic images are visualized and preliminarily interpreted by the emergency physician with the below findings:      Interpretation per the Radiologist below, if available at the time of this note:    CT ABDOMEN PELVIS W IV CONTRAST Additional Contrast? None   Final Result   1. Right colonic flexure acute diverticulitis as described. No evidence of abscess or perforation. 2.  No intra-abdominal free fluid. Recommendation: Follow up as clinically indicated. All CT scans at this facility utilize dose modulation, iterative reconstruction, and/or weight based dosing when appropriate to reduce radiation dose to as low as reasonably achievable. Electronically Signed by Mary Lazcano MD at 22-Jul-2022 09:15:45 PM                     ED BEDSIDE ULTRASOUND:   Performed by ED Physician - none    LABS:  Labs Reviewed   CBC WITH AUTO DIFFERENTIAL - Abnormal; Notable for the following components:       Result Value    WBC 14.8 (*)     MCH 26.2 (*)     MCHC 31.2 (*)     RDW 14.8 (*)     Neutrophils % 79.4 (*)     Lymphocytes % 12.7 (*)     Neutrophils Absolute 11.8 (*)     All other components within normal limits   COMPREHENSIVE METABOLIC PANEL W/ REFLEX TO MG FOR LOW K - Abnormal; Notable for the following components:    ALT 59 (*)     AST 42 (*)     All other components within normal limits   LIPASE   URINALYSIS WITH REFLEX TO CULTURE       All other labs were within normal range or not returned as of this dictation.     Medications   iopamidol (ISOVUE-370) 76 % injection 70 mL (70 mLs IntraVENous Given 7/22/22 1944)   ciprofloxacin (CIPRO) tablet 500 mg (500 mg Oral Given 7/22/22 2110)   metroNIDAZOLE (FLAGYL) tablet 500 mg (500 mg Oral Given 7/22/22 2110)       EMERGENCY DEPARTMENT COURSE and DIFFERENTIALDIAGNOSIS/MDM:   Vitals:    Vitals:    07/22/22 1705 07/22/22 1901 07/22/22 1955 07/22/22 2002   BP: 117/73 (!) 111/96 131/87    Pulse: 87 82 83 74   Resp: 17 16 12 12   Temp: 98.3 °F (36.8 °C)      SpO2: 95% 98% 92%        MDM    CT shows recurrent or persistent diverticulitis. No signs of intra-abdominal infection/abscess or other complication of prior cholecystectomy. Location of pain and other symptoms not concerning for pulmonary embolus. Patient is anticoagulated already. Laboratory evaluation shows mild elevation white blood cell count but otherwise unremarkable. Plan for another round of antibiotics for treatment of recurrent diverticulitis. Patient has follow-up scheduled with GI in 2 weeks. Discussed return precautions for worsening or no improvement after several days on antibiotics. Given recurrent episodes of diverticulitis in the hepatic flexure, patient may benefit from additional work-up such as a colonoscopy to rule out underlying structural issue    Evaluation and work-up here revealed no signs of emergent or life-threatening pathology that would necessitate admission for further work-up or management at this time. Patient is felt to be stable for discharge home with return precautions for worsening of the condition or development of new concerning symptoms. Patient was encouraged to follow-up with their primary care doctor in the appropriate timeframe. Necessary prescriptions and information have been provided for treatment at home. Patient voices understanding and agreement with the plan. CONSULTS:  None    PROCEDURES:  Unless otherwise notedbelow, none     Procedures      FINAL IMPRESSION     1. Acute diverticulitis          DISPOSITION/PLAN   DISPOSITION Decision To Discharge 07/22/2022 08:57:02 PM      No notes of EC Admission Criteria type on file.     PATIENT REFERRED TO:  [facility] EMERGENCY DEPT  [address] [id number]  [phone]    If symptoms worsen    MD SIDDHARTH Rashid/ [address]  [phone]      As needed    DISCHARGE MEDICATIONS:  Discharge Medication List as of 7/22/2022  9:19 PM        START taking these medications    Details   metroNIDAZOLE (FLAGYL) 500 MG tablet Take 1 tablet by mouth in the morning and 1 tablet at noon and 1 tablet before bedtime. Do all this for 10 days. , Disp-30 tablet, R-0Normal      ciprofloxacin (CIPRO) 500 MG tablet Take 1 tablet by mouth in the morning and 1 tablet before bedtime. Do all this for 10 days. , Disp-20 tablet, R-0Normal      docusate sodium (COLACE) 100 MG capsule Take 1 capsule by mouth in the morning and 1 capsule before bedtime. Do all this for 10 days. , Disp-20 capsule, R-0Normal                (Please note that portions of this note were completed with a voice recognition program.  Efforts were made to edit the dictations butoccasionally words are mis-transcribed.)    Umair Jimenez MD (electronically signed)  AttendingEmergency Physician          Umair Jimenez., MD  07/22/22 7804

## 2022-07-25 ENCOUNTER — CARE COORDINATION (OUTPATIENT)
Dept: OTHER | Facility: CLINIC | Age: 62
End: 2022-07-25

## 2022-07-25 RX ORDER — CYANOCOBALAMIN 1000 UG/ML
INJECTION INTRAMUSCULAR; SUBCUTANEOUS
Qty: 3 ML | Refills: 1 | Status: SHIPPED | OUTPATIENT
Start: 2022-07-25

## 2022-07-25 NOTE — CARE COORDINATION
Ambulatory Care Coordination Note  7/25/2022    ACC: Marguerite Perkins, RN    Summary Note: ACM attempted to reach patient for introduction to Associate Care Management related to ED visit on 7/22/2022 for acute diverticulitis. HIPAA compliant message left requesting a return phone call. Will attempt to outreach patient again.        Future Appointments   Date Time Provider Sandee Esquivel   8/2/2022  3:00 PM EMILIANO Stokes NP N DAMIÁN Gastro [facility]   8/11/2022  9:00 AM EMILIANO Samuels PAD HEMONC [facility]   8/11/2022  9:20 AM SCHEDULE, L MED ONC MA [facility] MED ONC Nadia [facility]   9/2/2022  7:30 AM Ava Franco MD [facility]

## 2022-07-26 ENCOUNTER — CARE COORDINATION (OUTPATIENT)
Dept: OTHER | Facility: CLINIC | Age: 62
End: 2022-07-26

## 2022-07-26 NOTE — CARE COORDINATION
Ambulatory Care Coordination Note  7/26/2022    ACC: Leila Carias, RN    Summary Note: ACM attempted 2nd outreach to reach patient for introduction to Associate Care Management. HIPAA compliant message left requesting a return phone call at patients convenience. Unable to Reach Letter sent to patient via [employer]. Will continue to outreach patient.        Future Appointments   Date Time Provider Sandee Esquivel   8/2/2022  3:00 PM EMILIANO Plasencia NP Gastro [facility]   8/11/2022  9:00 AM EMILIANO Sales HEMONC [facility]   8/11/2022  9:20 AM SCHEDULE, [facility] MED ONC MA [facility] MED ONC Nadia [facility]   9/2/2022  7:30 AM [facility]

## 2022-08-01 RX ORDER — LISINOPRIL 20 MG/1
TABLET ORAL
Qty: 90 TABLET | Refills: 3 | Status: SHIPPED | OUTPATIENT
Start: 2022-08-01

## 2022-08-02 ENCOUNTER — CARE COORDINATION (OUTPATIENT)
Dept: OTHER | Facility: CLINIC | Age: 62
End: 2022-08-02

## 2022-08-02 NOTE — CARE COORDINATION
Ambulatory Care Coordination Note  8/2/2022    ACC: Lilia Hubbard, RN    Summary Note: ACM attempted third and final call to patient for introduction to Associate Care Management. HIPAA compliant message left requesting a return phone call at patients convenience. Final Unable to Reach Letter sent via [employer]. No further outreach scheduled with this ACM, ACM will sign off care team at this time. Patient has been provided with this ACM's contact information.        Future Appointments   Date Time Provider Sandee Esquivel   8/11/2022  9:00 AM [address] [place] [address], APRN N PAD HEMONC [facility]   8/11/2022  9:20 AM SCHEDULE, L MED ONC MA L MED ONC Nadia [facility]   8/26/2022  9:00 AM EMILIANO Bay NP [facility]   9/2/2022  7:30 AM MD DAMIÁN Aranda [facility]

## 2022-08-10 DIAGNOSIS — D64.9 NORMOCYTIC ANEMIA: Primary | ICD-10-CM

## 2022-08-10 ASSESSMENT — ENCOUNTER SYMPTOMS
WHEEZING: 0
EYE DISCHARGE: 0
SORE THROAT: 0
TROUBLE SWALLOWING: 0
CONSTIPATION: 0
SHORTNESS OF BREATH: 0
EYE ITCHING: 0
DIARRHEA: 0
COUGH: 0
NAUSEA: 0
VOMITING: 0

## 2022-08-10 NOTE — PROGRESS NOTES
Progress Note      Pt Name: Prosper Putnam  YOB: 1960  MRN: [id number]    Date of evaluation: 8/11/2022  History Obtained From:  Patient, EMR    Portions of this note have been copied forward, however, changed to reflect the most current clinical status of this patient. Chief Complaint   Patient presents with    Follow-up     History of pulmonary embolus (PE)     HISTORY OF PRESENT ILLNESS:    Prosper Putnam is a 78-year-old  female returning to the clinic in annual follow-up regarding her history of unprovoked PE dating to 9/2/2020. Brina received catheter directed thrombolysis. 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 Mohini Hernadez continues Eliquis 5 mg po BID. Eliquis is managed by EMILIANO Estrada/Dr. Jose Smith. Brina self injects monthly B12 is also monitored for a history of pernicious anemia. Hgb is stable, 14.1. She was admitted to [facility] 4/2022 treated for pancreatitis related to gallstone with subsequent cholecystectomy. Mohini Hernadez was evaluated at the emergency department 6/30/2022 and again on 7/22/2022 for abdominal discomfort. She is scheduled to see gastroenterologist, Dr. Isabella Pimentel in 2 weeks. She is also scheduled to see PCP, Dr. Sienna Quiles in a couple of weeks. HEMATOLOGY HISTORY: Unprovoked PE, subacute LLE DVT, 9/2/2020    Diagnosis  · Unprovoked pulmonary embolism, severe 9/2/2020  · Subacute LLE DVT     Treatment summary  · 9/2/2020-catheter directed thrombolysis-EKOS     Prosper Putnam was seen in hematology consultation on 9/3/2020 as an inpatient consultation at [facility].  She presented to the hospital after outpatient imaging showed pulmonary embolus and right-sided heart strain. She had complaint of dyspnea since April 2020. Mohini Hernadez denies a personal history of VTE. She is a former smoker, having quit at the age of 26.   Previously on estradiol, which was completed at least 1 year prior to PE in September, 2020. Janet Faye retired in July, 2020. She admits to being rather sedentary, working at a desk. She is more active since her senior living. Janet Faye had also endured a 2-hour flight from [place] in July, 2020. She reports her mother had a history of miscarriages. She was admitted to [facility] ICU 9/2/2020. Duplex lower extremity showed subacute DVT in the LLE. 2D echo showed global systolic function severely reduced and severely dilated right ventricle. · 9/2/2020- CTA showed significant burden of pulmonary emboli in both lungs with RV strain. · 9/2/2020-BLE venous Doppler with evidence of subacute DVT in the LLE involving the peroneal veins  · 9/2/2020-2D echo showed severely reduced right ventricular global systolic function. Severely dilated right ventricle. Severe pulmonary hypertension. Flattening of the septum along with paradoxical motion. LVEF 75%.   · 9/2/2020- catheter directed thrombolysis-EKOS  · 9/3/2020-Heparin drip x24 hours, followed by Lovenox 70 mg subcut q12 hours  · Eliquis 10 mg po BID x 7 days, followed by 5 mg BID thereafter, initiated 9/17/2020 (prescribed by Dr. Willam Smith)    Labs 9/4/2020:  Lupus Anticoagulant: not detected  Beta-2 glycoprotein IGM & IgG: negative  Cardiolipin antibodies IgM & IgG: negative     Labs 9/18/2020:  JAK2: Negative for V617F, exon 12, MPL, CALR and KIT mutations  FLOW cytometry: Negative for evidence of PNH population  Factor V Leiden: Heterozygous for single or 506Q mutation  Factor II: Heterozygous [id number]-[id number] mutation  MTHFR: Heterozygous [id number] variant (typically not associated with increased risk of thrombosis)  Homocysteine: 13.6, normal  Antithrombin activity: Elevated at 167% (no known clinical significance)  Functional protein C: 128%  Free protein S: 123%    Recommend long-term anticoagulation  Recommend discussing genetic variants with family, which was discussed    Recommend age-appropriate health maintenance screenings. Bilateral mammograms 8/21/2020 at [address]: BI-RADS Category 2, arrangements per PCP  Colonoscopy 11/12/2018 by Dr. Sanna Diane.  Recall 5 years  Ovarian screening: Ashley Box has a history of RIGOBERTO, BSO 12/15/2011 and no longer has GYN exams    Past Medical History:   Diagnosis Date    Cervical disc disease 8/26/2017    DVT (deep venous thrombosis) (HCC)     Essential hypertension 12/1/2019    Gastroesophageal reflux disease without esophagitis 8/26/2017    Hyperlipidemia     Laryngeal polyp     followed by Cecilia Martinez PA-C    Major depressive disorder in remission ([facility] [address] 8/26/2017    Migraine without aura 8/26/2017    Pernicious anemia 8/26/2017    Raynaud's phenomenon 8/26/2017     Past Surgical History:   Procedure Laterality Date    ARTERIOGRAM Bilateral 9/2/2020    ECOS-PULMONARY performed by Po Joel MD at [address], LAPAROSCOPIC N/A 4/27/2022    laparoscopic robot assisted cholecystectomy with ICG performed by Kosta Zafar MD at [address] (CERVIX REMOVED)       Current Outpatient Medications   Medication Sig Dispense Refill    lisinopril (PRINIVIL;ZESTRIL) 20 MG tablet TAKE ONE TABLET ONE TIME DAILY 90 tablet 3    cyanocobalamin 1000 MCG/ML injection INJECT 1ML INTO THE MUSCLE EVERY 30 DAYS 3 mL 1    ondansetron (ZOFRAN ODT) 4 MG disintegrating tablet Take 1 tablet by mouth every 8 hours as needed for Nausea or Vomiting 20 tablet 0    Wheat Dextrin (BENEFIBER) POWD Take 4 g by mouth 3 times daily (with meals) 144 g 0    Oxymetazoline HCl (RHOFADE) 1 % CREA Apply topically daily       hydroCHLOROthiazide (HYDRODIURIL) 25 MG tablet TAKE ONE TABLET ONCE A DAY IN THE MORNING (Patient taking differently: 12.5 mg) 90 tablet 3    omeprazole (PRILOSEC) 40 MG delayed release capsule Take 1 capsule by mouth nightly 30 capsule 1    ELIQUIS 5 MG TABS tablet TAKE ONE TABLET BY MOUTH TWO TIMES A  tablet 0    desvenlafaxine succinate (PRISTIQ) 50 MG TB24 extended release tablet TAKE ONE TABLET ONE TIME DAILY 90 tablet 3    nortriptyline (PAMELOR) 10 MG capsule TAKE TWO CAPSULES EVERY EVENING AT BEDTIME 180 capsule 3    rizatriptan (MAXALT) 10 MG tablet May repeat in 2 hours if needed do not exceed 2 doses in 24 hours 9 tablet 5    calcium carbonate (OSCAL) 500 MG TABS tablet Take 500 mg by mouth daily      Cholecalciferol (VITAMIN D) 50 MCG (2000 UT) CAPS capsule Take 2,000 Units by mouth daily      Multiple Vitamins-Minerals (PRESERVISION AREDS 2+MULTI VIT PO) Take 1 tablet by mouth 2 times daily      tiZANidine (ZANAFLEX) 4 MG tablet Take 1 tablet by mouth every 12 hours as needed (muscle spasms) 180 tablet 1     No current facility-administered medications for this visit. Allergies   Allergen Reactions    Demerol  [Meperidine Hcl]      Other reaction(s): Unknown    Pcn [Penicillins] Rash    Versed [Midazolam] Nausea And Vomiting     Social History     Tobacco Use    Smoking status: Former     Packs/day: 0.50     Years: 25.00     Pack years: 12.50     Types: Cigarettes     Quit date:      Years since quittin.6    Smokeless tobacco: Never   Vaping Use    Vaping Use: Never used   Substance Use Topics    Alcohol use: Yes     Comment: rare    Drug use: No     Family History   Problem Relation Age of Onset    High Blood Pressure Mother     Coronary Art Dis Mother     Thyroid Disease Mother         Hypothyroidism    Breast Cancer Mother 80    Colon Polyps Father     Prostate Cancer Father     Anemia Father         Pernicious    Cancer Father         bladder cancer     Colon Polyps Sister     Anemia Brother         Pernicious    Cancer Brother 46        prostate cancer, bladder cancer    High Blood Pressure Paternal Grandmother      Review of Systems   Constitutional:  Negative for fatigue and fever. HENT:  Negative for dental problem, hearing loss, mouth sores, nosebleeds, sore throat and trouble swallowing. Eyes:  Negative for discharge and itching. Respiratory:  Negative for cough, shortness of breath and wheezing. Cardiovascular:  Negative for chest pain, palpitations and leg swelling. Gastrointestinal:  Positive for abdominal pain (RUQ). Negative for constipation, diarrhea, nausea and vomiting. Endocrine: Negative for cold intolerance and heat intolerance. Genitourinary:  Negative for dysuria, frequency, hematuria and urgency. Musculoskeletal:  Negative for arthralgias, joint swelling and myalgias. Skin:  Negative for pallor and rash. Allergic/Immunologic: Negative for environmental allergies and immunocompromised state. Neurological:  Negative for seizures, syncope and numbness. Hematological:  Negative for adenopathy. Does not bruise/bleed easily. Psychiatric/Behavioral:  Negative for agitation, behavioral problems, confusion and suicidal ideas. The patient is not nervous/anxious. Physical Exam  Vitals reviewed. Constitutional:       General: She is not in acute distress. Appearance: She is well-developed. She is not toxic-appearing or diaphoretic. Comments: Wearing a facial mask. HENT:      Head: Normocephalic and atraumatic. Right Ear: External ear normal.      Left Ear: External ear normal.      Nose: Nose normal.      Mouth/Throat:      Mouth: Mucous membranes are moist.   Eyes:      General: No scleral icterus. Right eye: No discharge. Left eye: No discharge. Conjunctiva/sclera: Conjunctivae normal.   Neck:      Trachea: No tracheal deviation. Cardiovascular:      Rate and Rhythm: Normal rate and regular rhythm. Heart sounds: No murmur heard. Pulmonary:      Effort: Pulmonary effort is normal. No respiratory distress. Breath sounds: Normal breath sounds. No wheezing or rales. Chest:      Chest wall: No tenderness. Breasts:     Right: No supraclavicular adenopathy. Left: No supraclavicular adenopathy.    Abdominal:      General: Bowel sounds are normal. There is no distension. Palpations: Abdomen is soft. There is no mass. Tenderness: There is abdominal tenderness. There is no guarding. Genitourinary:     Comments: Exam deferred  Musculoskeletal:         General: No tenderness or deformity. Cervical back: Neck supple. No muscular tenderness. Comments: Normal ROM all four extremities   Lymphadenopathy:      Cervical: No cervical adenopathy. Right cervical: No superficial or deep cervical adenopathy. Left cervical: No superficial or deep cervical adenopathy. Upper Body:      Right upper body: No supraclavicular adenopathy. Left upper body: No supraclavicular adenopathy. Comments: No bulky palpable cervical, clavicular, or axillary adenopathies on the left or right. Skin:     General: Skin is warm and dry. Findings: No rash. Neurological:      Mental Status: She is alert and oriented to person, place, and time. Comments: follows commands, non-focal   Psychiatric:         Behavior: Behavior normal. Behavior is cooperative. Thought Content: Thought content normal.         Judgment: Judgment normal.      Comments: Alert and oriented to person, place and time. Vitals:    08/11/22 0904   BP: 92/62   Pulse: 88   SpO2: 98%   Weight: 142 lb 6.4 oz (64.6 kg)   Height: 5' 7\" (1.702 m)      Wt Readings from Last 3 Encounters:   08/11/22 142 lb 6.4 oz (64.6 kg)   06/30/22 144 lb (65.3 kg)   05/12/22 144 lb 6.4 oz (65.5 kg)     LABS:  CBC 8/11/2022:   Lab Results   Component Value Date    WBC 5.90 08/11/2022    HGB 13.4 08/11/2022    HCT 42.3 08/11/2022    MCV 83.9 08/11/2022     (L) 08/11/2022    LABLYMP 1.74 04/08/2013    LYMPHOPCT 33.9 08/11/2022    RBC 5.04 08/11/2022    MCH 26.6 08/11/2022    MCHC 31.7 (L) 08/11/2022    RDW 15.3 (H) 08/11/2022     ASSESSMENT/PLAN:    1. History of pulmonary embolus (PE)    2. History of deep venous thrombosis (DVT) of distal vein of left lower extremity    3.  Anticoagulated 4. Heterozygous factor V Leiden mutation ([address]    5. 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 Pernicious anemia    7. RUQ abdominal pain      #Pulmonary embolism, unprovoked event  #Anticoagulated  Prothrombotic work-up 9/18/2020 revealed  Factor V Leiden: Heterozygous for single or 506Q mutation  Factor II: Heterozygous [id number]-[id number] mutation    CTA chest contrast 10/2/2020: Previously identified multifocal pulmonary embolus has continued to clear, with very little residual embolus identified. No new embolus. Developing medial segment RML scar at the site of prior consolidation/infarct    Recommend long-term anticoagulation  continue Eliquis 5 mg p.o. BID as prescribed by vascular surgery. No s/s to suggest recurrent/acute thrombosis    #Pernicious anemia  Hgb improved, 13.4/MCV 83.9  Continues monthly B12 injection    #RUQ abdominal pain  Albert Ruiz presented to [address] [place] [address] ER on 4/25/22 with acute  pancreatitis and acute cholecystitis. Lap-assisted cholecystectomy performed by Dr Vicki Penny on 4/27/22 with subsequent discharge on 4/28/2022. To [facility] ER on 6/30/22 and again 7/22/22 for acute diverticulitis around the hepatic flexure of the colon. Scheduled as a new patient with [facility] on 8/26/22 for evaluation for colonoscopy. Recommend routine, age-appropriate cancer screenings. Health maintenance:  Bilateral mammograms 8/21/2020 at [facility]: BI-RADS Category 2, arranged by Dr. Jo Johnson  Colonoscopy 11/12/2018 by Dr. Maura Durbin. Recall 5 years  Ovarian screening: Albert Ruiz has a history of RIGOBERTO, BSO 12/15/2011 and no longer has GYN exams    Return in about 1 year (around 8/11/2023) for follow up with Jun EMILIANO Mcintosh. I have seen, examined and reviewed this patient medication list, appropriate labs and imaging studies. I reviewed relevant medical records and others physicians notes. I discussed the plans of care with the patient. I answered all the questions to the patients satisfaction.  I have also reviewed the chief complaint (CC) and part of the history (History of Present Illness (HPI), Past Family Social History [facility]), or Review of Systems (ROS) and made changes when appropriated. (Please note that portions of this note were completed with a voice recognition program. Efforts were made to edit the dictations but occasionally words are mis-transcribed.)    The total time (20 min) I spent to see the patient today includes at least one or more of the following: preparing to see the patient by reviewing prior tests, prior notes or other relevant information, performing appropriate independent examination and evaluation, counseling, ordering of medications, tests or procedures, referrals, communicating with other healthcare professionals when appropriated to coordinate care, documenting clinic information in the electronic medical record or other health records, independently interpreting results of tests, managing test results and communicating the results to the patient/family or caregiver.           Efren Nino, APRN  9:59 AM  8/13/2022

## 2022-08-11 ENCOUNTER — HOSPITAL ENCOUNTER (OUTPATIENT)
Dept: INFUSION THERAPY | Age: 62
Discharge: HOME | End: 2022-08-11
Payer: COMMERCIAL

## 2022-08-11 ENCOUNTER — OFFICE VISIT (OUTPATIENT)
Dept: HEMATOLOGY | Age: 62
End: 2022-08-11
Payer: COMMERCIAL

## 2022-08-11 VITALS
HEIGHT: 67 IN | BODY MASS INDEX: 22.35 KG/M2 | SYSTOLIC BLOOD PRESSURE: 92 MMHG | DIASTOLIC BLOOD PRESSURE: 62 MMHG | WEIGHT: 142.4 LBS | OXYGEN SATURATION: 98 % | HEART RATE: 88 BPM

## 2022-08-11 DIAGNOSIS — Z86.711 HISTORY OF PULMONARY EMBOLUS (PE): Primary | ICD-10-CM

## 2022-08-11 DIAGNOSIS — D64.9 NORMOCYTIC ANEMIA: ICD-10-CM

## 2022-08-11 DIAGNOSIS — Z79.01 ANTICOAGULATED: ICD-10-CM

## 2022-08-11 DIAGNOSIS — D51.0 PERNICIOUS ANEMIA: ICD-10-CM

## 2022-08-11 DIAGNOSIS — D68.52 HETEROZYGOUS FOR PROTHROMBIN G20210A MUTATION (HCC): ICD-10-CM

## 2022-08-11 DIAGNOSIS — Z86.718 HISTORY OF DEEP VENOUS THROMBOSIS (DVT) OF DISTAL VEIN OF LEFT LOWER EXTREMITY: ICD-10-CM

## 2022-08-11 DIAGNOSIS — D68.51 HETEROZYGOUS FACTOR V LEIDEN MUTATION (HCC): ICD-10-CM

## 2022-08-11 DIAGNOSIS — R10.11 RUQ ABDOMINAL PAIN: ICD-10-CM

## 2022-08-11 LAB
BASOPHILS ABSOLUTE: 0.03 K/UL (ref 0.01–0.08)
BASOPHILS RELATIVE PERCENT: 0.5 % (ref 0.1–1.2)
EOSINOPHILS ABSOLUTE: 0.4 K/UL (ref 0.04–0.54)
EOSINOPHILS RELATIVE PERCENT: 6.8 % (ref 0.7–7)
HCT VFR BLD CALC: 42.3 % (ref 34.1–44.9)
HEMOGLOBIN: 13.4 G/DL (ref 11.2–15.7)
LYMPHOCYTES ABSOLUTE: 2 K/UL (ref 1.18–3.74)
LYMPHOCYTES RELATIVE PERCENT: 33.9 % (ref 19.3–53.1)
MCH RBC QN AUTO: 26.6 PG (ref 25.6–32.2)
MCHC RBC AUTO-ENTMCNC: 31.7 G/DL (ref 32.3–35.5)
MCV RBC AUTO: 83.9 FL (ref 79.4–94.8)
MONOCYTES ABSOLUTE: 0.46 K/UL (ref 0.24–0.82)
MONOCYTES RELATIVE PERCENT: 7.8 % (ref 4.7–12.5)
NEUTROPHILS ABSOLUTE: 2.99 K/UL (ref 1.56–6.13)
NEUTROPHILS RELATIVE PERCENT: 50.7 % (ref 34–71.1)
PDW BLD-RTO: 15.3 % (ref 11.7–14.4)
PLATELET # BLD: 169 K/UL (ref 182–369)
PMV BLD AUTO: 11.7 FL (ref 7.4–10.4)
RBC # BLD: 5.04 M/UL (ref 3.93–5.22)
WBC # BLD: 5.9 K/UL (ref 3.98–10.04)

## 2022-08-11 PROCEDURE — 99213 OFFICE O/P EST LOW 20 MIN: CPT | Performed by: NURSE PRACTITIONER

## 2022-08-11 PROCEDURE — 85025 COMPLETE CBC W/AUTO DIFF WBC: CPT

## 2022-08-11 PROCEDURE — 99212 OFFICE O/P EST SF 10 MIN: CPT

## 2022-08-13 ASSESSMENT — ENCOUNTER SYMPTOMS: ABDOMINAL PAIN: 1

## 2022-08-22 RX ORDER — NORTRIPTYLINE HYDROCHLORIDE 10 MG/1
CAPSULE ORAL
Qty: 180 CAPSULE | Refills: 1 | Status: SHIPPED | OUTPATIENT
Start: 2022-08-22

## 2022-08-23 RX ORDER — APIXABAN 5 MG/1
TABLET, FILM COATED ORAL
Qty: 180 TABLET | Refills: 1 | Status: SHIPPED | OUTPATIENT
Start: 2022-08-23

## 2022-08-25 ASSESSMENT — ENCOUNTER SYMPTOMS
ABDOMINAL DISTENTION: 0
TROUBLE SWALLOWING: 0
DIARRHEA: 0
VOMITING: 0
ABDOMINAL PAIN: 0
NAUSEA: 0
RECTAL PAIN: 0
CHOKING: 0
ANAL BLEEDING: 0
CONSTIPATION: 0
SHORTNESS OF BREATH: 0
BLOOD IN STOOL: 0
COUGH: 0

## 2022-08-25 NOTE — PROGRESS NOTES
2022    TELEHEALTH EVALUATION -- Audio/Visual ([facility])    HPI:    Nadeen Kim (:  1960) has requested an audio/video evaluation for the following concern(s):  Chief Complaint   Patient presents with    Abdominal Pain       Pt seen today for follow up after ER visit on 22. Pt presented to ER last month with c/o abdominal pain. CT noted right colonic flexure acute diverticulitis. Pt was discharged on Cipro and Flagyl. This was her second flare in the last 2 months. Today, pt denies any further abdominal pain. She reports occasional constipation. Denies any blood in stool or rectal bleeding. Last Colonoscopy 2018 - no polyps   Family hx colon polyps - Father, Sister    Review of Systems   Constitutional:  Negative for activity change, appetite change, fatigue, fever and unexpected weight change. HENT:  Negative for trouble swallowing. Respiratory:  Negative for cough, choking and shortness of breath. Cardiovascular:  Negative for chest pain. Gastrointestinal:  Negative for abdominal distention, abdominal pain, anal bleeding, blood in stool, constipation, diarrhea, nausea, rectal pain and vomiting. Allergic/Immunologic: Negative for food allergies. All other systems reviewed and are negative. Prior to Visit Medications    Medication Sig Taking?  Authorizing Provider   NONFORMULARY nightly as needed CBD Gummies  Historical Provider, MD   ELIQUSIMONE 5 MG TABS tablet TAKE 1 TABLET BY MOUTH 2 TIMES A DAY  EMILIANO Pinto   nortriptyline (PAMELOR) 10 MG capsule TAKE 2 CAPSULES BY MOUTH NIGHTLY  Susana Luna MD   lisinopril (PRINIVIL;ZESTRIL) 20 MG tablet TAKE ONE TABLET ONE TIME DAILY  Susana Luna MD   cyanocobalamin 1000 MCG/ML injection INJECT 1ML INTO THE MUSCLE EVERY 30 DAYS  Susana Luna MD   ondansetron (ZOFRAN ODT) 4 MG disintegrating tablet Take 1 tablet by mouth every 8 hours as needed for Nausea or Vomiting  Kaur Medina Eric Squires MD   omeprazole (PRILOSEC) 40 MG delayed release capsule Take 1 capsule by mouth nightly  Joe Martins PA-C   desvenlafaxine succinate (PRISTIQ) 50 MG TB24 extended release tablet TAKE ONE TABLET ONE TIME DAILY  Susana Luna MD   rizatriptan (MAXALT) 10 MG tablet May repeat in 2 hours if needed do not exceed 2 doses in 24 hours  Susana Luna MD   calcium carbonate (OSCAL) 500 MG TABS tablet Take 500 mg by mouth daily  Historical Provider, MD   Cholecalciferol (VITAMIN D) 50 MCG (2000) CAPS capsule Take 2,000 Units by mouth daily  Historical Provider, MD   Multiple Vitamins-Minerals (PRESERVISION AREDS 2+MULTI VIT PO) Take 1 tablet by mouth 2 times daily  Historical Provider, MD   tiZANidine (ZANAFLEX) 4 MG tablet Take 1 tablet by mouth every 12 hours as needed (muscle spasms)  Susana Luna MD       Social History     Tobacco Use    Smoking status: Former     Years: 25.00     Types: Cigarettes     Quit date:      Years since quittin.6    Smokeless tobacco: Never    Tobacco comments:     CBD prn at night    Vaping Use    Vaping Use: Never used   Substance Use Topics    Alcohol use: Yes     Comment: rare    Drug use: No        Allergies   Allergen Reactions    Demerol  [Meperidine Hcl]      Other reaction(s): Unknown    Pcn [Penicillins] Rash    Versed [Midazolam] Nausea And Vomiting   ,   Past Medical History:   Diagnosis Date    Cervical disc disease 2017    DVT (deep venous thrombosis) ([facility]     Essential hypertension 2019    Gastroesophageal reflux disease without esophagitis 2017    Hyperlipidemia     Laryngeal polyp     followed by Tess Olvera PA-C    Major depressive disorder in remission ([facility] 2017    Migraine without aura 2017    Pernicious anemia 2017    Raynaud's phenomenon 2017   ,   Past Surgical History:   Procedure Laterality Date    ARTERIOGRAM Bilateral 2020    ECOS-PULMONARY performed by Lilian Dobbs MD at [address] [place] [address] CHOLECYSTECTOMY, LAPAROSCOPIC N/A 2022    laparoscopic robot assisted cholecystectomy with ICG performed by Kosta Zafar MD at [facility] (CERVIX REMOVED)     ,   Social History     Tobacco Use    Smoking status: Former     Years: 25.00     Types: Cigarettes     Quit date:      Years since quittin.6    Smokeless tobacco: Never    Tobacco comments:     CBD prn at night    Vaping Use    Vaping Use: Never used   Substance Use Topics    Alcohol use: Yes     Comment: rare    Drug use: No   ,   Family History   Problem Relation Age of Onset    High Blood Pressure Mother     Coronary Art Dis Mother     Thyroid Disease Mother         Hypothyroidism    Breast Cancer Mother 80    Colon Polyps Father     Prostate Cancer Father     Anemia Father         Pernicious    Cancer Father         bladder cancer     Colon Polyps Sister     Anemia Brother         Pernicious    Cancer Brother 46        prostate cancer, bladder cancer    High Blood Pressure Paternal Grandmother     Esophageal Cancer Neg Hx     Liver Cancer Neg Hx     Rectal Cancer Neg Hx     Stomach Cancer Neg Hx     Colon Cancer Neg Hx        PHYSICAL EXAMINATION:  [ INSTRUCTIONS:  \"[x]\" Indicates a positive item  \"[]\" Indicates a negative item  -- DELETE ALL ITEMS NOT EXAMINED]  Vital Signs: (As obtained by patient/caregiver or practitioner observation)    Blood pressure-  Heart rate-    Respiratory rate-    Temperature-  Pulse oximetry-     Constitutional: [x] Appears well-developed and well-nourished [x] No apparent distress      [] Abnormal-   Mental status  [x] Alert and awake  [x] Oriented to person/place/time [x]Able to follow commands      Eyes:  EOM    [x]  Normal  [] Abnormal-  Sclera  [x]  Normal  [] Abnormal -         Discharge [x]  None visible  [] Abnormal -    HENT:   [x] Normocephalic, atraumatic.   [] Abnormal   [x] Mouth/Throat: Mucous membranes are moist.     External Ears [x] Normal  [] Abnormal-     Neck: [x] No visualized pursuant to the emergency declaration under the [facility] authority and the [employer] water systems and [employer] General Act. Patient identification was verified, and a caregiver was present when appropriate. The patient was located at Home: [address] [address]. Provider was located [address] [place]. Total time spent on this encounter: Not billed by time    --EMILIANO Truong NP on 8/26/2022 at 9:12 AM    An electronic signature was used to authenticate this note.

## 2022-08-26 ENCOUNTER — TELEMEDICINE (OUTPATIENT)
Dept: GASTROENTEROLOGY | Age: 62
End: 2022-08-26
Payer: COMMERCIAL

## 2022-08-26 DIAGNOSIS — K57.92 DIVERTICULITIS: Primary | ICD-10-CM

## 2022-08-26 PROCEDURE — 99204 OFFICE O/P NEW MOD 45 MIN: CPT | Performed by: NURSE PRACTITIONER

## 2022-08-26 NOTE — PATIENT INSTRUCTIONS
plenty of clear fluids. It is important   to keep yourself hydrated before the exam.     Please follow all instructions as provided for cleansing the bowel. Failure to have an adequately prepped colon may cause you to have incomplete exam with further testing required. [email or web address]     Schedule colonoscopy. No aspirin, ibuprofen, naproxen, fish oil or vitamin E for 5 days before procedure. Do not eat or drink after midnight the day of the procedure. Allowed medications can be taken with a small sip of water. Please review your prep instructions for allowed medications. You will not be able to drive for 24 hours after the procedure due to sedation. You must have a responsible adult, 25 year or older, to accompany you and drive you home the day of your procedure. If you are on blood thinners, clearance from the prescribing physician will be obtained before your procedure is scheduled. If it is determined it is not safe to hold these medications for a short time an alternative procedure for evaluation may be recommended. Risks of colonoscopy include, but are not limited to, perforation, bleeding, and infection, Risk of perforation and bleeding are increased if there is a polyp removed. Anesthesia risks will be reviewed with you before the procedure by a member of the anesthesia department. Your physician may also schedule a follow up appointment with the nurse practitioner to discuss pathology, symptoms or to check if you have had any problems related to your procedure. If you prefer not to return to the office after your procedure please discuss this with your physician on the day of your colonoscopy. The physician will talk with you and/or your family after the procedure is completed. Final recommendations are based on the pathologist report if biopsies or specimens are taken.      If polyps are removed during the procedure they will be sent to a pathologist for analysis. Unless you have a follow up appointment scheduled, you will be notified by mail of the pathology results within 4 weeks. If you have not received results after 4 weeks you may call the office to obtain this information. For Colonoscopy: You will be given specific directions regarding restrictions to diet and bowel prep instructions including laxatives. Please read these instructions one week prior to your scheduled procedure to ensure that you are prepared. If you have any questions regarding these instructions please call our office Mon through Fri from 8:00 am to 4:00 pm.     Follow prep instructions provided for bowel prep. Take all of the bowel prep as directed. If you are having problems with nausea, stop your prep for 30-45 min to allow the nausea to subside before resuming your prep. It is important to drink plenty of fluids throughout the day before taking your laxatives. This will help to protect your kidneys, prevent dehydration and maximize the effect of the bowel prep. Your diet before a colonoscopy bowel preparation is very important to ensure a successful colon exam. It is recommended to consider certain changes to your diet three to four days prior to the procedure. Remember that your bowels need to be completely empty for the exam.    What foods are good to eat? Cut down on heavy solid foods three to four days before the procedure and start introducing lighter meals to your diet. The following food suggestions are a good part of your diet before a colonoscopy bowel preparation. Light meat that is easily digestible such as chicken (without the skin)   Potatoes without skin   Cheese   Eggs   A light meal of steamed white fish   Light clear soups    Foods and drinks to avoid  Avoid foods that contain too much fiber. Stay clear of dark colored beverages. They can stick to the walls of the digestive tract and make it difficult to differentiate from blood.  Some of these foods are:  Red meat, rice, nuts and vegetables   Milk, other milk based fluids and cream   Most fruit and puddings   Whole grain pasta   Cereals, bran and seeds   Colored beverages, especially those that are red or purple in color   Red colored Jell-O     On the day before the colonoscopy, continue to drink plenty of clear fluids. It is important   to keep yourself hydrated before the exam.     Please follow all instructions as provided for cleansing the bowel. Failure to have an adequately prepped colon may cause you to have incomplete exam with further testing required.      [email or web address]

## 2022-09-02 ENCOUNTER — OFFICE VISIT (OUTPATIENT)
Dept: INTERNAL MEDICINE | Age: 62
End: 2022-09-02
Payer: COMMERCIAL

## 2022-09-02 VITALS
WEIGHT: 141.2 LBS | SYSTOLIC BLOOD PRESSURE: 104 MMHG | HEIGHT: 67 IN | DIASTOLIC BLOOD PRESSURE: 60 MMHG | OXYGEN SATURATION: 97 % | BODY MASS INDEX: 22.16 KG/M2 | HEART RATE: 86 BPM

## 2022-09-02 DIAGNOSIS — Z00.00 ANNUAL PHYSICAL EXAM: Primary | ICD-10-CM

## 2022-09-02 DIAGNOSIS — E78.2 MIXED HYPERLIPIDEMIA: ICD-10-CM

## 2022-09-02 DIAGNOSIS — I82.90 VTE (VENOUS THROMBOEMBOLISM): ICD-10-CM

## 2022-09-02 DIAGNOSIS — I26.02 ACUTE SADDLE PULMONARY EMBOLISM WITH ACUTE COR PULMONALE (HCC): ICD-10-CM

## 2022-09-02 DIAGNOSIS — F34.1 DYSTHYMIA: ICD-10-CM

## 2022-09-02 DIAGNOSIS — I10 ESSENTIAL HYPERTENSION: ICD-10-CM

## 2022-09-02 DIAGNOSIS — D51.0 PERNICIOUS ANEMIA: Chronic | ICD-10-CM

## 2022-09-02 DIAGNOSIS — I73.00 RAYNAUD'S PHENOMENON WITHOUT GANGRENE: Chronic | ICD-10-CM

## 2022-09-02 LAB
ALBUMIN SERPL-MCNC: 4.5 G/DL (ref 3.5–5.2)
ALP BLD-CCNC: 84 U/L (ref 35–104)
ALT SERPL-CCNC: 30 U/L (ref 5–33)
ANION GAP SERPL CALCULATED.3IONS-SCNC: 13 MMOL/L (ref 7–19)
AST SERPL-CCNC: 26 U/L (ref 5–32)
BILIRUB SERPL-MCNC: 0.3 MG/DL (ref 0.2–1.2)
BUN BLDV-MCNC: 19 MG/DL (ref 8–23)
CALCIUM SERPL-MCNC: 9.7 MG/DL (ref 8.8–10.2)
CHLORIDE BLD-SCNC: 102 MMOL/L (ref 98–111)
CHOLESTEROL, TOTAL: 198 MG/DL (ref 160–199)
CO2: 26 MMOL/L (ref 22–29)
CREAT SERPL-MCNC: 0.9 MG/DL (ref 0.5–0.9)
GFR AFRICAN AMERICAN: >59
GFR NON-AFRICAN AMERICAN: >60
GLUCOSE BLD-MCNC: 89 MG/DL (ref 74–109)
HCT VFR BLD CALC: 40.8 % (ref 37–47)
HDLC SERPL-MCNC: 55 MG/DL (ref 65–121)
HEMOGLOBIN: 12.7 G/DL (ref 12–16)
LDL CHOLESTEROL CALCULATED: 129 MG/DL
MCH RBC QN AUTO: 25.9 PG (ref 27–31)
MCHC RBC AUTO-ENTMCNC: 31.1 G/DL (ref 33–37)
MCV RBC AUTO: 83.1 FL (ref 81–99)
PDW BLD-RTO: 15.5 % (ref 11.5–14.5)
PLATELET # BLD: 231 K/UL (ref 130–400)
PMV BLD AUTO: 11.9 FL (ref 9.4–12.3)
POTASSIUM SERPL-SCNC: 4.5 MMOL/L (ref 3.5–5)
RBC # BLD: 4.91 M/UL (ref 4.2–5.4)
SODIUM BLD-SCNC: 141 MMOL/L (ref 136–145)
TOTAL PROTEIN: 6.5 G/DL (ref 6.6–8.7)
TRIGL SERPL-MCNC: 72 MG/DL (ref 0–149)
TSH SERPL DL<=0.05 MIU/L-ACNC: 0.89 UIU/ML (ref 0.27–4.2)
WBC # BLD: 5.9 K/UL (ref 4.8–10.8)

## 2022-09-02 PROCEDURE — 99396 PREV VISIT EST AGE 40-64: CPT | Performed by: INTERNAL MEDICINE

## 2022-09-02 ASSESSMENT — PATIENT HEALTH QUESTIONNAIRE - PHQ9
8. MOVING OR SPEAKING SO SLOWLY THAT OTHER PEOPLE COULD HAVE NOTICED. OR THE OPPOSITE, BEING SO FIGETY OR RESTLESS THAT YOU HAVE BEEN MOVING AROUND A LOT MORE THAN USUAL: 0
SUM OF ALL RESPONSES TO PHQ QUESTIONS 1-9: 0
5. POOR APPETITE OR OVEREATING: 0
3. TROUBLE FALLING OR STAYING ASLEEP: 0
9. THOUGHTS THAT YOU WOULD BE BETTER OFF DEAD, OR OF HURTING YOURSELF: 0
4. FEELING TIRED OR HAVING LITTLE ENERGY: 0
SUM OF ALL RESPONSES TO PHQ QUESTIONS 1-9: 0
6. FEELING BAD ABOUT YOURSELF - OR THAT YOU ARE A FAILURE OR HAVE LET YOURSELF OR YOUR FAMILY DOWN: 0
SUM OF ALL RESPONSES TO PHQ QUESTIONS 1-9: 0
SUM OF ALL RESPONSES TO PHQ QUESTIONS 1-9: 0
10. IF YOU CHECKED OFF ANY PROBLEMS, HOW DIFFICULT HAVE THESE PROBLEMS MADE IT FOR YOU TO DO YOUR WORK, TAKE CARE OF THINGS AT HOME, OR GET ALONG WITH OTHER PEOPLE: 0
SUM OF ALL RESPONSES TO PHQ9 QUESTIONS 1 & 2: 0
1. LITTLE INTEREST OR PLEASURE IN DOING THINGS: 0
2. FEELING DOWN, DEPRESSED OR HOPELESS: 0
7. TROUBLE CONCENTRATING ON THINGS, SUCH AS READING THE NEWSPAPER OR WATCHING TELEVISION: 0

## 2022-09-02 NOTE — PROGRESS NOTES
Chief Complaint   Patient presents with    Annual Exam       HPI: Patient is here today for annual exam.  This past year or so she has had a pulmonary embolism and DVT. She has had recurrent diverticulitis. Despite all these things she is feeling better. She denies headache chest pressure chest pain dyspnea abdominal pain currently. She is active and eats healthy and exercises.      Past Medical History:   Diagnosis Date    Cervical disc disease 8/26/2017    DVT (deep venous thrombosis) (HCC)     Essential hypertension 12/1/2019    Gastroesophageal reflux disease without esophagitis 8/26/2017    Hyperlipidemia     Laryngeal polyp     followed by Shrada Jones PA-C    Major depressive disorder in remission ([facility] [address] 8/26/2017    Migraine without aura 8/26/2017    Pernicious anemia 8/26/2017    Raynaud's phenomenon 8/26/2017       Past Surgical History:   Procedure Laterality Date    ARTERIOGRAM Bilateral 9/2/2020    ECOS-PULMONARY performed by Zuly Cain MD at [address], LAPAROSCOPIC N/A 4/27/2022    laparoscopic robot assisted cholecystectomy with ICG performed by Elaine Miguel MD at [address] [address] ([address] [facility])         Family History   Problem Relation Age of Onset    High Blood Pressure Mother     Coronary Art Dis Mother     Thyroid Disease Mother         Hypothyroidism    Breast Cancer Mother 80    Colon Polyps Father     Prostate Cancer Father     Anemia Father         Pernicious    Cancer Father         bladder cancer     Colon Polyps Sister     Anemia Brother         Pernicious    Cancer Brother 46        prostate cancer, bladder cancer    High Blood Pressure Paternal Grandmother     Esophageal Cancer Neg Hx     Liver Cancer Neg Hx     Rectal Cancer Neg Hx     Stomach Cancer Neg Hx     Colon Cancer Neg Hx        Social History     Socioeconomic History    Marital status:      Spouse name: Not on file    Number of children: Not on file    Years of education: Not on file Highest education level: Not on file   Occupational History    Not on file   Tobacco Use    Smoking status: Former     Years: 25.00     Types: Cigarettes     Quit date: [address]     Years since quittin.7    Smokeless tobacco: Never    Tobacco comments:     CBD prn at night    Vaping Use    Vaping Use: Never used   Substance and Sexual Activity    Alcohol use: Yes     Comment: rare    Drug use: No    Sexual activity: Not on file   Other Topics Concern    Not on file   Social History Narrative    Not on file     Social Determinants of Health     Financial Resource Strain: Low Risk     Difficulty of Paying Living Expenses: Not very hard   Food Insecurity: No Food Insecurity    Worried About Running Out of Food in the Last Year: Never true    Ran Out of Food in the Last Year: Never true   Transportation Needs: Not on file   Physical Activity: Not on file   Stress: Not on file   Social Connections: Not on file   Intimate Partner Violence: Not on file   Housing Stability: Not on file       Allergies   Allergen Reactions    Demerol  [Meperidine Hcl]      Other reaction(s): Unknown    Pcn [Penicillins] Rash    Versed [Midazolam] Nausea And Vomiting       Current Outpatient Medications   Medication Sig Dispense Refill    NONFORMULARY nightly as needed CBD Gummies      ELIQUIS 5 MG TABS tablet TAKE 1 TABLET BY MOUTH 2 TIMES A  tablet 1    nortriptyline (PAMELOR) 10 MG capsule TAKE 2 CAPSULES BY MOUTH NIGHTLY 180 capsule 1    lisinopril (PRINIVIL;ZESTRIL) 20 MG tablet TAKE ONE TABLET ONE TIME DAILY 90 tablet 3    cyanocobalamin 1000 MCG/ML injection INJECT 1ML INTO THE MUSCLE EVERY 30 DAYS 3 mL 1    ondansetron (ZOFRAN ODT) 4 MG disintegrating tablet Take 1 tablet by mouth every 8 hours as needed for Nausea or Vomiting 20 tablet 0    omeprazole (PRILOSEC) 40 MG delayed release capsule Take 1 capsule by mouth nightly 30 capsule 1    desvenlafaxine succinate (PRISTIQ) 50 MG TB24 extended release tablet TAKE ONE TABLET ONE TIME DAILY 90 tablet 3    rizatriptan (MAXALT) 10 MG tablet May repeat in 2 hours if needed do not exceed 2 doses in 24 hours 9 tablet 5    calcium carbonate (OSCAL) 500 MG TABS tablet Take 500 mg by mouth daily      Cholecalciferol (VITAMIN D) 50 MCG (2000 UT) CAPS capsule Take 2,000 Units by mouth daily      Multiple Vitamins-Minerals (PRESERVISION AREDS 2+MULTI VIT PO) Take 1 tablet by mouth 2 times daily      tiZANidine (ZANAFLEX) 4 MG tablet Take 1 tablet by mouth every 12 hours as needed (muscle spasms) 180 tablet 1     No current facility-administered medications for this visit. Review of Systems   Constitutional:  Negative for chills, fatigue and fever. HENT:  Negative for congestion and sinus pressure. Eyes:  Negative for discharge and redness. Respiratory:  Negative for cough and shortness of breath. Cardiovascular:  Negative for chest pain, palpitations and leg swelling. Gastrointestinal:  Negative for abdominal distention and abdominal pain. Genitourinary:  Negative for dysuria, frequency and urgency. Musculoskeletal:  Negative for arthralgias and back pain. Skin:  Negative for rash and wound. Neurological:  Negative for dizziness, light-headedness and headaches. Psychiatric/Behavioral:  Negative for dysphoric mood and sleep disturbance. The patient is not nervous/anxious.       /60 (Site: Left Upper Arm, Position: Sitting, Cuff Size: Medium Adult)   Pulse 86   Ht 5' 7\" (1.702 m)   Wt 141 lb 3.2 oz (64 kg)   SpO2 97%   BMI 22.12 kg/m²   BP Readings from Last 7 Encounters:   09/02/22 104/60   08/11/22 92/62   07/22/22 131/87   06/30/22 132/77   05/12/22 100/60   05/10/22 (!) 90/50   04/28/22 131/77     Wt Readings from Last 7 Encounters:   09/02/22 141 lb 3.2 oz (64 kg)   08/11/22 142 lb 6.4 oz (64.6 kg)   06/30/22 144 lb (65.3 kg)   05/12/22 144 lb 6.4 oz (65.5 kg)   05/10/22 141 lb (64 kg)   04/25/22 140 lb (63.5 kg)   04/15/22 144 lb (65.3 kg)     BMI Readings from Last 7 Encounters:   09/02/22 22.12 kg/m²   08/11/22 22.30 kg/m²   06/30/22 22.55 kg/m²   05/12/22 22.62 kg/m²   05/10/22 22.08 kg/m²   04/25/22 21.93 kg/m²   04/15/22 22.55 kg/m²     Resp Readings from Last 7 Encounters:   07/22/22 12   06/30/22 16   04/28/22 20   04/27/22 14   04/12/22 18   12/22/21 18   12/03/21 16       Physical Exam  Constitutional:       General: She is not in acute distress. Appearance: Normal appearance. She is well-developed. HENT:      Head: Normocephalic. Right Ear: External ear normal. Tympanic membrane is not injected. Left Ear: External ear normal. Tympanic membrane is not injected. Mouth/Throat:      Pharynx: No oropharyngeal exudate. Eyes:      General: No scleral icterus. Conjunctiva/sclera: Conjunctivae normal.   Neck:      Thyroid: No thyroid mass or thyromegaly. Vascular: No carotid bruit. Cardiovascular:      Rate and Rhythm: Normal rate and regular rhythm. Heart sounds: S1 normal and S2 normal. No murmur heard. No S3 or S4 sounds. Pulmonary:      Effort: Pulmonary effort is normal. No respiratory distress. Breath sounds: Normal breath sounds. No wheezing or rales. Chest:   Breasts:     Right: No supraclavicular adenopathy. Left: No supraclavicular adenopathy. Abdominal:      General: Bowel sounds are normal. There is no distension. Palpations: Abdomen is soft. There is no mass. Tenderness: There is no abdominal tenderness. Musculoskeletal:      Cervical back: Neck supple. Right lower leg: No edema. Left lower leg: No edema. Lymphadenopathy:      Cervical: No cervical adenopathy. Upper Body:      Right upper body: No supraclavicular adenopathy. Left upper body: No supraclavicular adenopathy. Skin:     Findings: No rash. Neurological:      Mental Status: She is alert and oriented to person, place, and time. Cranial Nerves: No cranial nerve deficit.    Psychiatric:         Mood and Affect: Mood normal.       Results for orders placed or performed during the hospital encounter of 08/11/22   CBC with Auto Differential   Result Value Ref Range    WBC 5.90 3.98 - 10.04 K/uL    RBC 5.04 3.93 - 5.22 M/uL    Hemoglobin 13.4 11.2 - 15.7 g/dL    Hematocrit 42.3 34.1 - 44.9 %    MCV 83.9 79.4 - 94.8 fL    MCH 26.6 25.6 - 32.2 pg    MCHC 31.7 (L) 32.3 - 35.5 g/dL    RDW 15.3 (H) 11.7 - 14.4 %    Platelets 781 (L) 622 - 369 K/uL    MPV 11.7 (H) 7.4 - 10.4 fL    Neutrophils % 50.7 34.0 - 71.1 %    Lymphocytes % 33.9 19.3 - 53.1 %    Monocytes % 7.8 4.7 - 12.5 %    Eosinophils % 6.8 0.7 - 7.0 %    Basophils % 0.5 0.1 - 1.2 %    Neutrophils Absolute 2.99 1.56 - 6.13 K/uL    Lymphocytes Absolute 2.00 1. 18 - 3.74 K/uL    Monocytes Absolute 0.46 0.24 - 0.82 K/uL    Eosinophils Absolute 0.40 0.04 - 0.54 K/uL    Basophils Absolute 0.03 0.01 - 0.08 K/uL       ASSESSMENT/ PLAN:  1. Annual physical exam  Chart, medications, labs, vaccines reviewed. Keep up to date with routine care and follow up. Call with any problems or complaints. Keep up to date with routine screening recomendations and vaccines. 2. Essential hypertension  Blood pressure a little low here today watch closely if running this low routinely would cut back her meds for now continue with lisinopril 20 mg low-salt intake increase activity and exercise and follow  - CBC; Future  - Comprehensive Metabolic Panel; Future    3. Mixed hyperlipidemia  We will check her lipid panel reassess her plan of care  - TSH; Future  - Lipid Panel; Future    4. VTE (venous thromboembolism)  History of DVT and bilateral PE long-term anticoagulation now with Eliquis continue current plan of care and follow    5. Acute saddle pulmonary embolism with acute cor pulmonale (HCC)  History of DVT bilateral PE now with Eliquis continue current plan of care and follow-up    6.  Raynaud's phenomenon without gangrene  She was on a calcium channel blocker but it seemed to be exacerbating hot flashes some so we switched her to an ACE inhibitor it was incidental that she had noted the calcium channel blocker seemed to worsen her hot flashes --now her hot flashes are coming back we talked about going back to the calcium channel blocker if the raynauds worsens    7. Pernicious anemia  Continue B12 injection therapy    8. Dysthymia  Continue with effexor and pamelor    Chart, medications, labs, vaccines reviewed. Keep up to date with routine care and follow up. Call with any problems or complaints. Keep up to date with routine screening recomendations and vaccines.

## 2022-09-11 ASSESSMENT — ENCOUNTER SYMPTOMS
COUGH: 0
ABDOMINAL PAIN: 0
EYE REDNESS: 0
BACK PAIN: 0
ABDOMINAL DISTENTION: 0
SINUS PRESSURE: 0
SHORTNESS OF BREATH: 0
EYE DISCHARGE: 0

## 2022-09-13 ENCOUNTER — TELEPHONE (OUTPATIENT)
Dept: VASCULAR SURGERY | Age: 62
End: 2022-09-13

## 2022-09-13 RX ORDER — OMEPRAZOLE 40 MG/1
CAPSULE, DELAYED RELEASE ORAL
Qty: 30 CAPSULE | Refills: 4 | Status: SHIPPED | OUTPATIENT
Start: 2022-09-13

## 2022-09-14 ENCOUNTER — TELEPHONE (OUTPATIENT)
Dept: VASCULAR SURGERY | Age: 62
End: 2022-09-14

## 2022-09-23 ENCOUNTER — TELEPHONE (OUTPATIENT)
Dept: GASTROENTEROLOGY | Age: 62
End: 2022-09-23

## 2022-09-23 NOTE — TELEPHONE ENCOUNTER
Patient: Joel Thurman    YOB: 1960      Clearance was received on September 23, 2022. for Endoscopy / Colonoscopy scheduled for: 10/24/22    Patient may discontinue the use of ELIQUIS  for 2  days prior to the procedure.     IS Lovenox required:  NO    PATIENT NOTIFIED ON:  9/23/22      Clearance scanned into media

## 2022-10-03 ENCOUNTER — HOSPITAL ENCOUNTER (OUTPATIENT)
Dept: WOMENS IMAGING | Age: 62
Discharge: HOME | End: 2022-10-03

## 2022-10-03 DIAGNOSIS — Z12.31 SCREENING MAMMOGRAM FOR BREAST CANCER: ICD-10-CM

## 2022-10-21 ENCOUNTER — HOSPITAL ENCOUNTER (OUTPATIENT)
Age: 62
Setting detail: OUTPATIENT SURGERY
Discharge: HOME | End: 2022-10-21
Attending: INTERNAL MEDICINE | Admitting: INTERNAL MEDICINE
Payer: COMMERCIAL

## 2022-10-21 ENCOUNTER — ANESTHESIA (OUTPATIENT)
Dept: OPERATING ROOM | Age: 62
End: 2022-10-21

## 2022-10-21 ENCOUNTER — ANESTHESIA EVENT (OUTPATIENT)
Dept: OPERATING ROOM | Age: 62
End: 2022-10-21

## 2022-10-21 ENCOUNTER — APPOINTMENT (OUTPATIENT)
Dept: OPERATING ROOM | Age: 62
End: 2022-10-21

## 2022-10-21 VITALS
HEART RATE: 73 BPM | RESPIRATION RATE: 16 BRPM | HEIGHT: 67 IN | BODY MASS INDEX: 21.97 KG/M2 | TEMPERATURE: 97.4 F | SYSTOLIC BLOOD PRESSURE: 130 MMHG | OXYGEN SATURATION: 99 % | WEIGHT: 140 LBS | DIASTOLIC BLOOD PRESSURE: 71 MMHG

## 2022-10-21 PROCEDURE — 45378 DIAGNOSTIC COLONOSCOPY: CPT | Performed by: INTERNAL MEDICINE

## 2022-10-21 PROCEDURE — 45378 DIAGNOSTIC COLONOSCOPY: CPT

## 2022-10-21 RX ORDER — LIDOCAINE HYDROCHLORIDE 10 MG/ML
INJECTION, SOLUTION EPIDURAL; INFILTRATION; INTRACAUDAL; PERINEURAL PRN
Status: DISCONTINUED | OUTPATIENT
Start: 2022-10-21 | End: 2022-10-21 | Stop reason: SDUPTHER

## 2022-10-21 RX ORDER — SODIUM CHLORIDE 9 MG/ML
INJECTION, SOLUTION INTRAVENOUS CONTINUOUS
Status: DISCONTINUED | OUTPATIENT
Start: 2022-10-21 | End: 2022-10-21 | Stop reason: HOSPADM

## 2022-10-21 RX ORDER — PROPOFOL 10 MG/ML
INJECTION, EMULSION INTRAVENOUS PRN
Status: DISCONTINUED | OUTPATIENT
Start: 2022-10-21 | End: 2022-10-21 | Stop reason: SDUPTHER

## 2022-10-21 RX ADMIN — PROPOFOL 300 MG: 10 INJECTION, EMULSION INTRAVENOUS at 09:27

## 2022-10-21 RX ADMIN — LIDOCAINE HYDROCHLORIDE 30 MG: 10 INJECTION, SOLUTION EPIDURAL; INFILTRATION; INTRACAUDAL; PERINEURAL at 09:27

## 2022-10-21 RX ADMIN — SODIUM CHLORIDE: 9 INJECTION, SOLUTION INTRAVENOUS at 08:43

## 2022-10-21 ASSESSMENT — PAIN SCALES - GENERAL
PAINLEVEL_OUTOF10: 0
PAINLEVEL_OUTOF10: 0

## 2022-10-21 ASSESSMENT — PAIN - FUNCTIONAL ASSESSMENT: PAIN_FUNCTIONAL_ASSESSMENT: 0-10

## 2022-10-21 NOTE — ANESTHESIA PRE PROCEDURE
Department of Anesthesiology  Preprocedure Note       Name:  Penny Elaine   Age:  60 y.o.  :  1960                                          MRN:  [id number]         Date:  10/21/2022      Surgeon: Katelyn Robins):  Lia Mack MD    Procedure: Procedure(s):  COLORECTAL CANCER SCREENING, NOT HIGH RISK    Medications prior to admission:   Prior to Admission medications    Medication Sig Start Date End Date Taking?  Authorizing Provider   omeprazole (PRILOSEC) 40 MG delayed release capsule TAKE ONE CAPSULE BY MOUTH NIGHTLY 22   Jaleesa Daley PA-C   NONFORMULARY nightly as needed CBD Gummies    Historical Provider, MD   ELIQUIS 5 MG TABS tablet TAKE 1 TABLET BY MOUTH 2 TIMES A DAY 22   EMILIANO Patel   nortriptyline (PAMELOR) 10 MG capsule TAKE 2 CAPSULES BY MOUTH NIGHTLY 22   Priyanka Cueto MD   lisinopril (PRINIVIL;ZESTRIL) 20 MG tablet TAKE ONE TABLET ONE TIME DAILY 22   Priyanka Cueto MD   cyanocobalamin 1000 MCG/ML injection INJECT 1ML INTO THE MUSCLE EVERY 30 DAYS 22   Priyanka Cueto MD   ondansetron (ZOFRAN ODT) 4 MG disintegrating tablet Take 1 tablet by mouth every 8 hours as needed for Nausea or Vomiting 22   Eleuterio King MD   desvenlafaxine succinate (PRISTIQ) 50 MG TB24 extended release tablet TAKE ONE TABLET ONE TIME DAILY 3/4/22   Priyanka Cueto MD   rizatriptan (MAXALT) 10 MG tablet May repeat in 2 hours if needed do not exceed 2 doses in 24 hours 11/3/21   Priyanka Cueto MD   calcium carbonate (OSCAL) 500 MG TABS tablet Take 500 mg by mouth daily    Historical Provider, MD   Cholecalciferol (VITAMIN D) 50 MCG ( UT) CAPS capsule Take 2,000 Units by mouth daily    Historical Provider, MD   Multiple Vitamins-Minerals (PRESERVISION AREDS 2+MULTI VIT PO) Take 1 tablet by mouth 2 times daily    Historical Provider, MD   tiZANidine (ZANAFLEX) 4 MG tablet Take 1 tablet by mouth every 12 hours as needed (muscle spasms) 20   Priyanka Cueto MD       Current medications:    Current Facility-Administered Medications   Medication Dose Route Frequency Provider Last Rate Last Admin    0.9 % sodium chloride infusion   IntraVENous Continuous Stalin Holly MD           Allergies:     Allergies   Allergen Reactions    Demerol  [Meperidine Hcl]      Other reaction(s): Unknown    Pcn [Penicillins] Rash    Versed [Midazolam] Nausea And Vomiting       Problem List:    Patient Active Problem List   Diagnosis Code    Breast density R92.2    Migraine without aura G43.009    Pernicious anemia D51.0    Raynaud's phenomenon I73.00    Cervical disc disease M50.90    Dysthymia F34.1    Myopia H52.10    Nuclear senile cataract H25.10    Pseudophakia Z96.1    Tear film insufficiency H04.129    Essential hypertension I10    Pulmonary embolism (HCC) I26.99    VTE (venous thromboembolism) I82.90    Bilateral pulmonary embolism (HCC) I26.99    Pulmonary arterial hypertension (HCC) I27.21    DVT (deep venous thrombosis) (HCC) I82.409    Dysfunction of right cardiac ventricle I51.9    Factor V Leiden ([facility] [address] D68.51    Vocal cord polyps J38.1    LAD (lymphadenopathy), axillary R59.0    Small airways disease J98.4    Acute abdominal pain R10.9    Acute pancreatitis K85.90    Abdominal pain R10.9    S/P cholecystectomy Z90.49    Calculus of gallbladder with acute cholecystitis and obstruction K80.01       Past Medical History:        Diagnosis Date    Cervical disc disease 8/26/2017    DVT (deep venous thrombosis) (HCC)     Essential hypertension 12/1/2019    Gastroesophageal reflux disease without esophagitis 8/26/2017    Hyperlipidemia     Laryngeal polyp     followed by Zak Gonzalez PA-C    Major depressive disorder in remission ([facility] [address] 8/26/2017    Migraine without aura 8/26/2017    Pernicious anemia 8/26/2017    Raynaud's phenomenon 8/26/2017       Past Surgical History:        Procedure Laterality Date    ARTERIOGRAM Bilateral 9/2/2020    ECOS-PULMONARY performed by Yolande Torres MD at [address] [place] [address], LAPAROSCOPIC N/A 2022    laparoscopic robot assisted cholecystectomy with ICG performed by Ángel Maravilla MD at [facility] [address] (CERVIX REMOVED)         Social History:    Social History     Tobacco Use    Smoking status: Former     Years: 25.00     Types: Cigarettes     Quit date:      Years since quittin.8    Smokeless tobacco: Never    Tobacco comments:     CBD prn at night    Substance Use Topics    Alcohol use: Yes     Comment: rare                                Counseling given: Not Answered  Tobacco comments: CBD prn at night       Vital Signs (Current): There were no vitals filed for this visit.                                            BP Readings from Last 3 Encounters:   22 104/60   22 92/62   22 131/87       NPO Status:                                                                                 BMI:   Wt Readings from Last 3 Encounters:   22 141 lb 3.2 oz (64 kg)   22 142 lb 6.4 oz (64.6 kg)   22 144 lb (65.3 kg)     There is no height or weight on file to calculate BMI.    CBC:   Lab Results   Component Value Date/Time    WBC 5.9 2022 08:27 AM    RBC 4.91 2022 08:27 AM    HGB 12.7 2022 08:27 AM    HCT 40.8 2022 08:27 AM    HCT 43.5 2011 06:57 AM    MCV 83.1 2022 08:27 AM    RDW 15.5 2022 08:27 AM     2022 08:27 AM     2011 06:57 AM       CMP:   Lab Results   Component Value Date/Time     2022 08:27 AM     2011 06:57 AM    K 4.5 2022 08:27 AM    K 3.6 2022 06:29 PM    K 4.9 2011 06:57 AM     2022 08:27 AM     2011 06:57 AM    CO2 26 2022 08:27 AM    BUN 19 2022 08:27 AM    CREATININE 0.9 2022 08:27 AM    CREATININE 0.7 2011 06:57 AM    GFRAA >59 2022 08:27 AM    LABGLOM >60 2022 08:27 AM    GLUCOSE 89 09/02/2022 08:27 AM    PROT 6.5 09/02/2022 08:27 AM    PROT 6.6 05/19/2011 06:57 AM    CALCIUM 9.7 09/02/2022 08:27 AM    BILITOT 0.3 09/02/2022 08:27 AM    ALKPHOS 84 09/02/2022 08:27 AM    ALKPHOS 66 05/19/2011 06:57 AM    AST 26 09/02/2022 08:27 AM    ALT 30 09/02/2022 08:27 AM       POC Tests: No results for input(s): POCGLU, POCNA, POCK, POCCL, POCBUN, POCHEMO, POCHCT in the last 72 hours. Coags:   Lab Results   Component Value Date/Time    PROTIME 15.6 09/03/2020 05:39 AM    INR 1.24 09/03/2020 05:39 AM    APTT 76.4 09/04/2020 02:15 AM       HCG (If Applicable): No results found for: PREGTESTUR, PREGSERUM, HCG, HCGQUANT     ABGs: No results found for: PHART, PO2ART, IGR1OOA, FBM1CWV, BEART, F7VEUNPW     Type & Screen (If Applicable):  No results found for: LABABO, LABRH    Drug/Infectious Status (If Applicable):  No results found for: HIV, HEPCAB    COVID-19 Screening (If Applicable):   Lab Results   Component Value Date/Time    COVID19 Not Detected 12/22/2021 06:32 AM           Anesthesia Evaluation  Patient summary reviewed and Nursing notes reviewed  Airway: Mallampati: II  TM distance: >3 FB   Neck ROM: full  Mouth opening: > = 3 FB   Dental: normal exam         Pulmonary:Negative Pulmonary ROS and normal exam                               Cardiovascular:  Exercise tolerance: good (>4 METS),   (+) hypertension:,          Beta Blocker:  Not on Beta Blocker         Neuro/Psych:   (+) headaches: migraine headaches, psychiatric history:            GI/Hepatic/Renal:   (+) GERD:,           Endo/Other: Negative Endo/Other ROS                    Abdominal:             Vascular: negative vascular ROS. Other Findings:           Anesthesia Plan      general and TIVA     ASA 2       Induction: intravenous. Anesthetic plan and risks discussed with patient. Plan discussed with CRNA.                     EMILIANO Cox - TREY   10/21/2022

## 2022-10-21 NOTE — ANESTHESIA POSTPROCEDURE EVALUATION
Department of Anesthesiology  Postprocedure Note    Patient: Vandana Dennison  MRN: [id number]  YOB: 1960  Date of evaluation: 10/21/2022      Procedure Summary     Date: 10/21/22 Room / Location: [facility] ENDO 02 / [address]    Anesthesia Start: 0751 Anesthesia Stop: 9845    Procedure: COLORECTAL CANCER SCREENING, NOT HIGH RISK (Abdomen) Diagnosis:       Hx of diverticulitis of colon      (Hx of diverticulitis of colon [Z87.19])    Surgeons: Willy Manzo MD Responsible Provider: EMILIANO Grimm CRNA    Anesthesia Type: general, TIVA ASA Status: 2          Anesthesia Type: No value filed.     Vira Phase I:      Vira Phase II:        Anesthesia Post Evaluation    Patient location during evaluation: bedside  Patient participation: complete - patient participated  Level of consciousness: sleepy but conscious  Pain score: 0  Airway patency: patent  Nausea & Vomiting: no nausea and no vomiting  Complications: no  Cardiovascular status: blood pressure returned to baseline  Respiratory status: acceptable, room air and spontaneous ventilation  Hydration status: euvolemic

## 2022-10-21 NOTE — H&P
Patient Name: Kurt Alvarado  : 1960  MRN: [id number]  DATE: 10/21/22    Allergies: Allergies   Allergen Reactions    Demerol  [Meperidine Hcl]      Other reaction(s): Unknown    Pcn [Penicillins] Rash    Versed [Midazolam] Nausea And Vomiting        ENDOSCOPY  History and Physical    Procedure:    [x] Diagnostic Colonoscopy       [] Screening Colonoscopy  [] EGD      [] ERCP      [] EUS       [] Other    [x] Previous office notes/History and Physical reviewed from the patients chart. Please see EMR for further details of HPI. I have examined the patient's status immediately prior to the procedure and:      Indications/HPI:    [x]Abdominal Pain   []Barretts  []Screening/Surveillance   []History of Polyps  []Dysphagia            [] +Cologard/DNA testing  [x]Abnormal Imaging              []EOE Hx              [] Family Hx of CRC/Polyps  []Anemia                            []Food Impaction       []Recent Poor Prep  []GI Bleed             []Lymphadenopathy  []History of Polyps  []Change in bowel habits []Heartburn/Reflux  []Cancer- GI/Lung  []Chest Pain - Non Cardiac []Heme (+) Stool []Ulcers  []Constipation  []Hemoptysis  []Incontinence    []Diarrhea  []Hypoxemia  []Rectal Bleed (BRBPR)  []Nausea/Vomiting   [] Varices  []Crohns/Colitis  []Pancreatic Cyst   [] Cirrhosis   []Pancreatitis    []Abnormal MRCP  []Elevated LFT [] Stent Removal, Previous ERCP  []Other:     Anesthesia:   [x] MAC [] Moderate Sedation   [] General   [] None     ROS: 12 pt Review of Symptoms was negative unless mentioned above    Medications:   Prior to Admission medications    Medication Sig Start Date End Date Taking?  Authorizing Provider   omeprazole (PRILOSEC) 40 MG delayed release capsule TAKE ONE CAPSULE BY MOUTH NIGHTLY 22   [facility]   NONFORMULARY nightly as needed CBD Gummies    Historical Provider, MD WEEKS 5 MG TABS tablet TAKE 1 TABLET BY MOUTH 2 TIMES A DAY 22   EMILIANO Nichols nortriptyline (PAMELOR) 10 MG capsule TAKE 2 CAPSULES BY MOUTH NIGHTLY 8/22/22   Shraddha Ferro MD   lisinopril (PRINIVIL;ZESTRIL) 20 MG tablet TAKE ONE TABLET ONE TIME DAILY 8/1/22   Shraddha Ferro MD   cyanocobalamin 1000 MCG/ML injection INJECT 1ML INTO THE MUSCLE EVERY 30 DAYS 7/25/22   Shraddha Ferro MD   ondansetron (ZOFRAN ODT) 4 MG disintegrating tablet Take 1 tablet by mouth every 8 hours as needed for Nausea or Vomiting  Patient not taking: Reported on 10/21/2022 7/23/22   Mayra Urias MD   desvenlafaxine succinate (PRISTIQ) 50 MG TB24 extended release tablet TAKE ONE TABLET ONE TIME DAILY 3/4/22   Shraddha Ferro MD   rizatriptan (MAXALT) 10 MG tablet May repeat in 2 hours if needed do not exceed 2 doses in 24 hours 11/3/21   Shraddha Ferro MD   calcium carbonate (OSCAL) 500 MG TABS tablet Take 500 mg by mouth daily    Historical Provider, MD   Cholecalciferol (VITAMIN D) 50 MCG (2000 UT) CAPS capsule Take 2,000 Units by mouth daily    Historical Provider, MD   Multiple Vitamins-Minerals (PRESERVISION AREDS 2+MULTI VIT PO) Take 1 tablet by mouth 2 times daily    Historical Provider, MD   tiZANidine (ZANAFLEX) 4 MG tablet Take 1 tablet by mouth every 12 hours as needed (muscle spasms) 12/30/20   Shraddha Ferro MD       Past Medical History:  Past Medical History:   Diagnosis Date    Cervical disc disease 8/26/2017    DVT (deep venous thrombosis) ([facility] [place] [address]     Essential hypertension 12/1/2019    Gastroesophageal reflux disease without esophagitis 8/26/2017    Hyperlipidemia     Laryngeal polyp     followed by Raheel Malin PA-C    Major depressive disorder in remission ([facility] [place] [address] 8/26/2017    Migraine without aura 8/26/2017    Pernicious anemia 8/26/2017    Raynaud's phenomenon 8/26/2017       Past Surgical History:  Past Surgical History:   Procedure Laterality Date    ARTERIOGRAM Bilateral 9/2/2020    ECOS-PULMONARY performed by Ivanna Baltazar MD at [address], LAPAROSCOPIC N/A 4/27/2022 laparoscopic robot assisted cholecystectomy with ICG performed by Bailey Harrison MD at [address] [facility] (CERVIX REMOVED)         Social History:  Social History     Tobacco Use    Smoking status: Former     Years: 25.00     Types: Cigarettes     Quit date:      Years since quittin.8    Smokeless tobacco: Never    Tobacco comments:     CBD prn at night    Vaping Use    Vaping Use: Never used   Substance Use Topics    Alcohol use: Yes     Comment: rare    Drug use: No       Vital Signs:   Vitals:    10/21/22 0833   BP: 133/75   Pulse: 88   Resp: 18   SpO2: 100%        Physical Exam:  Cardiac:  [x]WNL  []Comments:  Pulmonary:  [x]WNL   []Comments:  Neuro/Mental Status:  [x]WNL  []Comments:  Abdominal:  [x]WNL    []Comments:  Other:   []WNL  []Comments:    Informed Consent:  The risks and benefits of the procedure have been discussed with either the patient or if they cannot consent, their representative. Assessment:  Patient examined and appropriate for planned sedation and procedure. Plan:  Proceed with planned sedation and procedure as above.          Katie Mancilla MD

## 2022-10-21 NOTE — OP NOTE
Patient: Sweta Dia : 1960  Med Rec#: [id number] Acc#: [id number]   Primary Care Provider Shoshana Martinez MD    Date of Procedure:  10/21/2022    Endoscopist: Kirill Sotelo MD    Referring Provider: Shoshana Martinez MD,     Operation Performed: Colonoscopy     Indications: abnormal imaging - diverticulitis R colon    Anesthesia:  Sedation was administered by anesthesia who monitored the patient during the procedure. I met with Sweta Dia prior to procedure. We discussed the procedure itself, and I have discussed the risks of endoscopy (including-- but not limited to-- pain, discomfort, bleeding potentially requiring second endoscopic procedure and/or blood transfusion, organ perforation requiring operative repair, damage to organs near the colon, infection, aspiration, cardiopulmonary/allergic reaction), benefits, indications to endoscopy. Additionally, we discussed options other than colonoscopy. The patient expressed understanding. All questions answered. The patient decided to proceed with the procedure. Signed informed consent was placed on the chart. Blood Loss: minimal    Withdrawal time: n/a  Bowel Prep: adequate     Complications: no immediate complications    DESCRIPTION OF PROCEDURE:     A time out was performed. After written informed consent was obtained, the patient was placed in the left lateral position. The perianal area was inspected, and a digital rectal exam was performed. A rectal exam was performed: normal tone, no palpable lesions. At this point, a forward viewing Olympus colonoscope was inserted into the anus and carefully advanced to the cecum. The cecum was identified by the ileocecal valve and the appendiceal orifice. The colonoscope was then slowly withdrawn with careful inspection of the mucosa in a linear and circumferential fashion. The scope was retroflexed in the rectum. Suction was utilized during the procedure to remove as much air as possible from the bowel.  The colonoscope was removed from the patient, and the procedure was terminated. Findings are listed below. Findings: The mucosa appeared normal throughout the entire examined colon     There was evidence of diverticular disease throughout the right and left colon, more noticeable in left colon. No polyps or masses seen. Retroflexion in the rectum was normal and revealed no further abnormalities         Recommendations:  1. Repeat colonoscopy - 5 yrs for screening    Findings and recommendations were discussed w/ the patient. A copy of the images was provided.     Theresa Harry MD  10/21/2022  10:02 AM

## 2022-11-18 NOTE — TELEPHONE ENCOUNTER
Saleem Toledo called requesting a refill of the below medication which has been pended for you:     Requested Prescriptions     Pending Prescriptions Disp Refills    desvenlafaxine succinate (PRISTIQ) 50 MG TB24 extended release tablet [Pharmacy Med Name: DESVENLAFAXINE SUCCINATE ER 50 TB24] 90 tablet 2     Sig: TAKE ONE TABLET BY MOUTH ONE TIME DAILY       Last Appointment Date: 9/2/2022  Next Appointment Date: 3/7/2023    Allergies   Allergen Reactions    Demerol  [Meperidine Hcl]      Other reaction(s): Unknown    Pcn [Penicillins] Rash    Versed [Midazolam] Nausea And Vomiting

## 2022-11-21 RX ORDER — DESVENLAFAXINE 50 MG/1
TABLET, EXTENDED RELEASE ORAL
Qty: 90 TABLET | Refills: 2 | Status: SHIPPED | OUTPATIENT
Start: 2022-11-21

## 2022-11-28 RX ORDER — CYANOCOBALAMIN 1000 UG/ML
INJECTION, SOLUTION INTRAMUSCULAR; SUBCUTANEOUS
Qty: 3 ML | Refills: 1 | Status: SHIPPED | OUTPATIENT
Start: 2022-11-28

## 2022-12-15 ENCOUNTER — HOSPITAL ENCOUNTER (OUTPATIENT)
Dept: WOMENS IMAGING | Age: 62
Discharge: HOME | End: 2022-12-15
Payer: COMMERCIAL

## 2022-12-15 PROCEDURE — 77067 SCR MAMMO BI INCL CAD: CPT

## 2023-01-04 NOTE — ED NOTES
Pt ambulated to restroom to provide urine sample. Ok per dr Marianela Zheng. Pt co SOB when walking to restroom. Pt does not want wheelchair.       Genny Mejía RN  09/02/20 9578 Pt arrives with c/o R shoulder pain for the last 3 days. Pt denies any injury to that arm/shoulder.

## 2023-02-06 RX ORDER — OMEPRAZOLE 40 MG/1
CAPSULE, DELAYED RELEASE ORAL
Qty: 30 CAPSULE | Refills: 4 | Status: SHIPPED | OUTPATIENT
Start: 2023-02-06

## 2023-02-10 ENCOUNTER — TELEPHONE (OUTPATIENT)
Dept: INTERNAL MEDICINE | Age: 63
End: 2023-02-10

## 2023-02-10 ENCOUNTER — OFFICE VISIT (OUTPATIENT)
Dept: INTERNAL MEDICINE | Age: 63
End: 2023-02-10
Payer: COMMERCIAL

## 2023-02-10 VITALS
TEMPERATURE: 98.9 F | OXYGEN SATURATION: 97 % | DIASTOLIC BLOOD PRESSURE: 60 MMHG | SYSTOLIC BLOOD PRESSURE: 118 MMHG | HEART RATE: 119 BPM

## 2023-02-10 DIAGNOSIS — R19.7 DIARRHEA, UNSPECIFIED TYPE: Primary | ICD-10-CM

## 2023-02-10 PROCEDURE — 3074F SYST BP LT 130 MM HG: CPT | Performed by: INTERNAL MEDICINE

## 2023-02-10 PROCEDURE — 99214 OFFICE O/P EST MOD 30 MIN: CPT | Performed by: INTERNAL MEDICINE

## 2023-02-10 PROCEDURE — 3078F DIAST BP <80 MM HG: CPT | Performed by: INTERNAL MEDICINE

## 2023-02-10 RX ORDER — ONDANSETRON 4 MG/1
4 TABLET, FILM COATED ORAL 3 TIMES DAILY PRN
Qty: 30 TABLET | Refills: 0 | Status: SHIPPED | OUTPATIENT
Start: 2023-02-10

## 2023-02-10 RX ORDER — DIPHENOXYLATE HYDROCHLORIDE AND ATROPINE SULFATE 2.5; .025 MG/1; MG/1
1 TABLET ORAL 4 TIMES DAILY PRN
Qty: 40 TABLET | Refills: 0 | Status: CANCELLED | OUTPATIENT
Start: 2023-02-10 | End: 2023-02-20

## 2023-02-10 SDOH — ECONOMIC STABILITY: FOOD INSECURITY: WITHIN THE PAST 12 MONTHS, YOU WORRIED THAT YOUR FOOD WOULD RUN OUT BEFORE YOU GOT MONEY TO BUY MORE.: NEVER TRUE

## 2023-02-10 SDOH — ECONOMIC STABILITY: FOOD INSECURITY: WITHIN THE PAST 12 MONTHS, THE FOOD YOU BOUGHT JUST DIDN'T LAST AND YOU DIDN'T HAVE MONEY TO GET MORE.: NEVER TRUE

## 2023-02-10 SDOH — ECONOMIC STABILITY: TRANSPORTATION INSECURITY
IN THE PAST 12 MONTHS, HAS LACK OF TRANSPORTATION KEPT YOU FROM MEETINGS, WORK, OR FROM GETTING THINGS NEEDED FOR DAILY LIVING?: NO

## 2023-02-10 SDOH — ECONOMIC STABILITY: INCOME INSECURITY: HOW HARD IS IT FOR YOU TO PAY FOR THE VERY BASICS LIKE FOOD, HOUSING, MEDICAL CARE, AND HEATING?: NOT HARD AT ALL

## 2023-02-10 SDOH — ECONOMIC STABILITY: HOUSING INSECURITY
IN THE LAST 12 MONTHS, WAS THERE A TIME WHEN YOU DID NOT HAVE A STEADY PLACE TO SLEEP OR SLEPT IN A SHELTER (INCLUDING NOW)?: NO

## 2023-02-10 ASSESSMENT — ENCOUNTER SYMPTOMS
BLOOD IN STOOL: 0
EYE ITCHING: 0
ABDOMINAL PAIN: 0
SORE THROAT: 0
EYE DISCHARGE: 0
BACK PAIN: 0
SHORTNESS OF BREATH: 0
VOMITING: 1
NAUSEA: 1
WHEEZING: 0
TROUBLE SWALLOWING: 0
SINUS PRESSURE: 0
DIARRHEA: 1
ABDOMINAL DISTENTION: 0

## 2023-02-10 NOTE — PROGRESS NOTES
[address] [facility]  [address] [place] [address]  [address] [address]  Dept: [phone]  Dept Fax: [phone]: [phone]      Visit Date: 2/10/2023    Vincenzo Cadena a 58 y.o. female who presents today for:  Chief Complaint   Patient presents with    Diarrhea    Emesis         HPI:     Patient's been having nausea vomiting and diarrhea since last 3 days with no myalgias and no other flulike symptoms. She has not traveled and she has not had any other family members sick and does not remember eating anything that did not agree with her.     Past Medical History:   Diagnosis Date    Cervical disc disease 8/26/2017    DVT (deep venous thrombosis) (HCC)     Essential hypertension 12/1/2019    Gastroesophageal reflux disease without esophagitis 8/26/2017    Hyperlipidemia     Laryngeal polyp     followed by Jennifer Siddiqui PA-C    Major depressive disorder in remission ([facility] [address] 8/26/2017    Migraine without aura 8/26/2017    Pernicious anemia 8/26/2017    Raynaud's phenomenon 8/26/2017      Past Surgical History:   Procedure Laterality Date    ARTERIOGRAM Bilateral 09/02/2020    ECOS-PULMONARY performed by Miryam Macdonald MD at [address], LAPAROSCOPIC N/A 04/27/2022    laparoscopic robot assisted cholecystectomy with ICG performed by Terrence Cotton MD at [address] N/A 10/21/2022    Dr Adry Orellana of diverticular disease throughout the right and left colon, more noticeable in left colon, 5 yr recall    HYSTERECTOMY (CERVIX STATUS UNKNOWN)      RIGOBERTO AND BSO (CERVIX REMOVED)         Family History   Problem Relation Age of Onset    High Blood Pressure Mother     Coronary Art Dis Mother     Thyroid Disease Mother         Hypothyroidism    Breast Cancer Mother 80    Colon Polyps Father     Prostate Cancer Father     Anemia Father         Pernicious    Cancer Father         bladder cancer     Colon Polyps Sister     Anemia Brother Pernicious    Cancer Brother 46        prostate cancer, bladder cancer    High Blood Pressure Paternal Grandmother     Breast Cancer Paternal Grandmother     Esophageal Cancer Neg Hx     Liver Cancer Neg Hx     Rectal Cancer Neg Hx     Stomach Cancer Neg Hx     Colon Cancer Neg Hx        Social History     Tobacco Use    Smoking status: Former     Years: 25.00     Types: Cigarettes     Quit date:      Years since quittin.1    Smokeless tobacco: Never    Tobacco comments:     CBD prn at night    Substance Use Topics    Alcohol use: Yes     Comment: rare      Current Outpatient Medications   Medication Sig Dispense Refill    omeprazole (PRILOSEC) 40 MG delayed release capsule TAKE ONE CAPSULE BY MOUTH NIGHTLY 30 capsule 4    cyanocobalamin 1000 MCG/ML injection INJECT 1ML INTRAMUSCULARLY EVERY 30 DAYS 3 mL 1    desvenlafaxine succinate (PRISTIQ) 50 MG TB24 extended release tablet TAKE ONE TABLET BY MOUTH ONE TIME DAILY 90 tablet 2    NONFORMULARY nightly as needed CBD Gummies      ELIQUIS 5 MG TABS tablet TAKE 1 TABLET BY MOUTH 2 TIMES A  tablet 1    nortriptyline (PAMELOR) 10 MG capsule TAKE 2 CAPSULES BY MOUTH NIGHTLY 180 capsule 1    lisinopril (PRINIVIL;ZESTRIL) 20 MG tablet TAKE ONE TABLET ONE TIME DAILY 90 tablet 3    ondansetron (ZOFRAN ODT) 4 MG disintegrating tablet Take 1 tablet by mouth every 8 hours as needed for Nausea or Vomiting 20 tablet 0    rizatriptan (MAXALT) 10 MG tablet May repeat in 2 hours if needed do not exceed 2 doses in 24 hours 9 tablet 5    calcium carbonate (OSCAL) 500 MG TABS tablet Take 500 mg by mouth daily      Cholecalciferol (VITAMIN D) 50 MCG ( UT) CAPS capsule Take 2,000 Units by mouth daily      Multiple Vitamins-Minerals (PRESERVISION AREDS 2+MULTI VIT PO) Take 1 tablet by mouth 2 times daily      tiZANidine (ZANAFLEX) 4 MG tablet Take 1 tablet by mouth every 12 hours as needed (muscle spasms) 180 tablet 1     No current facility-administered medications for this visit. Allergies   Allergen Reactions    Demerol  [Meperidine Hcl]      Other reaction(s): Unknown    Pcn [Penicillins] Rash    Versed [Midazolam] Nausea And Vomiting         Subjective:     Review of Systems   Constitutional:  Negative for activity change, appetite change, fatigue and fever. HENT:  Negative for congestion, hearing loss, sinus pressure, sore throat and trouble swallowing. Eyes:  Negative for discharge and itching. Respiratory:  Negative for shortness of breath and wheezing. Cardiovascular:  Negative for chest pain, palpitations and leg swelling. Gastrointestinal:  Positive for diarrhea, nausea and vomiting. Negative for abdominal distention, abdominal pain and blood in stool. Endocrine: Negative for cold intolerance, heat intolerance and polydipsia. Genitourinary:  Negative for flank pain, frequency, hematuria and urgency. Musculoskeletal:  Negative for arthralgias, back pain and joint swelling. Skin:  Negative for rash and wound. Allergic/Immunologic: Negative for environmental allergies and food allergies. Neurological:  Negative for dizziness, tremors, syncope, weakness, numbness and headaches. Hematological:  Negative for adenopathy. Psychiatric/Behavioral:  Negative for agitation and hallucinations. The patient is not nervous/anxious. Objective:      /60 (Site: Left Upper Arm)   Pulse (!) 119   Temp 98.9 °F (37.2 °C) (Temporal)   SpO2 97%    Physical Exam     Assessment:      Diagnosis Orders   1. Diarrhea, unspecified type                 Plan:     Diarrhea and nausea. We will start her on Zofran and have given her an antidiarrheal agent in the form of Imodium. I told her to push fluids and we will get a CBC on her today to make sure that it does not look an bacterial.    No follow-ups on file. Patient given educational materials- see patient instructions. Discussed use, benefit, and side effects of prescribedmedications.   All patient questions answered. Pt voiced understanding. Reviewedhealth maintenance. Instructed to continue current medications, diet and exercise. Patient agreed with treatment plan. **This report has been created usingvoice recognition software. It may contain minor errors which are inherent in voicerecognition technology. **    Electronically signed by Monica Cortes MD on 2/10/2023 at 11:34 AM

## 2023-02-11 ENCOUNTER — NURSE TRIAGE (OUTPATIENT)
Dept: OTHER | Facility: CLINIC | Age: 63
End: 2023-02-11

## 2023-02-11 ENCOUNTER — HOSPITAL ENCOUNTER (EMERGENCY)
Age: 63
Discharge: HOME | End: 2023-02-11
Attending: EMERGENCY MEDICINE
Payer: COMMERCIAL

## 2023-02-11 ENCOUNTER — APPOINTMENT (OUTPATIENT)
Dept: CT IMAGING | Age: 63
End: 2023-02-11
Payer: COMMERCIAL

## 2023-02-11 VITALS
RESPIRATION RATE: 16 BRPM | WEIGHT: 140 LBS | OXYGEN SATURATION: 96 % | TEMPERATURE: 98 F | SYSTOLIC BLOOD PRESSURE: 125 MMHG | HEART RATE: 78 BPM | BODY MASS INDEX: 21.93 KG/M2 | DIASTOLIC BLOOD PRESSURE: 76 MMHG

## 2023-02-11 DIAGNOSIS — K52.9 ACUTE COLITIS: Primary | ICD-10-CM

## 2023-02-11 LAB
ALBUMIN SERPL-MCNC: 4.1 G/DL (ref 3.5–5.2)
ALP BLD-CCNC: 99 U/L (ref 35–104)
ALT SERPL-CCNC: 86 U/L (ref 5–33)
ANION GAP SERPL CALCULATED.3IONS-SCNC: 11 MMOL/L (ref 7–19)
AST SERPL-CCNC: 67 U/L (ref 5–32)
BASOPHILS ABSOLUTE: 0 K/UL (ref 0–0.2)
BASOPHILS RELATIVE PERCENT: 0.1 % (ref 0–1)
BILIRUB SERPL-MCNC: 0.7 MG/DL (ref 0.2–1.2)
BUN BLDV-MCNC: 10 MG/DL (ref 8–23)
CALCIUM SERPL-MCNC: 9 MG/DL (ref 8.8–10.2)
CHLORIDE BLD-SCNC: 102 MMOL/L (ref 98–111)
CO2: 26 MMOL/L (ref 22–29)
CREAT SERPL-MCNC: 0.8 MG/DL (ref 0.5–0.9)
EOSINOPHILS ABSOLUTE: 0.2 K/UL (ref 0–0.6)
EOSINOPHILS RELATIVE PERCENT: 2.3 % (ref 0–5)
GFR SERPL CREATININE-BSD FRML MDRD: >60 ML/MIN/{1.73_M2}
GLUCOSE BLD-MCNC: 93 MG/DL (ref 74–109)
HCT VFR BLD CALC: 40.9 % (ref 37–47)
HEMOGLOBIN: 13 G/DL (ref 12–16)
IMMATURE GRANULOCYTES #: 0 K/UL
LIPASE: 22 U/L (ref 13–60)
LYMPHOCYTES ABSOLUTE: 1.2 K/UL (ref 1.1–4.5)
LYMPHOCYTES RELATIVE PERCENT: 14.7 % (ref 20–40)
MCH RBC QN AUTO: 26.4 PG (ref 27–31)
MCHC RBC AUTO-ENTMCNC: 31.8 G/DL (ref 33–37)
MCV RBC AUTO: 83 FL (ref 81–99)
MONOCYTES ABSOLUTE: 0.7 K/UL (ref 0–0.9)
MONOCYTES RELATIVE PERCENT: 8.1 % (ref 0–10)
NEUTROPHILS ABSOLUTE: 6.3 K/UL (ref 1.5–7.5)
NEUTROPHILS RELATIVE PERCENT: 74.6 % (ref 50–65)
PDW BLD-RTO: 14.6 % (ref 11.5–14.5)
PLATELET # BLD: 227 K/UL (ref 130–400)
PMV BLD AUTO: 11.2 FL (ref 9.4–12.3)
POTASSIUM SERPL-SCNC: 3.5 MMOL/L (ref 3.5–5)
RBC # BLD: 4.93 M/UL (ref 4.2–5.4)
SODIUM BLD-SCNC: 139 MMOL/L (ref 136–145)
TOTAL PROTEIN: 6.2 G/DL (ref 6.6–8.7)
WBC # BLD: 8.4 K/UL (ref 4.8–10.8)

## 2023-02-11 PROCEDURE — 6360000004 HC RX CONTRAST MEDICATION: Performed by: EMERGENCY MEDICINE

## 2023-02-11 PROCEDURE — 36415 COLL VENOUS BLD VENIPUNCTURE: CPT

## 2023-02-11 PROCEDURE — 6370000000 HC RX 637 (ALT 250 FOR IP): Performed by: EMERGENCY MEDICINE

## 2023-02-11 PROCEDURE — 83690 ASSAY OF LIPASE: CPT

## 2023-02-11 PROCEDURE — 74177 CT ABD & PELVIS W/CONTRAST: CPT

## 2023-02-11 PROCEDURE — 99285 EMERGENCY DEPT VISIT HI MDM: CPT | Performed by: EMERGENCY MEDICINE

## 2023-02-11 PROCEDURE — 80053 COMPREHEN METABOLIC PANEL: CPT

## 2023-02-11 PROCEDURE — 85025 COMPLETE CBC W/AUTO DIFF WBC: CPT

## 2023-02-11 PROCEDURE — 6360000002 HC RX W HCPCS: Performed by: EMERGENCY MEDICINE

## 2023-02-11 PROCEDURE — 2580000003 HC RX 258: Performed by: EMERGENCY MEDICINE

## 2023-02-11 PROCEDURE — 96374 THER/PROPH/DIAG INJ IV PUSH: CPT

## 2023-02-11 RX ORDER — SODIUM CHLORIDE, SODIUM LACTATE, POTASSIUM CHLORIDE, AND CALCIUM CHLORIDE .6; .31; .03; .02 G/100ML; G/100ML; G/100ML; G/100ML
1000 INJECTION, SOLUTION INTRAVENOUS ONCE
Status: COMPLETED | OUTPATIENT
Start: 2023-02-11 | End: 2023-02-11

## 2023-02-11 RX ORDER — METRONIDAZOLE 500 MG/1
500 TABLET ORAL 3 TIMES DAILY
Qty: 15 TABLET | Refills: 0 | Status: SHIPPED | OUTPATIENT
Start: 2023-02-11 | End: 2023-02-16

## 2023-02-11 RX ORDER — CIPROFLOXACIN 500 MG/1
500 TABLET, FILM COATED ORAL 2 TIMES DAILY
Qty: 10 TABLET | Refills: 0 | Status: SHIPPED | OUTPATIENT
Start: 2023-02-11 | End: 2023-02-16

## 2023-02-11 RX ORDER — METOCLOPRAMIDE HYDROCHLORIDE 5 MG/ML
10 INJECTION INTRAMUSCULAR; INTRAVENOUS ONCE
Status: COMPLETED | OUTPATIENT
Start: 2023-02-11 | End: 2023-02-11

## 2023-02-11 RX ADMIN — METOCLOPRAMIDE 10 MG: 5 INJECTION, SOLUTION INTRAMUSCULAR; INTRAVENOUS at 15:40

## 2023-02-11 RX ADMIN — IOPAMIDOL 75 ML: 755 INJECTION, SOLUTION INTRAVENOUS at 16:37

## 2023-02-11 RX ADMIN — HYOSCYAMINE SULFATE 125 MCG: 0.12 TABLET ORAL; SUBLINGUAL at 15:41

## 2023-02-11 RX ADMIN — SODIUM CHLORIDE, POTASSIUM CHLORIDE, SODIUM LACTATE AND CALCIUM CHLORIDE 1000 ML: 600; 310; 30; 20 INJECTION, SOLUTION INTRAVENOUS at 15:39

## 2023-02-11 ASSESSMENT — ENCOUNTER SYMPTOMS
RESPIRATORY NEGATIVE: 1
EYES NEGATIVE: 1
ABDOMINAL PAIN: 1
DIARRHEA: 1
NAUSEA: 1

## 2023-02-11 ASSESSMENT — PAIN SCALES - GENERAL: PAINLEVEL_OUTOF10: 7

## 2023-02-11 ASSESSMENT — PAIN - FUNCTIONAL ASSESSMENT: PAIN_FUNCTIONAL_ASSESSMENT: 0-10

## 2023-02-11 NOTE — TELEPHONE ENCOUNTER
Location of patient: [place]    Subjective: Caller states \"Not sure if I should go to the ER\"     Current Symptoms: Diarrhea - has has for several days. Has a stool test pending results. Unable to even take a drink of water without immediately having to use the bathroom    Onset:  About 3-4 days ago    Pain Severity: 8/10; cramping; waxing and waning    Temperature: Patient is reported to not have a fever. Also denies chills and sweats     What has been tried: Immodium    LMP: NA Pregnant: NA    Recommended disposition: Go to ED Now    Care advice provided, patient verbalizes understanding; denies any other questions or concerns; instructed to call back for any new or worsening symptoms. Patient/caller agrees to proceed to the Emergency Department    Attention Provider: Thank you for allowing me to participate in the care of your patient. The patient was connected to triage in response to symptoms provided. Please do not respond through this encounter as the response is not directed to a shared pool.     Reason for Disposition   [1] Drinking very little AND [2] dehydration suspected (e.g., no urine > 12 hours, very dry mouth, very lightheaded)    Protocols used: Diarrhea-ADULT-

## 2023-02-11 NOTE — ED PROVIDER NOTES
[facility] EMERGENCY DEPT  EMERGENCY DEPARTMENT ENCOUNTER      Pt Name: Gorge Albert  MRN: [id number]  [place] 1960  Date of evaluation: 2/11/2023  Provider: James Zaman MD    CHIEF COMPLAINT       Chief Complaint   Patient presents with    Diarrhea     Since Monday, has had blood work drawn and given a stool sample; still having loose stools, states everything is running \"right through her\" and severe abdominal pain          HISTORY OF PRESENT ILLNESS   (Location/Symptom, Timing/Onset,Context/Setting, Quality, Duration, Modifying Factors, Severity)  Note limiting factors. Gorge Albert is a 58 y.o. female who presents to the emergency department for evaluation after having diarrhea along with lower abdominal pain over the last several days. Says the diarrhea started on Monday and since then has persisted. Having diarrhea anytime she eats or drinks anything. Has had some nausea and a couple episodes where she is gagged but no significant associated vomiting. Has not had any fevers. Saw primary care provider as an outpatient who did a GI PCR panel that has come back negative for source of infection. Has a history of diverticulosis with prior episodes of diverticulitis. Pain is not in the same location as what she has experienced with that in the past.  Pain located across both sides of the lower abdomen. HPI    NursingNotes were reviewed. REVIEW OF SYSTEMS    (2-9 systems for level 4, 10 or more for level 5)     Review of Systems   Constitutional: Negative. HENT: Negative. Eyes: Negative. Respiratory: Negative. Cardiovascular: Negative. Gastrointestinal:  Positive for abdominal pain, diarrhea and nausea. Genitourinary: Negative. Musculoskeletal: Negative. Skin: Negative. Neurological: Negative. Hematological: Negative. Psychiatric/Behavioral: Negative. A complete review of systems was performed and is negative except as noted above in the HPI.        PAST MEDICAL HISTORY     Past Medical History:   Diagnosis Date    Cervical disc disease 8/26/2017    DVT (deep venous thrombosis) (HCC)     Essential hypertension 12/1/2019    Gastroesophageal reflux disease without esophagitis 8/26/2017    Hyperlipidemia     Laryngeal polyp     followed by Marilyn Roman PA-C    Major depressive disorder in remission ([facility] [address] 8/26/2017    Migraine without aura 8/26/2017    Pernicious anemia 8/26/2017    Raynaud's phenomenon 8/26/2017         SURGICAL HISTORY       Past Surgical History:   Procedure Laterality Date    ARTERIOGRAM Bilateral 09/02/2020    ECOS-PULMONARY performed by Dolph Paget, MD at [address], LAPAROSCOPIC N/A 04/27/2022    laparoscopic robot assisted cholecystectomy with ICG performed by Claudia Lange MD at [address] N/A 10/21/2022    Dr Wilder Fess of diverticular disease throughout the right and left colon, more noticeable in left colon, 5 yr recall    HYSTERECTOMY (CERVIX STATUS UNKNOWN)      RIGOBERTO AND BSO (CERVIX REMOVED)           CURRENT MEDICATIONS       Discharge Medication List as of 2/11/2023  5:15 PM        CONTINUE these medications which have NOT CHANGED    Details   ondansetron (ZOFRAN) 4 MG tablet Take 1 tablet by mouth 3 times daily as needed for Nausea or Vomiting, Disp-30 tablet, R-0Normal      omeprazole (PRILOSEC) 40 MG delayed release capsule TAKE ONE CAPSULE BY MOUTH NIGHTLY, Disp-30 capsule, R-4Normal      cyanocobalamin 1000 MCG/ML injection INJECT 1ML INTRAMUSCULARLY EVERY 30 DAYS, Disp-3 mL, R-1Normal      desvenlafaxine succinate (PRISTIQ) 50 MG TB24 extended release tablet TAKE ONE TABLET BY MOUTH ONE TIME DAILY, Disp-90 tablet, R-2Normal      NONFORMULARY nightly as needed CBD GummiesHistorical Med      ELIQUIS 5 MG TABS tablet TAKE 1 TABLET BY MOUTH 2 TIMES A DAY, Disp-180 tablet, R-1Normal      nortriptyline (PAMELOR) 10 MG capsule TAKE 2 CAPSULES BY MOUTH NIGHTLY, Disp-180 capsule, R-1Normal      lisinopril (PRINIVIL;ZESTRIL) 20 MG tablet TAKE ONE TABLET ONE TIME DAILY, Disp-90 tablet, R-3Normal      ondansetron (ZOFRAN ODT) 4 MG disintegrating tablet Take 1 tablet by mouth every 8 hours as needed for Nausea or Vomiting, Disp-20 tablet, R-0Normal      rizatriptan (MAXALT) 10 MG tablet May repeat in 2 hours if needed do not exceed 2 doses in 24 hours, Disp-9 tablet, R-5Normal      calcium carbonate (OSCAL) 500 MG TABS tablet Take 500 mg by mouth dailyHistorical Med      Cholecalciferol (VITAMIN D) 50 MCG ( UT) CAPS capsule Take 2,000 Units by mouth dailyHistorical Med      Multiple Vitamins-Minerals (PRESERVISION AREDS 2+MULTI VIT PO) Take 1 tablet by mouth 2 times dailyHistorical Med      tiZANidine (ZANAFLEX) 4 MG tablet Take 1 tablet by mouth every 12 hours as needed (muscle spasms), Disp-180 tablet, R-1Normal             ALLERGIES     Demerol  [meperidine hcl], Pcn [penicillins], and Versed [midazolam]    FAMILY HISTORY       Family History   Problem Relation Age of Onset    High Blood Pressure Mother     Coronary Art Dis Mother     Thyroid Disease Mother         Hypothyroidism    Breast Cancer Mother 80    Colon Polyps Father     Prostate Cancer Father     Anemia Father         Pernicious    Cancer Father         bladder cancer     Colon Polyps Sister     Anemia Brother         Pernicious    Cancer Brother 46        prostate cancer, bladder cancer    High Blood Pressure Paternal Grandmother     Breast Cancer Paternal Grandmother     Esophageal Cancer Neg Hx     Liver Cancer Neg Hx     Rectal Cancer Neg Hx     Stomach Cancer Neg Hx     Colon Cancer Neg Hx           SOCIAL HISTORY       Social History     Socioeconomic History    Marital status:      Spouse name: None    Number of children: None    Years of education: None    Highest education level: None   Tobacco Use    Smoking status: Former     Years: 25.00     Types: Cigarettes     Quit date:      Years since quittin.    Smokeless tobacco: Never    Tobacco comments:     CBD prn at night    Vaping Use    Vaping Use: Never used   Substance and Sexual Activity    Alcohol use: Yes     Comment: rare    Drug use: No       SCREENINGS    [place] Coma Scale  Eye Opening: Spontaneous  Best Verbal Response: Oriented  Best Motor Response: Obeys commands  [place] Coma Scale Score: 15        PHYSICAL EXAM    (up to 7 for level 4, 8 or more for level 5)     ED Triage Vitals   BP Temp Temp Source Heart Rate Resp SpO2 Height Weight   02/11/23 1436 02/11/23 1434 02/11/23 1434 02/11/23 1436 02/11/23 1436 02/11/23 1436 -- 02/11/23 1434   (!) 126/57 97.9 °F (36.6 °C) Oral 79 18 97 %  140 lb (63.5 kg)       Physical Exam  Vitals and nursing note reviewed. Constitutional:       General: She is not in acute distress. Appearance: Normal appearance. She is well-developed. She is not diaphoretic. HENT:      Head: Normocephalic and atraumatic. Mouth/Throat:      Pharynx: No oropharyngeal exudate. Eyes:      General: No scleral icterus. Pupils: Pupils are equal, round, and reactive to light. Neck:      Trachea: No tracheal deviation. Cardiovascular:      Rate and Rhythm: Normal rate. Pulses: Normal pulses. Heart sounds: Normal heart sounds. Pulmonary:      Effort: Pulmonary effort is normal.      Breath sounds: Normal breath sounds. No stridor. No wheezing or rhonchi. Abdominal:      General: There is no distension. Palpations: Abdomen is soft. Abdomen is not rigid. Tenderness: There is no abdominal tenderness. There is no guarding. Hernia: No hernia is present. Musculoskeletal:         General: No deformity. Cervical back: Normal range of motion. Skin:     General: Skin is warm and dry. Findings: No rash. Neurological:      Mental Status: She is alert and oriented to person, place, and time. Cranial Nerves: No cranial nerve deficit.       Coordination: Coordination normal.   Psychiatric:         Behavior: Behavior normal.       DIAGNOSTIC RESULTS     EKG: All EKG's are interpreted by the Emergency Department Physician who either signs or Co-signs this chart in the absence of a cardiologist.        RADIOLOGY:   Non-plain film images such as CT, Ultrasound and MRI are read by the radiologist. Marry Friday images are visualized and preliminarily interpreted by the emergency physician with the below findings:      Interpretation per the Radiologist below, if available at the time of this note:    CT ABDOMEN PELVIS W IV CONTRAST Additional Contrast? None   Final Result   Findings compatible with mild to moderate colitis of the descending and sigmoid   colon. Diverticulosis without evidence of diverticulitis. ED BEDSIDE ULTRASOUND:   Performed by ED Physician - none    LABS:  Labs Reviewed   CBC WITH AUTO DIFFERENTIAL - Abnormal; Notable for the following components:       Result Value    MCH 26.4 (*)     MCHC 31.8 (*)     RDW 14.6 (*)     Neutrophils % 74.6 (*)     Lymphocytes % 14.7 (*)     All other components within normal limits   COMPREHENSIVE METABOLIC PANEL - Abnormal; Notable for the following components: Total Protein 6.2 (*)     ALT 86 (*)     AST 67 (*)     All other components within normal limits   LIPASE   URINALYSIS       All other labs were within normal range or not returned as of this dictation. EMERGENCY DEPARTMENT COURSE and DIFFERENTIALDIAGNOSIS/MDM:   Vitals:    Vitals:    02/11/23 1434 02/11/23 1436 02/11/23 1700   BP:  (!) 126/57 125/76   Pulse:  79 78   Resp:  18 16   Temp: 97.9 °F (36.6 °C) 97.9 °F (36.6 °C) 98 °F (36.7 °C)   TempSrc: Oral     SpO2:  97% 96%   Weight: 140 lb (63.5 kg)         MDM    ED Course as of 02/11/23 1958   Sat Feb 11, 2023   4655 Reviewed recent outpatient clinic notes and test results as well as colonoscopy report from October:   The mucosa appeared normal throughout the entire examined colon      There was evidence of diverticular disease throughout the right and left colon, more noticeable in left colon. No polyps or masses seen. [RADHA]   1602 WBC: 8.4 [RADHA]      ED Course User Index  [RADHA] Tamra Laguna MD     Reviewed outpatient GI PCR panel that was negative for source of infection. White blood cell count today within normal limits. No significant electrolyte derangements, renal insufficiency, or other abnormalities on lab work. With history of diverticulitis in the past, discussed further management options. Without strong objective evidence of underlying bacterial infection, I am hesitant to empirically treat with antibiotics. Patient agreeable to CT imaging to evaluate for diverticulitis or other intra-abdominal abnormalities to help guide management    Evaluation and work-up here revealed no signs of emergent or life-threatening pathology that would necessitate admission for further work-up or management at this time. Patient is felt to be stable for discharge home with return precautions for worsening of the condition or development of new concerning symptoms. Patient was encouraged to follow-up with their primary care doctor in the appropriate timeframe. Necessary prescriptions and information have been provided for treatment at home. Patient voices understanding and agreement with the plan. CONSULTS:  None    PROCEDURES:  Unless otherwise notedbelow, none     Procedures    FINAL IMPRESSION     1. Acute colitis          DISPOSITION/PLAN   DISPOSITION Decision To Discharge 02/11/2023 05:09:21 PM      No notes of EC Admission Criteria type on file.     PATIENT REFERRED TO:  [facility] EMERGENCY DEPT  [place]  [phone]    If symptoms worsen    MD SIDDHARTH Carrasco/ [address]  [phone]      As needed    DISCHARGE MEDICATIONS:  Discharge Medication List as of 2/11/2023  5:15 PM        START taking these medications    Details   metroNIDAZOLE (FLAGYL) 500 MG tablet Take 1 tablet by mouth 3 times daily for 5 days, Disp-15 tablet, R-0Normal      ciprofloxacin (CIPRO) 500 MG tablet Take 1 tablet by mouth 2 times daily for 5 days, Disp-10 tablet, R-0Normal                (Please note that portions of this note were completed with a voice recognition program.  Efforts were made to edit the dictations butoccasionally words are mis-transcribed.)    Dionna Loomis MD (electronically signed)  Emergency Physician          Dionna Loomis., MD  02/11/23 1958

## 2023-02-13 ENCOUNTER — CARE COORDINATION (OUTPATIENT)
Dept: OTHER | Facility: CLINIC | Age: 63
End: 2023-02-13

## 2023-02-13 NOTE — CARE COORDINATION
ACM attempted to reach patient for introduction to Associate Care Management related to ER visit 2/11/23 at [facility]. HIPAA compliant message left requesting a return phone call. Will attempt to outreach patient again.

## 2023-02-14 ENCOUNTER — CARE COORDINATION (OUTPATIENT)
Dept: OTHER | Facility: CLINIC | Age: 63
End: 2023-02-14

## 2023-02-14 NOTE — LETTER
[address], [place]ON[place]        February 14, 2023        Dear Ms Harris  My name is Nitish Abdalla, Associate Care Manager for [facility] and I have been trying to reach you. The Associate Care Management (ACM) program is a free-of-charge confidential service provided to our employees and their family members covered by the [facility]. The program will provide an associate and his/her family with the [employer]'s expertise to assist in navigating the health care delivery system, provider services, and their overall care needs--so as to assure and improve health care interactions and enhance the quality of life. This program is designed to provide you with the opportunity to have a [facility] partner with you for the following services:     1) when you come home from the hospital or emergency room   2) when help is needed to manage your disease   3) when you need assistance coordinating services or appointments  4) when you need additional education, resources or assistance reaching your Be Well Health Program goals/requirements such as Be Well With Diabetes      [facility] is dedicated to empowering the good health of its community and improving the quality of care and care experiences for employees and their families. We are committed to safeguarding patient confidentiality and privacy, assuring that every employee has the respect he or she deserves in managing their health. The information shared with your care manager will not be shared with anyone else aside from those you identify as part of your care team, and will only be used to assist you with any identified care needs. Please contact me if you would like this service provided to you.      Sincerely,  Nitish Abdalla, ALFONSO  [address]  [place] [address] [place] [address] [phone] F [phone]  [email or web address]  Chris SANCHEZ [email or web address]

## 2023-02-14 NOTE — CARE COORDINATION
ACM attempted 2nd outreach to reach patient for introduction to Associate Care Management. No answer at home/cell number, VM full, unable to leave message    Unable to Reach Letter sent to patient via chart. Will continue to outreach patient.

## 2023-03-01 ENCOUNTER — TELEPHONE (OUTPATIENT)
Dept: INTERNAL MEDICINE | Age: 63
End: 2023-03-01

## 2023-03-01 DIAGNOSIS — E78.2 MIXED HYPERLIPIDEMIA: ICD-10-CM

## 2023-03-01 DIAGNOSIS — K52.9 COLITIS: Primary | ICD-10-CM

## 2023-03-01 NOTE — TELEPHONE ENCOUNTER
----- Message from Ina Phan sent at 3/1/2023  9:48 AM CST -----  Subject: Message to Provider    QUESTIONS  Information for Provider? Patient requesting labs to check on things   because she had colitis a few weeks back. Please advise.   ---------------------------------------------------------------------------  --------------  Caryle Auerbach [facility]  [phone]; OK to leave message on voicemail  ---------------------------------------------------------------------------  --------------  SCRIPT ANSWERS  Relationship to Patient?  Self

## 2023-03-06 DIAGNOSIS — K52.9 COLITIS: ICD-10-CM

## 2023-03-06 DIAGNOSIS — E78.2 MIXED HYPERLIPIDEMIA: ICD-10-CM

## 2023-03-06 LAB
ALBUMIN SERPL-MCNC: 4.3 G/DL (ref 3.5–5.2)
ALP BLD-CCNC: 72 U/L (ref 35–104)
ALT SERPL-CCNC: 18 U/L (ref 5–33)
ANION GAP SERPL CALCULATED.3IONS-SCNC: 13 MMOL/L (ref 7–19)
AST SERPL-CCNC: 20 U/L (ref 5–32)
BILIRUB SERPL-MCNC: 0.3 MG/DL (ref 0.2–1.2)
BUN BLDV-MCNC: 12 MG/DL (ref 8–23)
C-REACTIVE PROTEIN: <0.3 MG/DL (ref 0–0.5)
CALCIUM SERPL-MCNC: 9.3 MG/DL (ref 8.8–10.2)
CHLORIDE BLD-SCNC: 103 MMOL/L (ref 98–111)
CHOLESTEROL, TOTAL: 214 MG/DL (ref 160–199)
CO2: 26 MMOL/L (ref 22–29)
CREAT SERPL-MCNC: 1 MG/DL (ref 0.5–0.9)
GFR SERPL CREATININE-BSD FRML MDRD: >60 ML/MIN/{1.73_M2}
GLUCOSE BLD-MCNC: 88 MG/DL (ref 74–109)
HCT VFR BLD CALC: 42.9 % (ref 37–47)
HDLC SERPL-MCNC: 64 MG/DL (ref 65–121)
HEMOGLOBIN: 13.5 G/DL (ref 12–16)
LDL CHOLESTEROL CALCULATED: 136 MG/DL
MCH RBC QN AUTO: 26.3 PG (ref 27–31)
MCHC RBC AUTO-ENTMCNC: 31.5 G/DL (ref 33–37)
MCV RBC AUTO: 83.5 FL (ref 81–99)
PDW BLD-RTO: 14.6 % (ref 11.5–14.5)
PLATELET # BLD: 251 K/UL (ref 130–400)
PMV BLD AUTO: 11.6 FL (ref 9.4–12.3)
POTASSIUM SERPL-SCNC: 4.3 MMOL/L (ref 3.5–5)
RBC # BLD: 5.14 M/UL (ref 4.2–5.4)
SEDIMENTATION RATE, ERYTHROCYTE: 2 MM/HR (ref 0–25)
SODIUM BLD-SCNC: 142 MMOL/L (ref 136–145)
TOTAL PROTEIN: 6.5 G/DL (ref 6.6–8.7)
TRIGL SERPL-MCNC: 72 MG/DL (ref 0–149)
TSH SERPL DL<=0.05 MIU/L-ACNC: 0.96 UIU/ML (ref 0.27–4.2)
WBC # BLD: 4.8 K/UL (ref 4.8–10.8)

## 2023-03-07 ENCOUNTER — OFFICE VISIT (OUTPATIENT)
Dept: INTERNAL MEDICINE | Age: 63
End: 2023-03-07

## 2023-03-07 VITALS
OXYGEN SATURATION: 99 % | DIASTOLIC BLOOD PRESSURE: 80 MMHG | HEIGHT: 67 IN | HEART RATE: 71 BPM | WEIGHT: 140 LBS | BODY MASS INDEX: 21.97 KG/M2 | SYSTOLIC BLOOD PRESSURE: 110 MMHG | RESPIRATION RATE: 18 BRPM

## 2023-03-07 DIAGNOSIS — I10 ESSENTIAL HYPERTENSION: ICD-10-CM

## 2023-03-07 DIAGNOSIS — F32.5 MAJOR DEPRESSIVE DISORDER WITH SINGLE EPISODE, IN REMISSION (HCC): ICD-10-CM

## 2023-03-07 DIAGNOSIS — Z13.1 SCREENING FOR DIABETES MELLITUS: ICD-10-CM

## 2023-03-07 DIAGNOSIS — I26.02 ACUTE SADDLE PULMONARY EMBOLISM WITH ACUTE COR PULMONALE (HCC): ICD-10-CM

## 2023-03-07 DIAGNOSIS — E55.9 VITAMIN D DEFICIENCY: ICD-10-CM

## 2023-03-07 DIAGNOSIS — K52.9 COLITIS: Primary | ICD-10-CM

## 2023-03-07 DIAGNOSIS — E78.2 MIXED HYPERLIPIDEMIA: ICD-10-CM

## 2023-03-07 DIAGNOSIS — D68.51 FACTOR V LEIDEN (HCC): ICD-10-CM

## 2023-03-07 PROBLEM — Z86.711 HISTORY OF PULMONARY EMBOLUS (PE): Status: ACTIVE | Noted: 2020-09-02

## 2023-03-07 RX ORDER — SACCHAROMYCES BOULARDII 250 MG
250 CAPSULE ORAL 2 TIMES DAILY
Qty: 60 CAPSULE | Refills: 0 | Status: SHIPPED | OUTPATIENT
Start: 2023-03-07 | End: 2023-04-06

## 2023-03-07 ASSESSMENT — PATIENT HEALTH QUESTIONNAIRE - PHQ9
8. MOVING OR SPEAKING SO SLOWLY THAT OTHER PEOPLE COULD HAVE NOTICED. OR THE OPPOSITE, BEING SO FIGETY OR RESTLESS THAT YOU HAVE BEEN MOVING AROUND A LOT MORE THAN USUAL: 0
2. FEELING DOWN, DEPRESSED OR HOPELESS: 0
6. FEELING BAD ABOUT YOURSELF - OR THAT YOU ARE A FAILURE OR HAVE LET YOURSELF OR YOUR FAMILY DOWN: 0
7. TROUBLE CONCENTRATING ON THINGS, SUCH AS READING THE NEWSPAPER OR WATCHING TELEVISION: 0
SUM OF ALL RESPONSES TO PHQ QUESTIONS 1-9: 2
4. FEELING TIRED OR HAVING LITTLE ENERGY: 1
5. POOR APPETITE OR OVEREATING: 0
10. IF YOU CHECKED OFF ANY PROBLEMS, HOW DIFFICULT HAVE THESE PROBLEMS MADE IT FOR YOU TO DO YOUR WORK, TAKE CARE OF THINGS AT HOME, OR GET ALONG WITH OTHER PEOPLE: 0
SUM OF ALL RESPONSES TO PHQ QUESTIONS 1-9: 2
SUM OF ALL RESPONSES TO PHQ9 QUESTIONS 1 & 2: 0
9. THOUGHTS THAT YOU WOULD BE BETTER OFF DEAD, OR OF HURTING YOURSELF: 0
3. TROUBLE FALLING OR STAYING ASLEEP: 1
1. LITTLE INTEREST OR PLEASURE IN DOING THINGS: 0

## 2023-03-07 NOTE — PROGRESS NOTES
Chief Complaint   Patient presents with    6 Month Follow-Up       HPI: Pt recently went to the ER on 2/11 with abd pain and diarrhea for several days. Hit suddenly with watery diarrhea - constant. She was having diarrhea every time she ate or drank. Nausea with a few episodes negative GI panel. CT consistent with a colitis - no diverticulits. Diarrhea is gone. Does not feel well. Now constipated. Pt had bilateral PE in 8/2020.   Chronic anticoagulation with factor V leyden --cholecystectomy 4/2022    Past Medical History:   Diagnosis Date    Cervical disc disease 8/26/2017    DVT (deep venous thrombosis) (HCC)     Essential hypertension 12/1/2019    Gastroesophageal reflux disease without esophagitis 8/26/2017    Hyperlipidemia     Laryngeal polyp     followed by Nir Shrestha PA-C    [facility] 8/26/2017    Migraine without aura 8/26/2017    Pernicious anemia 8/26/2017    Raynaud's phenomenon 8/26/2017       Past Surgical History:   Procedure Laterality Date    ARTERIOGRAM Bilateral 09/02/2020    ECOS-PULMONARY performed by Martha Box MD at [address], LAPAROSCOPIC N/A 04/27/2022    laparoscopic robot assisted cholecystectomy with ICG performed by Mareille East MD at [address] [facility] N/A 10/21/2022    Dr Tyrell Hernandez of diverticular disease throughout the right and left colon, more noticeable in left colon, 5 yr recall    HYSTERECTOMY (CERVIX STATUS UNKNOWN)      RIGOBERTO AND BSO (CERVIX REMOVED)         Family History   Problem Relation Age of Onset    High Blood Pressure Mother     Coronary Art Dis Mother     Thyroid Disease Mother         Hypothyroidism    Breast Cancer Mother 80    Colon Polyps Father     Prostate Cancer Father     Anemia Father         Pernicious    Cancer Father         bladder cancer     Colon Polyps Sister     Anemia Brother         Pernicious    Cancer Brother 46        prostate cancer, bladder cancer    High Blood Pressure Paternal Grandmother     Breast Cancer Paternal Grandmother     Esophageal Cancer Neg Hx     Liver Cancer Neg Hx     Rectal Cancer Neg Hx     Stomach Cancer Neg Hx     Colon Cancer Neg Hx        Social History     Socioeconomic History    Marital status:      Spouse name: Not on file    Number of children: Not on file    Years of education: Not on file    Highest education level: Not on file   Occupational History    Not on file   Tobacco Use    Smoking status: Former     Packs/day: 1.00     Years: 25.00     Pack years: 25.00     Types: Cigarettes     Quit date: 1995     Years since quittin.3    Smokeless tobacco: Never    Tobacco comments:     CBD prn at night    Vaping Use    Vaping Use: Never used   Substance and Sexual Activity    Alcohol use: Yes     Comment: rare    Drug use: No    Sexual activity: Not on file   Other Topics Concern    Not on file   Social History Narrative    Not on file     Social Determinants of Health     Financial Resource Strain: Not on file   Food Insecurity: Not on file   Transportation Needs: Not on file   Physical Activity: Not on file   Stress: Not on file   Social Connections: Not on file   Intimate Partner Violence: Not on file   Housing Stability: Not on file       Allergies   Allergen Reactions    Demerol  [Meperidine Hcl]      Other reaction(s): Unknown    Pcn [Penicillins] Rash    Versed [Midazolam] Nausea And Vomiting       Current Outpatient Medications   Medication Sig Dispense Refill    saccharomyces boulardii (FLORASTOR) 250 MG capsule Take 1 capsule by mouth 2 times daily 60 capsule 0    ondansetron (ZOFRAN) 4 MG tablet Take 1 tablet by mouth 3 times daily as needed for Nausea or Vomiting 30 tablet 0    omeprazole (PRILOSEC) 40 MG delayed release capsule TAKE ONE CAPSULE BY MOUTH NIGHTLY 30 capsule 4    cyanocobalamin 1000 MCG/ML injection INJECT 1ML INTRAMUSCULARLY EVERY 30 DAYS 3 mL 1    desvenlafaxine succinate (PRISTIQ) 50 MG TB24 extended release tablet TAKE ONE TABLET BY MOUTH ONE TIME DAILY 90 tablet 2    NONFORMULARY nightly as needed CBD Gummies      ELIQUIS 5 MG TABS tablet TAKE 1 TABLET BY MOUTH 2 TIMES A  tablet 1    nortriptyline (PAMELOR) 10 MG capsule TAKE 2 CAPSULES BY MOUTH NIGHTLY 180 capsule 1    lisinopril (PRINIVIL;ZESTRIL) 20 MG tablet TAKE ONE TABLET ONE TIME DAILY 90 tablet 3    ondansetron (ZOFRAN ODT) 4 MG disintegrating tablet Take 1 tablet by mouth every 8 hours as needed for Nausea or Vomiting 20 tablet 0    rizatriptan (MAXALT) 10 MG tablet May repeat in 2 hours if needed do not exceed 2 doses in 24 hours 9 tablet 5    calcium carbonate (OSCAL) 500 MG TABS tablet Take 500 mg by mouth daily      Cholecalciferol (VITAMIN D) 50 MCG (2000 UT) CAPS capsule Take 2,000 Units by mouth daily      Multiple Vitamins-Minerals (PRESERVISION AREDS 2+MULTI VIT PO) Take 1 tablet by mouth 2 times daily      tiZANidine (ZANAFLEX) 4 MG tablet Take 1 tablet by mouth every 12 hours as needed (muscle spasms) 180 tablet 1     No current facility-administered medications for this visit.        Review of Systems    /80   Pulse 71   Resp 18   Ht 5' 7\" (1.702 m)   Wt 140 lb (63.5 kg)   SpO2 99%   BMI 21.93 kg/m²   BP Readings from Last 7 Encounters:   03/07/23 110/80   02/11/23 125/76   02/10/23 118/60   10/21/22 130/71   09/02/22 104/60   08/11/22 92/62   07/22/22 131/87     Wt Readings from Last 7 Encounters:   03/07/23 140 lb (63.5 kg)   02/11/23 140 lb (63.5 kg)   10/21/22 140 lb (63.5 kg)   09/02/22 141 lb 3.2 oz (64 kg)   08/11/22 142 lb 6.4 oz (64.6 kg)   06/30/22 144 lb (65.3 kg)   05/12/22 144 lb 6.4 oz (65.5 kg)     BMI Readings from Last 7 Encounters:   03/07/23 21.93 kg/m²   02/11/23 21.93 kg/m²   10/21/22 21.93 kg/m²   09/02/22 22.12 kg/m²   08/11/22 22.30 kg/m²   06/30/22 22.55 kg/m²   05/12/22 22.62 kg/m²     Resp Readings from Last 7 Encounters:   03/07/23 18   02/11/23 16   10/21/22 16   07/22/22 12   06/30/22 16 04/28/22 20   04/27/22 14       Physical Exam  Constitutional:       General: She is not in acute distress. Appearance: Normal appearance. She is well-developed. HENT:      Right Ear: External ear normal. Tympanic membrane is not injected. Left Ear: External ear normal. Tympanic membrane is not injected. Mouth/Throat:      Pharynx: No oropharyngeal exudate. Eyes:      General: No scleral icterus. Conjunctiva/sclera: Conjunctivae normal.   Neck:      Thyroid: No thyroid mass or thyromegaly. Vascular: No carotid bruit. Cardiovascular:      Rate and Rhythm: Normal rate and regular rhythm. Heart sounds: S1 normal and S2 normal. No murmur heard. No S3 or S4 sounds. Pulmonary:      Effort: Pulmonary effort is normal. No respiratory distress. Breath sounds: Normal breath sounds. No wheezing or rales. Abdominal:      General: Bowel sounds are normal. There is no distension. Palpations: Abdomen is soft. There is no mass. Tenderness: There is no abdominal tenderness. Musculoskeletal:      Cervical back: Neck supple. Lymphadenopathy:      Cervical: No cervical adenopathy. Upper Body:      Right upper body: No supraclavicular adenopathy. Left upper body: No supraclavicular adenopathy. Skin:     Findings: No rash. Neurological:      Mental Status: She is alert and oriented to person, place, and time. Cranial Nerves: No cranial nerve deficit.        Results for orders placed or performed in visit on 03/06/23   Lipid Panel   Result Value Ref Range    Cholesterol, Total 214 (H) 160 - 199 mg/dL    Triglycerides 72 0 - 149 mg/dL    HDL 64 (L) 65 - 121 mg/dL    LDL Calculated 136 <100 mg/dL   TSH   Result Value Ref Range    TSH 0.957 0.270 - 4.200 uIU/mL   C-Reactive Protein   Result Value Ref Range    CRP <0.30 0.00 - 0.50 mg/dL   Sedimentation Rate   Result Value Ref Range    Sed Rate 2 0 - 25 mm/Hr   Comprehensive Metabolic Panel   Result Value Ref Range    Sodium 142 136 - 145 mmol/L    Potassium 4.3 3.5 - 5.0 mmol/L    Chloride 103 98 - 111 mmol/L    CO2 26 22 - 29 mmol/L    Anion Gap 13 7 - 19 mmol/L    Glucose 88 74 - 109 mg/dL    BUN 12 8 - 23 mg/dL    Creatinine 1.0 (H) 0.5 - 0.9 mg/dL    Est, Glom Filt Rate >60 >60    Calcium 9.3 8.8 - 10.2 mg/dL    Total Protein 6.5 (L) 6.6 - 8.7 g/dL    Albumin 4.3 3.5 - 5.2 g/dL    Total Bilirubin 0.3 0.2 - 1.2 mg/dL    Alkaline Phosphatase 72 35 - 104 U/L    ALT 18 5 - 33 U/L    AST 20 5 - 32 U/L   CBC   Result Value Ref Range    WBC 4.8 4.8 - 10.8 K/uL    RBC 5.14 4.20 - 5.40 M/uL    Hemoglobin 13.5 12.0 - 16.0 g/dL    Hematocrit 42.9 37.0 - 47.0 %    MCV 83.5 81.0 - 99.0 fL    MCH 26.3 (L) 27.0 - 31.0 pg    MCHC 31.5 (L) 33.0 - 37.0 g/dL    RDW 14.6 (H) 11.5 - 14.5 %    Platelets 251 130 - 400 K/uL    MPV 11.6 9.4 - 12.3 fL       ASSESSMENT/ PLAN:  1. Colitis  Reviewed the CT from the ER-- mild to moderate colitis of the descending and sigmoid colon--- pt had a cholecystecomy this past year also-- also history diverticulitis-- it looks like pt has recently had a viral syndrome or food poisoning- better now- add florastor for 1 month-- watch closely  - saccharomyces boulardii (FLORASTOR) 250 MG capsule; Take 1 capsule by mouth 2 times daily  Dispense: 60 capsule; Refill: 0    2. Acute saddle pulmonary embolism with acute cor pulmonale (HCC)  Continue eliquis     3. Major depressive disorder with single episode, in remission (HCC)  Continue pristiq and pamelor     4. Factor V Leiden (HCC)  Eliquis and follow     5. Essential hypertension  Bp is in control - lisinopril and follow   - CBC; Future  - Comprehensive Metabolic Panel; Future    6. Vitamin D deficiency    - Vitamin D 25 Hydroxy; Future    7. Mixed hyperlipidemia    - TSH; Future  - Lipid Panel; Future    8. Screening for diabetes mellitus    - Hemoglobin A1C; Future    Chart, medications, labs, vaccines reviewed.  Keep up to date with routine care and  follow up. Call with any problems or complaints. Keep up to date with routine screening recomendations and vaccines.

## 2023-03-13 RX ORDER — APIXABAN 5 MG/1
TABLET, FILM COATED ORAL
Qty: 180 TABLET | Refills: 1 | Status: SHIPPED | OUTPATIENT
Start: 2023-03-13

## 2023-03-13 RX ORDER — NORTRIPTYLINE HYDROCHLORIDE 10 MG/1
CAPSULE ORAL
Qty: 180 CAPSULE | Refills: 1 | Status: SHIPPED | OUTPATIENT
Start: 2023-03-13

## 2023-03-14 ENCOUNTER — CARE COORDINATION (OUTPATIENT)
Dept: OTHER | Facility: CLINIC | Age: 63
End: 2023-03-14

## 2023-03-14 NOTE — LETTER
[address], [place]      [birth year]    Dear Ms. Na Willis    My name is Laure Cruz , Associate Care Manager for [address] [place] [address],[address], and I have been trying to reach you. The Associate Care Management (ACM) program is a free-of-charge, confidential service provided to our employees and their family members covered by the [facility]. I can help you with care transitions such as when you come home from the hospital, when help is needed to manage your disease, or when you need assistance coordinating services or appointments. I can also help you understand any after visit instructions, such as what symptoms to watch out for, or any new or changed medications. Our program is designed to provide you with the opportunity to have a [facility] partner with you for your healthcare needs. Due to not being able to reach you, I am closing out the current program, but will remain available to you should you have any questions. Please contact me at [phone], if you are interested in this service.

## 2023-03-14 NOTE — CARE COORDINATION
ACM attempted third and final call to patient for introduction to Associate Care Management. HIPAA compliant message left requesting a return phone call at patients convenience. Final Unable to Reach Letter sent via chart. No further outreach scheduled with this ACM, ACM will sign off care team at this time. Patient has been provided with this ACM's contact information.

## 2023-04-11 PROBLEM — J38.2 VOCAL CORD NODULE: Status: ACTIVE | Noted: 2023-04-11

## 2023-04-11 PROBLEM — J38.1 VOCAL CORD POLYPS: Status: RESOLVED | Noted: 2021-04-06 | Resolved: 2023-04-11

## 2023-05-03 RX ORDER — OMEPRAZOLE 40 MG/1
CAPSULE, DELAYED RELEASE ORAL
Qty: 30 CAPSULE | Refills: 4 | Status: SHIPPED | OUTPATIENT
Start: 2023-05-03

## 2023-05-22 ENCOUNTER — TELEPHONE (OUTPATIENT)
Dept: INTERNAL MEDICINE | Age: 63
End: 2023-05-22

## 2023-05-22 NOTE — TELEPHONE ENCOUNTER
----- Message from April Nagle sent at 5/22/2023 10:49 AM CDT -----  Subject: Message to Provider    QUESTIONS  Information for Provider? Patients massage therapist told patient to call   her primary care physicians office and get a prescription for the massages   she gets, as she gets the massages due to a medical conditions she-   arthritis and muscle spasms. please call patient back if dr. Jaye Beard can   write this for patient. thank you   ---------------------------------------------------------------------------  --------------  Armando Curran INFO  [phone]; OK to leave message on voicemail  ---------------------------------------------------------------------------  --------------  SCRIPT ANSWERS  Relationship to Patient?  Self

## 2023-05-26 ENCOUNTER — APPOINTMENT (OUTPATIENT)
Dept: GENERAL RADIOLOGY | Age: 63
End: 2023-05-26
Payer: COMMERCIAL

## 2023-05-26 ENCOUNTER — HOSPITAL ENCOUNTER (EMERGENCY)
Age: 63
Discharge: HOME | End: 2023-05-26
Attending: EMERGENCY MEDICINE
Payer: COMMERCIAL

## 2023-05-26 VITALS
OXYGEN SATURATION: 100 % | TEMPERATURE: 98.5 F | HEART RATE: 79 BPM | SYSTOLIC BLOOD PRESSURE: 108 MMHG | DIASTOLIC BLOOD PRESSURE: 67 MMHG | BODY MASS INDEX: 22.44 KG/M2 | RESPIRATION RATE: 15 BRPM | WEIGHT: 143 LBS | HEIGHT: 67 IN

## 2023-05-26 DIAGNOSIS — R07.9 CHEST PAIN, UNSPECIFIED TYPE: Primary | ICD-10-CM

## 2023-05-26 LAB
ALBUMIN SERPL-MCNC: 4.2 G/DL (ref 3.5–5.2)
ALP SERPL-CCNC: 92 U/L (ref 35–104)
ALT SERPL-CCNC: 41 U/L (ref 5–33)
ANION GAP SERPL CALCULATED.3IONS-SCNC: 7 MMOL/L (ref 7–19)
AST SERPL-CCNC: 28 U/L (ref 5–32)
BASOPHILS # BLD: 0 K/UL (ref 0–0.2)
BASOPHILS NFR BLD: 0.6 % (ref 0–1)
BILIRUB SERPL-MCNC: <0.2 MG/DL (ref 0.2–1.2)
BNP BLD-MCNC: <36 PG/ML (ref 0–124)
BUN SERPL-MCNC: 18 MG/DL (ref 8–23)
CALCIUM SERPL-MCNC: 9.4 MG/DL (ref 8.8–10.2)
CHLORIDE SERPL-SCNC: 106 MMOL/L (ref 98–111)
CO2 SERPL-SCNC: 29 MMOL/L (ref 22–29)
CREAT SERPL-MCNC: 0.8 MG/DL (ref 0.5–0.9)
EOSINOPHIL # BLD: 0.2 K/UL (ref 0–0.6)
EOSINOPHIL NFR BLD: 3.1 % (ref 0–5)
ERYTHROCYTE [DISTWIDTH] IN BLOOD BY AUTOMATED COUNT: 14.4 % (ref 11.5–14.5)
GLUCOSE SERPL-MCNC: 95 MG/DL (ref 74–109)
HCT VFR BLD AUTO: 43.1 % (ref 37–47)
HGB BLD-MCNC: 13.4 G/DL (ref 12–16)
IMM GRANULOCYTES # BLD: 0 K/UL
LYMPHOCYTES # BLD: 1.8 K/UL (ref 1.1–4.5)
LYMPHOCYTES NFR BLD: 26.7 % (ref 20–40)
MCH RBC QN AUTO: 26.8 PG (ref 27–31)
MCHC RBC AUTO-ENTMCNC: 31.1 G/DL (ref 33–37)
MCV RBC AUTO: 86.2 FL (ref 81–99)
MONOCYTES # BLD: 0.4 K/UL (ref 0–0.9)
MONOCYTES NFR BLD: 6.6 % (ref 0–10)
NEUTROPHILS # BLD: 4.2 K/UL (ref 1.5–7.5)
NEUTS SEG NFR BLD: 62.9 % (ref 50–65)
PLATELET # BLD AUTO: 215 K/UL (ref 130–400)
PMV BLD AUTO: 11.1 FL (ref 9.4–12.3)
POTASSIUM SERPL-SCNC: 4.3 MMOL/L (ref 3.5–5)
PROT SERPL-MCNC: 6 G/DL (ref 6.6–8.7)
RBC # BLD AUTO: 5 M/UL (ref 4.2–5.4)
REASON FOR REJECTION: NORMAL
REJECTED TEST: NORMAL
SODIUM SERPL-SCNC: 142 MMOL/L (ref 136–145)
TROPONIN T SERPL-MCNC: <0.01 NG/ML (ref 0–0.03)
WBC # BLD AUTO: 6.7 K/UL (ref 4.8–10.8)

## 2023-05-26 PROCEDURE — 99285 EMERGENCY DEPT VISIT HI MDM: CPT

## 2023-05-26 PROCEDURE — 80053 COMPREHEN METABOLIC PANEL: CPT

## 2023-05-26 PROCEDURE — 84484 ASSAY OF TROPONIN QUANT: CPT

## 2023-05-26 PROCEDURE — 36415 COLL VENOUS BLD VENIPUNCTURE: CPT

## 2023-05-26 PROCEDURE — 85025 COMPLETE CBC W/AUTO DIFF WBC: CPT

## 2023-05-26 PROCEDURE — 83880 ASSAY OF NATRIURETIC PEPTIDE: CPT

## 2023-05-26 PROCEDURE — 71045 X-RAY EXAM CHEST 1 VIEW: CPT

## 2023-05-26 NOTE — ED PROVIDER NOTES
1000 MCG/ML INJECTION    INJECT 1ML INTRAMUSCULARLY EVERY 30 DAYS    DESVENLAFAXINE SUCCINATE (PRISTIQ) 50 MG TB24 EXTENDED RELEASE TABLET    TAKE ONE TABLET BY MOUTH ONE TIME DAILY    ELIQUIS 5 MG TABS TABLET    TAKE 1 TABLET BY MOUTH 2 TIMES A DAY    LISINOPRIL (PRINIVIL;ZESTRIL) 20 MG TABLET    TAKE ONE TABLET ONE TIME DAILY    MULTIPLE VITAMINS-MINERALS (PRESERVISION AREDS 2+MULTI VIT PO)    Take 1 tablet by mouth 2 times daily    NONFORMULARY    nightly as needed CBD Gummies    NORTRIPTYLINE (PAMELOR) 10 MG CAPSULE    TAKE TWO CAPSULES BY MOUTH NIGHTLY    RIZATRIPTAN (MAXALT) 10 MG TABLET    May repeat in 2 hours if needed do not exceed 2 doses in 24 hours    TIZANIDINE (ZANAFLEX) 4 MG TABLET    Take 1 tablet by mouth every 12 hours as needed (muscle spasms)       ALLERGIES     Demerol  [meperidine hcl], Pcn [penicillins], and Versed [midazolam]    FAMILY HISTORY       Family History   Problem Relation Age of Onset    High Blood Pressure Mother     Coronary Art Dis Mother     Thyroid Disease Mother         Hypothyroidism    Breast Cancer Mother 80    Colon Polyps Father     Prostate Cancer Father     Anemia Father         Pernicious    Cancer Father         bladder cancer     Colon Polyps Sister     Anemia Brother         Pernicious    Cancer Brother 46        prostate cancer, bladder cancer    High Blood Pressure Paternal Grandmother     Breast Cancer Paternal Grandmother     Esophageal Cancer Neg Hx     Liver Cancer Neg Hx     Rectal Cancer Neg Hx     Stomach Cancer Neg Hx     Colon Cancer Neg Hx           SOCIAL HISTORY       Social History     Socioeconomic History    Marital status:      Spouse name: None    Number of children: None    Years of education: None    Highest education level: None   Tobacco Use    Smoking status: Former     Packs/day: 1.00     Years: 25.00     Pack years: 25.00     Types: Cigarettes     Quit date: 1995     Years since quittin.5    Smokeless tobacco: Never

## 2023-05-26 NOTE — DISCHARGE INSTRUCTIONS
Return to the emergency department for any acute worsening of symptoms. If symptoms persist you should follow-up with your primary care doctor.

## 2023-05-30 ENCOUNTER — TELEPHONE (OUTPATIENT)
Dept: INTERNAL MEDICINE | Age: 63
End: 2023-05-30

## 2023-05-30 ENCOUNTER — CARE COORDINATION (OUTPATIENT)
Dept: OTHER | Facility: CLINIC | Age: 63
End: 2023-05-30

## 2023-05-30 LAB
EKG P AXIS: 75 DEGREES
EKG P-R INTERVAL: 164 MS
EKG Q-T INTERVAL: 398 MS
EKG QRS DURATION: 94 MS
EKG QTC CALCULATION (BAZETT): 415 MS
EKG T AXIS: 58 DEGREES

## 2023-05-30 NOTE — CARE COORDINATION
Ambulatory Care Coordination Note    ACM attempted to reach patient for introduction to Associate Care Management related to ED visit. HIPAA compliant message left requesting a return phone call at patient convenience.      Plan for follow-up call in 1-2 days      Future Appointments   Date Time Provider Sandee Esquivel   8/11/2023  9:15  [facility], EMILIANO NIETO [facility]   8/11/2023  9:30 AM SCHEDULE, L MED ONC MA [facility] MED ONC [facility]   9/12/2023  8:30 AM Joce Rodriguez MD [facility]

## 2023-05-30 NOTE — TELEPHONE ENCOUNTER
I looked and it looks similar to the one in 2020 but see if she can come tomorrow at 8 am (or 1130) and we will see her and see if need get a stress test etc

## 2023-05-30 NOTE — TELEPHONE ENCOUNTER
She was in the ER over the weekend with chest pain and she is concerned about her EKG results. She would like Dr. Nabeel Hopson to review it and get back with her.

## 2023-05-31 ENCOUNTER — OFFICE VISIT (OUTPATIENT)
Dept: INTERNAL MEDICINE | Age: 63
End: 2023-05-31
Payer: COMMERCIAL

## 2023-05-31 ENCOUNTER — CARE COORDINATION (OUTPATIENT)
Dept: OTHER | Facility: CLINIC | Age: 63
End: 2023-05-31

## 2023-05-31 VITALS
DIASTOLIC BLOOD PRESSURE: 72 MMHG | BODY MASS INDEX: 22.44 KG/M2 | SYSTOLIC BLOOD PRESSURE: 110 MMHG | WEIGHT: 143 LBS | HEIGHT: 67 IN | HEART RATE: 74 BPM | OXYGEN SATURATION: 96 %

## 2023-05-31 DIAGNOSIS — R07.89 OTHER CHEST PAIN: Primary | ICD-10-CM

## 2023-05-31 DIAGNOSIS — Z86.711 HISTORY OF PULMONARY EMBOLUS (PE): ICD-10-CM

## 2023-05-31 PROCEDURE — 99214 OFFICE O/P EST MOD 30 MIN: CPT | Performed by: INTERNAL MEDICINE

## 2023-05-31 PROCEDURE — 3074F SYST BP LT 130 MM HG: CPT | Performed by: INTERNAL MEDICINE

## 2023-05-31 PROCEDURE — 3078F DIAST BP <80 MM HG: CPT | Performed by: INTERNAL MEDICINE

## 2023-05-31 NOTE — CARE COORDINATION
Ambulatory Care Coordination Note    ACM contacted patient for introduction to Associate Care Management related to ED visit. Verified name and  with patient as identifiers. Patient declines care management at this time, stating she has no Associate CM need. Able to reach pt. Discussed ED visit. Pt reports no further CP. Pt seen by PCP today. Pt aware of what to monitor for to return. After reviewing ACM role pt declined services at this time. ACM provided contact information for self referral if patient would need care management in the future. ACM will sign off at this time.

## 2023-06-08 ASSESSMENT — ENCOUNTER SYMPTOMS
VOMITING: 0
DIARRHEA: 0
NAUSEA: 0
ABDOMINAL PAIN: 0
SHORTNESS OF BREATH: 0

## 2023-06-19 ENCOUNTER — TELEPHONE (OUTPATIENT)
Dept: INTERNAL MEDICINE | Age: 63
End: 2023-06-19

## 2023-06-19 DIAGNOSIS — K57.92 DIVERTICULITIS: Primary | ICD-10-CM

## 2023-06-19 RX ORDER — CIPROFLOXACIN 500 MG/1
500 TABLET, FILM COATED ORAL 2 TIMES DAILY
Qty: 20 TABLET | Refills: 0 | Status: SHIPPED | OUTPATIENT
Start: 2023-06-19 | End: 2023-06-29

## 2023-06-19 RX ORDER — METRONIDAZOLE 500 MG/1
500 TABLET ORAL 3 TIMES DAILY
Qty: 30 TABLET | Refills: 0 | Status: SHIPPED | OUTPATIENT
Start: 2023-06-19 | End: 2023-06-29

## 2023-07-05 DIAGNOSIS — K52.9 COLITIS: ICD-10-CM

## 2023-07-05 RX ORDER — SACCHAROMYCES BOULARDII 250 MG
250 CAPSULE ORAL 2 TIMES DAILY
Qty: 60 CAPSULE | Refills: 1 | Status: SHIPPED | OUTPATIENT
Start: 2023-07-05 | End: 2023-08-04

## 2023-07-31 RX ORDER — OMEPRAZOLE 40 MG/1
CAPSULE, DELAYED RELEASE ORAL
Qty: 30 CAPSULE | Refills: 4 | Status: SHIPPED | OUTPATIENT
Start: 2023-07-31

## 2023-08-03 RX ORDER — CYANOCOBALAMIN 1000 UG/ML
INJECTION, SOLUTION INTRAMUSCULAR; SUBCUTANEOUS
Qty: 3 ML | Refills: 1 | Status: SHIPPED | OUTPATIENT
Start: 2023-08-03

## 2023-08-14 RX ORDER — LISINOPRIL 20 MG/1
TABLET ORAL
Qty: 90 TABLET | Refills: 3 | Status: SHIPPED | OUTPATIENT
Start: 2023-08-14

## 2023-09-06 ENCOUNTER — TELEPHONE (OUTPATIENT)
Dept: HEMATOLOGY | Age: 63
End: 2023-09-06

## 2023-09-07 DIAGNOSIS — E55.9 VITAMIN D DEFICIENCY: ICD-10-CM

## 2023-09-07 DIAGNOSIS — Z13.1 SCREENING FOR DIABETES MELLITUS: ICD-10-CM

## 2023-09-07 DIAGNOSIS — I10 ESSENTIAL HYPERTENSION: ICD-10-CM

## 2023-09-07 DIAGNOSIS — E78.2 MIXED HYPERLIPIDEMIA: ICD-10-CM

## 2023-09-07 LAB
25(OH)D3 SERPL-MCNC: 47.3 NG/ML
ALBUMIN SERPL-MCNC: 4.6 G/DL (ref 3.5–5.2)
ALP SERPL-CCNC: 79 U/L (ref 35–104)
ALT SERPL-CCNC: 34 U/L (ref 5–33)
ANION GAP SERPL CALCULATED.3IONS-SCNC: 14 MMOL/L (ref 7–19)
AST SERPL-CCNC: 30 U/L (ref 5–32)
BILIRUB SERPL-MCNC: 0.3 MG/DL (ref 0.2–1.2)
BUN SERPL-MCNC: 15 MG/DL (ref 8–23)
CALCIUM SERPL-MCNC: 9.5 MG/DL (ref 8.8–10.2)
CHLORIDE SERPL-SCNC: 105 MMOL/L (ref 98–111)
CHOLEST SERPL-MCNC: 204 MG/DL (ref 160–199)
CO2 SERPL-SCNC: 26 MMOL/L (ref 22–29)
CREAT SERPL-MCNC: 0.9 MG/DL (ref 0.5–0.9)
ERYTHROCYTE [DISTWIDTH] IN BLOOD BY AUTOMATED COUNT: 14.9 % (ref 11.5–14.5)
GLUCOSE SERPL-MCNC: 90 MG/DL (ref 74–109)
HBA1C MFR BLD: 5.4 % (ref 4–6)
HCT VFR BLD AUTO: 42 % (ref 37–47)
HDLC SERPL-MCNC: 65 MG/DL (ref 65–121)
HGB BLD-MCNC: 13.7 G/DL (ref 12–16)
LDLC SERPL CALC-MCNC: 121 MG/DL
MCH RBC QN AUTO: 27.2 PG (ref 27–31)
MCHC RBC AUTO-ENTMCNC: 32.6 G/DL (ref 33–37)
MCV RBC AUTO: 83.3 FL (ref 81–99)
PLATELET # BLD AUTO: 238 K/UL (ref 130–400)
PMV BLD AUTO: 11.9 FL (ref 9.4–12.3)
POTASSIUM SERPL-SCNC: 5.1 MMOL/L (ref 3.5–5)
PROT SERPL-MCNC: 6.6 G/DL (ref 6.6–8.7)
RBC # BLD AUTO: 5.04 M/UL (ref 4.2–5.4)
SODIUM SERPL-SCNC: 145 MMOL/L (ref 136–145)
TRIGL SERPL-MCNC: 88 MG/DL (ref 0–149)
TSH SERPL DL<=0.005 MIU/L-ACNC: 1.55 UIU/ML (ref 0.27–4.2)
WBC # BLD AUTO: 5 K/UL (ref 4.8–10.8)

## 2023-09-07 RX ORDER — NORTRIPTYLINE HYDROCHLORIDE 10 MG/1
CAPSULE ORAL
Qty: 180 CAPSULE | Refills: 1 | Status: SHIPPED | OUTPATIENT
Start: 2023-09-07

## 2023-09-07 RX ORDER — APIXABAN 5 MG/1
TABLET, FILM COATED ORAL
Qty: 180 TABLET | Refills: 1 | Status: SHIPPED | OUTPATIENT
Start: 2023-09-07

## 2023-09-07 RX ORDER — DESVENLAFAXINE SUCCINATE 50 MG/1
TABLET, EXTENDED RELEASE ORAL
Qty: 90 TABLET | Refills: 2 | Status: SHIPPED | OUTPATIENT
Start: 2023-09-07

## 2023-09-07 NOTE — PROGRESS NOTES
exams    Past Medical History:   Diagnosis Date    Cervical disc disease 8/26/2017    DVT (deep venous thrombosis) (HCC)     Essential hypertension 12/1/2019    Gastroesophageal reflux disease without esophagitis 8/26/2017    Hyperlipidemia     Laryngeal polyp     followed by Eli Stack PA-C    Major depressive disorder in remission ([address]) 8/26/2017    Migraine without aura 8/26/2017    Pernicious anemia 8/26/2017    Raynaud's phenomenon 8/26/2017     Past Surgical History:   Procedure Laterality Date    ARTERIOGRAM Bilateral 09/02/2020    ECOS-PULMONARY performed by Bhavesh Silvestre MD at [address], LAPAROSCOPIC N/A 04/27/2022    laparoscopic robot assisted cholecystectomy with ICG performed by Thomas Dailey MD at [address] [facility] N/A 10/21/2022    Dr Eren Sandoval of diverticular disease throughout the right and left colon, more noticeable in left colon, 5 yr recall    HYSTERECTOMY (CERVIX STATUS UNKNOWN)      RIGOBERTO AND BSO (CERVIX REMOVED)       Current Outpatient Medications   Medication Sig Dispense Refill    escitalopram (LEXAPRO) 10 MG tablet Take 1 tablet by mouth daily 90 tablet 1    tiZANidine (ZANAFLEX) 4 MG tablet Take 1 tablet by mouth 3 times daily as needed (neck pain) 30 tablet 2    ELIQUIS 5 MG TABS tablet TAKE ONE TABLET BY MOUTH TWICE A  tablet 1    nortriptyline (PAMELOR) 10 MG capsule TAKE TWO CAPSULES BY MOUTH EVERY NIGHT 180 capsule 1    lisinopril (PRINIVIL;ZESTRIL) 20 MG tablet TAKE 1 TABLET BY MOUTH ONE TIME A DAY 90 tablet 3    cyanocobalamin 1000 MCG/ML injection INJECT 1ML INTRAMUSCULARLY EVERY 30 DAYS 3 mL 1    NONFORMULARY nightly as needed CBD Gummies      rizatriptan (MAXALT) 10 MG tablet May repeat in 2 hours if needed do not exceed 2 doses in 24 hours 9 tablet 5    calcium carbonate (OSCAL) 500 MG TABS tablet Take 1 tablet by mouth daily      Cholecalciferol (VITAMIN D) 50 MCG (2000 UT) CAPS capsule Take 2,000 Units by mouth daily      Multiple

## 2023-09-12 ENCOUNTER — OFFICE VISIT (OUTPATIENT)
Dept: INTERNAL MEDICINE | Age: 63
End: 2023-09-12
Payer: COMMERCIAL

## 2023-09-12 ENCOUNTER — TELEPHONE (OUTPATIENT)
Dept: INTERNAL MEDICINE | Age: 63
End: 2023-09-12

## 2023-09-12 VITALS
SYSTOLIC BLOOD PRESSURE: 110 MMHG | HEIGHT: 67 IN | WEIGHT: 140 LBS | DIASTOLIC BLOOD PRESSURE: 70 MMHG | OXYGEN SATURATION: 100 % | HEART RATE: 78 BPM | BODY MASS INDEX: 21.97 KG/M2

## 2023-09-12 DIAGNOSIS — F34.1 DYSTHYMIA: ICD-10-CM

## 2023-09-12 DIAGNOSIS — Z00.00 ANNUAL PHYSICAL EXAM: Primary | ICD-10-CM

## 2023-09-12 DIAGNOSIS — I73.00 RAYNAUD'S PHENOMENON WITHOUT GANGRENE: Chronic | ICD-10-CM

## 2023-09-12 DIAGNOSIS — Z12.31 SCREENING MAMMOGRAM FOR BREAST CANCER: ICD-10-CM

## 2023-09-12 DIAGNOSIS — D68.51 FACTOR V LEIDEN (HCC): ICD-10-CM

## 2023-09-12 DIAGNOSIS — D51.0 PERNICIOUS ANEMIA: Chronic | ICD-10-CM

## 2023-09-12 DIAGNOSIS — G43.009 MIGRAINE WITHOUT AURA AND WITHOUT STATUS MIGRAINOSUS, NOT INTRACTABLE: Chronic | ICD-10-CM

## 2023-09-12 DIAGNOSIS — M50.90 CERVICAL DISC DISEASE: Chronic | ICD-10-CM

## 2023-09-12 DIAGNOSIS — F32.5 MAJOR DEPRESSIVE DISORDER WITH SINGLE EPISODE, IN REMISSION (HCC): ICD-10-CM

## 2023-09-12 DIAGNOSIS — I10 ESSENTIAL HYPERTENSION: ICD-10-CM

## 2023-09-12 PROCEDURE — 3074F SYST BP LT 130 MM HG: CPT | Performed by: INTERNAL MEDICINE

## 2023-09-12 PROCEDURE — 99396 PREV VISIT EST AGE 40-64: CPT | Performed by: INTERNAL MEDICINE

## 2023-09-12 PROCEDURE — 3078F DIAST BP <80 MM HG: CPT | Performed by: INTERNAL MEDICINE

## 2023-09-12 RX ORDER — ESCITALOPRAM OXALATE 10 MG/1
10 TABLET ORAL DAILY
Qty: 90 TABLET | Refills: 1 | Status: SHIPPED | OUTPATIENT
Start: 2023-09-12

## 2023-09-12 RX ORDER — TIZANIDINE 4 MG/1
4 TABLET ORAL 3 TIMES DAILY PRN
Qty: 30 TABLET | Refills: 2 | Status: SHIPPED | OUTPATIENT
Start: 2023-09-12

## 2023-09-12 ASSESSMENT — PATIENT HEALTH QUESTIONNAIRE - PHQ9
SUM OF ALL RESPONSES TO PHQ QUESTIONS 1-9: 0
SUM OF ALL RESPONSES TO PHQ QUESTIONS 1-9: 0
2. FEELING DOWN, DEPRESSED OR HOPELESS: 0
SUM OF ALL RESPONSES TO PHQ QUESTIONS 1-9: 0
SUM OF ALL RESPONSES TO PHQ QUESTIONS 1-9: 0
SUM OF ALL RESPONSES TO PHQ9 QUESTIONS 1 & 2: 0
1. LITTLE INTEREST OR PLEASURE IN DOING THINGS: 0

## 2023-09-12 NOTE — PROGRESS NOTES
children: Not on file    Years of education: Not on file    Highest education level: Not on file   Occupational History    Not on file   Tobacco Use    Smoking status: Former     Packs/day: 1.00     Years: 25.00     Additional pack years: 0.00     Total pack years: 25.00     Types: Cigarettes     Quit date: 1995     Years since quittin.9    Smokeless tobacco: Never    Tobacco comments:     CBD prn at night    Vaping Use    Vaping Use: Never used   Substance and Sexual Activity    Alcohol use: Yes     Comment: rare    Drug use: No    Sexual activity: Not on file   Other Topics Concern    Not on file   Social History Narrative    Not on file     Social Determinants of Health     Financial Resource Strain: Low Risk  (10/1/2021)    Overall Financial Resource Strain (CARDIA)     Difficulty of Paying Living Expenses: Not very hard   Food Insecurity: No Food Insecurity (10/1/2021)    Hunger Vital Sign     Worried About Running Out of Food in the Last Year: Never true     Ran Out of Food in the Last Year: Never true   Transportation Needs: Not on file   Physical Activity: Not on file   Stress: Not on file   Social Connections: Not on file   Intimate Partner Violence: Not on file   Housing Stability: Not on file       Allergies   Allergen Reactions    Demerol  [Meperidine Hcl]      Other reaction(s): Unknown    Pcn [Penicillins] Rash    Versed [Midazolam] Nausea And Vomiting       Current Outpatient Medications   Medication Sig Dispense Refill    escitalopram (LEXAPRO) 10 MG tablet Take 1 tablet by mouth daily 90 tablet 1    tiZANidine (ZANAFLEX) 4 MG tablet Take 1 tablet by mouth 3 times daily as needed (neck pain) 30 tablet 2    ELIQUIS 5 MG TABS tablet TAKE ONE TABLET BY MOUTH TWICE A  tablet 1    nortriptyline (PAMELOR) 10 MG capsule TAKE TWO CAPSULES BY MOUTH EVERY NIGHT 180 capsule 1    lisinopril (PRINIVIL;ZESTRIL) 20 MG tablet TAKE 1 TABLET BY MOUTH ONE TIME A DAY 90 tablet 3    cyanocobalamin 1000

## 2023-09-12 NOTE — TELEPHONE ENCOUNTER
Yes - and she is cutting the pamelor back to 1 nightly--- pt aware there may be issues but there could be on current regimen also

## 2023-09-12 NOTE — TELEPHONE ENCOUNTER
[address] called to report an interaction between [facility] and [employer]. Ok to fill the Lexapro?     Pharmacy [phone]

## 2023-09-23 PROBLEM — R10.9 ABDOMINAL PAIN: Status: RESOLVED | Noted: 2022-04-25 | Resolved: 2023-09-23

## 2023-09-23 PROBLEM — K80.01 CALCULUS OF GALLBLADDER WITH ACUTE CHOLECYSTITIS AND OBSTRUCTION: Status: RESOLVED | Noted: 2022-05-21 | Resolved: 2023-09-23

## 2023-09-23 PROBLEM — K85.90 ACUTE PANCREATITIS: Status: RESOLVED | Noted: 2022-04-25 | Resolved: 2023-09-23

## 2023-09-23 PROBLEM — R10.9 ACUTE ABDOMINAL PAIN: Status: RESOLVED | Noted: 2022-04-25 | Resolved: 2023-09-23

## 2023-09-23 ASSESSMENT — ENCOUNTER SYMPTOMS
SINUS PRESSURE: 0
EYE REDNESS: 0
SHORTNESS OF BREATH: 0
BACK PAIN: 0
COUGH: 0
ABDOMINAL DISTENTION: 0
ABDOMINAL PAIN: 0
EYE DISCHARGE: 0

## 2023-10-24 ENCOUNTER — TELEPHONE (OUTPATIENT)
Dept: HEMATOLOGY | Age: 63
End: 2023-10-24

## 2023-10-24 NOTE — TELEPHONE ENCOUNTER
I called patient to remind them of their appointment on 10/25/2023.  Detailed voicemail was left with appointment date and time

## 2023-10-25 DIAGNOSIS — Z86.711 HISTORY OF PULMONARY EMBOLUS (PE): Primary | ICD-10-CM

## 2023-10-25 DIAGNOSIS — Z86.718 HISTORY OF DEEP VENOUS THROMBOSIS (DVT) OF DISTAL VEIN OF LEFT LOWER EXTREMITY: ICD-10-CM

## 2023-10-25 DIAGNOSIS — D68.51 HETEROZYGOUS FACTOR V LEIDEN MUTATION (HCC): ICD-10-CM

## 2023-10-25 RX ORDER — OMEPRAZOLE 40 MG/1
CAPSULE, DELAYED RELEASE ORAL
Qty: 30 CAPSULE | Refills: 4 | Status: SHIPPED | OUTPATIENT
Start: 2023-10-25

## 2023-11-16 ENCOUNTER — OFFICE VISIT (OUTPATIENT)
Dept: INTERNAL MEDICINE | Age: 63
End: 2023-11-16
Payer: COMMERCIAL

## 2023-11-16 VITALS
DIASTOLIC BLOOD PRESSURE: 80 MMHG | HEART RATE: 70 BPM | WEIGHT: 138 LBS | SYSTOLIC BLOOD PRESSURE: 110 MMHG | BODY MASS INDEX: 21.61 KG/M2 | OXYGEN SATURATION: 100 %

## 2023-11-16 DIAGNOSIS — F34.1 DYSTHYMIA: ICD-10-CM

## 2023-11-16 DIAGNOSIS — Z86.711 HISTORY OF PULMONARY EMBOLUS (PE): ICD-10-CM

## 2023-11-16 DIAGNOSIS — E55.9 VITAMIN D DEFICIENCY: ICD-10-CM

## 2023-11-16 DIAGNOSIS — Z29.11 NEED FOR RSV VACCINATION: ICD-10-CM

## 2023-11-16 DIAGNOSIS — I10 ESSENTIAL HYPERTENSION: Primary | ICD-10-CM

## 2023-11-16 DIAGNOSIS — D68.51 FACTOR V LEIDEN (HCC): ICD-10-CM

## 2023-11-16 DIAGNOSIS — G43.001 MIGRAINE WITHOUT AURA AND WITH STATUS MIGRAINOSUS, NOT INTRACTABLE: Chronic | ICD-10-CM

## 2023-11-16 DIAGNOSIS — E78.2 MIXED HYPERLIPIDEMIA: ICD-10-CM

## 2023-11-16 PROCEDURE — 3078F DIAST BP <80 MM HG: CPT | Performed by: INTERNAL MEDICINE

## 2023-11-16 PROCEDURE — 99213 OFFICE O/P EST LOW 20 MIN: CPT | Performed by: INTERNAL MEDICINE

## 2023-11-16 PROCEDURE — 3074F SYST BP LT 130 MM HG: CPT | Performed by: INTERNAL MEDICINE

## 2023-11-19 PROBLEM — F32.4 MAJOR DEPRESSIVE DISORDER WITH SINGLE EPISODE, IN PARTIAL REMISSION (HCC): Status: ACTIVE | Noted: 2023-03-07

## 2023-11-19 PROBLEM — R59.0 LAD (LYMPHADENOPATHY), AXILLARY: Status: RESOLVED | Noted: 2021-10-10 | Resolved: 2023-11-19

## 2023-11-19 PROBLEM — J98.4 SMALL AIRWAYS DISEASE: Status: RESOLVED | Noted: 2021-10-19 | Resolved: 2023-11-19

## 2023-11-19 PROBLEM — J38.2 VOCAL CORD NODULE: Status: RESOLVED | Noted: 2023-04-11 | Resolved: 2023-11-19

## 2023-12-05 DIAGNOSIS — F34.1 DYSTHYMIA: ICD-10-CM

## 2023-12-05 RX ORDER — ESCITALOPRAM OXALATE 10 MG/1
10 TABLET ORAL DAILY
Qty: 90 TABLET | Refills: 1 | Status: SHIPPED | OUTPATIENT
Start: 2023-12-05

## 2023-12-07 ENCOUNTER — TELEPHONE (OUTPATIENT)
Dept: HEMATOLOGY | Age: 63
End: 2023-12-07

## 2023-12-07 NOTE — TELEPHONE ENCOUNTER
Called patient and reminded patient of their appointment on 12/11/2023. A detailed voicemail with all appointment details was left for patient.

## 2023-12-11 ENCOUNTER — OFFICE VISIT (OUTPATIENT)
Dept: HEMATOLOGY | Age: 63
End: 2023-12-11

## 2023-12-11 ENCOUNTER — HOSPITAL ENCOUNTER (OUTPATIENT)
Dept: INFUSION THERAPY | Age: 63
Discharge: HOME | End: 2023-12-11
Payer: COMMERCIAL

## 2023-12-11 VITALS
OXYGEN SATURATION: 92 % | HEART RATE: 73 BPM | TEMPERATURE: 97.9 F | WEIGHT: 142 LBS | DIASTOLIC BLOOD PRESSURE: 70 MMHG | HEIGHT: 67 IN | BODY MASS INDEX: 22.29 KG/M2 | SYSTOLIC BLOOD PRESSURE: 108 MMHG

## 2023-12-11 DIAGNOSIS — Z79.01 ANTICOAGULATED: ICD-10-CM

## 2023-12-11 DIAGNOSIS — Z86.711 HISTORY OF PULMONARY EMBOLUS (PE): ICD-10-CM

## 2023-12-11 DIAGNOSIS — D51.0 PERNICIOUS ANEMIA: ICD-10-CM

## 2023-12-11 DIAGNOSIS — Z86.711 HISTORY OF PULMONARY EMBOLUS (PE): Primary | ICD-10-CM

## 2023-12-11 DIAGNOSIS — Z86.718 HISTORY OF DEEP VENOUS THROMBOSIS (DVT) OF DISTAL VEIN OF LEFT LOWER EXTREMITY: ICD-10-CM

## 2023-12-11 DIAGNOSIS — Z71.89 CARE PLAN DISCUSSED WITH PATIENT: ICD-10-CM

## 2023-12-11 DIAGNOSIS — D68.51 HETEROZYGOUS FACTOR V LEIDEN MUTATION (HCC): ICD-10-CM

## 2023-12-11 DIAGNOSIS — D68.52 HETEROZYGOUS FOR PROTHROMBIN G20210A MUTATION (HCC): ICD-10-CM

## 2023-12-11 LAB
BASOPHILS # BLD: 0.04 K/UL (ref 0.01–0.08)
BASOPHILS NFR BLD: 0.6 % (ref 0.1–1.2)
EOSINOPHIL # BLD: 0.29 K/UL (ref 0.04–0.54)
EOSINOPHIL NFR BLD: 4.6 % (ref 0.7–7)
ERYTHROCYTE [DISTWIDTH] IN BLOOD BY AUTOMATED COUNT: 14.6 % (ref 11.7–14.4)
HCT VFR BLD AUTO: 41.2 % (ref 34.1–44.9)
HGB BLD-MCNC: 13.4 G/DL (ref 11.2–15.7)
LYMPHOCYTES # BLD: 1.59 K/UL (ref 1.18–3.74)
LYMPHOCYTES NFR BLD: 25 % (ref 19.3–53.1)
MCH RBC QN AUTO: 26.3 PG (ref 25.6–32.2)
MCHC RBC AUTO-ENTMCNC: 32.5 G/DL (ref 32.3–35.5)
MCV RBC AUTO: 80.9 FL (ref 79.4–94.8)
MONOCYTES # BLD: 0.35 K/UL (ref 0.24–0.82)
MONOCYTES NFR BLD: 5.5 % (ref 4.7–12.5)
NEUTROPHILS # BLD: 4.09 K/UL (ref 1.56–6.13)
NEUTS SEG NFR BLD: 64.1 % (ref 34–71.1)
PLATELET # BLD AUTO: 235 K/UL (ref 182–369)
PMV BLD AUTO: 11.3 FL (ref 7.4–10.4)
RBC # BLD AUTO: 5.09 M/UL (ref 3.93–5.22)
WBC # BLD AUTO: 6.37 K/UL (ref 3.98–10.04)

## 2023-12-11 PROCEDURE — 99212 OFFICE O/P EST SF 10 MIN: CPT

## 2023-12-11 PROCEDURE — 36415 COLL VENOUS BLD VENIPUNCTURE: CPT

## 2023-12-11 PROCEDURE — 85025 COMPLETE CBC W/AUTO DIFF WBC: CPT

## 2023-12-29 DIAGNOSIS — F34.1 DYSTHYMIA: ICD-10-CM

## 2023-12-29 DIAGNOSIS — M50.90 CERVICAL DISC DISEASE: Chronic | ICD-10-CM

## 2023-12-29 RX ORDER — CYANOCOBALAMIN 1000 UG/ML
INJECTION, SOLUTION INTRAMUSCULAR; SUBCUTANEOUS
Qty: 3 ML | Refills: 1 | Status: SHIPPED | OUTPATIENT
Start: 2023-12-29 | End: 2023-12-29 | Stop reason: SDUPTHER

## 2023-12-29 RX ORDER — LISINOPRIL 20 MG/1
20 TABLET ORAL DAILY
Qty: 90 TABLET | Refills: 3 | Status: SHIPPED | OUTPATIENT
Start: 2023-12-29

## 2023-12-29 RX ORDER — LISINOPRIL 20 MG/1
20 TABLET ORAL DAILY
Qty: 90 TABLET | Refills: 3 | Status: SHIPPED | OUTPATIENT
Start: 2023-12-29 | End: 2023-12-29 | Stop reason: SDUPTHER

## 2023-12-29 RX ORDER — ESCITALOPRAM OXALATE 10 MG/1
10 TABLET ORAL DAILY
Qty: 90 TABLET | Refills: 3 | Status: SHIPPED | OUTPATIENT
Start: 2023-12-29

## 2023-12-29 RX ORDER — TIZANIDINE 4 MG/1
4 TABLET ORAL 3 TIMES DAILY PRN
Qty: 90 TABLET | Refills: 1 | Status: SHIPPED | OUTPATIENT
Start: 2023-12-29

## 2023-12-29 RX ORDER — TIZANIDINE 4 MG/1
4 TABLET ORAL 3 TIMES DAILY PRN
Qty: 30 TABLET | Refills: 2 | Status: SHIPPED | OUTPATIENT
Start: 2023-12-29 | End: 2023-12-29 | Stop reason: SDUPTHER

## 2023-12-29 RX ORDER — CYANOCOBALAMIN 1000 UG/ML
INJECTION, SOLUTION INTRAMUSCULAR; SUBCUTANEOUS
Qty: 3 ML | Refills: 3 | Status: SHIPPED | OUTPATIENT
Start: 2023-12-29

## 2023-12-29 RX ORDER — NORTRIPTYLINE HYDROCHLORIDE 10 MG/1
CAPSULE ORAL
Qty: 180 CAPSULE | Refills: 1 | Status: SHIPPED | OUTPATIENT
Start: 2023-12-29 | End: 2023-12-29 | Stop reason: SDUPTHER

## 2023-12-29 RX ORDER — NORTRIPTYLINE HYDROCHLORIDE 10 MG/1
CAPSULE ORAL
Qty: 180 CAPSULE | Refills: 3 | Status: SHIPPED | OUTPATIENT
Start: 2023-12-29

## 2023-12-29 RX ORDER — ESCITALOPRAM OXALATE 10 MG/1
10 TABLET ORAL DAILY
Qty: 90 TABLET | Refills: 1 | Status: SHIPPED | OUTPATIENT
Start: 2023-12-29 | End: 2023-12-29 | Stop reason: SDUPTHER

## 2024-01-04 DIAGNOSIS — F34.1 DYSTHYMIA: ICD-10-CM

## 2024-01-04 DIAGNOSIS — M50.90 CERVICAL DISC DISEASE: Chronic | ICD-10-CM

## 2024-01-04 RX ORDER — ESCITALOPRAM OXALATE 10 MG/1
10 TABLET ORAL DAILY
Qty: 90 TABLET | Refills: 3 | Status: SHIPPED | OUTPATIENT
Start: 2024-01-04

## 2024-01-04 RX ORDER — TIZANIDINE 4 MG/1
4 TABLET ORAL 3 TIMES DAILY PRN
Qty: 90 TABLET | Refills: 1 | Status: SHIPPED | OUTPATIENT
Start: 2024-01-04 | End: 2024-01-05 | Stop reason: SDUPTHER

## 2024-01-04 RX ORDER — NORTRIPTYLINE HYDROCHLORIDE 10 MG/1
CAPSULE ORAL
Qty: 180 CAPSULE | Refills: 3 | Status: SHIPPED | OUTPATIENT
Start: 2024-01-04

## 2024-01-04 RX ORDER — LISINOPRIL 20 MG/1
20 TABLET ORAL DAILY
Qty: 90 TABLET | Refills: 3 | Status: SHIPPED | OUTPATIENT
Start: 2024-01-04

## 2024-01-05 DIAGNOSIS — M50.90 CERVICAL DISC DISEASE: Chronic | ICD-10-CM

## 2024-01-05 RX ORDER — TIZANIDINE 4 MG/1
4 TABLET ORAL 3 TIMES DAILY PRN
Qty: 90 TABLET | Refills: 1 | Status: SHIPPED | OUTPATIENT
Start: 2024-01-05

## 2024-01-18 DIAGNOSIS — I27.21 PULMONARY ARTERIAL HYPERTENSION (HCC): Primary | ICD-10-CM

## 2024-01-18 DIAGNOSIS — I27.21 PULMONARY ARTERIAL HYPERTENSION (HCC): ICD-10-CM

## 2024-01-18 LAB
ALBUMIN SERPL-MCNC: 4.5 G/DL (ref 3.5–5.2)
ALP SERPL-CCNC: 78 U/L (ref 35–104)
ALT SERPL-CCNC: 27 U/L (ref 5–33)
ANION GAP SERPL CALCULATED.3IONS-SCNC: 12 MMOL/L (ref 7–19)
AST SERPL-CCNC: 22 U/L (ref 5–32)
BILIRUB SERPL-MCNC: 0.4 MG/DL (ref 0.2–1.2)
BUN SERPL-MCNC: 18 MG/DL (ref 8–23)
CALCIUM SERPL-MCNC: 9.5 MG/DL (ref 8.8–10.2)
CHLORIDE SERPL-SCNC: 104 MMOL/L (ref 98–111)
CO2 SERPL-SCNC: 28 MMOL/L (ref 22–29)
CREAT SERPL-MCNC: 0.8 MG/DL (ref 0.5–0.9)
ERYTHROCYTE [DISTWIDTH] IN BLOOD BY AUTOMATED COUNT: 14.6 % (ref 11.5–14.5)
GLUCOSE SERPL-MCNC: 96 MG/DL (ref 74–109)
HCT VFR BLD AUTO: 44 % (ref 37–47)
HGB BLD-MCNC: 13.9 G/DL (ref 12–16)
MCH RBC QN AUTO: 26.9 PG (ref 27–31)
MCHC RBC AUTO-ENTMCNC: 31.6 G/DL (ref 33–37)
MCV RBC AUTO: 85.1 FL (ref 81–99)
PLATELET # BLD AUTO: 241 K/UL (ref 130–400)
PMV BLD AUTO: 11.5 FL (ref 9.4–12.3)
POTASSIUM SERPL-SCNC: 4.4 MMOL/L (ref 3.5–5)
PROT SERPL-MCNC: 6.5 G/DL (ref 6.6–8.7)
RBC # BLD AUTO: 5.17 M/UL (ref 4.2–5.4)
SODIUM SERPL-SCNC: 144 MMOL/L (ref 136–145)
WBC # BLD AUTO: 5.9 K/UL (ref 4.8–10.8)

## 2024-01-23 RX ORDER — OMEPRAZOLE 40 MG/1
CAPSULE, DELAYED RELEASE ORAL
Qty: 30 CAPSULE | Refills: 3 | Status: SHIPPED | OUTPATIENT
Start: 2024-01-23

## 2024-02-29 NOTE — TELEPHONE ENCOUNTER
Pt states Adolfo Metz wants her to get a shingles vaccine. Her pharmacy can give her the shot but not the medication. She want to know where to get the medication. -3

## 2024-03-06 ENCOUNTER — TELEPHONE (OUTPATIENT)
Dept: GASTROENTEROLOGY | Facility: CLINIC | Age: 64
End: 2024-03-06
Payer: COMMERCIAL

## 2024-03-15 DIAGNOSIS — D51.0 PERNICIOUS ANEMIA: Primary | Chronic | ICD-10-CM

## 2024-03-15 RX ORDER — CYANOCOBALAMIN, ISOPROPYL ALCOHOL 1000MCG/ML
1 KIT INJECTION
Qty: 1 KIT | Refills: 3 | Status: SHIPPED | OUTPATIENT
Start: 2024-03-15

## 2024-03-15 RX ORDER — CYANOCOBALAMIN 1000 UG/ML
INJECTION, SOLUTION INTRAMUSCULAR; SUBCUTANEOUS
Qty: 3 ML | Refills: 3 | Status: CANCELLED | OUTPATIENT
Start: 2024-03-15

## 2024-04-12 RX ORDER — CYANOCOBALAMIN 1000 UG/ML
INJECTION, SOLUTION INTRAMUSCULAR; SUBCUTANEOUS
Qty: 3 ML | Refills: 1 | Status: SHIPPED | OUTPATIENT
Start: 2024-04-12

## 2024-05-13 DIAGNOSIS — E55.9 VITAMIN D DEFICIENCY: ICD-10-CM

## 2024-05-13 DIAGNOSIS — I10 ESSENTIAL HYPERTENSION: ICD-10-CM

## 2024-05-13 DIAGNOSIS — E78.2 MIXED HYPERLIPIDEMIA: ICD-10-CM

## 2024-05-13 LAB
25(OH)D3 SERPL-MCNC: 62.2 NG/ML
ALBUMIN SERPL-MCNC: 4.6 G/DL (ref 3.5–5.2)
ALP SERPL-CCNC: 74 U/L (ref 35–104)
ALT SERPL-CCNC: 22 U/L (ref 5–33)
ANION GAP SERPL CALCULATED.3IONS-SCNC: 12 MMOL/L (ref 7–19)
AST SERPL-CCNC: 29 U/L (ref 5–32)
BILIRUB SERPL-MCNC: 0.5 MG/DL (ref 0.2–1.2)
BUN SERPL-MCNC: 18 MG/DL (ref 8–23)
CALCIUM SERPL-MCNC: 9.4 MG/DL (ref 8.8–10.2)
CHLORIDE SERPL-SCNC: 101 MMOL/L (ref 98–111)
CHOLEST SERPL-MCNC: 169 MG/DL (ref 160–199)
CO2 SERPL-SCNC: 26 MMOL/L (ref 22–29)
CREAT SERPL-MCNC: 0.9 MG/DL (ref 0.5–0.9)
ERYTHROCYTE [DISTWIDTH] IN BLOOD BY AUTOMATED COUNT: 14.4 % (ref 11.5–14.5)
GLUCOSE SERPL-MCNC: 95 MG/DL (ref 74–109)
HCT VFR BLD AUTO: 45 % (ref 37–47)
HDLC SERPL-MCNC: 65 MG/DL (ref 65–121)
HGB BLD-MCNC: 14.1 G/DL (ref 12–16)
LDLC SERPL CALC-MCNC: 92 MG/DL
MCH RBC QN AUTO: 26.1 PG (ref 27–31)
MCHC RBC AUTO-ENTMCNC: 31.3 G/DL (ref 33–37)
MCV RBC AUTO: 83.3 FL (ref 81–99)
PLATELET # BLD AUTO: 253 K/UL (ref 130–400)
PMV BLD AUTO: 12.1 FL (ref 9.4–12.3)
POTASSIUM SERPL-SCNC: 4.3 MMOL/L (ref 3.5–5)
PROT SERPL-MCNC: 6.8 G/DL (ref 6.6–8.7)
RBC # BLD AUTO: 5.4 M/UL (ref 4.2–5.4)
SODIUM SERPL-SCNC: 139 MMOL/L (ref 136–145)
TRIGL SERPL-MCNC: 58 MG/DL (ref 0–149)
TSH SERPL DL<=0.005 MIU/L-ACNC: 0.95 UIU/ML (ref 0.27–4.2)
WBC # BLD AUTO: 5.9 K/UL (ref 4.8–10.8)

## 2024-05-16 ENCOUNTER — OFFICE VISIT (OUTPATIENT)
Dept: INTERNAL MEDICINE | Age: 64
End: 2024-05-16
Payer: MEDICARE

## 2024-05-16 VITALS
BODY MASS INDEX: 22.08 KG/M2 | SYSTOLIC BLOOD PRESSURE: 118 MMHG | WEIGHT: 141 LBS | DIASTOLIC BLOOD PRESSURE: 75 MMHG | OXYGEN SATURATION: 96 % | HEART RATE: 69 BPM

## 2024-05-16 DIAGNOSIS — D68.51 FACTOR V LEIDEN (HCC): ICD-10-CM

## 2024-05-16 DIAGNOSIS — M54.50 ACUTE BILATERAL LOW BACK PAIN WITHOUT SCIATICA: ICD-10-CM

## 2024-05-16 DIAGNOSIS — M54.2 NECK PAIN: ICD-10-CM

## 2024-05-16 DIAGNOSIS — F34.1 DYSTHYMIA: ICD-10-CM

## 2024-05-16 DIAGNOSIS — I10 ESSENTIAL HYPERTENSION: Primary | ICD-10-CM

## 2024-05-16 DIAGNOSIS — E55.9 VITAMIN D DEFICIENCY: ICD-10-CM

## 2024-05-16 DIAGNOSIS — E78.2 MIXED HYPERLIPIDEMIA: ICD-10-CM

## 2024-05-16 DIAGNOSIS — D51.0 PERNICIOUS ANEMIA: Chronic | ICD-10-CM

## 2024-05-16 PROBLEM — I27.21 PULMONARY ARTERIAL HYPERTENSION (HCC): Status: RESOLVED | Noted: 2020-09-02 | Resolved: 2024-05-16

## 2024-05-16 PROBLEM — F32.4 MAJOR DEPRESSIVE DISORDER WITH SINGLE EPISODE, IN PARTIAL REMISSION (HCC): Status: RESOLVED | Noted: 2023-03-07 | Resolved: 2024-05-16

## 2024-05-16 PROCEDURE — 1036F TOBACCO NON-USER: CPT | Performed by: INTERNAL MEDICINE

## 2024-05-16 PROCEDURE — 3074F SYST BP LT 130 MM HG: CPT | Performed by: INTERNAL MEDICINE

## 2024-05-16 PROCEDURE — G8399 PT W/DXA RESULTS DOCUMENT: HCPCS | Performed by: INTERNAL MEDICINE

## 2024-05-16 PROCEDURE — 3078F DIAST BP <80 MM HG: CPT | Performed by: INTERNAL MEDICINE

## 2024-05-16 PROCEDURE — 1123F ACP DISCUSS/DSCN MKR DOCD: CPT | Performed by: INTERNAL MEDICINE

## 2024-05-16 PROCEDURE — G8420 CALC BMI NORM PARAMETERS: HCPCS | Performed by: INTERNAL MEDICINE

## 2024-05-16 PROCEDURE — G8427 DOCREV CUR MEDS BY ELIG CLIN: HCPCS | Performed by: INTERNAL MEDICINE

## 2024-05-16 PROCEDURE — 1090F PRES/ABSN URINE INCON ASSESS: CPT | Performed by: INTERNAL MEDICINE

## 2024-05-16 PROCEDURE — 3017F COLORECTAL CA SCREEN DOC REV: CPT | Performed by: INTERNAL MEDICINE

## 2024-05-16 PROCEDURE — 99213 OFFICE O/P EST LOW 20 MIN: CPT | Performed by: INTERNAL MEDICINE

## 2024-05-16 SDOH — ECONOMIC STABILITY: FOOD INSECURITY: WITHIN THE PAST 12 MONTHS, YOU WORRIED THAT YOUR FOOD WOULD RUN OUT BEFORE YOU GOT MONEY TO BUY MORE.: NEVER TRUE

## 2024-05-16 SDOH — ECONOMIC STABILITY: FOOD INSECURITY: WITHIN THE PAST 12 MONTHS, THE FOOD YOU BOUGHT JUST DIDN'T LAST AND YOU DIDN'T HAVE MONEY TO GET MORE.: NEVER TRUE

## 2024-05-16 SDOH — ECONOMIC STABILITY: INCOME INSECURITY: HOW HARD IS IT FOR YOU TO PAY FOR THE VERY BASICS LIKE FOOD, HOUSING, MEDICAL CARE, AND HEATING?: NOT VERY HARD

## 2024-05-16 ASSESSMENT — PATIENT HEALTH QUESTIONNAIRE - PHQ9
7. TROUBLE CONCENTRATING ON THINGS, SUCH AS READING THE NEWSPAPER OR WATCHING TELEVISION: NOT AT ALL
8. MOVING OR SPEAKING SO SLOWLY THAT OTHER PEOPLE COULD HAVE NOTICED. OR THE OPPOSITE, BEING SO FIGETY OR RESTLESS THAT YOU HAVE BEEN MOVING AROUND A LOT MORE THAN USUAL: NOT AT ALL
4. FEELING TIRED OR HAVING LITTLE ENERGY: SEVERAL DAYS
6. FEELING BAD ABOUT YOURSELF - OR THAT YOU ARE A FAILURE OR HAVE LET YOURSELF OR YOUR FAMILY DOWN: NOT AT ALL
2. FEELING DOWN, DEPRESSED OR HOPELESS: NOT AT ALL
3. TROUBLE FALLING OR STAYING ASLEEP: SEVERAL DAYS
1. LITTLE INTEREST OR PLEASURE IN DOING THINGS: NOT AT ALL
SUM OF ALL RESPONSES TO PHQ QUESTIONS 1-9: 2
9. THOUGHTS THAT YOU WOULD BE BETTER OFF DEAD, OR OF HURTING YOURSELF: NOT AT ALL
SUM OF ALL RESPONSES TO PHQ9 QUESTIONS 1 & 2: 0
5. POOR APPETITE OR OVEREATING: NOT AT ALL
SUM OF ALL RESPONSES TO PHQ QUESTIONS 1-9: 2
SUM OF ALL RESPONSES TO PHQ QUESTIONS 1-9: 2
10. IF YOU CHECKED OFF ANY PROBLEMS, HOW DIFFICULT HAVE THESE PROBLEMS MADE IT FOR YOU TO DO YOUR WORK, TAKE CARE OF THINGS AT HOME, OR GET ALONG WITH OTHER PEOPLE: NOT DIFFICULT AT ALL
SUM OF ALL RESPONSES TO PHQ QUESTIONS 1-9: 2

## 2024-05-16 NOTE — PROGRESS NOTES
Housing Stability Vital Sign     Unable to Pay for Housing in the Last Year: Not on file     Number of Places Lived in the Last Year: Not on file     Unstable Housing in the Last Year: No       Allergies   Allergen Reactions    Demerol  [Meperidine Hcl]      Other reaction(s): Unknown    Pcn [Penicillins] Rash    Versed [Midazolam] Nausea And Vomiting       Current Outpatient Medications   Medication Sig Dispense Refill    cyanocobalamin 1000 MCG/ML injection INJECT 1ML EVERY 30 DAYS 3 mL 1    Cyanocobalamin (B-12 COMPLIANCE INJECTION) 1000 MCG/ML KIT Inject 1 mL as directed every 30 days (10 ml vial) 1 kit 3    tiZANidine (ZANAFLEX) 4 MG tablet Take 1 tablet by mouth 3 times daily as needed (neck pain) 90 tablet 1    escitalopram (LEXAPRO) 10 MG tablet Take 1 tablet by mouth daily 90 tablet 3    nortriptyline (PAMELOR) 10 MG capsule TAKE TWO CAPSULES BY MOUTH EVERY NIGHT 180 capsule 3    lisinopril (PRINIVIL;ZESTRIL) 20 MG tablet Take 1 tablet by mouth daily 90 tablet 3    ELIQUIS 5 MG TABS tablet TAKE ONE TABLET BY MOUTH TWICE A  tablet 1    NONFORMULARY nightly as needed CBD Gummies      rizatriptan (MAXALT) 10 MG tablet May repeat in 2 hours if needed do not exceed 2 doses in 24 hours 9 tablet 5    calcium carbonate (OSCAL) 500 MG TABS tablet Take 1 tablet by mouth daily      Cholecalciferol (VITAMIN D) 50 MCG (2000 UT) CAPS capsule Take 2,000 Units by mouth daily      Multiple Vitamins-Minerals (PRESERVISION AREDS 2+MULTI VIT PO) Take 1 tablet by mouth 2 times daily       No current facility-administered medications for this visit.       Review of Systems    /75   Pulse 69   Wt 64 kg (141 lb)   SpO2 96%   BMI 22.08 kg/m²   BP Readings from Last 7 Encounters:   05/16/24 118/75   12/11/23 108/70   11/16/23 110/80   09/12/23 110/70   05/31/23 110/72   05/26/23 108/67   04/11/23 124/62     Wt Readings from Last 7 Encounters:   05/16/24 64 kg (141 lb)   12/11/23 64.4 kg (142 lb)   11/16/23 62.6 kg

## 2024-09-06 ENCOUNTER — TELEPHONE (OUTPATIENT)
Dept: INTERNAL MEDICINE | Age: 64
End: 2024-09-06

## 2024-09-06 DIAGNOSIS — M54.2 NECK PAIN: Primary | ICD-10-CM

## 2024-09-06 NOTE — TELEPHONE ENCOUNTER
She is calling to get a prescription for her massages so that she doesn't have to pay taxes on it.

## 2024-10-18 ENCOUNTER — TELEPHONE (OUTPATIENT)
Dept: INTERNAL MEDICINE | Age: 64
End: 2024-10-18

## 2024-10-18 DIAGNOSIS — Z12.31 SCREENING MAMMOGRAM FOR BREAST CANCER: Primary | ICD-10-CM

## 2024-10-18 RX ORDER — CYANOCOBALAMIN 1000 UG/ML
INJECTION, SOLUTION INTRAMUSCULAR; SUBCUTANEOUS
Qty: 3 ML | Refills: 1 | Status: SHIPPED | OUTPATIENT
Start: 2024-10-18

## 2024-10-18 NOTE — TELEPHONE ENCOUNTER
Brina called requesting a refill of the below medication which has been pended for you:     Requested Prescriptions     Pending Prescriptions Disp Refills    cyanocobalamin 1000 MCG/ML injection [Pharmacy Med Name: CYANOCOBALAMIN 1,000 MCG/ML VL] 3 mL 1     Sig: INJECT 1ML EVERY 30 DAYS       Last Appointment Date: 5/16/2024  Next Appointment Date: 12/26/2024

## 2024-10-18 NOTE — TELEPHONE ENCOUNTER
Patient called stating that they was wanting to get an order for a screening mammogram sent to [facility]

## 2024-10-23 ENCOUNTER — TRANSCRIBE ORDERS (OUTPATIENT)
Dept: ADMINISTRATIVE | Facility: HOSPITAL | Age: 64
End: 2024-10-23
Payer: COMMERCIAL

## 2024-10-23 DIAGNOSIS — Z12.31 SCREENING MAMMOGRAM FOR BREAST CANCER: Primary | ICD-10-CM

## 2024-11-19 LAB
NCCN CRITERIA FLAG: NORMAL
TYRER CUZICK SCORE: 13.9

## 2024-12-06 DIAGNOSIS — F34.1 DYSTHYMIA: ICD-10-CM

## 2024-12-06 RX ORDER — ESCITALOPRAM OXALATE 10 MG/1
10 TABLET ORAL DAILY
Qty: 90 TABLET | Refills: 3 | Status: SHIPPED | OUTPATIENT
Start: 2024-12-06

## 2024-12-06 RX ORDER — LISINOPRIL 20 MG/1
20 TABLET ORAL DAILY
Qty: 90 TABLET | Refills: 3 | Status: SHIPPED | OUTPATIENT
Start: 2024-12-06

## 2024-12-06 RX ORDER — NORTRIPTYLINE HYDROCHLORIDE 10 MG/1
CAPSULE ORAL
Qty: 180 CAPSULE | Refills: 3 | Status: SHIPPED | OUTPATIENT
Start: 2024-12-06

## 2024-12-19 ENCOUNTER — HOSPITAL ENCOUNTER (OUTPATIENT)
Dept: ULTRASOUND IMAGING | Facility: HOSPITAL | Age: 64
Discharge: HOME | End: 2024-12-19
Payer: MEDICARE

## 2024-12-19 ENCOUNTER — HOSPITAL ENCOUNTER (OUTPATIENT)
Dept: MAMMOGRAPHY | Facility: HOSPITAL | Age: 64
Discharge: HOME | End: 2024-12-19
Payer: MEDICARE

## 2024-12-19 DIAGNOSIS — N64.4 BREAST PAIN, LEFT: ICD-10-CM

## 2024-12-19 DIAGNOSIS — Z12.31 SCREENING MAMMOGRAM FOR BREAST CANCER: ICD-10-CM

## 2024-12-19 DIAGNOSIS — Z12.31 VISIT FOR SCREENING MAMMOGRAM: Primary | ICD-10-CM

## 2024-12-19 PROCEDURE — G0279 TOMOSYNTHESIS, MAMMO: HCPCS

## 2024-12-19 PROCEDURE — 77066 DX MAMMO INCL CAD BI: CPT

## 2024-12-19 PROCEDURE — 76642 ULTRASOUND BREAST LIMITED: CPT

## 2024-12-23 DIAGNOSIS — E55.9 VITAMIN D DEFICIENCY: ICD-10-CM

## 2024-12-23 DIAGNOSIS — E78.2 MIXED HYPERLIPIDEMIA: ICD-10-CM

## 2024-12-23 DIAGNOSIS — D51.0 PERNICIOUS ANEMIA: Chronic | ICD-10-CM

## 2024-12-23 DIAGNOSIS — I10 ESSENTIAL HYPERTENSION: ICD-10-CM

## 2024-12-23 LAB
25(OH)D3 SERPL-MCNC: 55.5 NG/ML
ALBUMIN SERPL-MCNC: 4.3 G/DL (ref 3.5–5.2)
ALP SERPL-CCNC: 87 U/L (ref 35–104)
ALT SERPL-CCNC: 26 U/L (ref 5–33)
ANION GAP SERPL CALCULATED.3IONS-SCNC: 12 MMOL/L (ref 7–19)
AST SERPL-CCNC: 27 U/L (ref 5–32)
BACTERIA URNS QL MICRO: NEGATIVE /HPF
BILIRUB SERPL-MCNC: 0.3 MG/DL (ref 0.2–1.2)
BILIRUB UR QL STRIP: NEGATIVE
BUN SERPL-MCNC: 14 MG/DL (ref 8–23)
CALCIUM SERPL-MCNC: 9.4 MG/DL (ref 8.8–10.2)
CHLORIDE SERPL-SCNC: 104 MMOL/L (ref 98–111)
CHOLEST SERPL-MCNC: 187 MG/DL (ref 0–199)
CLARITY UR: CLEAR
CO2 SERPL-SCNC: 28 MMOL/L (ref 22–29)
COLOR UR: YELLOW
CREAT SERPL-MCNC: 0.8 MG/DL (ref 0.5–0.9)
CRYSTALS URNS MICRO: NORMAL /HPF
EPI CELLS #/AREA URNS AUTO: 1 /HPF (ref 0–5)
ERYTHROCYTE [DISTWIDTH] IN BLOOD BY AUTOMATED COUNT: 14.5 % (ref 11.5–14.5)
GLUCOSE SERPL-MCNC: 83 MG/DL (ref 70–99)
GLUCOSE UR STRIP.AUTO-MCNC: NEGATIVE MG/DL
HCT VFR BLD AUTO: 45 % (ref 37–47)
HDLC SERPL-MCNC: 56 MG/DL (ref 40–60)
HGB BLD-MCNC: 14 G/DL (ref 12–16)
HGB UR STRIP.AUTO-MCNC: NEGATIVE MG/L
HYALINE CASTS #/AREA URNS AUTO: 3 /HPF (ref 0–8)
KETONES UR STRIP.AUTO-MCNC: NEGATIVE MG/DL
LDLC SERPL CALC-MCNC: 116 MG/DL
LEUKOCYTE ESTERASE UR QL STRIP.AUTO: ABNORMAL
MCH RBC QN AUTO: 26.3 PG (ref 27–31)
MCHC RBC AUTO-ENTMCNC: 31.1 G/DL (ref 33–37)
MCV RBC AUTO: 84.6 FL (ref 81–99)
NITRITE UR QL STRIP.AUTO: NEGATIVE
PH UR STRIP.AUTO: 5.5 [PH] (ref 5–8)
PLATELET # BLD AUTO: 231 K/UL (ref 130–400)
PMV BLD AUTO: 11.9 FL (ref 9.4–12.3)
POTASSIUM SERPL-SCNC: 4.4 MMOL/L (ref 3.5–5)
PROT SERPL-MCNC: 6.5 G/DL (ref 6.4–8.3)
PROT UR STRIP.AUTO-MCNC: NEGATIVE MG/DL
RBC # BLD AUTO: 5.32 M/UL (ref 4.2–5.4)
RBC #/AREA URNS AUTO: 1 /HPF (ref 0–4)
SODIUM SERPL-SCNC: 144 MMOL/L (ref 136–145)
SP GR UR STRIP.AUTO: 1.02 (ref 1–1.03)
TRIGL SERPL-MCNC: 77 MG/DL (ref 0–149)
TSH SERPL DL<=0.005 MIU/L-ACNC: 1.74 UIU/ML (ref 0.27–4.2)
UROBILINOGEN UR STRIP.AUTO-MCNC: 1 E.U./DL
WBC # BLD AUTO: 6.5 K/UL (ref 4.8–10.8)
WBC #/AREA URNS AUTO: 1 /HPF (ref 0–5)

## 2024-12-26 ENCOUNTER — OFFICE VISIT (OUTPATIENT)
Dept: INTERNAL MEDICINE | Age: 64
End: 2024-12-26
Payer: MEDICARE

## 2024-12-26 VITALS
DIASTOLIC BLOOD PRESSURE: 74 MMHG | WEIGHT: 147.6 LBS | HEIGHT: 67 IN | OXYGEN SATURATION: 98 % | BODY MASS INDEX: 23.17 KG/M2 | SYSTOLIC BLOOD PRESSURE: 128 MMHG | HEART RATE: 78 BPM | TEMPERATURE: 97 F

## 2024-12-26 DIAGNOSIS — K57.30 COLON, DIVERTICULOSIS: ICD-10-CM

## 2024-12-26 DIAGNOSIS — I10 ESSENTIAL HYPERTENSION: ICD-10-CM

## 2024-12-26 DIAGNOSIS — G43.001 MIGRAINE WITHOUT AURA AND WITH STATUS MIGRAINOSUS, NOT INTRACTABLE: Chronic | ICD-10-CM

## 2024-12-26 DIAGNOSIS — Z86.711 HISTORY OF PULMONARY EMBOLUS (PE): ICD-10-CM

## 2024-12-26 DIAGNOSIS — F34.1 DYSTHYMIA: ICD-10-CM

## 2024-12-26 DIAGNOSIS — D68.51 FACTOR V LEIDEN (HCC): ICD-10-CM

## 2024-12-26 DIAGNOSIS — Z00.00 WELCOME TO MEDICARE PREVENTIVE VISIT: Primary | ICD-10-CM

## 2024-12-26 PROCEDURE — 3074F SYST BP LT 130 MM HG: CPT | Performed by: INTERNAL MEDICINE

## 2024-12-26 PROCEDURE — G0402 INITIAL PREVENTIVE EXAM: HCPCS | Performed by: INTERNAL MEDICINE

## 2024-12-26 PROCEDURE — 3078F DIAST BP <80 MM HG: CPT | Performed by: INTERNAL MEDICINE

## 2024-12-26 PROCEDURE — 3017F COLORECTAL CA SCREEN DOC REV: CPT | Performed by: INTERNAL MEDICINE

## 2024-12-26 PROCEDURE — 1123F ACP DISCUSS/DSCN MKR DOCD: CPT | Performed by: INTERNAL MEDICINE

## 2024-12-26 RX ORDER — BUPROPION HYDROCHLORIDE 150 MG/1
150 TABLET ORAL EVERY MORNING
Qty: 90 TABLET | Refills: 3 | Status: SHIPPED | OUTPATIENT
Start: 2024-12-26 | End: 2025-01-03 | Stop reason: SDUPTHER

## 2024-12-26 ASSESSMENT — PATIENT HEALTH QUESTIONNAIRE - PHQ9
SUM OF ALL RESPONSES TO PHQ9 QUESTIONS 1 & 2: 2
10. IF YOU CHECKED OFF ANY PROBLEMS, HOW DIFFICULT HAVE THESE PROBLEMS MADE IT FOR YOU TO DO YOUR WORK, TAKE CARE OF THINGS AT HOME, OR GET ALONG WITH OTHER PEOPLE: NOT DIFFICULT AT ALL
9. THOUGHTS THAT YOU WOULD BE BETTER OFF DEAD, OR OF HURTING YOURSELF: NOT AT ALL
SUM OF ALL RESPONSES TO PHQ QUESTIONS 1-9: 11
8. MOVING OR SPEAKING SO SLOWLY THAT OTHER PEOPLE COULD HAVE NOTICED. OR THE OPPOSITE, BEING SO FIGETY OR RESTLESS THAT YOU HAVE BEEN MOVING AROUND A LOT MORE THAN USUAL: NOT AT ALL
6. FEELING BAD ABOUT YOURSELF - OR THAT YOU ARE A FAILURE OR HAVE LET YOURSELF OR YOUR FAMILY DOWN: NOT AT ALL
SUM OF ALL RESPONSES TO PHQ QUESTIONS 1-9: 11
SUM OF ALL RESPONSES TO PHQ QUESTIONS 1-9: 11
1. LITTLE INTEREST OR PLEASURE IN DOING THINGS: SEVERAL DAYS
3. TROUBLE FALLING OR STAYING ASLEEP: NEARLY EVERY DAY
4. FEELING TIRED OR HAVING LITTLE ENERGY: NEARLY EVERY DAY
2. FEELING DOWN, DEPRESSED OR HOPELESS: SEVERAL DAYS
7. TROUBLE CONCENTRATING ON THINGS, SUCH AS READING THE NEWSPAPER OR WATCHING TELEVISION: NOT AT ALL
SUM OF ALL RESPONSES TO PHQ QUESTIONS 1-9: 11
5. POOR APPETITE OR OVEREATING: NEARLY EVERY DAY

## 2024-12-26 NOTE — PROGRESS NOTES
Outside the Home?: Not on file     Physical Sign of Abuse Present: Not on file   Housing Stability: Unknown (5/16/2024)    Housing Stability Vital Sign     Unable to Pay for Housing in the Last Year: Not on file     Number of Places Lived in the Last Year: Not on file     Unstable Housing in the Last Year: No       Allergies   Allergen Reactions    Demerol  [Meperidine Hcl]      Other reaction(s): Unknown    Pcn [Penicillins] Rash    Versed [Midazolam] Nausea And Vomiting       Current Outpatient Medications   Medication Sig Dispense Refill    cyanocobalamin 1000 MCG/ML injection INJECT 1ML EVERY 30 DAYS 3 mL 1    tiZANidine (ZANAFLEX) 4 MG tablet Take 1 tablet by mouth 3 times daily as needed (neck pain) 90 tablet 1    ELIQUIS 5 MG TABS tablet TAKE ONE TABLET BY MOUTH TWICE A  tablet 1    NONFORMULARY nightly as needed CBD Gummies      rizatriptan (MAXALT) 10 MG tablet May repeat in 2 hours if needed do not exceed 2 doses in 24 hours 9 tablet 5    calcium carbonate (OSCAL) 500 MG TABS tablet Take 1 tablet by mouth daily      Cholecalciferol (VITAMIN D) 50 MCG (2000 UT) CAPS capsule Take 1 capsule by mouth daily      Multiple Vitamins-Minerals (PRESERVISION AREDS 2+MULTI VIT PO) Take 1 tablet by mouth 2 times daily      buPROPion (WELLBUTRIN XL) 150 MG extended release tablet Take 1 tablet by mouth every morning 90 tablet 3    escitalopram (LEXAPRO) 10 MG tablet Take 1 tablet by mouth daily 90 tablet 3    lisinopril (PRINIVIL;ZESTRIL) 20 MG tablet Take 1 tablet by mouth daily 90 tablet 3    nortriptyline (PAMELOR) 10 MG capsule TAKE 2 CAPSULES EVERY NIGHT 180 capsule 3     No current facility-administered medications for this visit.       Review of Systems   Constitutional:  Positive for fatigue. Negative for chills and fever.   HENT:  Negative for congestion and sinus pressure.    Eyes:  Negative for discharge and redness.   Respiratory:  Negative for cough and shortness of breath.    Cardiovascular:

## 2025-01-03 DIAGNOSIS — I10 ESSENTIAL HYPERTENSION: Primary | ICD-10-CM

## 2025-01-03 DIAGNOSIS — F34.1 DYSTHYMIA: ICD-10-CM

## 2025-01-03 RX ORDER — LISINOPRIL 20 MG/1
20 TABLET ORAL DAILY
Qty: 90 TABLET | Refills: 3 | Status: SHIPPED | OUTPATIENT
Start: 2025-01-03

## 2025-01-03 RX ORDER — NORTRIPTYLINE HYDROCHLORIDE 10 MG/1
CAPSULE ORAL
Qty: 180 CAPSULE | Refills: 3 | Status: SHIPPED | OUTPATIENT
Start: 2025-01-03

## 2025-01-03 RX ORDER — BUPROPION HYDROCHLORIDE 150 MG/1
150 TABLET ORAL EVERY MORNING
Qty: 90 TABLET | Refills: 3 | Status: SHIPPED | OUTPATIENT
Start: 2025-01-03

## 2025-01-03 RX ORDER — ESCITALOPRAM OXALATE 10 MG/1
10 TABLET ORAL DAILY
Qty: 90 TABLET | Refills: 3 | Status: SHIPPED | OUTPATIENT
Start: 2025-01-03

## 2025-01-05 NOTE — PATIENT INSTRUCTIONS
such as acetaminophen (Tylenol).   When should you call for help?   Call 911 if you have symptoms of a heart attack. These may include:    Chest pain or pressure, or a strange feeling in the chest.     Sweating.     Shortness of breath.     Pain, pressure, or a strange feeling in the back, neck, jaw, or upper belly or in one or both shoulders or arms.     Lightheadedness or sudden weakness.     A fast or irregular heartbeat.   After you call 911, the  may tell you to chew 1 adult-strength or 2 to 4 low-dose aspirin. Wait for an ambulance. Do not try to drive yourself.  Watch closely for changes in your health, and be sure to contact your doctor if you have any problems.  Where can you learn more?  Go to https://www.[employer].net/patientEd and enter F075 to learn more about \"A Healthy Heart: Care Instructions.\"  Current as of: June 24, 2023  Content Version: 14.2  © 2024 [employer].   Care instructions adapted under license by [employer]. If you have questions about a medical condition or this instruction, always ask your healthcare professional. [employer], [employer] disclaims any warranty or liability for your use of this information.      Personalized Preventive Plan for Brina Lovett - 12/26/2024  Medicare offers a range of preventive health benefits. Some of the tests and screenings are paid in full while other may be subject to a deductible, co-insurance, and/or copay.  Some of these benefits include a comprehensive review of your medical history including lifestyle, illnesses that may run in your family, and various assessments and screenings as appropriate.  After reviewing your medical record and screening and assessments performed today your provider may have ordered immunizations, labs, imaging, and/or referrals for you.  A list of these orders (if applicable) as well as your Preventive Care list are included within your After Visit Summary for your review.

## 2025-01-06 PROBLEM — K57.30 COLON, DIVERTICULOSIS: Status: ACTIVE | Noted: 2025-01-06

## 2025-01-06 PROBLEM — I82.90 VTE (VENOUS THROMBOEMBOLISM): Status: RESOLVED | Noted: 2020-09-02 | Resolved: 2025-01-06

## 2025-01-06 PROBLEM — Z96.1 PSEUDOPHAKIA: Status: RESOLVED | Noted: 2018-09-26 | Resolved: 2025-01-06

## 2025-01-06 PROBLEM — I51.9 DYSFUNCTION OF RIGHT CARDIAC VENTRICLE: Status: RESOLVED | Noted: 2020-09-04 | Resolved: 2025-01-06

## 2025-02-07 ENCOUNTER — NURSE ONLY (OUTPATIENT)
Dept: INTERNAL MEDICINE | Age: 65
End: 2025-02-07
Payer: MEDICARE

## 2025-02-07 DIAGNOSIS — Z23 PNEUMOCOCCAL VACCINE ADMINISTERED: Primary | ICD-10-CM

## 2025-02-07 PROCEDURE — 90677 PCV20 VACCINE IM: CPT | Performed by: INTERNAL MEDICINE

## 2025-02-07 PROCEDURE — G0009 ADMIN PNEUMOCOCCAL VACCINE: HCPCS | Performed by: INTERNAL MEDICINE

## 2025-02-07 NOTE — PROGRESS NOTES
After obtaining consent, and per orders of Dr. Boswell, injection of Prevnar given in Left deltoid by Radha Leonard MA. Patient instructed to remain in clinic for 20 minutes afterwards, and to report any adverse reaction to me immediately.

## 2025-02-28 ENCOUNTER — OFFICE VISIT (OUTPATIENT)
Dept: INTERNAL MEDICINE | Age: 65
End: 2025-02-28

## 2025-02-28 VITALS
SYSTOLIC BLOOD PRESSURE: 112 MMHG | OXYGEN SATURATION: 100 % | HEIGHT: 67 IN | HEART RATE: 86 BPM | WEIGHT: 146 LBS | BODY MASS INDEX: 22.91 KG/M2 | DIASTOLIC BLOOD PRESSURE: 70 MMHG

## 2025-02-28 DIAGNOSIS — I10 ESSENTIAL HYPERTENSION: ICD-10-CM

## 2025-02-28 DIAGNOSIS — F34.1 DYSTHYMIA: ICD-10-CM

## 2025-02-28 DIAGNOSIS — Z86.711 HISTORY OF PULMONARY EMBOLUS (PE): ICD-10-CM

## 2025-02-28 DIAGNOSIS — R07.9 CHEST PAIN, UNSPECIFIED TYPE: Primary | ICD-10-CM

## 2025-02-28 DIAGNOSIS — L71.9 ROSACEA: ICD-10-CM

## 2025-02-28 SDOH — ECONOMIC STABILITY: FOOD INSECURITY: WITHIN THE PAST 12 MONTHS, THE FOOD YOU BOUGHT JUST DIDN'T LAST AND YOU DIDN'T HAVE MONEY TO GET MORE.: NEVER TRUE

## 2025-02-28 SDOH — ECONOMIC STABILITY: FOOD INSECURITY: WITHIN THE PAST 12 MONTHS, YOU WORRIED THAT YOUR FOOD WOULD RUN OUT BEFORE YOU GOT MONEY TO BUY MORE.: NEVER TRUE

## 2025-02-28 NOTE — PROGRESS NOTES
lisinopril (PRINIVIL;ZESTRIL) 20 MG tablet Take 1 tablet by mouth daily 90 tablet 3    nortriptyline (PAMELOR) 10 MG capsule TAKE 2 CAPSULES EVERY NIGHT 180 capsule 3    cyanocobalamin 1000 MCG/ML injection INJECT 1ML EVERY 30 DAYS 3 mL 1    tiZANidine (ZANAFLEX) 4 MG tablet Take 1 tablet by mouth 3 times daily as needed (neck pain) 90 tablet 1    ELIQUIS 5 MG TABS tablet TAKE ONE TABLET BY MOUTH TWICE A  tablet 1    NONFORMULARY nightly as needed CBD Gummies      rizatriptan (MAXALT) 10 MG tablet May repeat in 2 hours if needed do not exceed 2 doses in 24 hours 9 tablet 5    calcium carbonate (OSCAL) 500 MG TABS tablet Take 1 tablet by mouth daily      Cholecalciferol (VITAMIN D) 50 MCG (2000 UT) CAPS capsule Take 1 capsule by mouth daily      Multiple Vitamins-Minerals (PRESERVISION AREDS 2+MULTI VIT PO) Take 1 tablet by mouth 2 times daily       No current facility-administered medications for this visit.       Review of Systems    /70   Pulse 86   Ht 1.702 m (5' 7\")   Wt 66.2 kg (146 lb)   SpO2 100%   BMI 22.87 kg/m²   BP Readings from Last 7 Encounters:   02/28/25 112/70   12/26/24 128/74   05/16/24 118/75   12/11/23 108/70   11/16/23 110/80   09/12/23 110/70   05/31/23 110/72     Wt Readings from Last 7 Encounters:   02/28/25 66.2 kg (146 lb)   12/26/24 67 kg (147 lb 9.6 oz)   05/16/24 64 kg (141 lb)   12/11/23 64.4 kg (142 lb)   11/16/23 62.6 kg (138 lb)   09/12/23 63.5 kg (140 lb)   05/31/23 64.9 kg (143 lb)     BMI Readings from Last 7 Encounters:   02/28/25 22.87 kg/m²   12/26/24 23.12 kg/m²   05/16/24 22.08 kg/m²   12/11/23 22.24 kg/m²   11/16/23 21.61 kg/m²   09/12/23 21.93 kg/m²   05/31/23 22.40 kg/m²     Resp Readings from Last 7 Encounters:   05/26/23 15   03/07/23 18   02/11/23 16   10/21/22 16   07/22/22 12   06/30/22 16   04/28/22 20       Physical Exam:   Physical Exam  PE:  Neck is supple sclera anicteric heart S1-S2 lungs clear.  Mood seems a little depressed.  Face a little

## 2025-03-06 PROBLEM — L71.9 ROSACEA: Status: ACTIVE | Noted: 2025-03-06

## 2025-03-07 ENCOUNTER — TRANSCRIBE ORDERS (OUTPATIENT)
Dept: ADMINISTRATIVE | Facility: HOSPITAL | Age: 65
End: 2025-03-07
Payer: MEDICARE

## 2025-03-07 DIAGNOSIS — R07.9 CHEST PAIN, UNSPECIFIED TYPE: Primary | ICD-10-CM

## 2025-03-08 ENCOUNTER — HOSPITAL ENCOUNTER (EMERGENCY)
Facility: HOSPITAL | Age: 65
Discharge: HOME | End: 2025-03-08
Attending: EMERGENCY MEDICINE
Payer: MEDICARE

## 2025-03-08 ENCOUNTER — APPOINTMENT (OUTPATIENT)
Dept: GENERAL RADIOLOGY | Facility: HOSPITAL | Age: 65
End: 2025-03-08
Payer: MEDICARE

## 2025-03-08 VITALS
OXYGEN SATURATION: 98 % | HEIGHT: 67 IN | BODY MASS INDEX: 23.23 KG/M2 | RESPIRATION RATE: 18 BRPM | HEART RATE: 65 BPM | TEMPERATURE: 98.3 F | DIASTOLIC BLOOD PRESSURE: 86 MMHG | WEIGHT: 148 LBS | SYSTOLIC BLOOD PRESSURE: 150 MMHG

## 2025-03-08 DIAGNOSIS — R07.9 CHEST PAIN, UNSPECIFIED TYPE: Primary | ICD-10-CM

## 2025-03-08 LAB
ALBUMIN SERPL-MCNC: 4.3 G/DL (ref 3.5–5.2)
ALBUMIN/GLOB SERPL: 2 G/DL
ALP SERPL-CCNC: 82 U/L (ref 39–117)
ALT SERPL W P-5'-P-CCNC: 37 U/L (ref 1–33)
ANION GAP SERPL CALCULATED.3IONS-SCNC: 9 MMOL/L (ref 5–15)
AST SERPL-CCNC: 25 U/L (ref 1–32)
BASOPHILS # BLD AUTO: 0.04 10*3/MM3 (ref 0–0.2)
BASOPHILS NFR BLD AUTO: 0.6 % (ref 0–1.5)
BILIRUB SERPL-MCNC: 0.2 MG/DL (ref 0–1.2)
BUN SERPL-MCNC: 15 MG/DL (ref 8–23)
BUN/CREAT SERPL: 18.1 (ref 7–25)
CALCIUM SPEC-SCNC: 9.5 MG/DL (ref 8.6–10.5)
CHLORIDE SERPL-SCNC: 105 MMOL/L (ref 98–107)
CO2 SERPL-SCNC: 27 MMOL/L (ref 22–29)
CREAT SERPL-MCNC: 0.83 MG/DL (ref 0.57–1)
D DIMER PPP FEU-MCNC: 0.37 MCGFEU/ML (ref 0–0.66)
DEPRECATED RDW RBC AUTO: 43.6 FL (ref 37–54)
EGFRCR SERPLBLD CKD-EPI 2021: 77.9 ML/MIN/1.73
EOSINOPHIL # BLD AUTO: 0.34 10*3/MM3 (ref 0–0.4)
EOSINOPHIL NFR BLD AUTO: 4.7 % (ref 0.3–6.2)
ERYTHROCYTE [DISTWIDTH] IN BLOOD BY AUTOMATED COUNT: 14.8 % (ref 12.3–15.4)
GEN 5 1HR TROPONIN T REFLEX: 7 NG/L
GLOBULIN UR ELPH-MCNC: 2.2 GM/DL
GLUCOSE SERPL-MCNC: 99 MG/DL (ref 65–99)
HCT VFR BLD AUTO: 40.9 % (ref 34–46.6)
HGB BLD-MCNC: 13.1 G/DL (ref 12–15.9)
IMM GRANULOCYTES # BLD AUTO: 0.02 10*3/MM3 (ref 0–0.05)
IMM GRANULOCYTES NFR BLD AUTO: 0.3 % (ref 0–0.5)
LIPASE SERPL-CCNC: 36 U/L (ref 13–60)
LYMPHOCYTES # BLD AUTO: 1.51 10*3/MM3 (ref 0.7–3.1)
LYMPHOCYTES NFR BLD AUTO: 20.8 % (ref 19.6–45.3)
MAGNESIUM SERPL-MCNC: 2 MG/DL (ref 1.6–2.4)
MCH RBC QN AUTO: 26 PG (ref 26.6–33)
MCHC RBC AUTO-ENTMCNC: 32 G/DL (ref 31.5–35.7)
MCV RBC AUTO: 81.2 FL (ref 79–97)
MONOCYTES # BLD AUTO: 0.54 10*3/MM3 (ref 0.1–0.9)
MONOCYTES NFR BLD AUTO: 7.4 % (ref 5–12)
NEUTROPHILS NFR BLD AUTO: 4.82 10*3/MM3 (ref 1.7–7)
NEUTROPHILS NFR BLD AUTO: 66.2 % (ref 42.7–76)
NRBC BLD AUTO-RTO: 0 /100 WBC (ref 0–0.2)
PLATELET # BLD AUTO: 255 10*3/MM3 (ref 140–450)
PMV BLD AUTO: 10.5 FL (ref 6–12)
POTASSIUM SERPL-SCNC: 4.3 MMOL/L (ref 3.5–5.2)
PROT SERPL-MCNC: 6.5 G/DL (ref 6–8.5)
RBC # BLD AUTO: 5.04 10*6/MM3 (ref 3.77–5.28)
SODIUM SERPL-SCNC: 141 MMOL/L (ref 136–145)
TROPONIN T NUMERIC DELTA: NORMAL
TROPONIN T SERPL HS-MCNC: <6 NG/L
WBC NRBC COR # BLD AUTO: 7.27 10*3/MM3 (ref 3.4–10.8)

## 2025-03-08 PROCEDURE — 93005 ELECTROCARDIOGRAM TRACING: CPT

## 2025-03-08 PROCEDURE — 83690 ASSAY OF LIPASE: CPT | Performed by: EMERGENCY MEDICINE

## 2025-03-08 PROCEDURE — 71045 X-RAY EXAM CHEST 1 VIEW: CPT

## 2025-03-08 PROCEDURE — 84484 ASSAY OF TROPONIN QUANT: CPT | Performed by: EMERGENCY MEDICINE

## 2025-03-08 PROCEDURE — 85379 FIBRIN DEGRADATION QUANT: CPT | Performed by: EMERGENCY MEDICINE

## 2025-03-08 PROCEDURE — 93010 ELECTROCARDIOGRAM REPORT: CPT | Performed by: STUDENT IN AN ORGANIZED HEALTH CARE EDUCATION/TRAINING PROGRAM

## 2025-03-08 PROCEDURE — 36415 COLL VENOUS BLD VENIPUNCTURE: CPT

## 2025-03-08 PROCEDURE — 99284 EMERGENCY DEPT VISIT MOD MDM: CPT

## 2025-03-08 PROCEDURE — 85025 COMPLETE CBC W/AUTO DIFF WBC: CPT | Performed by: EMERGENCY MEDICINE

## 2025-03-08 PROCEDURE — 80053 COMPREHEN METABOLIC PANEL: CPT | Performed by: EMERGENCY MEDICINE

## 2025-03-08 PROCEDURE — 83735 ASSAY OF MAGNESIUM: CPT | Performed by: EMERGENCY MEDICINE

## 2025-03-08 PROCEDURE — 93005 ELECTROCARDIOGRAM TRACING: CPT | Performed by: EMERGENCY MEDICINE

## 2025-03-08 RX ORDER — SODIUM CHLORIDE 0.9 % (FLUSH) 0.9 %
10 SYRINGE (ML) INJECTION AS NEEDED
Status: DISCONTINUED | OUTPATIENT
Start: 2025-03-08 | End: 2025-03-08 | Stop reason: HOSPADM

## 2025-03-09 LAB
QT INTERVAL: 378 MS
QTC INTERVAL: 419 MS

## 2025-03-14 ENCOUNTER — HOSPITAL ENCOUNTER (OUTPATIENT)
Dept: CARDIOLOGY | Facility: HOSPITAL | Age: 65
Discharge: HOME | End: 2025-03-14
Payer: MEDICARE

## 2025-03-14 VITALS
SYSTOLIC BLOOD PRESSURE: 116 MMHG | DIASTOLIC BLOOD PRESSURE: 73 MMHG | BODY MASS INDEX: 23.22 KG/M2 | WEIGHT: 147.93 LBS | HEIGHT: 67 IN | HEART RATE: 70 BPM

## 2025-03-14 DIAGNOSIS — R07.9 CHEST PAIN, UNSPECIFIED TYPE: ICD-10-CM

## 2025-03-14 PROCEDURE — 93350 STRESS TTE ONLY: CPT

## 2025-03-14 PROCEDURE — 93017 CV STRESS TEST TRACING ONLY: CPT

## 2025-03-14 PROCEDURE — 25510000001 PERFLUTREN 6.52 MG/ML SUSPENSION: Performed by: INTERNAL MEDICINE

## 2025-03-14 RX ADMIN — PERFLUTREN 8.48 MG: 6.52 INJECTION, SUSPENSION INTRAVENOUS at 15:07

## 2025-03-17 LAB
BH CV STRESS BP STAGE 1: NORMAL
BH CV STRESS BP STAGE 2: NORMAL
BH CV STRESS DURATION MIN STAGE 1: 3
BH CV STRESS DURATION MIN STAGE 2: 1
BH CV STRESS DURATION SEC STAGE 1: 0
BH CV STRESS DURATION SEC STAGE 2: 0
BH CV STRESS GRADE STAGE 1: 10
BH CV STRESS GRADE STAGE 2: 12
BH CV STRESS HR STAGE 1: 131
BH CV STRESS HR STAGE 2: 139
BH CV STRESS METS STAGE 1: 5
BH CV STRESS METS STAGE 2: 7.5
BH CV STRESS PROTOCOL 1: NORMAL
BH CV STRESS RECOVERY BP: NORMAL MMHG
BH CV STRESS RECOVERY HR: 84 BPM
BH CV STRESS SPEED STAGE 1: 1.7
BH CV STRESS SPEED STAGE 2: 2.5
BH CV STRESS STAGE 1: 1
BH CV STRESS STAGE 2: 2
MAXIMAL PREDICTED HEART RATE: 154 BPM
PERCENT MAX PREDICTED HR: 90.26 %
STRESS BASELINE BP: NORMAL MMHG
STRESS BASELINE HR: 75 BPM
STRESS PERCENT HR: 106 %
STRESS POST ESTIMATED WORKLOAD: 7.5 METS
STRESS POST EXERCISE DUR MIN: 4 MIN
STRESS POST EXERCISE DUR SEC: 0 SEC
STRESS POST PEAK BP: NORMAL MMHG
STRESS POST PEAK HR: 139 BPM
STRESS TARGET HR: 131 BPM

## 2025-04-01 ENCOUNTER — RESULTS FOLLOW-UP (OUTPATIENT)
Dept: INTERNAL MEDICINE | Age: 65
End: 2025-04-01

## 2025-04-11 RX ORDER — CYANOCOBALAMIN 1000 UG/ML
INJECTION, SOLUTION INTRAMUSCULAR; SUBCUTANEOUS
Qty: 3 ML | Refills: 1 | Status: SHIPPED | OUTPATIENT
Start: 2025-04-11

## 2025-06-06 ENCOUNTER — OFFICE VISIT (OUTPATIENT)
Dept: INTERNAL MEDICINE | Age: 65
End: 2025-06-06
Payer: MEDICARE

## 2025-06-06 ENCOUNTER — TRANSCRIBE ORDERS (OUTPATIENT)
Dept: ADMINISTRATIVE | Facility: HOSPITAL | Age: 65
End: 2025-06-06
Payer: MEDICARE

## 2025-06-06 VITALS
HEART RATE: 86 BPM | BODY MASS INDEX: 22.6 KG/M2 | OXYGEN SATURATION: 99 % | SYSTOLIC BLOOD PRESSURE: 110 MMHG | HEIGHT: 67 IN | WEIGHT: 144 LBS | DIASTOLIC BLOOD PRESSURE: 66 MMHG

## 2025-06-06 DIAGNOSIS — I10 ESSENTIAL HYPERTENSION: Primary | ICD-10-CM

## 2025-06-06 DIAGNOSIS — E55.9 VITAMIN D DEFICIENCY: ICD-10-CM

## 2025-06-06 DIAGNOSIS — Z12.31 SCREENING MAMMOGRAM FOR BREAST CANCER: ICD-10-CM

## 2025-06-06 DIAGNOSIS — Z13.1 SCREENING FOR DIABETES MELLITUS: ICD-10-CM

## 2025-06-06 DIAGNOSIS — Z12.31 SCREENING MAMMOGRAM FOR BREAST CANCER: Primary | ICD-10-CM

## 2025-06-06 DIAGNOSIS — E78.2 MIXED HYPERLIPIDEMIA: ICD-10-CM

## 2025-06-06 PROCEDURE — 3017F COLORECTAL CA SCREEN DOC REV: CPT | Performed by: INTERNAL MEDICINE

## 2025-06-06 PROCEDURE — G8420 CALC BMI NORM PARAMETERS: HCPCS | Performed by: INTERNAL MEDICINE

## 2025-06-06 PROCEDURE — 1036F TOBACCO NON-USER: CPT | Performed by: INTERNAL MEDICINE

## 2025-06-06 PROCEDURE — 1090F PRES/ABSN URINE INCON ASSESS: CPT | Performed by: INTERNAL MEDICINE

## 2025-06-06 PROCEDURE — 1159F MED LIST DOCD IN RCRD: CPT | Performed by: INTERNAL MEDICINE

## 2025-06-06 PROCEDURE — 1123F ACP DISCUSS/DSCN MKR DOCD: CPT | Performed by: INTERNAL MEDICINE

## 2025-06-06 PROCEDURE — 99213 OFFICE O/P EST LOW 20 MIN: CPT | Performed by: INTERNAL MEDICINE

## 2025-06-06 PROCEDURE — G8427 DOCREV CUR MEDS BY ELIG CLIN: HCPCS | Performed by: INTERNAL MEDICINE

## 2025-06-06 PROCEDURE — 3078F DIAST BP <80 MM HG: CPT | Performed by: INTERNAL MEDICINE

## 2025-06-06 PROCEDURE — 3074F SYST BP LT 130 MM HG: CPT | Performed by: INTERNAL MEDICINE

## 2025-06-06 PROCEDURE — G8399 PT W/DXA RESULTS DOCUMENT: HCPCS | Performed by: INTERNAL MEDICINE

## 2025-06-06 ASSESSMENT — PATIENT HEALTH QUESTIONNAIRE - PHQ9
SUM OF ALL RESPONSES TO PHQ QUESTIONS 1-9: 4
2. FEELING DOWN, DEPRESSED OR HOPELESS: NOT AT ALL
6. FEELING BAD ABOUT YOURSELF - OR THAT YOU ARE A FAILURE OR HAVE LET YOURSELF OR YOUR FAMILY DOWN: NOT AT ALL
10. IF YOU CHECKED OFF ANY PROBLEMS, HOW DIFFICULT HAVE THESE PROBLEMS MADE IT FOR YOU TO DO YOUR WORK, TAKE CARE OF THINGS AT HOME, OR GET ALONG WITH OTHER PEOPLE: NOT DIFFICULT AT ALL
7. TROUBLE CONCENTRATING ON THINGS, SUCH AS READING THE NEWSPAPER OR WATCHING TELEVISION: NOT AT ALL
SUM OF ALL RESPONSES TO PHQ QUESTIONS 1-9: 4
1. LITTLE INTEREST OR PLEASURE IN DOING THINGS: SEVERAL DAYS
SUM OF ALL RESPONSES TO PHQ QUESTIONS 1-9: 4
4. FEELING TIRED OR HAVING LITTLE ENERGY: SEVERAL DAYS
3. TROUBLE FALLING OR STAYING ASLEEP: SEVERAL DAYS
8. MOVING OR SPEAKING SO SLOWLY THAT OTHER PEOPLE COULD HAVE NOTICED. OR THE OPPOSITE, BEING SO FIGETY OR RESTLESS THAT YOU HAVE BEEN MOVING AROUND A LOT MORE THAN USUAL: NOT AT ALL
SUM OF ALL RESPONSES TO PHQ QUESTIONS 1-9: 4
5. POOR APPETITE OR OVEREATING: SEVERAL DAYS
9. THOUGHTS THAT YOU WOULD BE BETTER OFF DEAD, OR OF HURTING YOURSELF: NOT AT ALL

## 2025-06-06 NOTE — PROGRESS NOTES
Chief Complaint   Patient presents with    3 Month Follow-Up    Hypertension       HPI:   History of Present Illness  The patient presents for f/u htn and other medical problems and for evaluation of headaches, cough, and health maintenance.    Reports overall health improvement, no recent diverticulitis or cardiac events. Engages in hiking. Tolerates Eliquis well, no adverse effects. Lexapro and nortriptyline are effective. No medication refills needed.    In 03/2025, experienced a severe headache requiring ER visit, etiology unknown, resolved. Intermittent headaches attributed to allergies.    Persistent cough for several months, gradually improving.    Interested in MMR vaccine, due for COVID-19 booster. Contracted COVID-19 in 07/2024.       Past Medical History:   Diagnosis Date    Cervical disc disease 8/26/2017    DVT (deep venous thrombosis) (HCC)     Essential hypertension 12/1/2019    Gastroesophageal reflux disease without esophagitis 8/26/2017    Hyperlipidemia     Laryngeal polyp     followed by Giovanni Benjamin PA-C    Major depressive disorder in remission 8/26/2017    Migraine without aura 8/26/2017    Pernicious anemia 8/26/2017    Raynaud's phenomenon 8/26/2017       Past Surgical History:   Procedure Laterality Date    ARTERIOGRAM Bilateral 09/02/2020    ECOS-PULMONARY performed by Joe Adame MD at [facility] OR    CHOLECYSTECTOMY, LAPAROSCOPIC N/A 04/27/2022    laparoscopic robot assisted cholecystectomy with ICG performed by Nellie Fiore MD at [facility] OR    COLONOSCOPY N/A 10/21/2022    Dr PABLO Lane-Evidence of diverticular disease throughout the right and left colon, more noticeable in left colon, 5 yr recall    HYSTERECTOMY (CERVIX STATUS UNKNOWN)      RIGOBERTO AND BSO (CERVIX REMOVED)         Family History   Problem Relation Age of Onset    High Blood Pressure Mother     Coronary Art Dis Mother     Thyroid Disease Mother         Hypothyroidism    Breast Cancer Mother 84    Colon Polyps Father

## 2025-07-07 RX ORDER — CYANOCOBALAMIN 1000 UG/ML
INJECTION, SOLUTION INTRAMUSCULAR; SUBCUTANEOUS
Qty: 3 ML | Refills: 1 | Status: SHIPPED | OUTPATIENT
Start: 2025-07-07

## 2025-07-31 DIAGNOSIS — K21.9 GASTROESOPHAGEAL REFLUX DISEASE WITHOUT ESOPHAGITIS: Primary | ICD-10-CM

## 2025-07-31 RX ORDER — OMEPRAZOLE 20 MG/1
20 CAPSULE, DELAYED RELEASE ORAL DAILY
Qty: 90 CAPSULE | Refills: 1 | Status: SHIPPED | OUTPATIENT
Start: 2025-07-31 | End: 2025-08-01 | Stop reason: SDUPTHER

## 2025-08-01 DIAGNOSIS — K21.9 GASTROESOPHAGEAL REFLUX DISEASE WITHOUT ESOPHAGITIS: ICD-10-CM

## 2025-08-01 RX ORDER — OMEPRAZOLE 20 MG/1
20 CAPSULE, DELAYED RELEASE ORAL DAILY
Qty: 90 CAPSULE | Refills: 1 | Status: SHIPPED | OUTPATIENT
Start: 2025-08-01

## (undated) DEVICE — GARMENT,MEDLINE,DVT,INT,CALF,MED, GEN2: Brand: MEDLINE

## (undated) DEVICE — SOLUTION IV 250ML 0.9% SOD CHL PH 5 INJ USP VIAFLX PLAS

## (undated) DEVICE — SINGLE PORT MANIFOLD: Brand: NEPTUNE 2

## (undated) DEVICE — DRAPE KEYBOARD W26XL36IN ADH

## (undated) DEVICE — SOLUTION IV 500ML 0.9% SOD CHL PH 5 INJ USP VIAFLX PLAS

## (undated) DEVICE — DRAPE,UTILITY,XL,4/PK,STERILE: Brand: MEDLINE

## (undated) DEVICE — COVER,MAYO STAND,STERILE: Brand: MEDLINE

## (undated) DEVICE — C-ARM: Brand: UNBRANDED

## (undated) DEVICE — SET IV LVP AS 10 DROP 4 SMARTSITES 4-WAY STOPCOCK

## (undated) DEVICE — Device: Brand: DEFENDO AIR/WATER/SUCTION AND BIOPSY VALVE

## (undated) DEVICE — CUFF BLD PRESSURE 1 TUBE AD 25-34 CM ARM VLY FLEXIPORT DISP

## (undated) DEVICE — POLYETHYLENE CATHETER: Brand: COOK

## (undated) DEVICE — GUIDEWIRE VASC L260CM DIA0.035IN L10CM DIA3MM J TIP PTFE S

## (undated) DEVICE — CANNULA NSL AD L7FT DIV O2 CO2 W/ M LUERLOCK TRMPT CONN

## (undated) DEVICE — PINNACLE INTRODUCER SHEATH: Brand: PINNACLE

## (undated) DEVICE — GENERAL LAP CDS

## (undated) DEVICE — ROYAL SILK SURGICAL GOWN, XXL: Brand: CONVERTORS

## (undated) DEVICE — SOLUTION IV 1000ML 0.9% SOD CHL PH 5 INJ USP VIAFLX PLAS

## (undated) DEVICE — SUTURE MCRYL SZ 4-0 L18IN ABSRB UD L19MM PS-2 3/8 CIR PRIM Y496G

## (undated) DEVICE — GLOVE SURG SZ 7 CRM LTX FREE POLYISOPRENE POLYMER BEAD ANTI

## (undated) DEVICE — CLIP INT L POLYMER LOK LIG HEM O LOK

## (undated) DEVICE — TISSUE RETRIEVAL SYSTEM: Brand: INZII RETRIEVAL SYSTEM

## (undated) DEVICE — ARM DRAPE

## (undated) DEVICE — ENDOGATOR AUXILIARY WATER JET CONNECTOR: Brand: ENDOGATOR

## (undated) DEVICE — SUTURE VCRL SZ 0 L27IN ABSRB VLT L36MM UR-5 5/8 CIR J376H

## (undated) DEVICE — DRESSING TRNSPAR W5XL4.5IN FLM SHT SEMIPERMEABLE WIND

## (undated) DEVICE — SODIUM CHL 09% SOL EXCEL

## (undated) DEVICE — 3 ML SYRINGE WITH HYPODERMIC SAFETY NEEDLE: Brand: MAGELLAN

## (undated) DEVICE — COVER LT HNDL BLU PLAS

## (undated) DEVICE — CUFF,BP,DISP,1 TUBE,ADULT,HP: Brand: MEDLINE

## (undated) DEVICE — BLADELESS OBTURATOR: Brand: WECK VISTA

## (undated) DEVICE — COVER US PRB W5XL96IN LTX W/ GEL

## (undated) DEVICE — SOLUTION IV IRRIG POUR BRL 0.9% SODIUM CHL 2F7124

## (undated) DEVICE — NEEDLE INSUF L120MM ULT VERES ENDOPATH

## (undated) DEVICE — ANGIOGRAPHY PK

## (undated) DEVICE — GOWN,PRECEPT,XLNG/XXLARGE,STRL: Brand: MEDLINE

## (undated) DEVICE — SUTURE PROL SZ 6-0 L24IN NONABSORBABLE BLU BV-1 L9.3MM 3/8 M8805

## (undated) DEVICE — RADIFOCUS GLIDECATH: Brand: GLIDECATH

## (undated) DEVICE — PERCUTANEOUS ENTRY THINWALL NEEDLE  ONE-PART: Brand: COOK

## (undated) DEVICE — TBG SMPL FLTR LINE NASL 02/C02 A/ BX/100

## (undated) DEVICE — MASK,OXYGEN,MED CONC,ADLT,7' TUB, UC: Brand: PENDING

## (undated) DEVICE — SUTURE VCRL CTRL REL 2-0 CTX 18IN ABSRB BRAID UD J723D

## (undated) DEVICE — RADIFOCUS GLIDEWIRE: Brand: GLIDEWIRE

## (undated) DEVICE — SUCTION IRRIGATOR: Brand: ENDOWRIST

## (undated) DEVICE — SYRINGE 20ML LL S/C 50

## (undated) DEVICE — ENDO KIT,LOURDES HOSPITAL: Brand: MEDLINE INDUSTRIES, INC.

## (undated) DEVICE — UNDERGLOVE SURG SZ 7.5 FNGR THK0.21MIL GRN LTX BEAD CUF

## (undated) DEVICE — COVER US PRB W15XL120CM W/ GEL RUBBERBAND TAPE STRP FLD GEN

## (undated) DEVICE — ADHESIVE SKIN CLSR 0.7ML TOP DERMBND ADV

## (undated) DEVICE — SYRINGE MED 10ML LUERLOCK TIP W/O SFTY DISP

## (undated) DEVICE — LUER-LOK 360°: Brand: CONNECTA, LUER-LOK

## (undated) DEVICE — GLOVE SURG SZ 7 L12IN FNGR THK94MIL TRNSLUC YEL LTX HYDRGEL

## (undated) DEVICE — SOLUTION ANTIFOG VIS SYS CLEARIFY LAPSCP

## (undated) DEVICE — TOWEL,OR,DSP,ST,BLUE,DLX,4/PK,20PK/CS: Brand: MEDLINE

## (undated) DEVICE — TUBE ET 7.5MM NSL ORAL BASIC CUF INTMED MURPHY EYE RADPQ

## (undated) DEVICE — CATHETER INFUSION ENDOVASC DEV 12 FRX106 CM US COR EKOSONIC

## (undated) DEVICE — CANNULA SEAL

## (undated) DEVICE — SENSR O2 OXIMAX FNGR A/ 18IN NONSTR

## (undated) DEVICE — YANKAUER,BULB TIP WITH VENT: Brand: ARGYLE

## (undated) DEVICE — THE CHANNEL CLEANING BRUSH IS A NYLON FLEXI BRUSH ATTACHED TO A FLEXIBLE PLASTIC SHEATH DESIGNED TO SAFELY REMOVE DEBRIS FROM FLEXIBLE ENDOSCOPES.

## (undated) DEVICE — UNIV MI, 5 FR, 7CM, ANL,: Brand: MICROEZ INTRODUCER

## (undated) DEVICE — COVER LT HNDL PLAS RIG 2 PER PK

## (undated) DEVICE — LARYNGOSCOPE BLDE MAC HNDL M SZ 35 ST CURAPLEX CURAVIEW LED